# Patient Record
Sex: MALE | Race: WHITE | Employment: OTHER | URBAN - NONMETROPOLITAN AREA
[De-identification: names, ages, dates, MRNs, and addresses within clinical notes are randomized per-mention and may not be internally consistent; named-entity substitution may affect disease eponyms.]

---

## 2017-01-09 LAB
ALBUMIN: 3
BASOPHILS ABSOLUTE: ABNORMAL /ΜL
BASOPHILS RELATIVE PERCENT: ABNORMAL %
BUN BLDV-MCNC: 21 MG/DL
CALCIUM SERPL-MCNC: 8.8 MG/DL
CHLORIDE BLD-SCNC: 95 MMOL/L
CO2: 32.6 MMOL/L
CREAT SERPL-MCNC: 1.3 MG/DL
EOSINOPHILS ABSOLUTE: ABNORMAL /ΜL
EOSINOPHILS RELATIVE PERCENT: ABNORMAL %
GFR CALCULATED: 56
GLUCOSE BLD-MCNC: 203 MG/DL
HCT VFR BLD CALC: 33.6 % (ref 41–53)
HEMOGLOBIN: 11.4 G/DL (ref 13.5–17.5)
LYMPHOCYTES ABSOLUTE: ABNORMAL /ΜL
LYMPHOCYTES RELATIVE PERCENT: ABNORMAL %
MCH RBC QN AUTO: ABNORMAL PG
MCHC RBC AUTO-ENTMCNC: ABNORMAL G/DL
MCV RBC AUTO: ABNORMAL FL
MONOCYTES ABSOLUTE: ABNORMAL /ΜL
MONOCYTES RELATIVE PERCENT: ABNORMAL %
NEUTROPHILS ABSOLUTE: ABNORMAL /ΜL
NEUTROPHILS RELATIVE PERCENT: ABNORMAL %
PHOSPHORUS: 2.8 MG/DL
PLATELET # BLD: 412 K/ΜL
PMV BLD AUTO: ABNORMAL FL
POTASSIUM SERPL-SCNC: 3.9 MMOL/L
RBC # BLD: 3.58 10^6/ΜL
SODIUM BLD-SCNC: 138 MMOL/L
WBC # BLD: 11 10^3/ML

## 2017-01-11 ENCOUNTER — OFFICE VISIT (OUTPATIENT)
Dept: NEPHROLOGY | Age: 82
End: 2017-01-11

## 2017-01-11 VITALS — SYSTOLIC BLOOD PRESSURE: 132 MMHG | DIASTOLIC BLOOD PRESSURE: 60 MMHG | HEART RATE: 80 BPM | OXYGEN SATURATION: 94 %

## 2017-01-11 DIAGNOSIS — N18.30 CKD (CHRONIC KIDNEY DISEASE) STAGE 3, GFR 30-59 ML/MIN (HCC): Primary | Chronic | ICD-10-CM

## 2017-01-11 PROCEDURE — 99213 OFFICE O/P EST LOW 20 MIN: CPT | Performed by: INTERNAL MEDICINE

## 2017-01-11 RX ORDER — ALBUTEROL SULFATE 2.5 MG/3ML
2.5 SOLUTION RESPIRATORY (INHALATION) EVERY 6 HOURS PRN
Status: ON HOLD | COMMUNITY
End: 2017-05-23 | Stop reason: CLARIF

## 2017-01-11 RX ORDER — GABAPENTIN 300 MG/1
300 CAPSULE ORAL 3 TIMES DAILY
Status: ON HOLD | COMMUNITY
End: 2020-07-10 | Stop reason: SDUPTHER

## 2017-01-11 RX ORDER — PANTOPRAZOLE SODIUM 40 MG/1
40 TABLET, DELAYED RELEASE ORAL NIGHTLY
Status: ON HOLD | COMMUNITY
End: 2020-07-10 | Stop reason: HOSPADM

## 2017-01-11 RX ORDER — INSULIN HUMAN 100 [IU]/ML
INJECTION, SOLUTION PARENTERAL
COMMUNITY
Start: 2016-12-09 | End: 2017-05-22

## 2017-01-11 ASSESSMENT — ENCOUNTER SYMPTOMS
RESPIRATORY NEGATIVE: 1
SHORTNESS OF BREATH: 0
ABDOMINAL PAIN: 0
VOMITING: 0
FACIAL SWELLING: 0
BLOOD IN STOOL: 0
DIARRHEA: 0
COUGH: 0
WHEEZING: 0
NAUSEA: 0
CONSTIPATION: 0
CHEST TIGHTNESS: 0
BACK PAIN: 0
ABDOMINAL DISTENTION: 0
GASTROINTESTINAL NEGATIVE: 1

## 2017-02-07 ENCOUNTER — PROCEDURE VISIT (OUTPATIENT)
Dept: CARDIOLOGY | Age: 82
End: 2017-02-07

## 2017-02-07 DIAGNOSIS — Z95.0 PACEMAKER: Primary | ICD-10-CM

## 2017-02-07 PROCEDURE — 93280 PM DEVICE PROGR EVAL DUAL: CPT | Performed by: INTERNAL MEDICINE

## 2017-03-22 ENCOUNTER — TELEPHONE (OUTPATIENT)
Dept: CARDIOLOGY | Age: 82
End: 2017-03-22

## 2017-04-19 ENCOUNTER — PROCEDURE VISIT (OUTPATIENT)
Dept: CARDIOLOGY | Age: 82
End: 2017-04-19

## 2017-04-19 DIAGNOSIS — Z95.0 PACEMAKER: Primary | ICD-10-CM

## 2017-04-19 PROCEDURE — 93294 REM INTERROG EVL PM/LDLS PM: CPT | Performed by: INTERNAL MEDICINE

## 2017-04-19 PROCEDURE — 93296 REM INTERROG EVL PM/IDS: CPT | Performed by: INTERNAL MEDICINE

## 2017-05-15 ENCOUNTER — TELEPHONE (OUTPATIENT)
Dept: UROLOGY | Age: 82
End: 2017-05-15

## 2017-05-25 PROBLEM — J90 PLEURAL EFFUSION: Status: ACTIVE | Noted: 2017-05-25

## 2017-06-05 ENCOUNTER — PROCEDURE VISIT (OUTPATIENT)
Dept: UROLOGY | Age: 82
End: 2017-06-05

## 2017-06-05 VITALS
SYSTOLIC BLOOD PRESSURE: 100 MMHG | BODY MASS INDEX: 30.8 KG/M2 | WEIGHT: 220 LBS | DIASTOLIC BLOOD PRESSURE: 58 MMHG | HEIGHT: 71 IN

## 2017-06-05 DIAGNOSIS — R33.9 URINARY RETENTION: Primary | ICD-10-CM

## 2017-06-05 PROCEDURE — 52000 CYSTOURETHROSCOPY: CPT | Performed by: UROLOGY

## 2017-06-05 PROCEDURE — 99999 PR OFFICE/OUTPT VISIT,PROCEDURE ONLY: CPT | Performed by: UROLOGY

## 2017-06-05 PROCEDURE — 1036F TOBACCO NON-USER: CPT | Performed by: UROLOGY

## 2017-06-07 LAB
BUN BLDV-MCNC: 26 MG/DL
CALCIUM SERPL-MCNC: 8.4 MG/DL
CHLORIDE BLD-SCNC: 94 MMOL/L
CO2: 31.9 MMOL/L
CREAT SERPL-MCNC: 1.6 MG/DL
GFR CALCULATED: 44
GLUCOSE BLD-MCNC: 230 MG/DL
POTASSIUM SERPL-SCNC: 4.9 MMOL/L
SODIUM BLD-SCNC: 136 MMOL/L

## 2017-06-09 ENCOUNTER — OFFICE VISIT (OUTPATIENT)
Dept: NEPHROLOGY | Age: 82
End: 2017-06-09

## 2017-06-09 VITALS — SYSTOLIC BLOOD PRESSURE: 118 MMHG | DIASTOLIC BLOOD PRESSURE: 58 MMHG

## 2017-06-09 DIAGNOSIS — N18.30 CKD (CHRONIC KIDNEY DISEASE) STAGE 3, GFR 30-59 ML/MIN (HCC): Primary | Chronic | ICD-10-CM

## 2017-06-09 PROCEDURE — 1111F DSCHRG MED/CURRENT MED MERGE: CPT | Performed by: INTERNAL MEDICINE

## 2017-06-09 PROCEDURE — 99213 OFFICE O/P EST LOW 20 MIN: CPT | Performed by: INTERNAL MEDICINE

## 2017-06-09 PROCEDURE — 4040F PNEUMOC VAC/ADMIN/RCVD: CPT | Performed by: INTERNAL MEDICINE

## 2017-06-09 PROCEDURE — G8427 DOCREV CUR MEDS BY ELIG CLIN: HCPCS | Performed by: INTERNAL MEDICINE

## 2017-06-09 PROCEDURE — 1123F ACP DISCUSS/DSCN MKR DOCD: CPT | Performed by: INTERNAL MEDICINE

## 2017-06-09 PROCEDURE — 1036F TOBACCO NON-USER: CPT | Performed by: INTERNAL MEDICINE

## 2017-06-09 PROCEDURE — G8417 CALC BMI ABV UP PARAM F/U: HCPCS | Performed by: INTERNAL MEDICINE

## 2017-06-09 PROCEDURE — G8598 ASA/ANTIPLAT THER USED: HCPCS | Performed by: INTERNAL MEDICINE

## 2017-06-09 RX ORDER — FLUCONAZOLE 100 MG/1
100 TABLET ORAL DAILY
COMMUNITY
End: 2017-07-26 | Stop reason: ALTCHOICE

## 2017-06-09 RX ORDER — TRAMADOL HYDROCHLORIDE 50 MG/1
50 TABLET ORAL 2 TIMES DAILY
COMMUNITY
End: 2020-02-10 | Stop reason: SDUPTHER

## 2017-06-09 ASSESSMENT — ENCOUNTER SYMPTOMS
CHEST TIGHTNESS: 0
SHORTNESS OF BREATH: 0
WHEEZING: 0
FACIAL SWELLING: 0
NAUSEA: 0
GASTROINTESTINAL NEGATIVE: 1
VOMITING: 0
CONSTIPATION: 0
ABDOMINAL DISTENTION: 0
ABDOMINAL PAIN: 0
DIARRHEA: 0
BACK PAIN: 0
BLOOD IN STOOL: 0
RESPIRATORY NEGATIVE: 1
COUGH: 0

## 2017-06-27 ENCOUNTER — OFFICE VISIT (OUTPATIENT)
Dept: UROLOGY | Age: 82
End: 2017-06-27

## 2017-06-27 VITALS
SYSTOLIC BLOOD PRESSURE: 118 MMHG | WEIGHT: 222 LBS | DIASTOLIC BLOOD PRESSURE: 64 MMHG | HEIGHT: 72 IN | BODY MASS INDEX: 30.07 KG/M2

## 2017-06-27 DIAGNOSIS — R33.9 URINARY RETENTION: Primary | ICD-10-CM

## 2017-06-27 PROCEDURE — 1036F TOBACCO NON-USER: CPT | Performed by: NURSE PRACTITIONER

## 2017-06-27 PROCEDURE — 4040F PNEUMOC VAC/ADMIN/RCVD: CPT | Performed by: NURSE PRACTITIONER

## 2017-06-27 PROCEDURE — 99213 OFFICE O/P EST LOW 20 MIN: CPT | Performed by: NURSE PRACTITIONER

## 2017-06-27 PROCEDURE — G8417 CALC BMI ABV UP PARAM F/U: HCPCS | Performed by: NURSE PRACTITIONER

## 2017-06-27 PROCEDURE — 1123F ACP DISCUSS/DSCN MKR DOCD: CPT | Performed by: NURSE PRACTITIONER

## 2017-06-27 PROCEDURE — G8598 ASA/ANTIPLAT THER USED: HCPCS | Performed by: NURSE PRACTITIONER

## 2017-06-27 PROCEDURE — G8427 DOCREV CUR MEDS BY ELIG CLIN: HCPCS | Performed by: NURSE PRACTITIONER

## 2017-06-27 RX ORDER — DOCUSATE SODIUM 100 MG/1
100 CAPSULE, LIQUID FILLED ORAL 2 TIMES DAILY
COMMUNITY
End: 2017-12-08 | Stop reason: ALTCHOICE

## 2017-06-28 ENCOUNTER — OFFICE VISIT (OUTPATIENT)
Dept: OTOLARYNGOLOGY | Age: 82
End: 2017-06-28

## 2017-06-28 VITALS
TEMPERATURE: 98.1 F | SYSTOLIC BLOOD PRESSURE: 116 MMHG | RESPIRATION RATE: 16 BRPM | HEART RATE: 78 BPM | DIASTOLIC BLOOD PRESSURE: 60 MMHG

## 2017-06-28 DIAGNOSIS — H65.02 ACUTE SEROUS OTITIS MEDIA OF LEFT EAR, RECURRENCE NOT SPECIFIED: Primary | ICD-10-CM

## 2017-06-28 PROCEDURE — G8417 CALC BMI ABV UP PARAM F/U: HCPCS | Performed by: NURSE PRACTITIONER

## 2017-06-28 PROCEDURE — 99213 OFFICE O/P EST LOW 20 MIN: CPT | Performed by: NURSE PRACTITIONER

## 2017-06-28 PROCEDURE — G8427 DOCREV CUR MEDS BY ELIG CLIN: HCPCS | Performed by: NURSE PRACTITIONER

## 2017-06-28 PROCEDURE — 1123F ACP DISCUSS/DSCN MKR DOCD: CPT | Performed by: NURSE PRACTITIONER

## 2017-06-28 PROCEDURE — 1036F TOBACCO NON-USER: CPT | Performed by: NURSE PRACTITIONER

## 2017-06-28 PROCEDURE — 4040F PNEUMOC VAC/ADMIN/RCVD: CPT | Performed by: NURSE PRACTITIONER

## 2017-06-28 PROCEDURE — G8598 ASA/ANTIPLAT THER USED: HCPCS | Performed by: NURSE PRACTITIONER

## 2017-06-28 ASSESSMENT — ENCOUNTER SYMPTOMS
SINUS PRESSURE: 0
CONSTIPATION: 0
RHINORRHEA: 0
WHEEZING: 0
FACIAL SWELLING: 0
ANAL BLEEDING: 0
SHORTNESS OF BREATH: 0
PHOTOPHOBIA: 0
CHEST TIGHTNESS: 0
STRIDOR: 0
APNEA: 0
EYE REDNESS: 0
NAUSEA: 0
BACK PAIN: 0
TROUBLE SWALLOWING: 0
SORE THROAT: 0
EYE PAIN: 0
ABDOMINAL PAIN: 0
COLOR CHANGE: 0
ABDOMINAL DISTENTION: 0
EYE DISCHARGE: 0
VOMITING: 0
COUGH: 0
CHOKING: 0
EYE ITCHING: 0
RECTAL PAIN: 0
VOICE CHANGE: 0
DIARRHEA: 0
BLOOD IN STOOL: 0

## 2017-07-11 ENCOUNTER — OFFICE VISIT (OUTPATIENT)
Dept: CARDIOLOGY | Age: 82
End: 2017-07-11

## 2017-07-11 VITALS
HEART RATE: 72 BPM | SYSTOLIC BLOOD PRESSURE: 108 MMHG | WEIGHT: 215 LBS | BODY MASS INDEX: 29.16 KG/M2 | DIASTOLIC BLOOD PRESSURE: 60 MMHG

## 2017-07-11 DIAGNOSIS — I10 ESSENTIAL HYPERTENSION: Primary | ICD-10-CM

## 2017-07-11 DIAGNOSIS — I25.10 CORONARY ARTERY DISEASE INVOLVING NATIVE CORONARY ARTERY OF NATIVE HEART WITHOUT ANGINA PECTORIS: ICD-10-CM

## 2017-07-11 DIAGNOSIS — E78.01 FAMILIAL HYPERCHOLESTEROLEMIA: ICD-10-CM

## 2017-07-11 PROCEDURE — 99213 OFFICE O/P EST LOW 20 MIN: CPT | Performed by: NUCLEAR MEDICINE

## 2017-07-11 PROCEDURE — 4040F PNEUMOC VAC/ADMIN/RCVD: CPT | Performed by: NUCLEAR MEDICINE

## 2017-07-11 PROCEDURE — 1036F TOBACCO NON-USER: CPT | Performed by: NUCLEAR MEDICINE

## 2017-07-11 PROCEDURE — G8427 DOCREV CUR MEDS BY ELIG CLIN: HCPCS | Performed by: NUCLEAR MEDICINE

## 2017-07-11 PROCEDURE — G8417 CALC BMI ABV UP PARAM F/U: HCPCS | Performed by: NUCLEAR MEDICINE

## 2017-07-11 PROCEDURE — G8598 ASA/ANTIPLAT THER USED: HCPCS | Performed by: NUCLEAR MEDICINE

## 2017-07-11 PROCEDURE — 1123F ACP DISCUSS/DSCN MKR DOCD: CPT | Performed by: NUCLEAR MEDICINE

## 2017-07-11 RX ORDER — HYDROCODONE BITARTRATE AND ACETAMINOPHEN 5; 325 MG/1; MG/1
1 TABLET ORAL EVERY 4 HOURS PRN
COMMUNITY
End: 2020-05-12

## 2017-07-12 ENCOUNTER — TELEPHONE (OUTPATIENT)
Dept: UROLOGY | Age: 82
End: 2017-07-12

## 2017-07-20 ENCOUNTER — PROCEDURE VISIT (OUTPATIENT)
Dept: CARDIOLOGY CLINIC | Age: 82
End: 2017-07-20
Payer: MEDICARE

## 2017-07-20 DIAGNOSIS — Z95.0 PACEMAKER: Primary | ICD-10-CM

## 2017-07-20 PROCEDURE — 93294 REM INTERROG EVL PM/LDLS PM: CPT | Performed by: INTERNAL MEDICINE

## 2017-07-20 PROCEDURE — 93296 REM INTERROG EVL PM/IDS: CPT | Performed by: INTERNAL MEDICINE

## 2017-07-21 ENCOUNTER — OFFICE VISIT (OUTPATIENT)
Dept: ENT CLINIC | Age: 82
End: 2017-07-21
Payer: MEDICARE

## 2017-07-21 VITALS
TEMPERATURE: 98 F | RESPIRATION RATE: 24 BRPM | HEART RATE: 84 BPM | SYSTOLIC BLOOD PRESSURE: 120 MMHG | DIASTOLIC BLOOD PRESSURE: 70 MMHG | HEIGHT: 72 IN | WEIGHT: 215 LBS | BODY MASS INDEX: 29.12 KG/M2

## 2017-07-21 DIAGNOSIS — H91.91 HEARING LOSS, RIGHT: ICD-10-CM

## 2017-07-21 DIAGNOSIS — H65.21 RIGHT CHRONIC SEROUS OTITIS MEDIA: Primary | ICD-10-CM

## 2017-07-21 PROCEDURE — G8427 DOCREV CUR MEDS BY ELIG CLIN: HCPCS | Performed by: OTOLARYNGOLOGY

## 2017-07-21 PROCEDURE — 4040F PNEUMOC VAC/ADMIN/RCVD: CPT | Performed by: OTOLARYNGOLOGY

## 2017-07-21 PROCEDURE — G8598 ASA/ANTIPLAT THER USED: HCPCS | Performed by: OTOLARYNGOLOGY

## 2017-07-21 PROCEDURE — 1036F TOBACCO NON-USER: CPT | Performed by: OTOLARYNGOLOGY

## 2017-07-21 PROCEDURE — 1123F ACP DISCUSS/DSCN MKR DOCD: CPT | Performed by: OTOLARYNGOLOGY

## 2017-07-21 PROCEDURE — 69433 CREATE EARDRUM OPENING: CPT | Performed by: OTOLARYNGOLOGY

## 2017-07-21 PROCEDURE — 99212 OFFICE O/P EST SF 10 MIN: CPT | Performed by: OTOLARYNGOLOGY

## 2017-07-21 PROCEDURE — G8417 CALC BMI ABV UP PARAM F/U: HCPCS | Performed by: OTOLARYNGOLOGY

## 2017-07-21 RX ORDER — CYCLOBENZAPRINE HCL 10 MG
10 TABLET ORAL DAILY
Status: ON HOLD | COMMUNITY
Start: 2017-07-13 | End: 2022-10-11

## 2017-07-26 ENCOUNTER — OFFICE VISIT (OUTPATIENT)
Dept: UROLOGY | Age: 82
End: 2017-07-26
Payer: MEDICARE

## 2017-07-26 VITALS
SYSTOLIC BLOOD PRESSURE: 118 MMHG | WEIGHT: 215 LBS | HEIGHT: 72 IN | DIASTOLIC BLOOD PRESSURE: 66 MMHG | BODY MASS INDEX: 29.12 KG/M2

## 2017-07-26 DIAGNOSIS — N40.1 BENIGN NON-NODULAR PROSTATIC HYPERPLASIA WITH LOWER URINARY TRACT SYMPTOMS: Primary | ICD-10-CM

## 2017-07-26 LAB — POST VOID RESIDUAL (PVR): 590 ML

## 2017-07-26 PROCEDURE — G8427 DOCREV CUR MEDS BY ELIG CLIN: HCPCS | Performed by: NURSE PRACTITIONER

## 2017-07-26 PROCEDURE — G8598 ASA/ANTIPLAT THER USED: HCPCS | Performed by: NURSE PRACTITIONER

## 2017-07-26 PROCEDURE — G8417 CALC BMI ABV UP PARAM F/U: HCPCS | Performed by: NURSE PRACTITIONER

## 2017-07-26 PROCEDURE — 51702 INSERT TEMP BLADDER CATH: CPT | Performed by: NURSE PRACTITIONER

## 2017-07-26 PROCEDURE — 1036F TOBACCO NON-USER: CPT | Performed by: NURSE PRACTITIONER

## 2017-07-26 PROCEDURE — 1123F ACP DISCUSS/DSCN MKR DOCD: CPT | Performed by: NURSE PRACTITIONER

## 2017-07-26 PROCEDURE — 51798 US URINE CAPACITY MEASURE: CPT | Performed by: NURSE PRACTITIONER

## 2017-07-26 PROCEDURE — 4040F PNEUMOC VAC/ADMIN/RCVD: CPT | Performed by: NURSE PRACTITIONER

## 2017-07-26 PROCEDURE — 99213 OFFICE O/P EST LOW 20 MIN: CPT | Performed by: NURSE PRACTITIONER

## 2017-08-14 ENCOUNTER — OFFICE VISIT (OUTPATIENT)
Dept: ENT CLINIC | Age: 82
End: 2017-08-14
Payer: MEDICARE

## 2017-08-14 VITALS
TEMPERATURE: 97.7 F | RESPIRATION RATE: 18 BRPM | SYSTOLIC BLOOD PRESSURE: 120 MMHG | DIASTOLIC BLOOD PRESSURE: 70 MMHG | HEART RATE: 72 BPM

## 2017-08-14 DIAGNOSIS — Z96.22 S/P MYRINGOTOMY WITH INSERTION OF TUBE: Primary | ICD-10-CM

## 2017-08-14 PROCEDURE — 99213 OFFICE O/P EST LOW 20 MIN: CPT | Performed by: NURSE PRACTITIONER

## 2017-08-14 PROCEDURE — G8417 CALC BMI ABV UP PARAM F/U: HCPCS | Performed by: NURSE PRACTITIONER

## 2017-08-14 PROCEDURE — 1123F ACP DISCUSS/DSCN MKR DOCD: CPT | Performed by: NURSE PRACTITIONER

## 2017-08-14 PROCEDURE — G8427 DOCREV CUR MEDS BY ELIG CLIN: HCPCS | Performed by: NURSE PRACTITIONER

## 2017-08-14 PROCEDURE — G8598 ASA/ANTIPLAT THER USED: HCPCS | Performed by: NURSE PRACTITIONER

## 2017-08-14 PROCEDURE — 1036F TOBACCO NON-USER: CPT | Performed by: NURSE PRACTITIONER

## 2017-08-14 PROCEDURE — 4040F PNEUMOC VAC/ADMIN/RCVD: CPT | Performed by: NURSE PRACTITIONER

## 2017-08-14 ASSESSMENT — ENCOUNTER SYMPTOMS
SHORTNESS OF BREATH: 0
SINUS PRESSURE: 0
COLOR CHANGE: 0
WHEEZING: 0
VOICE CHANGE: 0
DIARRHEA: 0
BLOOD IN STOOL: 0
EYE DISCHARGE: 0
VOMITING: 0
CONSTIPATION: 0
PHOTOPHOBIA: 0
ABDOMINAL DISTENTION: 0
SORE THROAT: 0
ABDOMINAL PAIN: 0
EYE PAIN: 0
FACIAL SWELLING: 0
APNEA: 0
RHINORRHEA: 0
STRIDOR: 0
CHEST TIGHTNESS: 0
EYE REDNESS: 0
TROUBLE SWALLOWING: 0
EYE ITCHING: 0
BACK PAIN: 0
NAUSEA: 0
RECTAL PAIN: 0
ANAL BLEEDING: 0
CHOKING: 0
COUGH: 0

## 2017-10-18 ENCOUNTER — PROCEDURE VISIT (OUTPATIENT)
Dept: CARDIOLOGY CLINIC | Age: 82
End: 2017-10-18

## 2017-10-18 DIAGNOSIS — Z95.0 PACEMAKER: Primary | ICD-10-CM

## 2017-12-05 LAB
BUN BLDV-MCNC: 40 MG/DL
CALCIUM SERPL-MCNC: 8.4 MG/DL
CHLORIDE BLD-SCNC: 102 MMOL/L
CO2: 29.9 MMOL/L
CREAT SERPL-MCNC: 1.9 MG/DL
CREATININE, URINE: 75.4
GFR CALCULATED: 36
GLUCOSE BLD-MCNC: 122 MG/DL
MICROALBUMIN/CREAT 24H UR: 8.1 MG/G{CREAT}
MICROALBUMIN/CREAT UR-RTO: 107
POTASSIUM SERPL-SCNC: 4.3 MMOL/L
SODIUM BLD-SCNC: 140 MMOL/L

## 2017-12-08 ENCOUNTER — OFFICE VISIT (OUTPATIENT)
Dept: NEPHROLOGY | Age: 82
End: 2017-12-08
Payer: MEDICARE

## 2017-12-08 VITALS — DIASTOLIC BLOOD PRESSURE: 75 MMHG | HEART RATE: 79 BPM | OXYGEN SATURATION: 98 % | SYSTOLIC BLOOD PRESSURE: 116 MMHG

## 2017-12-08 DIAGNOSIS — N18.30 CKD (CHRONIC KIDNEY DISEASE) STAGE 3, GFR 30-59 ML/MIN (HCC): Primary | Chronic | ICD-10-CM

## 2017-12-08 PROCEDURE — 4040F PNEUMOC VAC/ADMIN/RCVD: CPT | Performed by: INTERNAL MEDICINE

## 2017-12-08 PROCEDURE — G8417 CALC BMI ABV UP PARAM F/U: HCPCS | Performed by: INTERNAL MEDICINE

## 2017-12-08 PROCEDURE — G8598 ASA/ANTIPLAT THER USED: HCPCS | Performed by: INTERNAL MEDICINE

## 2017-12-08 PROCEDURE — 1036F TOBACCO NON-USER: CPT | Performed by: INTERNAL MEDICINE

## 2017-12-08 PROCEDURE — 1123F ACP DISCUSS/DSCN MKR DOCD: CPT | Performed by: INTERNAL MEDICINE

## 2017-12-08 PROCEDURE — G8427 DOCREV CUR MEDS BY ELIG CLIN: HCPCS | Performed by: INTERNAL MEDICINE

## 2017-12-08 PROCEDURE — G8484 FLU IMMUNIZE NO ADMIN: HCPCS | Performed by: INTERNAL MEDICINE

## 2017-12-08 PROCEDURE — 99213 OFFICE O/P EST LOW 20 MIN: CPT | Performed by: INTERNAL MEDICINE

## 2017-12-08 RX ORDER — MELOXICAM 15 MG/1
15 TABLET ORAL DAILY
COMMUNITY
End: 2019-06-07 | Stop reason: SINTOL

## 2017-12-08 ASSESSMENT — ENCOUNTER SYMPTOMS
CHEST TIGHTNESS: 0
BACK PAIN: 0
VOMITING: 0
COUGH: 0
ABDOMINAL PAIN: 0
BLOOD IN STOOL: 0
NAUSEA: 0
RESPIRATORY NEGATIVE: 1
ABDOMINAL DISTENTION: 0
CONSTIPATION: 0
DIARRHEA: 0
WHEEZING: 0
GASTROINTESTINAL NEGATIVE: 1
FACIAL SWELLING: 0
SHORTNESS OF BREATH: 0

## 2017-12-08 NOTE — PROGRESS NOTES
Neck: No JVD present. Cardiovascular: Normal rate, regular rhythm, normal heart sounds and intact distal pulses. Exam reveals no gallop and no friction rub. No murmur heard. Pulmonary/Chest: Effort normal and breath sounds normal. No respiratory distress. He has no wheezes. He has no rales. He exhibits no tenderness. Abdominal: Soft. Bowel sounds are normal. He exhibits no distension. There is no tenderness. There is no guarding. Musculoskeletal: Normal range of motion. He exhibits no edema or tenderness. Lymphadenopathy:     He has no cervical adenopathy. Neurological: He is alert and oriented to person, place, and time. Skin: Skin is warm and dry. No rash noted. He is not diaphoretic. No pallor. Psychiatric: He has a normal mood and affect. His behavior is normal. Judgment and thought content normal.   Nursing note and vitals reviewed.     CBC:   Lab Results   Component Value Date    WBC 10.6 05/25/2017    HGB 8.2 (L) 05/25/2017    HCT 25.4 (L) 05/25/2017    MCV 94.5 (H) 05/25/2017     (H) 05/25/2017     BMP:    Lab Results   Component Value Date     12/05/2017     06/07/2017     05/26/2017    K 4.3 12/05/2017    K 4.9 06/07/2017    K 3.9 05/26/2017     12/05/2017    CL 94 06/07/2017    CL 99 05/26/2017    CO2 29.9 12/05/2017    CO2 31.9 06/07/2017    CO2 26 05/26/2017    BUN 40 12/05/2017    BUN 26 06/07/2017    BUN 30 (H) 05/26/2017    CREATININE 1.9 12/05/2017    CREATININE 1.6 06/07/2017    CREATININE 2.0 (H) 05/26/2017    GLUCOSE 122 12/05/2017    GLUCOSE 230 06/07/2017    GLUCOSE 245 (H) 05/26/2017      Hepatic:   Lab Results   Component Value Date    AST 17 05/22/2017    AST 18 06/10/2016    AST 20 05/12/2015    ALT 9 (L) 05/22/2017    ALT 20 06/10/2016    ALT 20 05/12/2015    BILITOT 0.3 05/22/2017    BILITOT 0.4 06/10/2016    BILITOT 0.4 05/12/2015    ALKPHOS 84 05/22/2017    ALKPHOS 57 06/10/2016    ALKPHOS 52 05/12/2015     BNP: No results found for:

## 2018-01-24 ENCOUNTER — OFFICE VISIT (OUTPATIENT)
Dept: UROLOGY | Age: 83
End: 2018-01-24
Payer: MEDICARE

## 2018-01-24 VITALS
DIASTOLIC BLOOD PRESSURE: 58 MMHG | WEIGHT: 235 LBS | HEIGHT: 72 IN | SYSTOLIC BLOOD PRESSURE: 108 MMHG | BODY MASS INDEX: 31.83 KG/M2

## 2018-01-24 DIAGNOSIS — N31.9 NEUROGENIC BLADDER: ICD-10-CM

## 2018-01-24 DIAGNOSIS — Z87.898 HISTORY OF GROSS HEMATURIA: ICD-10-CM

## 2018-01-24 DIAGNOSIS — R33.9 URINARY RETENTION: Primary | ICD-10-CM

## 2018-01-24 PROCEDURE — 1123F ACP DISCUSS/DSCN MKR DOCD: CPT | Performed by: NURSE PRACTITIONER

## 2018-01-24 PROCEDURE — G8427 DOCREV CUR MEDS BY ELIG CLIN: HCPCS | Performed by: NURSE PRACTITIONER

## 2018-01-24 PROCEDURE — G8484 FLU IMMUNIZE NO ADMIN: HCPCS | Performed by: NURSE PRACTITIONER

## 2018-01-24 PROCEDURE — 99213 OFFICE O/P EST LOW 20 MIN: CPT | Performed by: NURSE PRACTITIONER

## 2018-01-24 PROCEDURE — G8417 CALC BMI ABV UP PARAM F/U: HCPCS | Performed by: NURSE PRACTITIONER

## 2018-01-24 PROCEDURE — 1036F TOBACCO NON-USER: CPT | Performed by: NURSE PRACTITIONER

## 2018-01-24 PROCEDURE — 4040F PNEUMOC VAC/ADMIN/RCVD: CPT | Performed by: NURSE PRACTITIONER

## 2018-01-24 PROCEDURE — G8599 NO ASA/ANTIPLAT THER USE RNG: HCPCS | Performed by: NURSE PRACTITIONER

## 2018-01-24 ASSESSMENT — ENCOUNTER SYMPTOMS
NAUSEA: 0
ABDOMINAL PAIN: 0
VOMITING: 0

## 2018-02-06 ENCOUNTER — NURSE ONLY (OUTPATIENT)
Dept: CARDIOLOGY CLINIC | Age: 83
End: 2018-02-06
Payer: MEDICARE

## 2018-02-06 DIAGNOSIS — Z95.0 PACEMAKER: Primary | ICD-10-CM

## 2018-02-06 PROCEDURE — 93280 PM DEVICE PROGR EVAL DUAL: CPT | Performed by: INTERNAL MEDICINE

## 2018-02-14 ENCOUNTER — OFFICE VISIT (OUTPATIENT)
Dept: ENT CLINIC | Age: 83
End: 2018-02-14
Payer: MEDICARE

## 2018-02-14 VITALS
HEART RATE: 78 BPM | SYSTOLIC BLOOD PRESSURE: 120 MMHG | DIASTOLIC BLOOD PRESSURE: 70 MMHG | RESPIRATION RATE: 18 BRPM | TEMPERATURE: 97.8 F

## 2018-02-14 DIAGNOSIS — Z96.22 S/P TYMPANOSTOMY TUBE PLACEMENT: Primary | ICD-10-CM

## 2018-02-14 PROCEDURE — 99213 OFFICE O/P EST LOW 20 MIN: CPT | Performed by: NURSE PRACTITIONER

## 2018-02-14 PROCEDURE — G8599 NO ASA/ANTIPLAT THER USE RNG: HCPCS | Performed by: NURSE PRACTITIONER

## 2018-02-14 PROCEDURE — 4040F PNEUMOC VAC/ADMIN/RCVD: CPT | Performed by: NURSE PRACTITIONER

## 2018-02-14 PROCEDURE — G8427 DOCREV CUR MEDS BY ELIG CLIN: HCPCS | Performed by: NURSE PRACTITIONER

## 2018-02-14 PROCEDURE — 1036F TOBACCO NON-USER: CPT | Performed by: NURSE PRACTITIONER

## 2018-02-14 PROCEDURE — G8417 CALC BMI ABV UP PARAM F/U: HCPCS | Performed by: NURSE PRACTITIONER

## 2018-02-14 PROCEDURE — G8484 FLU IMMUNIZE NO ADMIN: HCPCS | Performed by: NURSE PRACTITIONER

## 2018-02-14 PROCEDURE — 1123F ACP DISCUSS/DSCN MKR DOCD: CPT | Performed by: NURSE PRACTITIONER

## 2018-02-14 ASSESSMENT — ENCOUNTER SYMPTOMS
APNEA: 0
WHEEZING: 0
COLOR CHANGE: 0
RECTAL PAIN: 0
ANAL BLEEDING: 0
BLOOD IN STOOL: 0
SORE THROAT: 0
COUGH: 0
STRIDOR: 0
SHORTNESS OF BREATH: 0
VOMITING: 0
EYE REDNESS: 0
FACIAL SWELLING: 0
VOICE CHANGE: 0
SINUS PRESSURE: 0
NAUSEA: 0
EYE PAIN: 0
DIARRHEA: 0
CHEST TIGHTNESS: 0
CONSTIPATION: 0
RHINORRHEA: 0
ABDOMINAL DISTENTION: 0
TROUBLE SWALLOWING: 0
PHOTOPHOBIA: 0
BACK PAIN: 0
EYE DISCHARGE: 0
CHOKING: 0
EYE ITCHING: 0
ABDOMINAL PAIN: 0

## 2018-03-07 ENCOUNTER — TELEPHONE (OUTPATIENT)
Dept: CARDIOLOGY CLINIC | Age: 83
End: 2018-03-07

## 2018-05-08 ENCOUNTER — PROCEDURE VISIT (OUTPATIENT)
Dept: CARDIOLOGY CLINIC | Age: 83
End: 2018-05-08
Payer: MEDICARE

## 2018-05-08 DIAGNOSIS — Z95.0 PACEMAKER: Primary | ICD-10-CM

## 2018-05-08 PROCEDURE — 93296 REM INTERROG EVL PM/IDS: CPT | Performed by: INTERNAL MEDICINE

## 2018-05-08 PROCEDURE — 93294 REM INTERROG EVL PM/LDLS PM: CPT | Performed by: INTERNAL MEDICINE

## 2018-05-09 ENCOUNTER — PROCEDURE VISIT (OUTPATIENT)
Dept: CARDIOLOGY CLINIC | Age: 83
End: 2018-05-09

## 2018-05-09 DIAGNOSIS — Z95.0 PACEMAKER: Primary | ICD-10-CM

## 2018-06-05 LAB
BUN BLDV-MCNC: 32 MG/DL
CALCIUM SERPL-MCNC: 8.1 MG/DL
CHLORIDE BLD-SCNC: 102 MMOL/L
CO2: 27 MMOL/L
CREAT SERPL-MCNC: 1.8 MG/DL
CREATININE, URINE: 68.9
GFR CALCULATED: 38
GLUCOSE BLD-MCNC: 154 MG/DL
MICROALBUMIN/CREAT 24H UR: 4 MG/G{CREAT}
MICROALBUMIN/CREAT UR-RTO: 58
POTASSIUM SERPL-SCNC: 4.4 MMOL/L
SODIUM BLD-SCNC: 139 MMOL/L

## 2018-06-06 ENCOUNTER — OFFICE VISIT (OUTPATIENT)
Dept: NEPHROLOGY | Age: 83
End: 2018-06-06
Payer: MEDICARE

## 2018-06-06 VITALS — SYSTOLIC BLOOD PRESSURE: 122 MMHG | DIASTOLIC BLOOD PRESSURE: 69 MMHG | OXYGEN SATURATION: 97 % | HEART RATE: 75 BPM

## 2018-06-06 DIAGNOSIS — N18.30 CHRONIC RENAL FAILURE, STAGE 3 (MODERATE) (HCC): Primary | ICD-10-CM

## 2018-06-06 PROCEDURE — G8427 DOCREV CUR MEDS BY ELIG CLIN: HCPCS | Performed by: INTERNAL MEDICINE

## 2018-06-06 PROCEDURE — 4040F PNEUMOC VAC/ADMIN/RCVD: CPT | Performed by: INTERNAL MEDICINE

## 2018-06-06 PROCEDURE — G8599 NO ASA/ANTIPLAT THER USE RNG: HCPCS | Performed by: INTERNAL MEDICINE

## 2018-06-06 PROCEDURE — 1123F ACP DISCUSS/DSCN MKR DOCD: CPT | Performed by: INTERNAL MEDICINE

## 2018-06-06 PROCEDURE — 99213 OFFICE O/P EST LOW 20 MIN: CPT | Performed by: INTERNAL MEDICINE

## 2018-06-06 PROCEDURE — 1036F TOBACCO NON-USER: CPT | Performed by: INTERNAL MEDICINE

## 2018-06-06 PROCEDURE — G8417 CALC BMI ABV UP PARAM F/U: HCPCS | Performed by: INTERNAL MEDICINE

## 2018-06-06 ASSESSMENT — ENCOUNTER SYMPTOMS
RESPIRATORY NEGATIVE: 1
GASTROINTESTINAL NEGATIVE: 1

## 2018-06-12 ENCOUNTER — OFFICE VISIT (OUTPATIENT)
Dept: CARDIOLOGY CLINIC | Age: 83
End: 2018-06-12
Payer: MEDICARE

## 2018-06-12 VITALS — DIASTOLIC BLOOD PRESSURE: 56 MMHG | HEART RATE: 80 BPM | SYSTOLIC BLOOD PRESSURE: 110 MMHG

## 2018-06-12 DIAGNOSIS — I10 ESSENTIAL HYPERTENSION: Primary | ICD-10-CM

## 2018-06-12 DIAGNOSIS — E78.01 FAMILIAL HYPERCHOLESTEROLEMIA: ICD-10-CM

## 2018-06-12 DIAGNOSIS — I25.10 CORONARY ARTERY DISEASE INVOLVING NATIVE CORONARY ARTERY OF NATIVE HEART WITHOUT ANGINA PECTORIS: ICD-10-CM

## 2018-06-12 PROCEDURE — 4040F PNEUMOC VAC/ADMIN/RCVD: CPT | Performed by: NUCLEAR MEDICINE

## 2018-06-12 PROCEDURE — 1036F TOBACCO NON-USER: CPT | Performed by: NUCLEAR MEDICINE

## 2018-06-12 PROCEDURE — G8427 DOCREV CUR MEDS BY ELIG CLIN: HCPCS | Performed by: NUCLEAR MEDICINE

## 2018-06-12 PROCEDURE — 1123F ACP DISCUSS/DSCN MKR DOCD: CPT | Performed by: NUCLEAR MEDICINE

## 2018-06-12 PROCEDURE — G8417 CALC BMI ABV UP PARAM F/U: HCPCS | Performed by: NUCLEAR MEDICINE

## 2018-06-12 PROCEDURE — G8599 NO ASA/ANTIPLAT THER USE RNG: HCPCS | Performed by: NUCLEAR MEDICINE

## 2018-06-12 PROCEDURE — 99213 OFFICE O/P EST LOW 20 MIN: CPT | Performed by: NUCLEAR MEDICINE

## 2018-08-07 ENCOUNTER — PROCEDURE VISIT (OUTPATIENT)
Dept: CARDIOLOGY CLINIC | Age: 83
End: 2018-08-07

## 2018-08-07 DIAGNOSIS — Z95.0 PACEMAKER: Primary | ICD-10-CM

## 2018-08-15 ENCOUNTER — OFFICE VISIT (OUTPATIENT)
Dept: ENT CLINIC | Age: 83
End: 2018-08-15
Payer: MEDICARE

## 2018-08-15 VITALS
BODY MASS INDEX: 33.23 KG/M2 | RESPIRATION RATE: 16 BRPM | SYSTOLIC BLOOD PRESSURE: 116 MMHG | TEMPERATURE: 97.6 F | HEART RATE: 68 BPM | DIASTOLIC BLOOD PRESSURE: 68 MMHG | WEIGHT: 245 LBS

## 2018-08-15 DIAGNOSIS — Z96.22 S/P TYMPANOSTOMY TUBE PLACEMENT: Primary | ICD-10-CM

## 2018-08-15 PROCEDURE — 1101F PT FALLS ASSESS-DOCD LE1/YR: CPT | Performed by: NURSE PRACTITIONER

## 2018-08-15 PROCEDURE — 99213 OFFICE O/P EST LOW 20 MIN: CPT | Performed by: NURSE PRACTITIONER

## 2018-08-15 PROCEDURE — G8427 DOCREV CUR MEDS BY ELIG CLIN: HCPCS | Performed by: NURSE PRACTITIONER

## 2018-08-15 PROCEDURE — 1123F ACP DISCUSS/DSCN MKR DOCD: CPT | Performed by: NURSE PRACTITIONER

## 2018-08-15 PROCEDURE — 4040F PNEUMOC VAC/ADMIN/RCVD: CPT | Performed by: NURSE PRACTITIONER

## 2018-08-15 PROCEDURE — G8599 NO ASA/ANTIPLAT THER USE RNG: HCPCS | Performed by: NURSE PRACTITIONER

## 2018-08-15 PROCEDURE — G8417 CALC BMI ABV UP PARAM F/U: HCPCS | Performed by: NURSE PRACTITIONER

## 2018-08-15 PROCEDURE — 1036F TOBACCO NON-USER: CPT | Performed by: NURSE PRACTITIONER

## 2018-08-15 ASSESSMENT — ENCOUNTER SYMPTOMS
COLOR CHANGE: 0
BLOOD IN STOOL: 0
VOICE CHANGE: 0
RHINORRHEA: 0
ABDOMINAL DISTENTION: 0
VOMITING: 0
DIARRHEA: 0
CHOKING: 0
CHEST TIGHTNESS: 0
PHOTOPHOBIA: 0
WHEEZING: 0
SORE THROAT: 0
COUGH: 0
EYE DISCHARGE: 0
NAUSEA: 0
SINUS PRESSURE: 0
ANAL BLEEDING: 0
STRIDOR: 0
RECTAL PAIN: 0
EYE PAIN: 0
BACK PAIN: 0
ABDOMINAL PAIN: 0
TROUBLE SWALLOWING: 0
EYE REDNESS: 0
SHORTNESS OF BREATH: 0
EYE ITCHING: 0
APNEA: 0
FACIAL SWELLING: 0
CONSTIPATION: 0

## 2018-11-05 NOTE — PROGRESS NOTES
[facility]  [facility]  [address]  Dept: [phone]  Dept Fax: [phone]  Loc: [phone]    Monie Brown is a 80 y.o. male who was referred by No ref. provider found for:  Chief Complaint   Patient presents with    6 Month Follow-Up     tube check   . HPI:       Patient here for follow up after left PET placed 7/21/17 in office with Dr Belia Jones. His left hearing improved greatly. No ear pain or drainage. Subjective:        Review of Systems   Constitutional: Negative for activity change, appetite change, chills, diaphoresis, fatigue, fever and unexpected weight change. HENT: Negative for congestion, dental problem, drooling, ear discharge, ear pain, facial swelling, hearing loss, mouth sores, nosebleeds, postnasal drip, rhinorrhea, sinus pressure, sneezing, sore throat, tinnitus, trouble swallowing and voice change. Eyes: Negative for photophobia, pain, discharge, redness, itching and visual disturbance. Respiratory: Negative for apnea, cough, choking, chest tightness, shortness of breath, wheezing and stridor. Cardiovascular: Negative for chest pain, palpitations and leg swelling. Gastrointestinal: Negative for abdominal distention, abdominal pain, anal bleeding, blood in stool, constipation, diarrhea, nausea, rectal pain and vomiting. Endocrine: Negative for cold intolerance, heat intolerance, polydipsia, polyphagia and polyuria. Genitourinary: Negative for decreased urine volume, difficulty urinating, discharge, dysuria, enuresis, flank pain, frequency, genital sores, hematuria, penile pain, penile swelling, scrotal swelling, testicular pain and urgency. Musculoskeletal: Negative for arthralgias, back pain, gait problem, joint swelling, myalgias, neck pain and neck stiffness. Skin: Negative for color change, pallor, rash and wound.    Allergic/Immunologic: Negative for environmental allergies, food allergies 10

## 2018-11-08 ENCOUNTER — PROCEDURE VISIT (OUTPATIENT)
Dept: CARDIOLOGY CLINIC | Age: 83
End: 2018-11-08
Payer: MEDICARE

## 2018-11-08 DIAGNOSIS — Z95.0 PACEMAKER: Primary | ICD-10-CM

## 2018-11-08 PROCEDURE — 93294 REM INTERROG EVL PM/LDLS PM: CPT | Performed by: INTERNAL MEDICINE

## 2018-11-08 PROCEDURE — 93296 REM INTERROG EVL PM/IDS: CPT | Performed by: INTERNAL MEDICINE

## 2019-03-06 ENCOUNTER — OFFICE VISIT (OUTPATIENT)
Dept: ENT CLINIC | Age: 84
End: 2019-03-06
Payer: MEDICARE

## 2019-03-06 VITALS
WEIGHT: 250 LBS | TEMPERATURE: 97.7 F | DIASTOLIC BLOOD PRESSURE: 72 MMHG | HEART RATE: 64 BPM | SYSTOLIC BLOOD PRESSURE: 136 MMHG | RESPIRATION RATE: 12 BRPM | BODY MASS INDEX: 33.91 KG/M2

## 2019-03-06 DIAGNOSIS — Z96.22 S/P TYMPANOSTOMY TUBE PLACEMENT: Primary | ICD-10-CM

## 2019-03-06 DIAGNOSIS — H61.22 LEFT EAR IMPACTED CERUMEN: ICD-10-CM

## 2019-03-06 PROCEDURE — G8484 FLU IMMUNIZE NO ADMIN: HCPCS | Performed by: NURSE PRACTITIONER

## 2019-03-06 PROCEDURE — G8599 NO ASA/ANTIPLAT THER USE RNG: HCPCS | Performed by: NURSE PRACTITIONER

## 2019-03-06 PROCEDURE — 4040F PNEUMOC VAC/ADMIN/RCVD: CPT | Performed by: NURSE PRACTITIONER

## 2019-03-06 PROCEDURE — G8417 CALC BMI ABV UP PARAM F/U: HCPCS | Performed by: NURSE PRACTITIONER

## 2019-03-06 PROCEDURE — 1036F TOBACCO NON-USER: CPT | Performed by: NURSE PRACTITIONER

## 2019-03-06 PROCEDURE — 99213 OFFICE O/P EST LOW 20 MIN: CPT | Performed by: NURSE PRACTITIONER

## 2019-03-06 PROCEDURE — 1123F ACP DISCUSS/DSCN MKR DOCD: CPT | Performed by: NURSE PRACTITIONER

## 2019-03-06 PROCEDURE — G8427 DOCREV CUR MEDS BY ELIG CLIN: HCPCS | Performed by: NURSE PRACTITIONER

## 2019-03-06 ASSESSMENT — ENCOUNTER SYMPTOMS
COUGH: 0
COLOR CHANGE: 0
VOICE CHANGE: 0
SHORTNESS OF BREATH: 0
CHOKING: 0
DIARRHEA: 0
FACIAL SWELLING: 0
ABDOMINAL PAIN: 0
SINUS PRESSURE: 0
CHEST TIGHTNESS: 0
SORE THROAT: 0
STRIDOR: 0
WHEEZING: 0
TROUBLE SWALLOWING: 0
APNEA: 0
RHINORRHEA: 0
VOMITING: 0
NAUSEA: 0

## 2019-03-06 NOTE — PROGRESS NOTES
[address] [facility] [address], NOSE AND THROAT  [facility]  [facility] [address] [address]  Dept: [phone]  Dept Fax: [phone]  Loc: [phone]    Katina Oviedo is a 80 y.o. male who was referred by No ref. provider found for:  Chief Complaint   Patient presents with    Follow-up     Patient here for 6 month follow up. Angeli Mckeon HPI:       Patient here with his wife for follow up after left PET placed 7/21/17 in office with Dr Regla Burgos. Janki Dill ear pain or drainage. No changes in hearing. No longer getting HBO therapy. Subjective:        Review of Systems   Constitutional: Negative for activity change, appetite change, chills, diaphoresis, fatigue, fever and unexpected weight change. HENT: Negative for congestion, dental problem, ear discharge, ear pain, facial swelling, hearing loss, mouth sores, nosebleeds, postnasal drip, rhinorrhea, sinus pressure, sneezing, sore throat, tinnitus, trouble swallowing and voice change. Eyes: Negative for visual disturbance. Respiratory: Negative for apnea, cough, choking, chest tightness, shortness of breath, wheezing and stridor. Cardiovascular: Negative for chest pain, palpitations and leg swelling. Gastrointestinal: Negative for abdominal pain, diarrhea, nausea and vomiting. Endocrine: Negative for cold intolerance, heat intolerance, polydipsia and polyuria. Genitourinary: Negative for difficulty urinating, discharge, dysuria, enuresis, hematuria, penile pain, penile swelling, scrotal swelling, testicular pain and urgency. Musculoskeletal: Negative for arthralgias, gait problem, neck pain and neck stiffness. Skin: Negative for color change, rash and wound. Allergic/Immunologic: Negative for environmental allergies, food allergies and immunocompromised state. Neurological: Positive for seizures. Negative for dizziness, syncope, facial asymmetry, speech difficulty, light-headedness and headaches.

## 2019-04-02 ENCOUNTER — NURSE ONLY (OUTPATIENT)
Dept: CARDIOLOGY CLINIC | Age: 84
End: 2019-04-02
Payer: MEDICARE

## 2019-04-02 DIAGNOSIS — Z95.0 PACEMAKER: Primary | ICD-10-CM

## 2019-04-02 PROCEDURE — 93288 INTERROG EVL PM/LDLS PM IP: CPT | Performed by: INTERNAL MEDICINE

## 2019-05-31 LAB
BUN BLDV-MCNC: 38 MG/DL
CALCIUM SERPL-MCNC: 8.2 MG/DL
CHLORIDE BLD-SCNC: 101 MMOL/L
CO2: 28 MMOL/L
CREAT SERPL-MCNC: 1.7 MG/DL
CREATININE URINE: 70.6 MG/DL
GFR CALCULATED: 41
GLUCOSE BLD-MCNC: 151 MG/DL
MICROALBUMIN/CREAT 24H UR: NORMAL MG/G{CREAT}
POTASSIUM SERPL-SCNC: 4.3 MMOL/L
SODIUM BLD-SCNC: 139 MMOL/L

## 2019-06-06 LAB
BUN BLDV-MCNC: 38 MG/DL
CALCIUM SERPL-MCNC: 8.5 MG/DL
CHLORIDE BLD-SCNC: 100 MMOL/L
CO2: 29 MMOL/L
CREAT SERPL-MCNC: 1.7 MG/DL
GFR CALCULATED: 41
GLUCOSE BLD-MCNC: 119 MG/DL
POTASSIUM SERPL-SCNC: 4.5 MMOL/L
SODIUM BLD-SCNC: 136 MMOL/L

## 2019-06-07 ENCOUNTER — OFFICE VISIT (OUTPATIENT)
Dept: NEPHROLOGY | Age: 84
End: 2019-06-07
Payer: MEDICARE

## 2019-06-07 VITALS — SYSTOLIC BLOOD PRESSURE: 118 MMHG | OXYGEN SATURATION: 98 % | DIASTOLIC BLOOD PRESSURE: 70 MMHG | HEART RATE: 65 BPM

## 2019-06-07 DIAGNOSIS — N18.30 CHRONIC RENAL FAILURE, STAGE 3 (MODERATE) (HCC): Primary | ICD-10-CM

## 2019-06-07 DIAGNOSIS — I10 ESSENTIAL HYPERTENSION: ICD-10-CM

## 2019-06-07 DIAGNOSIS — R33.9 URINARY RETENTION: ICD-10-CM

## 2019-06-07 PROCEDURE — 99213 OFFICE O/P EST LOW 20 MIN: CPT | Performed by: INTERNAL MEDICINE

## 2019-06-07 PROCEDURE — 1123F ACP DISCUSS/DSCN MKR DOCD: CPT | Performed by: INTERNAL MEDICINE

## 2019-06-07 PROCEDURE — 1036F TOBACCO NON-USER: CPT | Performed by: INTERNAL MEDICINE

## 2019-06-07 PROCEDURE — G8417 CALC BMI ABV UP PARAM F/U: HCPCS | Performed by: INTERNAL MEDICINE

## 2019-06-07 PROCEDURE — 4040F PNEUMOC VAC/ADMIN/RCVD: CPT | Performed by: INTERNAL MEDICINE

## 2019-06-07 PROCEDURE — G8599 NO ASA/ANTIPLAT THER USE RNG: HCPCS | Performed by: INTERNAL MEDICINE

## 2019-06-07 PROCEDURE — G8427 DOCREV CUR MEDS BY ELIG CLIN: HCPCS | Performed by: INTERNAL MEDICINE

## 2019-06-07 NOTE — PROGRESS NOTES
[facility]    [facility], [address]   [facility], [address] [facility]  [phone]  Progress Note  6/7/2019 11:55 AM      Pt Name:    Etta Solares  YOB: 1934  Primary Care Physician:  Derry Castleman, MD     Chief Complaint:   Chief Complaint   Patient presents with    Follow-up     CKD III        History of Present Illness: This is a follow-up visit for CKD 3. The patient was last seen in clinic 1 year ago by Dr. Werner Ewing. Since the last visit the patient denies any hospitalizations or illnesses. He is residing at [facility]. Hx of DM since 1985 +retinopathy + neuropathy, CAD s/p stent (follows with Christiano Jurado), +pacemaker, BPH, urinary retention with chronic engel catheter, HTN, hyperlipidemia, OA.  + left BKA . He has LE edema, he is on Bumex 1 mg BID. Thinks swelling is worsening. Denies any shortness of breath. He takes Mobic daily. Pertinent items are noted in HPI. Past History:  Past Medical History:   Diagnosis Date    BPH (benign prostatic hyperplasia)     Chronic kidney disease     CKD (chronic kidney disease) stage 3, GFR 30-59 ml/min ([facility]) 4/16/2015    Edema     Hiatal hernia     Hyperlipidemia     Hypertension     Osteoarthritis     S/P PTCA: 5/12/2015: Stenting of proximal left circumflex artery with Xience 2.75X15 mm, post-dilated to 3.70 mm. 5/12/2015 5/12/2015: Stenting of proximal left circumflex artery with Xience 2.75X15 mm, post-dilated to 3.70 mm.  Dr. Ivy Oliveros Type II or unspecified type diabetes mellitus without mention of complication, not stated as uncontrolled      Past Surgical History:   Procedure Laterality Date    C[facility]AC [facility]HE[facility]IZ[facility]  5[facility]8/15    [facility]    CATARACT REMOVAL      BILAT    COLONOSCOPY      CORONARY ANGIOPLASTY  2015    EKG 12-LEAD  5/12/2015         FOOT SURGERY      multiple    HYDROCELE EXCISION      LEG AMPUTATION BELOW KNEE  05/12/2017    PACEMAKER INSERTION      PACEMAKER PLACEMENT      TONSILLECTOMY AND ADENOIDECTOMY          VITALS:  /70 (Site: Left Lower Arm, Position: Sitting, Cuff Size: Large Adult)   Pulse 65   SpO2 98%   Wt Readings from Last 3 Encounters:   03/06/19 250 lb (113.4 kg)   08/15/18 245 lb (111.1 kg)   01/24/18 235 lb (106.6 kg)     There is no height or weight on file to calculate BMI. General Appearance: alert and cooperative with exam, appears comfortable,   HEENT: EOMI, moist oral mucus membranes  Lungs: rales posterior bases  Heart: S1, S2 heard, no rub  GI: soft, non-tender, no guarding, no flank pain  Extremities: 2+ LE edema B/L  + left BKA  Skin: warm, dry  Neurologic: no tremor, no asterixis, no focal neurologic deficits     Medications:  Current Outpatient Medications   Medication Sig Dispense Refill    guaiFENesin (ROBITUSSIN) 100 MG/5ML liquid Take 300 mg by mouth      ASPERCREME W/LIDOCAINE EX Apply topically      insulin aspart (NOVOLOG) 100 UNIT/ML injection vial Inject into the skin 3 times daily (before meals)      meloxicam (MOBIC) 15 MG tablet Take 15 mg by mouth daily      cyclobenzaprine (FLEXERIL) 10 MG tablet 10 mg as needed      HYDROcodone-acetaminophen (NORCO) 5-325 MG per tablet Take 1 tablet by mouth every 4 hours .       Incontinence Supplies (BEDSIDE DRAINAGE BAG) MISC Dispense one, 2000 cc overnight urinary bag 1 each 0    traMADol (ULTRAM) 50 MG tablet Take 50 mg by mouth every 6 hours as needed for Pain      insulin NPH (HUMULIN N;NOVOLIN N) 100 UNIT/ML injection vial Inject 10 Units into the skin 2 times daily (Patient taking differently: Inject 10 Units into the skin 2 times daily Inject 22 units qhs and 28 units daily) 1 vial 3    bumetanide (BUMEX) 1 MG tablet Take 1 tablet by mouth 2 times daily 30 tablet 3    finasteride (PROSCAR) 5 MG tablet Take 1 tablet by mouth daily 30 tablet 3    tamsulosin (FLOMAX) 0.4 MG capsule Take 1 capsule by mouth nightly 30 capsule 3    potassium chloride (KLOR-CON M) 20 MEQ extended release tablet Take 1 tablet by mouth daily (with breakfast) 60 tablet 3    loperamide (IMODIUM) 2 MG capsule Take 2 mg by mouth 4 times daily as needed for Diarrhea (for diarrhea related to weakness)      atorvastatin (LIPITOR) 20 MG tablet Take 20 mg by mouth nightly      gabapentin (NEURONTIN) 300 MG capsule Take 300 mg by mouth 2 times daily      pantoprazole (PROTONIX) 40 MG tablet Take 40 mg by mouth nightly       metoprolol succinate ER (TOPROL-XL) 50 MG XL tablet Take 1 tablet by mouth daily 90 tablet 3    clopidogrel (PLAVIX) 75 MG tablet Take 1 tablet by mouth daily 90 tablet 3    nitroGLYCERIN (NITROSTAT) 0.4 MG SL tablet Place 1 tablet under the tongue every 5 minutes as needed for Chest pain 25 tablet 0    aspirin 81 MG chewable tablet Take 1 tablet by mouth daily 30 tablet 3    acetaminophen (TYLENOL) 325 MG tablet Take 650 mg by mouth every 6 hours       Multiple Vitamins-Minerals (THERAPEUTIC MULTIVITAMIN-MINERALS) tablet Take 1 tablet by mouth daily      isosorbide mononitrate (IMDUR) 30 MG CR tablet Take 30 mg by mouth daily. No current facility-administered medications for this visit.          Laboratory & Diagnostics:  CBC:   Lab Results   Component Value Date    WBC 10.6 05/25/2017    HGB 8.2 (L) 05/25/2017    HCT 25.4 (L) 05/25/2017    MCV 94.5 (H) 05/25/2017     (H) 05/25/2017     BMP:    Lab Results   Component Value Date     06/06/2019     05/31/2019     06/05/2018    K 4.5 06/06/2019    K 4.3 05/31/2019    K 4.4 06/05/2018     06/06/2019     05/31/2019     06/05/2018    CO2 29.0 06/06/2019    CO2 28 05/31/2019    CO2 27.0 06/05/2018    BUN 38 06/06/2019    BUN 38 05/31/2019    BUN 32 06/05/2018    CREATININE 1.7 06/06/2019    CREATININE 1.7 05/31/2019    CREATININE 1.8 06/05/2018    GLUCOSE 119 06/06/2019    GLUCOSE 151 05/31/2019    GLUCOSE 154 06/05/2018      Hepatic:   Lab Results   Component Value Date    AST 17 05/22/2017    AST 18 06/10/2016    AST 20 05/12/2015    ALT 9 (L) 05/22/2017    ALT 20 06/10/2016    ALT 20 05/12/2015    BILITOT 0.3 05/22/2017    BILITOT 0.4 06/10/2016    BILITOT 0.4 05/12/2015    ALKPHOS 84 05/22/2017    ALKPHOS 57 06/10/2016    ALKPHOS 52 05/12/2015     BNP: No results found for: BNP  Lipids:   Lab Results   Component Value Date    CHOL 126 06/10/2016    HDL 48 06/10/2016     INR:   Lab Results   Component Value Date    INR 1.27 (H) 05/22/2017    INR 1.13 05/13/2015    INR 0.95 05/12/2015     URINE:   Lab Results   Component Value Date    NAUR 33 04/16/2015     Lab Results   Component Value Date    NITRU NEGATIVE 05/25/2017    COLORU YELLOW 05/25/2017    PHUR 6.0 05/25/2017    LABCAST NONE SEEN 05/25/2017    LABCAST NONE SEEN 05/25/2017    WBCUA 5-10 05/25/2017    RBCUA 3-5 05/25/2017    YEAST NONE SEEN 05/25/2017    BACTERIA NONE 05/25/2017    SPECGRAV <=1.005 05/25/2017    LEUKOCYTESUR SMALL 05/25/2017    UROBILINOGEN 0.2 05/25/2017    BILIRUBINUR NEGATIVE 05/25/2017    BLOODU MODERATE 05/25/2017    GLUCOSEU NEGATIVE 05/22/2017    KETUA NEGATIVE 05/25/2017      Microalbumen/Creatinine ratio:  No components found for: RUCREAT        Impression/Plan:   1. 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 Goals of care include slowing rate of progression by controlling blood pressure, blood glucoses and albuminuria and by avoiding nephrotoxins such as NSAIDs and IV contrast.  He takes Mobic daily and advised against this due to his kidneys and his heart  2. Volume overload - increase Bumex to TID for next 3 days. Will have NH check more frequent weights and advised extra bumex if gaining > 3 pounds in 1 day or 5 pounds in 1 week. K is stable 4.5 on KCl 20 meq daily  3. HTN - controlled  4. DM with microalbuminuria - not on ACE-I or ARB, will monitor  5. BPH/LUTS - chronic engel catheter  6. CAD/stent + pacemaker   7.  Anemia from 2017, recheck Bloodwork and medications were reviewed and plan of care discussed with the patient. Return to clinic in 1 year  or sooner if the need arises.  BMP q 6 months      Jose Arnold DO  [facility]

## 2019-06-10 ENCOUNTER — PROCEDURE VISIT (OUTPATIENT)
Dept: CARDIOLOGY CLINIC | Age: 84
End: 2019-06-10
Payer: MEDICARE

## 2019-06-10 DIAGNOSIS — Z95.0 PACEMAKER: Primary | ICD-10-CM

## 2019-06-10 PROCEDURE — 93294 REM INTERROG EVL PM/LDLS PM: CPT | Performed by: INTERNAL MEDICINE

## 2019-06-10 PROCEDURE — 93296 REM INTERROG EVL PM/IDS: CPT | Performed by: INTERNAL MEDICINE

## 2019-06-10 NOTE — PROGRESS NOTES
Medtronic dual pacemaker  Battery 24 ths  A paced 18.3%  V paced 98.6%  p wave >2.5mV  r wave >=80%  atria impedance 403 ohms  Ventricle impedance  583ohm

## 2019-06-18 ENCOUNTER — OFFICE VISIT (OUTPATIENT)
Dept: CARDIOLOGY CLINIC | Age: 84
End: 2019-06-18
Payer: MEDICARE

## 2019-06-18 VITALS
BODY MASS INDEX: 37.24 KG/M2 | HEART RATE: 76 BPM | HEIGHT: 71 IN | SYSTOLIC BLOOD PRESSURE: 130 MMHG | WEIGHT: 266 LBS | DIASTOLIC BLOOD PRESSURE: 58 MMHG

## 2019-06-18 DIAGNOSIS — I25.10 CORONARY ARTERY DISEASE INVOLVING NATIVE CORONARY ARTERY OF NATIVE HEART WITHOUT ANGINA PECTORIS: Primary | ICD-10-CM

## 2019-06-18 DIAGNOSIS — I10 ESSENTIAL HYPERTENSION: ICD-10-CM

## 2019-06-18 DIAGNOSIS — E78.01 FAMILIAL HYPERCHOLESTEROLEMIA: ICD-10-CM

## 2019-06-18 PROCEDURE — 4040F PNEUMOC VAC/ADMIN/RCVD: CPT | Performed by: NUCLEAR MEDICINE

## 2019-06-18 PROCEDURE — 99213 OFFICE O/P EST LOW 20 MIN: CPT | Performed by: NUCLEAR MEDICINE

## 2019-06-18 PROCEDURE — G8417 CALC BMI ABV UP PARAM F/U: HCPCS | Performed by: NUCLEAR MEDICINE

## 2019-06-18 PROCEDURE — 1036F TOBACCO NON-USER: CPT | Performed by: NUCLEAR MEDICINE

## 2019-06-18 PROCEDURE — 1123F ACP DISCUSS/DSCN MKR DOCD: CPT | Performed by: NUCLEAR MEDICINE

## 2019-06-18 PROCEDURE — G8428 CUR MEDS NOT DOCUMENT: HCPCS | Performed by: NUCLEAR MEDICINE

## 2019-06-18 PROCEDURE — G8599 NO ASA/ANTIPLAT THER USE RNG: HCPCS | Performed by: NUCLEAR MEDICINE

## 2019-06-18 NOTE — PROGRESS NOTES
[address] [address]  Dept: [phone]  Dept Fax: [phone]  Loc: [phone]    Visit Date: 6/18/2019    Rodrigo Sánchez is a 80 y.o. male who presents todayfor:  Chief Complaint   Patient presents with    Check-Up    Diabetes    Coronary Artery Disease    Hypertension   Bs is not the best   Not the best diet  No chest pain  Limited by amputation   At a NH  BP is stable  Known LAD stent   No changes in breathing        HPI:  HPI  Past Medical History:   Diagnosis Date    BPH (benign prostatic hyperplasia)     Chronic kidney disease     CKD (chronic kidney disease) stage 3, GFR 30-59 ml/min ([facility] [address] 4/16/2015    Edema     Hiatal hernia     Hyperlipidemia     Hypertension     Osteoarthritis     S/P PTCA: 5/12/2015: Stenting of proximal left circumflex artery with Xience 2.75X15 mm, post-dilated to 3.70 mm. 5/12/2015 5/12/2015: Stenting of proximal left circumflex artery with Xience 2.75X15 mm, post-dilated to 3.70 mm.  Dr. Beatty Co Type II or unspecified type diabetes mellitus without mention of complication, not stated as uncontrolled       Past Surgical History:   Procedure Laterality Date    [facility]    CATARACT REMOVAL      BILAT    COLONOSCOPY      CORONARY ANGIOPLASTY  2015    EKG 12-LEAD  5/12/2015         FOOT SURGERY      multiple    HYDROCELE EXCISION      LEG AMPUTATION BELOW KNEE  05/12/2017    PACEMAKER INSERTION      PACEMAKER PLACEMENT      TONSILLECTOMY AND ADENOIDECTOMY       Family History   Problem Relation Age of Onset    Cancer Mother     Diabetes Father     Cancer Brother     Diabetes Paternal Grandmother     Diabetes Paternal Grandfather      Social History     Tobacco Use    Smoking status: Never Smoker    Smokeless tobacco: Never Used   Substance Use Topics    Alcohol use: Yes     Comment: Occasionally      Current Outpatient Medications   Medication Sig Dispense Refill    guaiFENesin (ROBITUSSIN) 100 MG/5ML liquid Take 300 mg by mouth      ASPERCREME W/LIDOCAINE EX Apply topically      insulin aspart (NOVOLOG) 100 UNIT/ML injection vial Inject into the skin 3 times daily (before meals)      cyclobenzaprine (FLEXERIL) 10 MG tablet 10 mg as needed      HYDROcodone-acetaminophen (NORCO) 5-325 MG per tablet Take 1 tablet by mouth every 4 hours .       Incontinence Supplies (BEDSIDE DRAINAGE BAG) MISC Dispense one, 2000 cc overnight urinary bag 1 each 0    traMADol (ULTRAM) 50 MG tablet Take 50 mg by mouth every 6 hours as needed for Pain      insulin NPH (HUMULIN N;NOVOLIN N) 100 UNIT/ML injection vial Inject 10 Units into the skin 2 times daily (Patient taking differently: Inject 10 Units into the skin 2 times daily Inject 22 units qhs and 28 units daily) 1 vial 3    bumetanide (BUMEX) 1 MG tablet Take 1 tablet by mouth 2 times daily 30 tablet 3    finasteride (PROSCAR) 5 MG tablet Take 1 tablet by mouth daily 30 tablet 3    tamsulosin (FLOMAX) 0.4 MG capsule Take 1 capsule by mouth nightly 30 capsule 3    potassium chloride (KLOR-CON M) 20 MEQ extended release tablet Take 1 tablet by mouth daily (with breakfast) 60 tablet 3    loperamide (IMODIUM) 2 MG capsule Take 2 mg by mouth 4 times daily as needed for Diarrhea (for diarrhea related to weakness)      atorvastatin (LIPITOR) 20 MG tablet Take 20 mg by mouth nightly      gabapentin (NEURONTIN) 300 MG capsule Take 300 mg by mouth 2 times daily      pantoprazole (PROTONIX) 40 MG tablet Take 40 mg by mouth nightly       metoprolol succinate ER (TOPROL-XL) 50 MG XL tablet Take 1 tablet by mouth daily 90 tablet 3    clopidogrel (PLAVIX) 75 MG tablet Take 1 tablet by mouth daily 90 tablet 3    nitroGLYCERIN (NITROSTAT) 0.4 MG SL tablet Place 1 tablet under the tongue every 5 minutes as needed for Chest pain 25 tablet 0    aspirin 81 MG chewable tablet Take 1 tablet by mouth daily 30 tablet 3    acetaminophen (TYLENOL) 325 MG tablet Take 650 mg by mouth every 6 hours       Multiple Vitamins-Minerals (THERAPEUTIC MULTIVITAMIN-MINERALS) tablet Take 1 tablet by mouth daily      isosorbide mononitrate (IMDUR) 30 MG CR tablet Take 30 mg by mouth daily. No current facility-administered medications for this visit. No Known Allergies  Health Maintenance   Topic Date Due    DTaP/Tdap/Td vaccine (1 - Tdap) 11/19/1953    Shingles Vaccine (1 of 2) 11/19/1984    Pneumococcal 65+ years Vaccine (1 of 2 - PCV13) 11/19/1999    Flu vaccine (Season Ended) 09/01/2019    Potassium monitoring  06/06/2020    Creatinine monitoring  06/06/2020       Subjective:  Review of Systems  General:   No fever, no chills,some fatigue or weight loss  Pulmonary:   some dyspnea, no wheezing  Cardiac:    Denies recent chest pain,   GI:     No nausea or vomiting, no abdominal pain  Neuro:     No dizziness or light headedness,   Musculoskeletal:  No recent active issues  Extremities:   No edema,amputated       Objective:  Physical Exam  BP (!) 130/58   Pulse 76   Ht 5' 11\" (1.803 m)   Wt 266 lb (120.7 kg)   BMI 37.10 kg/m²   General:   Well developed, well nourished  Lungs:    Clear to auscultation  Heart:    Normal S1 S2, Slight murmur. no rubs, no gallops  Abdomen:   Soft, non tender, no organomegalies, positive bowel sounds  Extremities:   No edema, no cyanosis, good peripheral pulses  Neurological:   Awake, alert, oriented. No obvious focal deficits  Musculoskelatal:  No obvious deformities    Assessment:      Diagnosis Orders   1. Coronary artery disease involving native coronary artery of native heart without angina pectoris     2. Essential hypertension     3. Familial hypercholesterolemia     cardiac fair for now     Plan:  No follow-ups on file. As above  Continue risk factor modification and medical management  Thank you for allowing me to participate in the care of your patient.  Please don't hesitate to contact me regarding any further issues related to the patient care    Orders Placed:  No orders of the defined types were placed in this encounter. Medications Prescribed:  No orders of the defined types were placed in this encounter. Discussed use, benefit, and side effects of prescribed medications. All patient questions answered. Pt voicedunderstanding. Instructed to continue current medications, diet and exercise. Continue risk factor modification and medical management. Patient agreed with treatment plan. Follow up as directed.     Electronically signedby Neymar Peacock MD on 6/18/2019 at 1:53 PM

## 2019-09-11 ENCOUNTER — OFFICE VISIT (OUTPATIENT)
Dept: ENT CLINIC | Age: 84
End: 2019-09-11
Payer: MEDICARE

## 2019-09-11 VITALS
DIASTOLIC BLOOD PRESSURE: 70 MMHG | HEART RATE: 64 BPM | TEMPERATURE: 97.8 F | SYSTOLIC BLOOD PRESSURE: 118 MMHG | RESPIRATION RATE: 16 BRPM

## 2019-09-11 DIAGNOSIS — T85.698A EXTRUSION OF LEFT TYMPANIC VENTILATION TUBE, INITIAL ENCOUNTER: ICD-10-CM

## 2019-09-11 DIAGNOSIS — Z96.22 S/P TYMPANOSTOMY TUBE PLACEMENT: Primary | ICD-10-CM

## 2019-09-11 PROCEDURE — G8599 NO ASA/ANTIPLAT THER USE RNG: HCPCS | Performed by: NURSE PRACTITIONER

## 2019-09-11 PROCEDURE — G8417 CALC BMI ABV UP PARAM F/U: HCPCS | Performed by: NURSE PRACTITIONER

## 2019-09-11 PROCEDURE — 1036F TOBACCO NON-USER: CPT | Performed by: NURSE PRACTITIONER

## 2019-09-11 PROCEDURE — 1123F ACP DISCUSS/DSCN MKR DOCD: CPT | Performed by: NURSE PRACTITIONER

## 2019-09-11 PROCEDURE — 99213 OFFICE O/P EST LOW 20 MIN: CPT | Performed by: NURSE PRACTITIONER

## 2019-09-11 PROCEDURE — G8427 DOCREV CUR MEDS BY ELIG CLIN: HCPCS | Performed by: NURSE PRACTITIONER

## 2019-09-11 PROCEDURE — 4040F PNEUMOC VAC/ADMIN/RCVD: CPT | Performed by: NURSE PRACTITIONER

## 2019-09-11 RX ORDER — BENZONATATE 200 MG/1
200 CAPSULE ORAL EVERY 8 HOURS PRN
COMMUNITY

## 2019-09-11 RX ORDER — BUMETANIDE 2 MG/1
1 TABLET ORAL 3 TIMES DAILY
COMMUNITY
Start: 2019-09-01 | End: 2020-02-28

## 2019-09-11 RX ORDER — PEN NEEDLE, DIABETIC, SAFETY 30 GX3/16"
NEEDLE, DISPOSABLE MISCELLANEOUS
COMMUNITY
Start: 2019-08-29

## 2019-09-11 RX ORDER — INSULIN GLARGINE 100 [IU]/ML
75 INJECTION, SOLUTION SUBCUTANEOUS DAILY
Status: ON HOLD | COMMUNITY
Start: 2019-09-09 | End: 2020-07-10 | Stop reason: HOSPADM

## 2019-09-11 RX ORDER — DOCUSATE SODIUM 100 MG/1
100 CAPSULE, LIQUID FILLED ORAL 2 TIMES DAILY PRN
COMMUNITY

## 2019-09-11 ASSESSMENT — ENCOUNTER SYMPTOMS
SINUS PRESSURE: 0
NAUSEA: 0
FACIAL SWELLING: 0
WHEEZING: 0
SHORTNESS OF BREATH: 0
SORE THROAT: 0
RHINORRHEA: 0
CHEST TIGHTNESS: 0
VOMITING: 0
STRIDOR: 0
VOICE CHANGE: 0
APNEA: 0
TROUBLE SWALLOWING: 0
DIARRHEA: 0
COLOR CHANGE: 0
COUGH: 0
CHOKING: 0
ABDOMINAL PAIN: 0

## 2019-09-11 NOTE — PROGRESS NOTES
change. HENT: Negative for congestion, dental problem, ear discharge, ear pain, facial swelling, hearing loss, mouth sores, nosebleeds, postnasal drip, rhinorrhea, sinus pressure, sneezing, sore throat, tinnitus, trouble swallowing and voice change. Eyes: Negative for visual disturbance. Respiratory: Negative for apnea, cough, choking, chest tightness, shortness of breath, wheezing and stridor. Cardiovascular: Negative for chest pain, palpitations and leg swelling. Gastrointestinal: Negative for abdominal pain, diarrhea, nausea and vomiting. Endocrine: Negative for cold intolerance, heat intolerance, polydipsia and polyuria. Genitourinary: Negative for difficulty urinating, discharge, dysuria, enuresis, hematuria, penile pain, penile swelling, scrotal swelling, testicular pain and urgency. Musculoskeletal: Negative for arthralgias, gait problem, neck pain and neck stiffness. Skin: Negative for color change, rash and wound. Allergic/Immunologic: Negative for environmental allergies, food allergies and immunocompromised state. Neurological: Negative for dizziness, seizures, syncope, facial asymmetry, speech difficulty, light-headedness and headaches. Hematological: Negative for adenopathy. Does not bruise/bleed easily. Psychiatric/Behavioral: Negative for confusion, sleep disturbance and suicidal ideas. The patient is not nervous/anxious. Objective:       Physical Exam     This is a 80 y.o. male. Patient is alert and oriented to person, place and time. Mood is happy. No respiratory distress. No nasal voice, no hoarseness. Not obviously hearing impaired. Vitals:    09/11/19 1045   BP: 118/70   Pulse: 64   Resp: 16   Temp: 97.8 °F (36.6 °C)       External ears are normal: no scars, lesions or masses.    R External auditory canal clear and free of any pathology  L External auditory canal cerumen removed with curette, tube extruded in canal lying in hard dried secretions along the

## 2019-09-16 ENCOUNTER — PROCEDURE VISIT (OUTPATIENT)
Dept: CARDIOLOGY CLINIC | Age: 84
End: 2019-09-16
Payer: MEDICARE

## 2019-09-16 DIAGNOSIS — Z95.0 PACEMAKER: Primary | ICD-10-CM

## 2019-09-16 PROCEDURE — 93296 REM INTERROG EVL PM/IDS: CPT | Performed by: INTERNAL MEDICINE

## 2019-09-16 PROCEDURE — 93294 REM INTERROG EVL PM/LDLS PM: CPT | Performed by: INTERNAL MEDICINE

## 2019-11-05 LAB
ALBUMIN SERPL-MCNC: 3 G/DL
ALP BLD-CCNC: 83 U/L
ALT SERPL-CCNC: 21 U/L
ANION GAP SERPL CALCULATED.3IONS-SCNC: NORMAL MMOL/L
AST SERPL-CCNC: 21 U/L
BASOPHILS ABSOLUTE: ABNORMAL
BASOPHILS RELATIVE PERCENT: ABNORMAL
BILIRUB SERPL-MCNC: 0.3 MG/DL (ref 0.1–1.4)
BUN BLDV-MCNC: 33 MG/DL
CALCIUM SERPL-MCNC: 8.3 MG/DL
CHLORIDE BLD-SCNC: 99 MMOL/L
CO2: 29 MMOL/L
CREAT SERPL-MCNC: 1.7 MG/DL
EOSINOPHILS ABSOLUTE: ABNORMAL
EOSINOPHILS RELATIVE PERCENT: ABNORMAL
GFR CALCULATED: 41
GLUCOSE BLD-MCNC: 223 MG/DL
HCT VFR BLD CALC: 36.5 % (ref 41–53)
HEMOGLOBIN: 11.7 G/DL (ref 13.5–17.5)
LYMPHOCYTES ABSOLUTE: ABNORMAL
LYMPHOCYTES RELATIVE PERCENT: ABNORMAL
MCH RBC QN AUTO: ABNORMAL PG
MCHC RBC AUTO-ENTMCNC: ABNORMAL G/DL
MCV RBC AUTO: ABNORMAL FL
MONOCYTES ABSOLUTE: ABNORMAL
MONOCYTES RELATIVE PERCENT: ABNORMAL
NEUTROPHILS ABSOLUTE: ABNORMAL
NEUTROPHILS RELATIVE PERCENT: ABNORMAL
PLATELET # BLD: 240 K/ΜL
PMV BLD AUTO: ABNORMAL FL
POTASSIUM SERPL-SCNC: 4.4 MMOL/L
RBC # BLD: 3.8 10^6/ΜL
SODIUM BLD-SCNC: 138 MMOL/L
TOTAL PROTEIN: 5.8
WBC # BLD: 10.6 10^3/ML

## 2019-11-07 LAB — B-TYPE NATRIURETIC PEPTIDE: 2278 PG/ML

## 2019-11-11 ENCOUNTER — TELEPHONE (OUTPATIENT)
Dept: NEPHROLOGY | Age: 84
End: 2019-11-11

## 2019-12-05 ENCOUNTER — OFFICE VISIT (OUTPATIENT)
Dept: CARDIOLOGY CLINIC | Age: 84
End: 2019-12-05
Payer: MEDICARE

## 2019-12-05 VITALS — HEART RATE: 80 BPM | SYSTOLIC BLOOD PRESSURE: 136 MMHG | DIASTOLIC BLOOD PRESSURE: 78 MMHG

## 2019-12-05 DIAGNOSIS — E78.01 FAMILIAL HYPERCHOLESTEROLEMIA: ICD-10-CM

## 2019-12-05 DIAGNOSIS — I25.10 CORONARY ARTERY DISEASE INVOLVING NATIVE CORONARY ARTERY OF NATIVE HEART WITHOUT ANGINA PECTORIS: Primary | ICD-10-CM

## 2019-12-05 DIAGNOSIS — I10 ESSENTIAL HYPERTENSION: ICD-10-CM

## 2019-12-05 PROCEDURE — 1123F ACP DISCUSS/DSCN MKR DOCD: CPT | Performed by: NUCLEAR MEDICINE

## 2019-12-05 PROCEDURE — 93000 ELECTROCARDIOGRAM COMPLETE: CPT | Performed by: NUCLEAR MEDICINE

## 2019-12-05 PROCEDURE — G8417 CALC BMI ABV UP PARAM F/U: HCPCS | Performed by: NUCLEAR MEDICINE

## 2019-12-05 PROCEDURE — G8484 FLU IMMUNIZE NO ADMIN: HCPCS | Performed by: NUCLEAR MEDICINE

## 2019-12-05 PROCEDURE — 4040F PNEUMOC VAC/ADMIN/RCVD: CPT | Performed by: NUCLEAR MEDICINE

## 2019-12-05 PROCEDURE — 1036F TOBACCO NON-USER: CPT | Performed by: NUCLEAR MEDICINE

## 2019-12-05 PROCEDURE — G8428 CUR MEDS NOT DOCUMENT: HCPCS | Performed by: NUCLEAR MEDICINE

## 2019-12-05 PROCEDURE — 99214 OFFICE O/P EST MOD 30 MIN: CPT | Performed by: NUCLEAR MEDICINE

## 2019-12-05 PROCEDURE — G8599 NO ASA/ANTIPLAT THER USE RNG: HCPCS | Performed by: NUCLEAR MEDICINE

## 2019-12-05 RX ORDER — METOLAZONE 2.5 MG/1
2.5 TABLET ORAL DAILY
Qty: 30 TABLET | Refills: 3 | Status: SHIPPED | OUTPATIENT
Start: 2019-12-05 | End: 2020-01-28 | Stop reason: ALTCHOICE

## 2019-12-11 DIAGNOSIS — E78.01 FAMILIAL HYPERCHOLESTEROLEMIA: ICD-10-CM

## 2019-12-11 DIAGNOSIS — I10 ESSENTIAL HYPERTENSION: ICD-10-CM

## 2019-12-11 DIAGNOSIS — I25.10 CORONARY ARTERY DISEASE INVOLVING NATIVE CORONARY ARTERY OF NATIVE HEART WITHOUT ANGINA PECTORIS: ICD-10-CM

## 2019-12-23 ENCOUNTER — PROCEDURE VISIT (OUTPATIENT)
Dept: CARDIOLOGY CLINIC | Age: 84
End: 2019-12-23
Payer: MEDICARE

## 2019-12-23 DIAGNOSIS — Z95.0 PACEMAKER: Primary | ICD-10-CM

## 2019-12-23 PROCEDURE — 93296 REM INTERROG EVL PM/IDS: CPT | Performed by: INTERNAL MEDICINE

## 2019-12-23 PROCEDURE — 93294 REM INTERROG EVL PM/LDLS PM: CPT | Performed by: INTERNAL MEDICINE

## 2020-01-07 ENCOUNTER — OFFICE VISIT (OUTPATIENT)
Dept: CARDIOLOGY CLINIC | Age: 85
End: 2020-01-07
Payer: MEDICARE

## 2020-01-07 VITALS
HEART RATE: 72 BPM | DIASTOLIC BLOOD PRESSURE: 58 MMHG | BODY MASS INDEX: 35.56 KG/M2 | WEIGHT: 254 LBS | HEIGHT: 71 IN | SYSTOLIC BLOOD PRESSURE: 102 MMHG

## 2020-01-07 PROCEDURE — G8417 CALC BMI ABV UP PARAM F/U: HCPCS | Performed by: PHYSICIAN ASSISTANT

## 2020-01-07 PROCEDURE — 1123F ACP DISCUSS/DSCN MKR DOCD: CPT | Performed by: PHYSICIAN ASSISTANT

## 2020-01-07 PROCEDURE — 1036F TOBACCO NON-USER: CPT | Performed by: PHYSICIAN ASSISTANT

## 2020-01-07 PROCEDURE — 99213 OFFICE O/P EST LOW 20 MIN: CPT | Performed by: PHYSICIAN ASSISTANT

## 2020-01-07 PROCEDURE — 4040F PNEUMOC VAC/ADMIN/RCVD: CPT | Performed by: PHYSICIAN ASSISTANT

## 2020-01-07 PROCEDURE — G8427 DOCREV CUR MEDS BY ELIG CLIN: HCPCS | Performed by: PHYSICIAN ASSISTANT

## 2020-01-07 PROCEDURE — G8484 FLU IMMUNIZE NO ADMIN: HCPCS | Performed by: PHYSICIAN ASSISTANT

## 2020-01-07 NOTE — PROGRESS NOTES
times daily       docusate sodium (COLACE) 100 MG capsule Take 100 mg by mouth daily as needed for Constipation      ASPERCREME W/LIDOCAINE EX Apply topically every 4 hours as needed (Apply to back PRN for Pain)       insulin aspart (NOVOLOG) 100 UNIT/ML injection vial Inject into the skin 3 times daily (before meals) Per Sliding Scale = No Insulin, 140-199= 2 Units, 200-249= 4 Units, 250-290= 6 Units, 300-349= 8 Units, 350-399= 10 Units, 400+= 12 Units      HYDROcodone-acetaminophen (NORCO) 5-325 MG per tablet Take 1 tablet by mouth every 4 hours as needed.  Incontinence Supplies (BEDSIDE DRAINAGE BAG) MISC Dispense one, 2000 cc overnight urinary bag 1 each 0    traMADol (ULTRAM) 50 MG tablet Take 50 mg by mouth 2 times daily.        insulin NPH (HUMULIN N;NOVOLIN N) 100 UNIT/ML injection vial Inject 10 Units into the skin 2 times daily (Patient taking differently: Inject 22 Units into the skin nightly Inject 22 units qhs and 28 units daily) 1 vial 3    bumetanide (BUMEX) 1 MG tablet Take 1 tablet by mouth 2 times daily (Patient taking differently: Take 1 mg by mouth daily as needed (weight gain) ) 30 tablet 3    finasteride (PROSCAR) 5 MG tablet Take 1 tablet by mouth daily 30 tablet 3    tamsulosin (FLOMAX) 0.4 MG capsule Take 1 capsule by mouth nightly 30 capsule 3    potassium chloride (KLOR-CON M) 20 MEQ extended release tablet Take 1 tablet by mouth daily (with breakfast) 60 tablet 3    loperamide (IMODIUM) 2 MG capsule Take 2 mg by mouth 4 times daily as needed for Diarrhea (for diarrhea related to weakness)      atorvastatin (LIPITOR) 20 MG tablet Take 20 mg by mouth nightly      gabapentin (NEURONTIN) 300 MG capsule Take 300 mg by mouth 2 times daily      pantoprazole (PROTONIX) 40 MG tablet Take 40 mg by mouth nightly       metoprolol succinate ER (TOPROL-XL) 50 MG XL tablet Take 1 tablet by mouth daily 90 tablet 3    clopidogrel (PLAVIX) 75 MG tablet Take 1 tablet by mouth ERNESTINA

## 2020-01-10 ENCOUNTER — OUTSIDE SERVICES (OUTPATIENT)
Dept: FAMILY MEDICINE CLINIC | Age: 85
End: 2020-01-10
Payer: MEDICARE

## 2020-01-10 VITALS
SYSTOLIC BLOOD PRESSURE: 108 MMHG | TEMPERATURE: 98.9 F | HEART RATE: 68 BPM | DIASTOLIC BLOOD PRESSURE: 64 MMHG | WEIGHT: 261.2 LBS | BODY MASS INDEX: 36.43 KG/M2 | RESPIRATION RATE: 18 BRPM | OXYGEN SATURATION: 97 %

## 2020-01-10 PROCEDURE — 99305 1ST NF CARE MODERATE MDM 35: CPT | Performed by: NURSE PRACTITIONER

## 2020-01-10 NOTE — PROGRESS NOTES
 CORONARY ANGIOPLASTY  2015    EKG 12-LEAD  5/12/2015         FOOT SURGERY      multiple    HYDROCELE EXCISION      LEG AMPUTATION BELOW KNEE  05/12/2017    PACEMAKER INSERTION      PACEMAKER PLACEMENT      TONSILLECTOMY AND ADENOIDECTOMY         Family History   Problem Relation Age of Onset    Cancer Mother     Diabetes Father     Cancer Brother     Diabetes Paternal Grandmother     Diabetes Paternal Grandfather        Social History     Tobacco Use    Smoking status: Never Smoker    Smokeless tobacco: Never Used   Substance Use Topics    Alcohol use: Yes     Comment: Occasionally      Current Outpatient Medications   Medication Sig Dispense Refill    metOLazone (ZAROXOLYN) 2.5 MG tablet Take 1 tablet by mouth daily 30 tablet 3    BASAGLAR KWIKPEN 100 UNIT/ML injection pen Inject 70 Units into the skin daily       bumetanide (BUMEX) 2 MG tablet Take 1 tablet by mouth 3 times daily       docusate sodium (COLACE) 100 MG capsule Take 100 mg by mouth daily as needed for Constipation      benzonatate (TESSALON) 200 MG capsule Take 200 mg by mouth every 8 hours as needed for Cough      BD AUTOSHIELD DUO 30G X 5 MM MISC       guaiFENesin (ROBITUSSIN) 100 MG/5ML liquid Take 10 mLs by mouth every 6 hours as needed       ASPERCREME W/LIDOCAINE EX Apply topically every 4 hours as needed (Apply to back PRN for Pain)       insulin aspart (NOVOLOG) 100 UNIT/ML injection vial Inject into the skin 3 times daily (before meals) Per Sliding Scale = No Insulin, 140-199= 2 Units, 200-249= 4 Units, 250-290= 6 Units, 300-349= 8 Units, 350-399= 10 Units, 400+= 12 Units      cyclobenzaprine (FLEXERIL) 10 MG tablet 10 mg daily Taking once daily for leg spasms and the Every 8 hours PRN TID for Pain. Hold if drowsy, confused, or sleepy.  HYDROcodone-acetaminophen (NORCO) 5-325 MG per tablet Take 1 tablet by mouth every 4 hours as needed.        Incontinence Supplies (BEDSIDE DRAINAGE BAG) MISC Dispense (1 - Tdap) 11/19/1945    Shingles Vaccine (1 of 2) 11/19/1984    Pneumococcal 65+ years Vaccine (1 of 1 - PPSV23) 11/19/1999    Lipid screen  06/10/2017    Flu vaccine (1) 09/01/2019    Potassium monitoring  11/05/2020    Creatinine monitoring  11/05/2020       Subjective:      Review of Systems   Constitutional: Negative for chills, fatigue, fever and unexpected weight change. HENT: Negative for congestion, rhinorrhea, sore throat and trouble swallowing. Eyes: Negative for redness and visual disturbance. Respiratory: Negative for cough, shortness of breath and wheezing. Cardiovascular: Negative for chest pain and leg swelling. Gastrointestinal: Positive for nausea. Negative for blood in stool, constipation, diarrhea and vomiting. Endocrine: Negative for polydipsia and polyuria. Genitourinary: Negative for dysuria, frequency and hematuria. Catheter   Musculoskeletal: Positive for arthralgias. Negative for gait problem and myalgias. Skin: Positive for pallor. Negative for rash and wound. Allergic/Immunologic: Negative for environmental allergies and immunocompromised state. Neurological: Positive for weakness and numbness (phatom pain from time to time). Negative for dizziness, light-headedness and headaches. Hematological: Does not bruise/bleed easily. Psychiatric/Behavioral: Negative for dysphoric mood and sleep disturbance. The patient is not nervous/anxious. Objective:     Physical Exam  Vitals signs and nursing note reviewed. Constitutional:       General: He is not in acute distress. Appearance: He is well-developed. HENT:      Head: Normocephalic and atraumatic. Right Ear: External ear normal.      Left Ear: External ear normal.      Nose: Nose normal.   Eyes:      General: No scleral icterus. Right eye: No discharge. Left eye: No discharge. Conjunctiva/sclera: Conjunctivae normal.   Neck:      Musculoskeletal: Neck supple. Thyroid: No thyromegaly. Vascular: No JVD. Cardiovascular:      Rate and Rhythm: Normal rate and regular rhythm. Heart sounds: Normal heart sounds. Pulmonary:      Effort: Pulmonary effort is normal. No respiratory distress. Breath sounds: Normal breath sounds. No stridor. No wheezing or rales. Abdominal:      General: Bowel sounds are normal. There is no distension. Palpations: Abdomen is soft. Tenderness: There is no tenderness. Genitourinary:     Comments: Chronic catheter  Musculoskeletal: Normal range of motion. General: No deformity. Right shoulder: He exhibits no tenderness, no bony tenderness and no pain. Left shoulder: He exhibits no tenderness, no bony tenderness and no pain. Cervical back: He exhibits no tenderness, no bony tenderness and no pain. Thoracic back: He exhibits no tenderness, no bony tenderness, no pain and no spasm. Lumbar back: He exhibits no tenderness, no bony tenderness and no pain. 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 Lymphadenopathy:      Cervical: No cervical adenopathy. Upper Body:      Right upper body: No supraclavicular adenopathy. Left upper body: No supraclavicular adenopathy. Skin:     General: Skin is warm and dry. Findings: No erythema or rash. Neurological:      Mental Status: He is alert and oriented to person, place, and time. Motor: No atrophy, abnormal muscle tone or seizure activity. Psychiatric:         Attention and Perception: Attention normal.         Mood and Affect: Mood normal.         Speech: Speech normal.         Behavior: Behavior normal.         Cognition and Memory: Cognition and memory normal.       /64   Pulse 68   Temp 98.9 °F (37.2 °C)   Resp 18   Wt 261 lb 3.2 oz (118.5 kg)   SpO2 97%   BMI 36.43 kg/m²     Assessment:       Diagnosis Orders   1.  Type 2 diabetes mellitus with other circulatory complication, with long-term current use of insulin ([facility] [place] [address]     2. CKD (chronic kidney disease) stage 3, GFR 30-59 ml/min ([facility])     3. Diabetic peripheral neuropathy ([facility] [place] [address]     4. Benign prostatic hyperplasia with urinary obstruction     5. Hyperlipidemia, unspecified hyperlipidemia type     6. Essential hypertension     7. Gastroesophageal reflux disease without esophagitis         Plan:     1. DM- Chronic with hyperglycemia. Will obtain an A1C. Will add Trulicity once weekly and continue current regimen. Will review blood sugars in one week and may increase Trulicity or add novolog at meal coverage. 2. CKD- Follows with nephrology. Continue current diuretics and monitor renal function. 3. Diabetic neuropathy- Will increase Gabapentin to 3 times daily and monitor. Possibly increased s/s related to blood sugar. 4. BPH- Stable overall. Has chronic catheter related to retention from [address]. Continue to monitor and follow up with urology as indicated. Continue Flomax and Proscar. 5. HLD- Chronic and stable. Continue ASA, Plavix, and atorvastatin along with a heart healthy diet. 6. HTN- Chronic and stable. Continue Metoprolol, Bumex and Potassium. 7. GERD- increased s/s. Will continue to monitor as this may be viral. Continue Pantoprazole. 8. CAD- Chronic and denies chest pain. Follows with cardiology. Continue Imdur, Plavix and ASA. Patient seen and examined. History partially obtained by chart review and nursing notes. I have reviewed patient's past medical, surgical, social, and family history and have made updates where appropriate.   See facility EMR for updated medication list.       Electronically signed by DENY Lyons CNP on 1/10/2020 at 5:30 PM

## 2020-01-11 ASSESSMENT — ENCOUNTER SYMPTOMS
WHEEZING: 0
CONSTIPATION: 0
VOMITING: 0
SHORTNESS OF BREATH: 0
EYE REDNESS: 0
RHINORRHEA: 0
COUGH: 0
SORE THROAT: 0
DIARRHEA: 0
NAUSEA: 1
TROUBLE SWALLOWING: 0
BLOOD IN STOOL: 0

## 2020-01-24 ENCOUNTER — OUTSIDE SERVICES (OUTPATIENT)
Dept: FAMILY MEDICINE CLINIC | Age: 85
End: 2020-01-24
Payer: MEDICARE

## 2020-01-24 VITALS
HEART RATE: 66 BPM | DIASTOLIC BLOOD PRESSURE: 66 MMHG | OXYGEN SATURATION: 98 % | SYSTOLIC BLOOD PRESSURE: 122 MMHG | WEIGHT: 266.9 LBS | TEMPERATURE: 98.6 F | RESPIRATION RATE: 18 BRPM | BODY MASS INDEX: 37.22 KG/M2

## 2020-01-24 LAB
BUN BLDV-MCNC: 42 MG/DL
CALCIUM SERPL-MCNC: 8.4 MG/DL
CHLORIDE BLD-SCNC: 95 MMOL/L
CO2: 28 MMOL/L
CREAT SERPL-MCNC: 2 MG/DL
GFR CALCULATED: 34
GLUCOSE BLD-MCNC: 88 MG/DL
POTASSIUM SERPL-SCNC: 3.6 MMOL/L
SODIUM BLD-SCNC: 135 MMOL/L

## 2020-01-24 PROCEDURE — 99309 SBSQ NF CARE MODERATE MDM 30: CPT | Performed by: NURSE PRACTITIONER

## 2020-01-24 ASSESSMENT — ENCOUNTER SYMPTOMS
COUGH: 0
EYE REDNESS: 0
RHINORRHEA: 0
SORE THROAT: 0
VOMITING: 0
SHORTNESS OF BREATH: 0
NAUSEA: 0
TROUBLE SWALLOWING: 0
CONSTIPATION: 0
BLOOD IN STOOL: 0
DIARRHEA: 0
WHEEZING: 0

## 2020-01-24 NOTE — PROGRESS NOTES
Rolan Leslie is a 80 y.o. male who presents today for his medical conditions/complaints as noted below. Chief Complaint   Patient presents with    Diabetes           HPI:     Dl Richards is seen today as he was readmitted to the facility. He was admitted to the hospital on 1/20/2020 with hypokalemia and CKD. He was noted to have had increased nausea and diarrhea and was having adjustments made to his potassium but this was not making a difference. He was admitted and his potassium was replaced interveneosly. His Bumex was decreased and Trulicity was discontinued. His blood sugars were better controlled in the hospital,but he was eating a piece of pie upon entering his room today. 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 He has transferred to our care for continued follow up for chronic health conditions. Diabetes   Pertinent negatives for hypoglycemia include no dizziness, headaches, nervousness/anxiousness or pallor. Associated symptoms include weakness. Pertinent negatives for diabetes include no chest pain, no fatigue, no polydipsia and no polyuria. Past Medical History:   Diagnosis Date    BPH (benign prostatic hyperplasia)     Chronic kidney disease     CKD (chronic kidney disease) stage 3, GFR 30-59 ml/min ([facility] 4/16/2015    Edema     Hiatal hernia     Hyperlipidemia     Hypertension     Osteoarthritis     S/P PTCA: 5/12/2015: Stenting of proximal left circumflex artery with Xience 2.75X15 mm, post-dilated to 3.70 mm. 5/12/2015 5/12/2015: Stenting of proximal left circumflex artery with Xience 2.75X15 mm, post-dilated to 3.70 mm.  Dr. 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    PACEMAKER INSERTION      PACEMAKER PLACEMENT      TONSILLECTOMY AND ADENOIDECTOMY         Family History   Problem Relation Age of Onset    Cancer Mother     Diabetes Father     Cancer Brother     Diabetes Paternal Grandmother     Diabetes Paternal Grandfather        Social History     Tobacco Use    Smoking status: Never Smoker    Smokeless tobacco: Never Used   Substance Use Topics    Alcohol use: Yes     Comment: Occasionally      Current Outpatient Medications   Medication Sig Dispense Refill    metOLazone (ZAROXOLYN) 2.5 MG tablet Take 1 tablet by mouth daily 30 tablet 3    BASAGLAR KWIKPEN 100 UNIT/ML injection pen Inject 70 Units into the skin daily       bumetanide (BUMEX) 2 MG tablet Take 1 tablet by mouth 3 times daily       docusate sodium (COLACE) 100 MG capsule Take 100 mg by mouth daily as needed for Constipation      benzonatate (TESSALON) 200 MG capsule Take 200 mg by mouth every 8 hours as needed for Cough      BD AUTOSHIELD DUO 30G X 5 MM MISC       guaiFENesin (ROBITUSSIN) 100 MG/5ML liquid Take 10 mLs by mouth every 6 hours as needed       ASPERCREME W/LIDOCAINE EX Apply topically every 4 hours as needed (Apply to back PRN for Pain)       insulin aspart (NOVOLOG) 100 UNIT/ML injection vial Inject into the skin 3 times daily (before meals) Per Sliding Scale = No Insulin, 140-199= 2 Units, 200-249= 4 Units, 250-290= 6 Units, 300-349= 8 Units, 350-399= 10 Units, 400+= 12 Units      cyclobenzaprine (FLEXERIL) 10 MG tablet 10 mg daily Taking once daily for leg spasms and the Every 8 hours PRN TID for Pain. Hold if drowsy, confused, or sleepy.  HYDROcodone-acetaminophen (NORCO) 5-325 MG per tablet Take 1 tablet by mouth every 4 hours as needed.        Incontinence Supplies (BEDSIDE DRAINAGE BAG) MISC Dispense one, 2000 cc overnight urinary bag 1 each 0    traMADol (ULTRAM) 50 MG tablet Take 50 mg by mouth 2 times daily.  insulin NPH (HUMULIN N;NOVOLIN N) 100 UNIT/ML injection vial Inject 10 Units into the skin 2 times daily (Patient taking differently: Inject 22 Units into the skin nightly Inject 22 units qhs and 28 units daily) 1 vial 3    bumetanide (BUMEX) 1 MG tablet Take 1 tablet by mouth 2 times daily (Patient taking differently: Take 1 mg by mouth daily as needed (weight gain) ) 30 tablet 3    finasteride (PROSCAR) 5 MG tablet Take 1 tablet by mouth daily 30 tablet 3    tamsulosin (FLOMAX) 0.4 MG capsule Take 1 capsule by mouth nightly 30 capsule 3    potassium chloride (KLOR-CON M) 20 MEQ extended release tablet Take 1 tablet by mouth daily (with breakfast) 60 tablet 3    loperamide (IMODIUM) 2 MG capsule Take 2 mg by mouth 4 times daily as needed for Diarrhea (for diarrhea related to weakness)      atorvastatin (LIPITOR) 20 MG tablet Take 20 mg by mouth nightly      gabapentin (NEURONTIN) 300 MG capsule Take 300 mg by mouth 2 times daily      pantoprazole (PROTONIX) 40 MG tablet Take 40 mg by mouth nightly       metoprolol succinate ER (TOPROL-XL) 50 MG XL tablet Take 1 tablet by mouth daily 90 tablet 3    clopidogrel (PLAVIX) 75 MG tablet Take 1 tablet by mouth daily 90 tablet 3    nitroGLYCERIN (NITROSTAT) 0.4 MG SL tablet Place 1 tablet under the tongue every 5 minutes as needed for Chest pain 25 tablet 0    aspirin 81 MG chewable tablet Take 1 tablet by mouth daily 30 tablet 3    acetaminophen (TYLENOL) 325 MG tablet Take 650 mg by mouth every 6 hours       Multiple Vitamins-Minerals (THERAPEUTIC MULTIVITAMIN-MINERALS) tablet Take 1 tablet by mouth daily      isosorbide mononitrate (IMDUR) 30 MG CR tablet Take 30 mg by mouth daily. No current facility-administered medications for this visit.       No Known Allergies    Health Maintenance   Topic Date Due    DTaP/Tdap/Td vaccine (1 - Tdap) 11/19/1945    Shingles Vaccine (1 of 2) 11/19/1984    Pneumococcal 65+ years Vaccine (1 of 1 - PPSV23) 11/19/1999    Lipid screen  06/10/2017    Flu vaccine (1) 09/01/2019    Potassium monitoring  11/05/2020    Creatinine monitoring  11/05/2020       Subjective:      Review of Systems   Constitutional: Negative for appetite change, chills, fatigue, fever and unexpected weight change. HENT: Negative for congestion, rhinorrhea, sore throat and trouble swallowing. Eyes: Negative for redness and visual disturbance. Respiratory: Negative for cough, shortness of breath and wheezing. Cardiovascular: Positive for leg swelling. Negative for chest pain. Gastrointestinal: Negative for blood in stool, constipation, diarrhea, nausea and vomiting. Endocrine: Negative for polydipsia and polyuria. Genitourinary: Negative for dysuria, frequency and hematuria. Catheter   Musculoskeletal: Positive for arthralgias. Negative for gait problem and myalgias. Skin: Negative for pallor, rash and wound. Allergic/Immunologic: Positive for immunocompromised state. Negative for environmental allergies. Neurological: Positive for weakness and numbness (phatom pain from time to time). Negative for dizziness, light-headedness and headaches. Hematological: Does not bruise/bleed easily. Psychiatric/Behavioral: Negative for dysphoric mood and sleep disturbance. The patient is not nervous/anxious. Objective:     Physical Exam  Vitals signs and nursing note reviewed. Constitutional:       General: He is not in acute distress. Appearance: He is well-developed. HENT:      Head: Normocephalic and atraumatic. Right Ear: External ear normal.      Left Ear: External ear normal.      Nose: Nose normal.   Eyes:      General: No scleral icterus. Right eye: No discharge. Left eye: No discharge. Conjunctiva/sclera: Conjunctivae normal.   Neck:      Musculoskeletal: Neck supple. Thyroid: No thyromegaly.       Vascular: No 2. CKD (chronic kidney disease) stage 3, GFR 30-59 ml/min ([facility])     3. Diabetic peripheral neuropathy ([facility] [address]     4. Benign prostatic hyperplasia with urinary obstruction     5. Hyperlipidemia, unspecified hyperlipidemia type     6. Essential hypertension     7. Gastroesophageal reflux disease without esophagitis         Plan:     1. DM- Chronic and stable now. Will continue current regimen and Trulicity has been stopped. Nausea was present prior to Trulicity starting so not sure this was the cause of decrease. 2. CKD- Follows with nephrology. Continue current diuretics and monitor renal function. Will check BMP in one week. Labs reviewed from today and it is stable. 3. Diabetic neuropathy- stable and continue Gabapentin to 3 times daily and monitor. Improved with recent increase   4. BPH- Stable overall. Has chronic catheter related to retention from [address]. Continue to monitor and follow up with urology as indicated. Continue Flomax and Proscar. 5. HLD- Chronic and stable. Continue ASA, Plavix, and atorvastatin along with a heart healthy diet. 6. HTN- Chronic and stable. Continue Metoprolol, Bumex and Potassium. Recent decrease in Bumex and adjustment in potassium. Will monitor labs. 7. GERD- stable. Continue Pantoprazole. 8. CAD- Chronic and denies chest pain. Follows with cardiology. Continue Imdur, Plavix and ASA. Cardio appt on 1/28  Patient seen and examined. History partially obtained by chart review and nursing notes. I have reviewed patient's past medical, surgical, social, and family history and have made updates where appropriate.   See facility EMR for updated medication list.       Electronically signed by DENY Delgado CNP on 1/24/2020 at 5:34 PM

## 2020-01-28 ENCOUNTER — OUTSIDE SERVICES (OUTPATIENT)
Dept: FAMILY MEDICINE CLINIC | Age: 85
End: 2020-01-28
Payer: MEDICARE

## 2020-01-28 ENCOUNTER — OFFICE VISIT (OUTPATIENT)
Dept: NEPHROLOGY | Age: 85
End: 2020-01-28
Payer: MEDICARE

## 2020-01-28 VITALS
RESPIRATION RATE: 16 BRPM | HEIGHT: 71 IN | BODY MASS INDEX: 36.99 KG/M2 | TEMPERATURE: 97 F | OXYGEN SATURATION: 97 % | HEART RATE: 74 BPM | DIASTOLIC BLOOD PRESSURE: 72 MMHG | SYSTOLIC BLOOD PRESSURE: 151 MMHG | WEIGHT: 264.2 LBS

## 2020-01-28 VITALS
BODY MASS INDEX: 37.13 KG/M2 | WEIGHT: 266.2 LBS | HEART RATE: 75 BPM | DIASTOLIC BLOOD PRESSURE: 62 MMHG | SYSTOLIC BLOOD PRESSURE: 118 MMHG | OXYGEN SATURATION: 96 %

## 2020-01-28 PROBLEM — E87.6 HYPOKALEMIA: Status: ACTIVE | Noted: 2020-01-28

## 2020-01-28 PROCEDURE — 1036F TOBACCO NON-USER: CPT | Performed by: INTERNAL MEDICINE

## 2020-01-28 PROCEDURE — 99306 1ST NF CARE HIGH MDM 50: CPT | Performed by: PHYSICAL MEDICINE & REHABILITATION

## 2020-01-28 PROCEDURE — 99213 OFFICE O/P EST LOW 20 MIN: CPT | Performed by: INTERNAL MEDICINE

## 2020-01-28 PROCEDURE — 4040F PNEUMOC VAC/ADMIN/RCVD: CPT | Performed by: INTERNAL MEDICINE

## 2020-01-28 PROCEDURE — G8417 CALC BMI ABV UP PARAM F/U: HCPCS | Performed by: INTERNAL MEDICINE

## 2020-01-28 PROCEDURE — 1123F ACP DISCUSS/DSCN MKR DOCD: CPT | Performed by: INTERNAL MEDICINE

## 2020-01-28 PROCEDURE — G8428 CUR MEDS NOT DOCUMENT: HCPCS | Performed by: INTERNAL MEDICINE

## 2020-01-28 PROCEDURE — G8484 FLU IMMUNIZE NO ADMIN: HCPCS | Performed by: INTERNAL MEDICINE

## 2020-01-28 RX ORDER — BUMETANIDE 1 MG/1
1 TABLET ORAL 2 TIMES DAILY
Qty: 30 TABLET | Refills: 3
Start: 2020-01-28 | End: 2021-11-05 | Stop reason: SINTOL

## 2020-01-28 RX ORDER — POTASSIUM CHLORIDE 20 MEQ/1
20 TABLET, EXTENDED RELEASE ORAL 2 TIMES DAILY
Qty: 60 TABLET | Refills: 3
Start: 2020-01-28 | End: 2020-10-02 | Stop reason: ALTCHOICE

## 2020-01-28 ASSESSMENT — ENCOUNTER SYMPTOMS
SINUS PAIN: 0
EYE DISCHARGE: 0
EYE ITCHING: 0
RHINORRHEA: 0
ABDOMINAL PAIN: 0
SINUS PRESSURE: 0
EYE PAIN: 0
CONSTIPATION: 0
SORE THROAT: 0
NAUSEA: 0
STRIDOR: 0
CHOKING: 0
DIARRHEA: 0
WHEEZING: 0
SHORTNESS OF BREATH: 0
PHOTOPHOBIA: 0
COUGH: 0
COLOR CHANGE: 0
EYE REDNESS: 0
FACIAL SWELLING: 0
ABDOMINAL DISTENTION: 0
VOMITING: 0

## 2020-01-28 NOTE — PROGRESS NOTES
[facility]    [facility], [address]   [address] [facility]  [phone]  Progress Note  1/28/2020 10:00 AM      Pt Name:    Richard Byers  YOB: 1934  Primary Care Physician: Roula Luther MD     Chief Complaint:   Chief Complaint   Patient presents with    Follow-up     CKD III        History of Present Illness: This is a hospital follow up visit from [facility]. He was hospitalized earlier in January with diarrhea and had EFREN, hyponatremia, and severe hypokalemia. Renal labs and electrolyte improved with IV fluids. He presented with creat of 2.5, Na 125, K of 2.8. On day of discharge 1/24 sodium was 135, K 3.6, serum creat 2.0. He is residing at [facility]. His bumex was decreased on discharge to 1 mg daily (previously on 2 mg TID). KCl 10 meq BID. His weight is up. Has swelling which is stable. Denies SOB. Hx of DM since 1985 +retinopathy + neuropathy, CAD s/p stent (follows with Maikel Marking), CHF ( EF 45%), +pacemaker, BPH, urinary retention with chronic engel catheter, HTN, hyperlipidemia, OA.  + left BKA . Pertinent items are noted in HPI. Past History:  Past Medical History:   Diagnosis Date    BPH (benign prostatic hyperplasia)     Chronic kidney disease     CKD (chronic kidney disease) stage 3, GFR 30-59 ml/min ([facility]) 4/16/2015    Edema     Hiatal hernia     Hyperlipidemia     Hypertension     Osteoarthritis     S/P PTCA: 5/12/2015: Stenting of proximal left circumflex artery with Xience 2.75X15 mm, post-dilated to 3.70 mm. 5/12/2015 5/12/2015: Stenting of proximal left circumflex artery with Xience 2.75X15 mm, post-dilated to 3.70 mm.  Dr. Allegra Barrett Type II or unspecified type diabetes mellitus without mention of complication, not stated as uncontrolled      Past Surgical History:   Procedure Laterality Date    CARDIAC CATHETERIZATION  5/8/15    [address] [place] [address]    CATARACT REMOVAL      BILAT    COLONOSCOPY      CORONARY ANGIOPLASTY  2015    EKG 12-LEAD  5/12/2015         FOOT SURGERY      multiple    HYDROCELE EXCISION      LEG AMPUTATION BELOW KNEE  05/12/2017    PACEMAKER INSERTION      PACEMAKER PLACEMENT      TONSILLECTOMY AND ADENOIDECTOMY          VITALS:  /62 (Site: Right Upper Arm, Position: Sitting, Cuff Size: Large Adult)   Pulse 75   Wt 266 lb 3.2 oz (120.7 kg) Comment: patient stated  SpO2 96%   BMI 37.13 kg/m²   Wt Readings from Last 3 Encounters:   01/28/20 266 lb 3.2 oz (120.7 kg)   01/10/20 261 lb 3.2 oz (118.5 kg)   01/07/20 254 lb (115.2 kg)     Body mass index is 37.13 kg/m².      General Appearance: alert and cooperative with exam, appears comfortable,   HEENT: EOMI, moist oral mucus membranes  Lungs: decreased breath sounds, no audible rales  Heart: S1, S2 heard, no rub  GI: soft, non-tender, no guarding, no flank pain  Extremities: 2+ LE edema B/L  + left BKA  Skin: warm, dry  Neurologic: no tremor, no asterixis, no focal neurologic deficits     Medications:  Current Outpatient Medications   Medication Sig Dispense Refill    metOLazone (ZAROXOLYN) 2.5 MG tablet Take 1 tablet by mouth daily 30 tablet 3    BASAGLAR KWIKPEN 100 UNIT/ML injection pen Inject 70 Units into the skin daily       bumetanide (BUMEX) 2 MG tablet Take 1 tablet by mouth 3 times daily       docusate sodium (COLACE) 100 MG capsule Take 100 mg by mouth daily as needed for Constipation      benzonatate (TESSALON) 200 MG capsule Take 200 mg by mouth every 8 hours as needed for Cough      BD AUTOSHIELD DUO 30G X 5 MM MISC       guaiFENesin (ROBITUSSIN) 100 MG/5ML liquid Take 10 mLs by mouth every 6 hours as needed       ASPERCREME W/LIDOCAINE EX Apply topically every 4 hours as needed (Apply to back PRN for Pain)       insulin aspart (NOVOLOG) 100 UNIT/ML injection vial Inject into the skin 3 times daily (before meals) Per Sliding Scale = No Insulin, 140-199= 2 Units, 200-249= 4 Units, 250-290= 6 Units, 300-349= 8 Units, 350-399= 10 Units, 400+= 12 Units      cyclobenzaprine (FLEXERIL) 10 MG tablet 10 mg daily Taking once daily for leg spasms and the Every 8 hours PRN TID for Pain. Hold if drowsy, confused, or sleepy.  HYDROcodone-acetaminophen (NORCO) 5-325 MG per tablet Take 1 tablet by mouth every 4 hours as needed.  Incontinence Supplies (BEDSIDE DRAINAGE BAG) MISC Dispense one, 2000 cc overnight urinary bag 1 each 0    traMADol (ULTRAM) 50 MG tablet Take 50 mg by mouth 2 times daily.        insulin NPH (HUMULIN N;NOVOLIN N) 100 UNIT/ML injection vial Inject 10 Units into the skin 2 times daily (Patient taking differently: Inject 22 Units into the skin nightly Inject 22 units qhs and 28 units daily) 1 vial 3    bumetanide (BUMEX) 1 MG tablet Take 1 tablet by mouth 2 times daily (Patient taking differently: Take 1 mg by mouth daily as needed (weight gain) ) 30 tablet 3    finasteride (PROSCAR) 5 MG tablet Take 1 tablet by mouth daily 30 tablet 3    tamsulosin (FLOMAX) 0.4 MG capsule Take 1 capsule by mouth nightly 30 capsule 3    potassium chloride (KLOR-CON M) 20 MEQ extended release tablet Take 1 tablet by mouth daily (with breakfast) 60 tablet 3    loperamide (IMODIUM) 2 MG capsule Take 2 mg by mouth 4 times daily as needed for Diarrhea (for diarrhea related to weakness)      atorvastatin (LIPITOR) 20 MG tablet Take 20 mg by mouth nightly      gabapentin (NEURONTIN) 300 MG capsule Take 300 mg by mouth 2 times daily      pantoprazole (PROTONIX) 40 MG tablet Take 40 mg by mouth nightly       metoprolol succinate ER (TOPROL-XL) 50 MG XL tablet Take 1 tablet by mouth daily 90 tablet 3    clopidogrel (PLAVIX) 75 MG tablet Take 1 tablet by mouth daily 90 tablet 3    nitroGLYCERIN (NITROSTAT) 0.4 MG SL tablet Place 1 tablet under the tongue every 5 minutes as needed for Chest pain 25 tablet 0    aspirin 81 MG chewable tablet Take 1 tablet by mouth daily 30 tablet 3    acetaminophen (TYLENOL) 325 MG YEAST NONE SEEN 05/25/2017    BACTERIA NONE 05/25/2017    SPECGRAV <=1.005 05/25/2017    LEUKOCYTESUR SMALL 05/25/2017    UROBILINOGEN 0.2 05/25/2017    BILIRUBINUR NEGATIVE 05/25/2017    BLOODU MODERATE 05/25/2017    GLUCOSEU NEGATIVE 05/22/2017    KETUA NEGATIVE 05/25/2017      Microalbumen/Creatinine ratio:  No components found for: RUCREAT        Impression/Plan:   1. S/p EFREN from volume depletion from diarrhea, creatinine 2.0 on discharge. Hyponatremia and hypokalemia resolved as well. 2. 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 3. Volume overload -weights increasing, will increase his Bumex to BID  4. Hypokalemia: increase KCl to 20 meq BID since increasing Bumex  5. HTN - controlled  6. DM with microalbuminuria - not on ACE-I or ARB, will monitor  7. BPH/LUTS - chronic engel catheter  8. CAD/stent + pacemaker         Repeat BMP Friday or Monday. Bloodwork and medications were reviewed and plan of care discussed with the patient. Return to clinic in 6 weeks or sooner if the need arises.        Karen Amin DO  [facility]

## 2020-01-29 NOTE — PROGRESS NOTES
Artie Bañuelos is a 80 y.o. male who presents today for his medical conditions/complaints as noted below. Chief Complaint   Patient presents with    Other     Re-admission visit           HPI:     Priscilla Munoz is an 81 y/o M admitted to [facility] [place] 1/2/17 and was re-admitted 1/23/20 from [facility] where he had been admitted 1/20/20  with hypokalemia and CKD. He was noted to have had increased nausea and diarrhea and was having adjustments made to his potassium but this was not making a difference. He was admitted and his potassium was replaced interveneosly. His Bumex was decreased and Trulicity was discontinued. His blood sugars were better controlled in the hospital,but he was eating a piece of pie upon entering his room today. 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 He has transferred to our care for continued follow up for chronic health conditions and for therapies to improve his mobility and ADL's. When he was seen today, he denied headache, SOB, chest pain, nausea, and vomiting. He has been sleeping at night and his bowels have been moving.         Past Medical History:   Diagnosis Date    BPH (benign prostatic hyperplasia)     Chronic kidney disease     CKD (chronic kidney disease) stage 3, GFR 30-59 ml/min ([address] 4/16/2015    Edema     Hiatal hernia     Hyperlipidemia     Hypertension     Osteoarthritis     S/P PTCA: 5/12/2015: Stenting of proximal left circumflex artery with Xience 2.75X15 mm, post-dilated to 3.70 mm. 5/12/2015 5/12/2015: Stenting of proximal left circumflex artery with Xience 2.75X15 mm, post-dilated to 3.70 mm.  Dr. Marie Lopez Type II or unspecified type diabetes mellitus without mention of complication, not stated as uncontrolled       Past Surgical History:   Procedure Laterality Date    [facility]    CATARACT REMOVAL      BILAT    COLONOSCOPY      CORONARY ANGIOPLASTY  2015    EKG 12-LEAD  5/12/2015         FOOT SURGERY      multiple    HYDROCELE EXCISION      LEG AMPUTATION BELOW KNEE  05/12/2017    PACEMAKER INSERTION      PACEMAKER PLACEMENT      TONSILLECTOMY AND ADENOIDECTOMY         Family History   Problem Relation Age of Onset    Cancer Mother     Diabetes Father     Cancer Brother     Diabetes Paternal Grandmother     Diabetes Paternal Grandfather        Social History     Tobacco Use    Smoking status: Never Smoker    Smokeless tobacco: Never Used   Substance Use Topics    Alcohol use: Yes     Comment: Occasionally      Current Outpatient Medications   Medication Sig Dispense Refill    bumetanide (BUMEX) 1 MG tablet Take 1 tablet by mouth 2 times daily 30 tablet 3    potassium chloride (KLOR-CON M) 20 MEQ extended release tablet Take 1 tablet by mouth 2 times daily 60 tablet 3    BASAGLAR KWIKPEN 100 UNIT/ML injection pen Inject 75 Units into the skin daily       bumetanide (BUMEX) 2 MG tablet Take 1 tablet by mouth 3 times daily       docusate sodium (COLACE) 100 MG capsule Take 100 mg by mouth daily as needed for Constipation      benzonatate (TESSALON) 200 MG capsule Take 200 mg by mouth every 8 hours as needed for Cough      BD AUTOSHIELD DUO 30G X 5 MM MISC       guaiFENesin (ROBITUSSIN) 100 MG/5ML liquid Take 10 mLs by mouth every 6 hours as needed       ASPERCREME W/LIDOCAINE EX Apply topically every 4 hours as needed (Apply to back PRN for Pain)       insulin aspart (NOVOLOG) 100 UNIT/ML injection vial Inject into the skin 3 times daily (before meals) Per Sliding Scale = No Insulin, 140-199= 2 Units, 200-249= 4 Units, 250-290= 6 Units, 300-349= 8 Units, 350-399= 10 Units, 400+= 12 Units      cyclobenzaprine (FLEXERIL) 10 MG tablet 10 mg daily Taking once daily for leg spasms and the Every 8 hours PRN TID for Pain. Hold if drowsy, confused, or sleepy.  HYDROcodone-acetaminophen (NORCO) 5-325 MG per tablet Take 1 tablet by mouth every 4 hours as needed.        Incontinence Supplies (BEDSIDE DRAINAGE BAG) MISC Dispense one, 2000 cc overnight urinary bag 1 each 0    traMADol (ULTRAM) 50 MG tablet Take 50 mg by mouth 2 times daily.  insulin NPH (HUMULIN N;NOVOLIN N) 100 UNIT/ML injection vial Inject 10 Units into the skin 2 times daily (Patient taking differently: Inject 22 Units into the skin nightly Inject 22 units qhs and 28 units daily) 1 vial 3    finasteride (PROSCAR) 5 MG tablet Take 1 tablet by mouth daily 30 tablet 3    tamsulosin (FLOMAX) 0.4 MG capsule Take 1 capsule by mouth nightly 30 capsule 3    loperamide (IMODIUM) 2 MG capsule Take 2 mg by mouth 4 times daily as needed for Diarrhea (for diarrhea related to weakness)      atorvastatin (LIPITOR) 20 MG tablet Take 20 mg by mouth nightly      gabapentin (NEURONTIN) 300 MG capsule Take 300 mg by mouth 3 times daily.  pantoprazole (PROTONIX) 40 MG tablet Take 40 mg by mouth nightly       metoprolol succinate ER (TOPROL-XL) 50 MG XL tablet Take 1 tablet by mouth daily 90 tablet 3    clopidogrel (PLAVIX) 75 MG tablet Take 1 tablet by mouth daily 90 tablet 3    nitroGLYCERIN (NITROSTAT) 0.4 MG SL tablet Place 1 tablet under the tongue every 5 minutes as needed for Chest pain 25 tablet 0    aspirin 81 MG chewable tablet Take 1 tablet by mouth daily 30 tablet 3    acetaminophen (TYLENOL) 325 MG tablet Take 650 mg by mouth every 6 hours       Multiple Vitamins-Minerals (THERAPEUTIC MULTIVITAMIN-MINERALS) tablet Take 1 tablet by mouth daily      isosorbide mononitrate (IMDUR) 30 MG CR tablet Take 30 mg by mouth daily. No current facility-administered medications for this visit.       No Known Allergies    Health Maintenance   Topic Date Due    DTaP/Tdap/Td vaccine (1 - Tdap) 11/19/1945    Shingles Vaccine (1 of 2) 11/19/1984    Pneumococcal 65+ years Vaccine (1 of 1 - PPSV23) 11/19/1999    Lipid screen  06/10/2017    Flu vaccine (1) 09/01/2019    Potassium monitoring  11/05/2020    Creatinine monitoring  11/05/2020       Subjective:      Review of Systems   Constitutional: Positive for activity change. Negative for chills, diaphoresis, fatigue and fever. Tolerating therapies   HENT: Negative for congestion, dental problem, drooling, ear discharge, ear pain, facial swelling, nosebleeds, postnasal drip, rhinorrhea, sinus pressure, sinus pain, sore throat and tinnitus. Eyes: Negative for photophobia, pain, discharge, redness, itching and visual disturbance. Respiratory: Negative for cough, choking, shortness of breath, wheezing and stridor. Cardiovascular: Negative for chest pain and leg swelling. Gastrointestinal: Negative for abdominal distention, abdominal pain, constipation, diarrhea, nausea and vomiting. Endocrine: Negative for polydipsia, polyphagia and polyuria. Genitourinary: Negative for decreased urine volume, dysuria, enuresis, flank pain, frequency, hematuria and urgency. Musculoskeletal: Negative for arthralgias, gait problem and myalgias. Skin: Negative for color change, pallor, rash and wound. Allergic/Immunologic: Negative for environmental allergies and immunocompromised state. Neurological: Positive for weakness and numbness. Negative for dizziness, tremors, seizures, speech difficulty, light-headedness and headaches. Hematological: Does not bruise/bleed easily. Psychiatric/Behavioral: Negative for agitation, behavioral problems, confusion, decreased concentration, dysphoric mood, hallucinations, self-injury, sleep disturbance and suicidal ideas. The patient is not nervous/anxious. Objective:     Physical Exam  Vitals signs and nursing note reviewed. Constitutional:       General: He is not in acute distress. Appearance: He is well-developed. He is not ill-appearing, toxic-appearing or diaphoretic. HENT:      Head: Normocephalic and atraumatic.       Right Ear: External ear normal.      Left Ear: External ear normal.      Nose: Nose normal. No congestion or rhinorrhea. Mouth/Throat:      Pharynx: Oropharynx is clear. Eyes:      General: No scleral icterus. Right eye: No discharge. Left eye: No discharge. Extraocular Movements: Extraocular movements intact. Conjunctiva/sclera: Conjunctivae normal.      Pupils: Pupils are equal, round, and reactive to light. Neck:      Musculoskeletal: Normal range of motion and neck supple. No neck rigidity. Thyroid: No thyromegaly. Vascular: No JVD. Cardiovascular:      Rate and Rhythm: Normal rate and regular rhythm. Heart sounds: Normal heart sounds. No friction rub. No gallop. Pulmonary:      Effort: Pulmonary effort is normal. No respiratory distress. Breath sounds: Normal breath sounds. No stridor. No wheezing, rhonchi or rales. Abdominal:      General: Bowel sounds are normal. There is no distension. Palpations: Abdomen is soft. Tenderness: There is no abdominal tenderness. There is no guarding or rebound. Musculoskeletal: Normal range of motion. General: No swelling, deformity or signs of injury. Right shoulder: He exhibits no tenderness, no bony tenderness and no pain. Left shoulder: He exhibits no tenderness, no bony tenderness and no pain. Cervical back: He exhibits no tenderness, no bony tenderness and no pain. Thoracic back: He exhibits no tenderness, no bony tenderness, no pain and no spasm. Lumbar back: He exhibits no tenderness, no bony tenderness and no pain. Lymphadenopathy:      Cervical: No cervical adenopathy. Upper Body:      Right upper body: No supraclavicular adenopathy. Left upper body: No supraclavicular adenopathy. Skin:     General: Skin is warm and dry. Coloration: Skin is not jaundiced. Findings: No erythema or rash. Neurological:      General: No focal deficit present. Mental Status: He is alert and oriented to person, place, and time.       Cranial Nerves: No cranial nerve deficit. Sensory: Sensory deficit present. Motor: Weakness present. No atrophy, abnormal muscle tone or seizure activity. Gait: Gait abnormal.   Psychiatric:         Mood and Affect: Mood normal.         Speech: Speech normal.         Behavior: Behavior normal.         Thought Content: Thought content normal.         Judgment: Judgment normal.         VITALS  BP (!) 151/72   Pulse 74   Temp 97 °F (36.1 °C)   Resp 16   Ht 5' 11\" (1.803 m)   Wt 264 lb 3.2 oz (119.8 kg)   SpO2 97%   BMI 36.85 kg/m²     Assessment/Plan:       1. DM- Chronic and stable now. Will continue current regimen and Trulicity has been stopped. (Nausea was present prior to Trulicity starting so not sure this was the cause of decrease.)  2. CKD- Follows with nephrology. Continue current diuretics and monitor renal function. 3. Diabetic neuropathy- stable and continue Gabapentin 3 times daily and monitor. Improved with recent increase   4. BPH- Stable overall. Has chronic catheter related to retention from [address]. Continue to monitor and follow up with urology as indicated. Continue Flomax and Proscar. 5. HLD- Chronic and stable. Continue ASA, Plavix, and atorvastatin along with a heart healthy diet. 6. HTN- Chronic and stable. Continue Metoprolol, Bumex and K+. Recent decrease in Bumex and adjustment in potassium. Will monitor labs. 7. GERD- stable. Continue Pantoprazole. 8. CAD- Chronic and denies chest pain. Follows with cardiology. Continue Imdur, Plavix and ASA. Cardio appt today. Spent 45 minutes with patient and nurse, discussing case, symptoms, medications, and management options and completing documentation. Patient seen and examined. History partially obtained by chart review and nursing notes. I have reviewed patient's past medical, surgical, social, and family history and have made updates where appropriate.   See facility EMR for updated medication list.     Electronically signed by

## 2020-01-30 ENCOUNTER — OFFICE VISIT (OUTPATIENT)
Dept: CARDIOLOGY CLINIC | Age: 85
End: 2020-01-30
Payer: MEDICARE

## 2020-01-30 VITALS
HEART RATE: 76 BPM | HEIGHT: 71 IN | DIASTOLIC BLOOD PRESSURE: 60 MMHG | WEIGHT: 260 LBS | SYSTOLIC BLOOD PRESSURE: 114 MMHG | BODY MASS INDEX: 36.4 KG/M2

## 2020-01-30 PROBLEM — I50.43 ACUTE ON CHRONIC COMBINED SYSTOLIC AND DIASTOLIC CHF (CONGESTIVE HEART FAILURE) (HCC): Status: ACTIVE | Noted: 2020-01-30

## 2020-01-30 PROCEDURE — 1123F ACP DISCUSS/DSCN MKR DOCD: CPT | Performed by: NURSE PRACTITIONER

## 2020-01-30 PROCEDURE — G8427 DOCREV CUR MEDS BY ELIG CLIN: HCPCS | Performed by: NURSE PRACTITIONER

## 2020-01-30 PROCEDURE — G8417 CALC BMI ABV UP PARAM F/U: HCPCS | Performed by: NURSE PRACTITIONER

## 2020-01-30 PROCEDURE — 4040F PNEUMOC VAC/ADMIN/RCVD: CPT | Performed by: NURSE PRACTITIONER

## 2020-01-30 PROCEDURE — 99213 OFFICE O/P EST LOW 20 MIN: CPT | Performed by: NURSE PRACTITIONER

## 2020-01-30 PROCEDURE — G8484 FLU IMMUNIZE NO ADMIN: HCPCS | Performed by: NURSE PRACTITIONER

## 2020-01-30 PROCEDURE — 1036F TOBACCO NON-USER: CPT | Performed by: NURSE PRACTITIONER

## 2020-01-30 NOTE — PROGRESS NOTES
[facility] PROFESSIONAL SERVICES  [facility]   [address] [place] [address] [address]   Dept: [phone]   Dept Fax: [phone]   Loc: [phone]      Chief Complaint   Patient presents with    Follow-Up from Hospital     F/U from recent hospitalization at [facility] for diarrhea, hypokalemia, pvc's and EFREN on CKD in an 81 y/o male patient of Dr. Reese Hough with history of CAD/PCI, HTN, HLP, CHF, PPM, CKD, LE edema, and DM type 2 Per patient and his spouse, he was given IV fluids and potassium replacement with improvement and discharged back to ECF. No records available at this time. He reports doing much better and is following closely with nephrology and had diuretics adjusted this week. He does report right lower extremity edema and uses lymphedema pump. Weight had been up some and therefore diuretics increased by nephrology. Denies chest pain, palpitations, sob, lightheadedness, dizziness or syncope. Denies orthopnea or PND. States ECF is supposed to be following 2gm/2L fluid restriction, diabetic diet. Cardiologist:  Dr. Elodia Alas:   No fever, no chills, No fatigue or weight loss  Pulmonary:    No dyspnea, no wheezing  Cardiac:    Denies recent chest pain   GI:     No nausea or vomiting, no abdominal pain  Neuro:    No dizziness or light headedness  Musculoskeletal:  No recent active issues  Extremities:   + RLE edema, good peripheral pulses      Past Medical History:   Diagnosis Date    BPH (benign prostatic hyperplasia)     Chronic kidney disease     CKD (chronic kidney disease) stage 3, GFR 30-59 ml/min ([address] 4/16/2015    Edema     Hiatal hernia     Hyperlipidemia     Hypertension     Osteoarthritis     S/P PTCA: 5/12/2015: Stenting of proximal left circumflex artery with Xience 2.75X15 mm, post-dilated to 3.70 mm. 5/12/2015 5/12/2015: Stenting of proximal left circumflex artery with Xience 2.75X15 mm, post-dilated to 3.70 mm.  Dr. Ethan Patel    Type II or unspecified type diabetes mellitus without mention of complication, not stated as uncontrolled        No Known Allergies    Current Outpatient Medications   Medication Sig Dispense Refill    bumetanide (BUMEX) 1 MG tablet Take 1 tablet by mouth 2 times daily 30 tablet 3    potassium chloride (KLOR-CON M) 20 MEQ extended release tablet Take 1 tablet by mouth 2 times daily 60 tablet 3    BASAGLAR KWIKPEN 100 UNIT/ML injection pen Inject 75 Units into the skin daily       bumetanide (BUMEX) 2 MG tablet Take 1 tablet by mouth 3 times daily       docusate sodium (COLACE) 100 MG capsule Take 100 mg by mouth daily as needed for Constipation      benzonatate (TESSALON) 200 MG capsule Take 200 mg by mouth every 8 hours as needed for Cough      BD AUTOSHIELD DUO 30G X 5 MM MISC       guaiFENesin (ROBITUSSIN) 100 MG/5ML liquid Take 10 mLs by mouth every 6 hours as needed       ASPERCREME W/LIDOCAINE EX Apply topically every 4 hours as needed (Apply to back PRN for Pain)       insulin aspart (NOVOLOG) 100 UNIT/ML injection vial Inject into the skin 3 times daily (before meals) Per Sliding Scale = No Insulin, 140-199= 2 Units, 200-249= 4 Units, 250-290= 6 Units, 300-349= 8 Units, 350-399= 10 Units, 400+= 12 Units      cyclobenzaprine (FLEXERIL) 10 MG tablet 10 mg daily Taking once daily for leg spasms and the Every 8 hours PRN TID for Pain. Hold if drowsy, confused, or sleepy.  HYDROcodone-acetaminophen (NORCO) 5-325 MG per tablet Take 1 tablet by mouth every 4 hours as needed.  Incontinence Supplies (BEDSIDE DRAINAGE BAG) MISC Dispense one, 2000 cc overnight urinary bag 1 each 0    traMADol (ULTRAM) 50 MG tablet Take 50 mg by mouth 2 times daily.        insulin NPH (HUMULIN N;NOVOLIN N) 100 UNIT/ML injection vial Inject 10 Units into the skin 2 times daily (Patient taking differently: Inject 22 Units into the skin nightly Inject 22 units qhs and 28 units daily) 1 vial 3    finasteride (PROSCAR) 5 MG tablet Take 1 tablet by mouth daily 30 tablet 3    tamsulosin (FLOMAX) 0.4 MG capsule Take 1 capsule by mouth nightly 30 capsule 3    loperamide (IMODIUM) 2 MG capsule Take 2 mg by mouth 4 times daily as needed for Diarrhea (for diarrhea related to weakness)      atorvastatin (LIPITOR) 20 MG tablet Take 20 mg by mouth nightly      gabapentin (NEURONTIN) 300 MG capsule Take 300 mg by mouth 3 times daily.  pantoprazole (PROTONIX) 40 MG tablet Take 40 mg by mouth nightly       metoprolol succinate ER (TOPROL-XL) 50 MG XL tablet Take 1 tablet by mouth daily 90 tablet 3    clopidogrel (PLAVIX) 75 MG tablet Take 1 tablet by mouth daily 90 tablet 3    nitroGLYCERIN (NITROSTAT) 0.4 MG SL tablet Place 1 tablet under the tongue every 5 minutes as needed for Chest pain 25 tablet 0    aspirin 81 MG chewable tablet Take 1 tablet by mouth daily 30 tablet 3    acetaminophen (TYLENOL) 325 MG tablet Take 650 mg by mouth every 6 hours       Multiple Vitamins-Minerals (THERAPEUTIC MULTIVITAMIN-MINERALS) tablet Take 1 tablet by mouth daily      isosorbide mononitrate (IMDUR) 30 MG CR tablet Take 30 mg by mouth daily. No current facility-administered medications for this visit.         Social History     Socioeconomic History    Marital status:      Spouse name: None    Number of children: None    Years of education: None    Highest education level: None   Occupational History    None   Social Needs    Financial resource strain: None    Food insecurity:     Worry: None     Inability: None    Transportation needs:     Medical: None     Non-medical: None   Tobacco Use    Smoking status: Never Smoker    Smokeless tobacco: Never Used   Substance and Sexual Activity    Alcohol use: Yes     Comment: Occasionally    Drug use: No    Sexual activity: None   Lifestyle    Physical activity:     Days per week: None     Minutes per session: None    Stress: None   Relationships    Social connections: diarrhea. Potassium 3.6. Following closely with nephrology for diuretic adjustment. Educated on signs/symptoms of heart failure and to report to healthcare provider if:  Weight gain of 2-3 pounds in 1 day or 5 pounds in 1 week  Increased shortness of breath  Shortness of breath while laying down  Cough  Chest pain  Swelling in feet, ankles or legs  Tenderness or bloating in the abdomen  Fatigue   Decreased appetite or nausea   Confusion       Discussed use, benefit, and side effects of prescribed medications. All patient questions answered. Pt voiced understanding. Instructed to continue current medications, diet and exercise. Continue risk factor modification and medical management. Patient agreed with treatment plan. Follow up as directed.     Continue Dr Skylar Keys current treatment plan  Follow up with Dr Fidelina Worthy as scheduled or sooner if needed

## 2020-02-10 RX ORDER — TRAMADOL HYDROCHLORIDE 50 MG/1
50 TABLET ORAL 2 TIMES DAILY
Qty: 60 TABLET | Refills: 5 | Status: SHIPPED | OUTPATIENT
Start: 2020-02-10 | End: 2020-03-11

## 2020-02-17 LAB
BUN BLDV-MCNC: 33 MG/DL
CALCIUM SERPL-MCNC: 8.1 MG/DL
CHLORIDE BLD-SCNC: 100 MMOL/L
CO2: 26 MMOL/L
CREAT SERPL-MCNC: 1.6 MG/DL
GFR CALCULATED: 44
GLUCOSE BLD-MCNC: 136 MG/DL
POTASSIUM SERPL-SCNC: 4.5 MMOL/L
SODIUM BLD-SCNC: 138 MMOL/L

## 2020-02-20 ENCOUNTER — OUTSIDE SERVICES (OUTPATIENT)
Dept: FAMILY MEDICINE CLINIC | Age: 85
End: 2020-02-20
Payer: MEDICARE

## 2020-02-20 VITALS
HEART RATE: 74 BPM | RESPIRATION RATE: 16 BRPM | WEIGHT: 277.5 LBS | SYSTOLIC BLOOD PRESSURE: 151 MMHG | TEMPERATURE: 97 F | BODY MASS INDEX: 38.7 KG/M2 | OXYGEN SATURATION: 97 % | DIASTOLIC BLOOD PRESSURE: 72 MMHG

## 2020-02-20 PROCEDURE — 99309 SBSQ NF CARE MODERATE MDM 30: CPT | Performed by: NURSE PRACTITIONER

## 2020-02-20 ASSESSMENT — ENCOUNTER SYMPTOMS
COUGH: 0
CONSTIPATION: 0
NAUSEA: 0
SHORTNESS OF BREATH: 0
SORE THROAT: 0
EYE REDNESS: 0
RHINORRHEA: 0
WHEEZING: 0
DIARRHEA: 0
VOMITING: 0

## 2020-02-20 NOTE — PROGRESS NOTES
Le Robertson is a 80 y.o. male who presents today for his medical conditions/complaints as noted below. Chief Complaint   Patient presents with    1 Month Follow-Up    Diabetes           HPI:     Angela Haro is seen today for follow up on chronic health conditions. His labs are reviewed and states that he knows his blood sugars are elevated. We have added meal time insulin and we will monitor if there is improvement with this. He is also complaining of ears feeling clogged. Will add debrox. No other complaints today. Past Medical History:   Diagnosis Date    BPH (benign prostatic hyperplasia)     Chronic kidney disease     CKD (chronic kidney disease) stage 3, GFR 30-59 ml/min ([facility] 4/16/2015    Edema     Hiatal hernia     Hyperlipidemia     Hypertension     Osteoarthritis     S/P PTCA: 5/12/2015: Stenting of proximal left circumflex artery with Xience 2.75X15 mm, post-dilated to 3.70 mm. 5/12/2015 5/12/2015: Stenting of proximal left circumflex artery with Xience 2.75X15 mm, post-dilated to 3.70 mm.  Dr. Gopi Corado Type II or unspecified type diabetes mellitus without mention of complication, not stated as uncontrolled       Past Surgical History:   Procedure Laterality Date    CARDIAC CATHETERIZATION  5/8/15    [facility]    CATARACT REMOVAL      BILAT    COLONOSCOPY      CORONARY ANGIOPLASTY  2015    EKG 12-LEAD  5/12/2015         FOOT SURGERY      multiple    HYDROCELE EXCISION      LEG AMPUTATION BELOW KNEE  05/12/2017    PACEMAKER INSERTION      PACEMAKER PLACEMENT      TONSILLECTOMY AND ADENOIDECTOMY         Family History   Problem Relation Age of Onset    Cancer Mother     Diabetes Father     Cancer Brother     Diabetes Paternal Grandmother     Diabetes Paternal Grandfather        Social History     Tobacco Use    Smoking status: Never Smoker    Smokeless tobacco: Never Used   Substance Use Topics    Alcohol use: Yes     Comment: Occasionally      No Known Allergies    Health Maintenance   Topic Date Due    DTaP/Tdap/Td vaccine (1 - Tdap) 11/19/1945    Hepatitis B vaccine (1 of 3 - Risk 3-dose series) 11[birth year]/1953    Shingles Vaccine (1 of 2) 11/19/1984    Pneumococcal 65+ years Vaccine (1 of 1 - PPSV23) 11/19/1999    Lipid screen  06/10/2017    Flu vaccine (1) 09/01/2019    Potassium monitoring  01/24/2021    Creatinine monitoring  01/24/2021    Hepatitis A vaccine  Aged Out    Hib vaccine  Aged Out    Meningococcal (ACWY) vaccine  Aged Out       Subjective:      Review of Systems   Constitutional: Negative for chills, fatigue and fever. HENT: Negative for congestion, rhinorrhea and sore throat. Eyes: Negative for redness and visual disturbance. Respiratory: Negative for cough, shortness of breath and wheezing. Cardiovascular: Positive for leg swelling. Negative for chest pain. Gastrointestinal: Negative for constipation, diarrhea, nausea and vomiting. Endocrine: Negative for polydipsia and polyuria. Genitourinary: Negative for dysuria, frequency and hematuria. Musculoskeletal: Negative for arthralgias, gait problem and myalgias. Skin: Negative for rash and wound. Allergic/Immunologic: Negative for environmental allergies and immunocompromised state. Neurological: Negative for dizziness, light-headedness and headaches. Hematological: Does not bruise/bleed easily. Psychiatric/Behavioral: Negative for dysphoric mood and sleep disturbance. The patient is not nervous/anxious. Objective:     Physical Exam  Vitals signs and nursing note reviewed. Constitutional:       General: He is not in acute distress. Appearance: He is well-developed. He is obese. HENT:      Head: Normocephalic and atraumatic. Right Ear: External ear normal. Decreased hearing noted. Left Ear: External ear normal. Decreased hearing noted. Nose: Nose normal.   Eyes:      General: No scleral icterus. Right eye: No discharge.          Left eye: No discharge. Conjunctiva/sclera: Conjunctivae normal.   Neck:      Musculoskeletal: Neck supple. Thyroid: No thyromegaly. Vascular: No JVD. Cardiovascular:      Rate and Rhythm: Normal rate and regular rhythm. Heart sounds: Normal heart sounds. Pulmonary:      Effort: Pulmonary effort is normal. No respiratory distress. Breath sounds: Normal breath sounds. No stridor. No wheezing or rales. Abdominal:      General: Bowel sounds are normal. There is no distension. Palpations: Abdomen is soft. Tenderness: There is no abdominal tenderness. Musculoskeletal: Normal range of motion. General: No deformity. Right shoulder: He exhibits no tenderness, no bony tenderness and no pain. Left shoulder: He exhibits no tenderness, no bony tenderness and no pain. Cervical back: He exhibits no tenderness, no bony tenderness and no pain. Thoracic back: He exhibits no tenderness, no bony tenderness, no pain and no spasm. Lumbar back: He exhibits no tenderness, no bony tenderness and no pain. Left lower leg: Edema present. 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 Lymphadenopathy:      Cervical: No cervical adenopathy. Upper Body:      Right upper body: No supraclavicular adenopathy. Left upper body: No supraclavicular adenopathy. Skin:     General: Skin is warm and dry. Findings: No erythema or rash. Neurological:      Mental Status: He is alert and oriented to person, place, and time. Motor: No atrophy, abnormal muscle tone or seizure activity. Psychiatric:         Attention and Perception: Attention normal.         Mood and Affect: Mood normal.         Speech: Speech normal.         Behavior: Behavior normal.       BP (!) 151/72   Pulse 74   Temp 97 °F (36.1 °C)   Resp 16   Wt 277 lb 8 oz (125.9 kg)   SpO2 97%   BMI 38.70 kg/m²     Assessment/Plan      1. Chronic osteoarthritis - stable.

## 2020-02-28 ENCOUNTER — OFFICE VISIT (OUTPATIENT)
Dept: NEPHROLOGY | Age: 85
End: 2020-02-28
Payer: MEDICARE

## 2020-02-28 VITALS
SYSTOLIC BLOOD PRESSURE: 118 MMHG | HEART RATE: 75 BPM | OXYGEN SATURATION: 96 % | WEIGHT: 272 LBS | DIASTOLIC BLOOD PRESSURE: 56 MMHG | BODY MASS INDEX: 37.94 KG/M2

## 2020-02-28 PROCEDURE — 1036F TOBACCO NON-USER: CPT | Performed by: INTERNAL MEDICINE

## 2020-02-28 PROCEDURE — G8484 FLU IMMUNIZE NO ADMIN: HCPCS | Performed by: INTERNAL MEDICINE

## 2020-02-28 PROCEDURE — G8427 DOCREV CUR MEDS BY ELIG CLIN: HCPCS | Performed by: INTERNAL MEDICINE

## 2020-02-28 PROCEDURE — 4040F PNEUMOC VAC/ADMIN/RCVD: CPT | Performed by: INTERNAL MEDICINE

## 2020-02-28 PROCEDURE — 1123F ACP DISCUSS/DSCN MKR DOCD: CPT | Performed by: INTERNAL MEDICINE

## 2020-02-28 PROCEDURE — G8417 CALC BMI ABV UP PARAM F/U: HCPCS | Performed by: INTERNAL MEDICINE

## 2020-02-28 PROCEDURE — 99213 OFFICE O/P EST LOW 20 MIN: CPT | Performed by: INTERNAL MEDICINE

## 2020-02-28 RX ORDER — BUMETANIDE 1 MG/1
2 TABLET ORAL 2 TIMES DAILY
COMMUNITY
Start: 2020-02-28 | End: 2020-05-12

## 2020-02-28 NOTE — PROGRESS NOTES
Renal Progress Note    Assessment and Plan:      Diagnosis Orders   1. CKD (chronic kidney disease), stage III ([address]     2. Essential hypertension       PLAN:  Lab results discussed with the patient and spouse. They understood. They had no questions. Serum creatinine is improved to 1.6 mg/dL from 2.0 mg/dL. Medications reviewed. Will change bumetanide from 1 mg twice a day to 2 mg twice a day for 7 days. Thereafter we will go back to 1 mg twice a day. BMP in 1 week. Return visit in 4 weeks otherwise with labs. Care plan discussed with the patient and spouse. We will ask the [facility] facility to call us in 3 to 5 days with patient's weight. Patient Active Problem List   Diagnosis    Medtronic dual pacer    Hyperlipidemia    Type 2 diabetes mellitus with circulatory disorder ([facility])    Chronic renal failure, stage 3 (moderate) ([facility])    Chronic osteoarthritis    Benign prostatic hyperplasia with lower urinary tract symptoms    Other lymphedema    CAD (coronary artery disease)    Essential hypertension    GERD (gastroesophageal reflux disease)    EFREN (acute kidney injury) ([address]    Diabetic peripheral neuropathy ([facility])    Chronic back pain    Hypoxia    Leukocytosis    Pain, dental    CKD (chronic kidney disease) stage 3, GFR 30-59 ml/min ([facility])    CKD (chronic kidney disease), stage III ([address]    Dental abscess    Abnormal stress test    Diarrhea    S/P PTCA: 5/12/2015: Stenting of proximal left circumflex artery with Xience 2.75X15 mm, post-dilated to 3.70 mm.     Hypotension    Renal insufficiency    Hematoma    Acute on chronic renal insufficiency    CAD S/P percutaneous coronary angioplasty    HTN (hypertension)    Hyperlipidemia with target LDL less than 70    Sepsis (HCC)    Urinary retention    Scrotal edema    Cellulitis of scrotum    Altered mental status    Anasarca associated with disorder of kidney    Hyponatremia    ARF (acute renal failure) with mouth daily.  guaiFENesin (ROBITUSSIN) 100 MG/5ML liquid Take 10 mLs by mouth every 6 hours as needed       cyclobenzaprine (FLEXERIL) 10 MG tablet 10 mg daily Taking once daily for leg spasms and the Every 8 hours PRN TID for Pain. Hold if drowsy, confused, or sleepy.  HYDROcodone-acetaminophen (NORCO) 5-325 MG per tablet Take 1 tablet by mouth every 4 hours as needed. No current facility-administered medications for this visit.         Lab Results:    CBC:   Lab Results   Component Value Date    WBC 10.6 11/05/2019    HGB 11.7 (A) 11/05/2019    HCT 36.5 (A) 11/05/2019    MCV 94.5 (H) 05/25/2017     11/05/2019     BMP:    Lab Results   Component Value Date     02/17/2020     01/24/2020     11/05/2019    K 4.5 02/17/2020    K 3.6 01/24/2020    K 4.4 11/05/2019     02/17/2020    CL 95 01/24/2020    CL 99 11/05/2019    CO2 26.0 02/17/2020    CO2 28.0 01/24/2020    CO2 29.0 11/05/2019    BUN 33 02/17/2020    BUN 42 01/24/2020    BUN 33 11/05/2019    CREATININE 1.6 02/17/2020    CREATININE 2.0 01/24/2020    CREATININE 1.7 11/05/2019    GLUCOSE 136 02/17/2020    GLUCOSE 88 01/24/2020    GLUCOSE 223 11/05/2019      Hepatic:   Lab Results   Component Value Date    AST 21 11/05/2019    AST 17 05/22/2017    AST 18 06/10/2016    ALT 21 11/05/2019    ALT 9 (L) 05/22/2017    ALT 20 06/10/2016    BILITOT 0.3 11/05/2019    BILITOT 0.3 05/22/2017    BILITOT 0.4 06/10/2016    ALKPHOS 83 11/05/2019    ALKPHOS 84 05/22/2017    ALKPHOS 57 06/10/2016     BNP:   Lab Results   Component Value Date    BNP 2,278 11/07/2019     Lipids:   Lab Results   Component Value Date    CHOL 126 06/10/2016    HDL 48 06/10/2016     INR:   Lab Results   Component Value Date    INR 1.27 (H) 05/22/2017    INR 1.13 05/13/2015    INR 0.95 05/12/2015     URINE:   Lab Results   Component Value Date    NAUR 33 04/16/2015     Lab Results   Component Value Date    NITRU NEGATIVE 05/25/2017    COLORU YELLOW

## 2020-03-20 ENCOUNTER — OUTSIDE SERVICES (OUTPATIENT)
Dept: FAMILY MEDICINE CLINIC | Age: 85
End: 2020-03-20
Payer: MEDICARE

## 2020-03-20 ENCOUNTER — OUTSIDE SERVICES (OUTPATIENT)
Dept: FAMILY MEDICINE CLINIC | Age: 85
End: 2020-03-20

## 2020-03-20 VITALS
TEMPERATURE: 97 F | SYSTOLIC BLOOD PRESSURE: 140 MMHG | DIASTOLIC BLOOD PRESSURE: 60 MMHG | RESPIRATION RATE: 18 BRPM | WEIGHT: 282.2 LBS | OXYGEN SATURATION: 95 % | HEART RATE: 68 BPM | BODY MASS INDEX: 39.36 KG/M2

## 2020-03-20 PROCEDURE — 99309 SBSQ NF CARE MODERATE MDM 30: CPT | Performed by: FAMILY MEDICINE

## 2020-03-20 ASSESSMENT — ENCOUNTER SYMPTOMS
COUGH: 0
WHEEZING: 0
RHINORRHEA: 0
NAUSEA: 0
EYE REDNESS: 0
SORE THROAT: 0
DIARRHEA: 0
SHORTNESS OF BREATH: 0
VOMITING: 0
CONSTIPATION: 0

## 2020-03-20 NOTE — PROGRESS NOTES
Left eye: No discharge. Conjunctiva/sclera: Conjunctivae normal.   Neck:      Musculoskeletal: Neck supple. Thyroid: No thyromegaly. Vascular: No JVD. Cardiovascular:      Rate and Rhythm: Normal rate and regular rhythm. Heart sounds: Normal heart sounds. Pulmonary:      Effort: Pulmonary effort is normal. No respiratory distress. Breath sounds: Normal breath sounds. No stridor. No wheezing or rales. Abdominal:      General: Bowel sounds are normal. There is no distension. Palpations: Abdomen is soft. Tenderness: There is no abdominal tenderness. Musculoskeletal: Normal range of motion. General: No deformity. Right shoulder: He exhibits no tenderness, no bony tenderness and no pain. Left shoulder: He exhibits no tenderness, no bony tenderness and no pain. Cervical back: He exhibits no tenderness, no bony tenderness and no pain. Thoracic back: He exhibits no tenderness, no bony tenderness, no pain and no spasm. Lumbar back: He exhibits no tenderness, no bony tenderness and no pain. Right lower leg: Edema present. Feet:      Left foot:      Amputation: Left leg is amputated above knee. Lymphadenopathy:      Cervical: No cervical adenopathy. Upper Body:      Right upper body: No supraclavicular adenopathy. Left upper body: No supraclavicular adenopathy. Skin:     General: Skin is warm and dry. Findings: No erythema or rash. Neurological:      Mental Status: He is alert and oriented to person, place, and time. Motor: No atrophy, abnormal muscle tone or seizure activity. Psychiatric:         Attention and Perception: Attention normal.         Mood and Affect: Mood normal.         Speech: Speech normal.         Behavior: Behavior normal.       BP (!) 140/60   Pulse 68   Temp 97 °F (36.1 °C)   Resp 18   Wt 282 lb 3.2 oz (128 kg)   SpO2 95%   BMI 39.36 kg/m²     Assessment/Plan      1.  Chronic osteoarthritis - stable. Continue to monitor pain and continue current pain regimen. 2. Type 2 diabetes mellitus with other circulatory complication, with long-term current use of insulin ([facility]) - elevated blood sugars. Continue current regimen. Blood sugars improved overall. 3. CKD (chronic kidney disease) stage 3, GFR 30-59 ml/min ([facility]) - continue to monitor labs. Overall stable. BMP stable. Follows with nephrology. 4. Diabetic peripheral neuropathy ([facility]) - Continue Gabapentin as directed. 5. Benign prostatic hyperplasia with urinary obstruction - continue catheter. Continue current meds. 6. Hyperlipidemia, unspecified hyperlipidemia type - continue to follow labs and current meds. 7. Essential hypertension - blood pressures overall stable. Continue current meds and monitor blood pressures. 8. Gastroesophageal reflux disease without esophagitis - denies symptoms. Patient seen and examined. History partially obtained by chart review and nursing notes. I have reviewed patient's past medical, surgical, social, and family history and have made updates where appropriate.   See facility EMR for updated medication list.       Electronically signed by Hank Marques MD on 3/20/2020 at 3:39 PM

## 2020-03-30 ENCOUNTER — PROCEDURE VISIT (OUTPATIENT)
Dept: CARDIOLOGY CLINIC | Age: 85
End: 2020-03-30
Payer: MEDICARE

## 2020-03-30 PROCEDURE — 93296 REM INTERROG EVL PM/IDS: CPT | Performed by: INTERNAL MEDICINE

## 2020-03-30 PROCEDURE — 93294 REM INTERROG EVL PM/LDLS PM: CPT | Performed by: INTERNAL MEDICINE

## 2020-04-16 ENCOUNTER — OUTSIDE SERVICES (OUTPATIENT)
Dept: FAMILY MEDICINE CLINIC | Age: 85
End: 2020-04-16
Payer: MEDICARE

## 2020-04-16 VITALS
DIASTOLIC BLOOD PRESSURE: 60 MMHG | RESPIRATION RATE: 20 BRPM | HEART RATE: 78 BPM | TEMPERATURE: 99.6 F | WEIGHT: 274.8 LBS | OXYGEN SATURATION: 95 % | BODY MASS INDEX: 38.33 KG/M2 | SYSTOLIC BLOOD PRESSURE: 140 MMHG

## 2020-04-16 PROCEDURE — 99490 CHRNC CARE MGMT STAFF 1ST 20: CPT | Performed by: NURSE PRACTITIONER

## 2020-04-16 PROCEDURE — 99310 SBSQ NF CARE HIGH MDM 45: CPT | Performed by: NURSE PRACTITIONER

## 2020-04-16 ASSESSMENT — ENCOUNTER SYMPTOMS
EYE DISCHARGE: 1
SINUS PRESSURE: 0
SHORTNESS OF BREATH: 0
SORE THROAT: 0
COUGH: 1
ABDOMINAL PAIN: 0
NAUSEA: 0
TROUBLE SWALLOWING: 0
CONSTIPATION: 0
VOMITING: 0
SINUS PAIN: 0
RHINORRHEA: 0
WHEEZING: 0
EYE ITCHING: 0
DIARRHEA: 0
CHEST TIGHTNESS: 0
EYE REDNESS: 1
ABDOMINAL DISTENTION: 0

## 2020-05-12 ENCOUNTER — OFFICE VISIT (OUTPATIENT)
Dept: CARDIOLOGY CLINIC | Age: 85
End: 2020-05-12
Payer: MEDICARE

## 2020-05-12 VITALS
SYSTOLIC BLOOD PRESSURE: 112 MMHG | BODY MASS INDEX: 38.5 KG/M2 | HEIGHT: 71 IN | WEIGHT: 275 LBS | HEART RATE: 72 BPM | DIASTOLIC BLOOD PRESSURE: 67 MMHG

## 2020-05-12 PROCEDURE — G8417 CALC BMI ABV UP PARAM F/U: HCPCS | Performed by: NUCLEAR MEDICINE

## 2020-05-12 PROCEDURE — 4040F PNEUMOC VAC/ADMIN/RCVD: CPT | Performed by: NUCLEAR MEDICINE

## 2020-05-12 PROCEDURE — G8427 DOCREV CUR MEDS BY ELIG CLIN: HCPCS | Performed by: NUCLEAR MEDICINE

## 2020-05-12 PROCEDURE — 99213 OFFICE O/P EST LOW 20 MIN: CPT | Performed by: NUCLEAR MEDICINE

## 2020-05-12 PROCEDURE — 1123F ACP DISCUSS/DSCN MKR DOCD: CPT | Performed by: NUCLEAR MEDICINE

## 2020-05-12 PROCEDURE — 1036F TOBACCO NON-USER: CPT | Performed by: NUCLEAR MEDICINE

## 2020-05-12 RX ORDER — TRAMADOL HYDROCHLORIDE 50 MG/1
50 TABLET ORAL 2 TIMES DAILY PRN
Status: ON HOLD | COMMUNITY
End: 2020-06-29 | Stop reason: HOSPADM

## 2020-05-12 NOTE — PROGRESS NOTES
[id number] [place] [address]  Dept: [phone]  Dept Fax: [phone]  Loc: [phone]    Visit Date: 5/12/2020    Kenny Ambriz is a 80 y.o. male who presents todayfor:  Chief Complaint   Patient presents with    1 Year Follow Up    Coronary Artery Disease    Hypertension    Hyperlipidemia     Known cardiorenal   No changes clinically   No chest pain   Some baseline leg edema  No changes  BP is stable  No dizziness  No syncope      HPI:  HPI  Past Medical History:   Diagnosis Date    BPH (benign prostatic hyperplasia)     Chronic kidney disease     CKD (chronic kidney disease) stage 3, GFR 30-59 ml/min [facility] 4/16/2015    Edema     Hiatal hernia     Hyperlipidemia     Hypertension     Osteoarthritis     S/P PTCA: 5/12/2015: Stenting of proximal left circumflex artery with Xience 2.75X15 mm, post-dilated to 3.70 mm. 5/12/2015 5/12/2015: Stenting of proximal left circumflex artery with Xience 2.75X15 mm, post-dilated to 3.70 mm.  Dr. Curry Search Type II or unspecified type diabetes mellitus without mention of complication, not stated as uncontrolled       Past Surgical History:   Procedure Laterality Date    [facility]    CATARACT REMOVAL      BILAT    COLONOSCOPY      CORONARY ANGIOPLASTY  2015    EKG 12-LEAD  5/12/2015         FOOT SURGERY      multiple    HYDROCELE EXCISION      LEG AMPUTATION BELOW KNEE  05/12/2017    PACEMAKER INSERTION      PACEMAKER PLACEMENT      TONSILLECTOMY AND ADENOIDECTOMY       Family History   Problem Relation Age of Onset    Cancer Mother     Diabetes Father     Cancer Brother     Diabetes Paternal Grandmother     Diabetes Paternal Grandfather      Social History     Tobacco Use    Smoking status: Never Smoker    Smokeless tobacco: Never Used   Substance Use Topics    Alcohol use: Yes     Comment: Occasionally      Current Outpatient 20 mg by mouth nightly      gabapentin (NEURONTIN) 300 MG capsule Take 300 mg by mouth 3 times daily.  pantoprazole (PROTONIX) 40 MG tablet Take 40 mg by mouth nightly       metoprolol succinate ER (TOPROL-XL) 50 MG XL tablet Take 1 tablet by mouth daily 90 tablet 3    clopidogrel (PLAVIX) 75 MG tablet Take 1 tablet by mouth daily 90 tablet 3    nitroGLYCERIN (NITROSTAT) 0.4 MG SL tablet Place 1 tablet under the tongue every 5 minutes as needed for Chest pain 25 tablet 0    aspirin 81 MG chewable tablet Take 1 tablet by mouth daily 30 tablet 3    acetaminophen (TYLENOL) 325 MG tablet Take 650 mg by mouth every 6 hours       Multiple Vitamins-Minerals (THERAPEUTIC MULTIVITAMIN-MINERALS) tablet Take 1 tablet by mouth daily      isosorbide mononitrate (IMDUR) 30 MG CR tablet Take 30 mg by mouth daily. No current facility-administered medications for this visit.       No Known Allergies  Health Maintenance   Topic Date Due    DTaP/Tdap/Td vaccine (1 - Tdap) 11/19/1953    Shingles Vaccine (1 of 2) 11/19/1984    Pneumococcal 65+ years Vaccine (1 of 1 - PPSV23) 11/19/1999    Lipid screen  06/10/2017    Flu vaccine (Season Ended) 09/01/2020    Potassium monitoring  02/17/2021    Creatinine monitoring  02/17/2021    Hepatitis A vaccine  Aged Out    Hib vaccine  Aged Out    Meningococcal (ACWY) vaccine  Aged Out       Subjective:  Review of Systems  General:   No fever, no chills, No fatigue or weight loss  Pulmonary:   some dyspnea, no wheezing  Cardiac:    Denies recent chest pain,   GI:     No nausea or vomiting, no abdominal pain  Neuro:    No dizziness or light headedness,   Musculoskeletal:  No recent active issues  Extremities:   No edema, no obvious claudication       Objective:  Physical Exam  /67   Pulse 72   Ht 5' 11\" (1.803 m)   Wt 275 lb (124.7 kg)   BMI 38.35 kg/m²   General:   Well developed, well nourished  Lungs:   Clear to auscultation  Heart:    Normal S1 S2, Slight

## 2020-05-20 ENCOUNTER — OUTSIDE SERVICES (OUTPATIENT)
Dept: FAMILY MEDICINE CLINIC | Age: 85
End: 2020-05-20
Payer: MEDICARE

## 2020-05-20 VITALS
SYSTOLIC BLOOD PRESSURE: 141 MMHG | OXYGEN SATURATION: 95 % | DIASTOLIC BLOOD PRESSURE: 65 MMHG | HEART RATE: 65 BPM | WEIGHT: 281.6 LBS | TEMPERATURE: 97.4 F | RESPIRATION RATE: 18 BRPM | BODY MASS INDEX: 39.28 KG/M2

## 2020-05-20 PROCEDURE — 99490 CHRNC CARE MGMT STAFF 1ST 20: CPT | Performed by: NURSE PRACTITIONER

## 2020-05-20 PROCEDURE — 99309 SBSQ NF CARE MODERATE MDM 30: CPT | Performed by: NURSE PRACTITIONER

## 2020-05-20 ASSESSMENT — ENCOUNTER SYMPTOMS
SHORTNESS OF BREATH: 0
RHINORRHEA: 0
WHEEZING: 0
COUGH: 0
CONSTIPATION: 0
DIARRHEA: 0
NAUSEA: 0
ABDOMINAL PAIN: 0
EYE REDNESS: 0
VOMITING: 0
SORE THROAT: 0

## 2020-05-20 NOTE — PROGRESS NOTES
Sebastian Long is a 80 y.o. male who presents today for his medical conditions/complaints as noted below. Chief Complaint   Patient presents with    1 Month Follow-Up     Follow up on chronic health conditions           HPI:     Todd Oliveira is seen in his room today. He states he is doing well. States his blood sugars have been stable overall. No recent concerns. States he has occasional phantom pain. Has been having some popping in his left ear but it has improved in the last week or two. No recent falls. Labs reviewed from the past month and overall stable. Blood sugars reviewed. Will check a Hgb A1C next week. Past Medical History:   Diagnosis Date    BPH (benign prostatic hyperplasia)     Chronic kidney disease     CKD (chronic kidney disease) stage 3, GFR 30-59 ml/min ([facility]) 4/16/2015    Edema     Hiatal hernia     Hyperlipidemia     Hypertension     Osteoarthritis     S/P PTCA: 5/12/2015: Stenting of proximal left circumflex artery with Xience 2.75X15 mm, post-dilated to 3.70 mm. 5/12/2015 5/12/2015: Stenting of proximal left circumflex artery with Xience 2.75X15 mm, post-dilated to 3.70 mm.  Dr. Natacha Good Type II or unspecified type diabetes mellitus without mention of complication, not stated as uncontrolled       Past Surgical History:   Procedure Laterality Date    [facility]    CATARACT REMOVAL      BILAT    COLONOSCOPY      CORONARY ANGIOPLASTY  2015    EKG 12-LEAD  5/12/2015         FOOT SURGERY      multiple    HYDROCELE EXCISION      LEG AMPUTATION BELOW KNEE  05/12/2017    PACEMAKER INSERTION      PACEMAKER PLACEMENT      TONSILLECTOMY AND ADENOIDECTOMY         Family History   Problem Relation Age of Onset    Cancer Mother     Diabetes Father     Cancer Brother     Diabetes Paternal Grandmother     Diabetes Paternal Grandfather        Social History     Tobacco Use    Smoking status: Never Smoker    Smokeless tobacco: Never Used Substance Use Topics    Alcohol use: Yes     Comment: Occasionally      No Known Allergies    Health Maintenance   Topic Date Due    DTaP/Tdap/Td vaccine (1 - Tdap) 11/19/1953    Shingles Vaccine (1 of 2) 11/19/1984    Pneumococcal 65+ years Vaccine (1 of 1 - PPSV23) 11/19/1999    Lipid screen  06/10/2017    Flu vaccine (Season Ended) 09/01/2020    Potassium monitoring  02/17/2021    Creatinine monitoring  02/17/2021    Hepatitis A vaccine  Aged Out    Hib vaccine  Aged Out    Meningococcal (ACWY) vaccine  Aged Out       Subjective:      Review of Systems   Constitutional: Negative for chills, fatigue and fever. HENT: Positive for hearing loss. Negative for congestion, rhinorrhea and sore throat. Eyes: Negative for redness and visual disturbance. Respiratory: Negative for cough, shortness of breath and wheezing. Cardiovascular: Positive for leg swelling (right leg). Negative for chest pain. Gastrointestinal: Negative for abdominal pain, constipation, diarrhea, nausea and vomiting. Endocrine: Negative for polydipsia and polyuria. Genitourinary: Negative for dysuria, frequency and hematuria. Musculoskeletal: Positive for gait problem. Negative for arthralgias and myalgias. Skin: Negative for rash and wound. Allergic/Immunologic: Negative for environmental allergies and immunocompromised state. Neurological: Negative for dizziness, light-headedness and headaches. Hematological: Does not bruise/bleed easily. Psychiatric/Behavioral: Negative for dysphoric mood and sleep disturbance. The patient is not nervous/anxious. Objective:     Physical Exam  Vitals signs and nursing note reviewed. Constitutional:       General: He is not in acute distress. Appearance: He is well-developed. He is obese. HENT:      Head: Normocephalic and atraumatic. Right Ear: External ear normal.      Left Ear: External ear normal. Decreased hearing noted.       Nose: Nose normal.   Eyes:

## 2020-06-02 ENCOUNTER — NURSE ONLY (OUTPATIENT)
Dept: CARDIOLOGY CLINIC | Age: 85
End: 2020-06-02
Payer: MEDICARE

## 2020-06-02 PROCEDURE — 93280 PM DEVICE PROGR EVAL DUAL: CPT | Performed by: INTERNAL MEDICINE

## 2020-06-17 ENCOUNTER — OUTSIDE SERVICES (OUTPATIENT)
Dept: FAMILY MEDICINE CLINIC | Age: 85
End: 2020-06-17
Payer: MEDICARE

## 2020-06-17 VITALS
SYSTOLIC BLOOD PRESSURE: 147 MMHG | TEMPERATURE: 97.3 F | OXYGEN SATURATION: 95 % | DIASTOLIC BLOOD PRESSURE: 64 MMHG | HEART RATE: 74 BPM | WEIGHT: 284.3 LBS | BODY MASS INDEX: 39.65 KG/M2 | RESPIRATION RATE: 18 BRPM

## 2020-06-17 PROCEDURE — 99490 CHRNC CARE MGMT STAFF 1ST 20: CPT | Performed by: NURSE PRACTITIONER

## 2020-06-17 PROCEDURE — 99309 SBSQ NF CARE MODERATE MDM 30: CPT | Performed by: NURSE PRACTITIONER

## 2020-06-17 ASSESSMENT — ENCOUNTER SYMPTOMS
COUGH: 0
EYE REDNESS: 0
VOMITING: 0
SHORTNESS OF BREATH: 0
SORE THROAT: 0
CONSTIPATION: 0
ABDOMINAL DISTENTION: 0
NAUSEA: 0
DIARRHEA: 0
RHINORRHEA: 0
WHEEZING: 0

## 2020-06-17 NOTE — PROGRESS NOTES
Smita Nicholson is a 80 y.o. male who presents today for his medical conditions/complaints as noted below. Chief Complaint   Patient presents with    1 Month Follow-Up     Folllow up on chronic health conditions           HPI:     Carmen Thompson is seen today for follow upon chronic health conditions including DM, CKD, HTN, BPH, HLD, and neuropathy. He had labs on 6/15 and we are awaiting C&S from  that was completed. He denies s/s. The rest of the labs were sent to nephrology. His parathormone was elevated and his Vitamin D level was decreased. These have been sent to nephrology. His calcium is 8.2. This is consistent with CKD. Likely needs his vitamin D replenished. His blood sugars are overall improved with A1C down to 7.6 from 8.3. He did have a sleep study completed and we are awaiting results. He denies chest pain or shortness of breath. He has chronic neuropathic pain. Past Medical History:   Diagnosis Date    BPH (benign prostatic hyperplasia)     Chronic kidney disease     CKD (chronic kidney disease) stage 3, GFR 30-59 ml/min ([facility]) 4/16/2015    Edema     Hiatal hernia     Hyperlipidemia     Hypertension     Osteoarthritis     S/P PTCA: 5/12/2015: Stenting of proximal left circumflex artery with Xience 2.75X15 mm, post-dilated to 3.70 mm. 5/12/2015 5/12/2015: Stenting of proximal left circumflex artery with Xience 2.75X15 mm, post-dilated to 3.70 mm.  Dr. Tahir Gruber Type II or unspecified type diabetes mellitus without mention of complication, not stated as uncontrolled       Past Surgical History:   Procedure Laterality Date    [facility]/15    [facility]    CATARACT REMOVAL      BILAT    COLONOSCOPY      CORONARY ANGIOPLASTY  2015    EKG 12-LEAD  5/12/2015         FOOT SURGERY      multiple    HYDROCELE EXCISION      LEG AMPUTATION BELOW KNEE  05/12/2017    PACEMAKER INSERTION      PACEMAKER PLACEMENT      TONSILLECTOMY AND ADENOIDECTOMY         Family History   Problem

## 2020-06-27 ENCOUNTER — HOSPITAL ENCOUNTER (INPATIENT)
Age: 85
LOS: 14 days | Discharge: NURSING, REHAB OR LONG-TERM CARE | DRG: 981 | End: 2020-07-11
Attending: FAMILY MEDICINE | Admitting: FAMILY MEDICINE
Payer: MEDICARE

## 2020-06-27 LAB
ALBUMIN SERPL-MCNC: 2.8 G/DL (ref 3.5–5.1)
ALP BLD-CCNC: 76 U/L (ref 38–126)
ALT SERPL-CCNC: 13 U/L (ref 11–66)
ANION GAP SERPL CALCULATED.3IONS-SCNC: 10 MEQ/L (ref 8–16)
AST SERPL-CCNC: 17 U/L (ref 5–40)
BASOPHILS # BLD: 0.6 %
BASOPHILS ABSOLUTE: 0.1 THOU/MM3 (ref 0–0.1)
BILIRUB SERPL-MCNC: 0.2 MG/DL (ref 0.3–1.2)
BUN BLDV-MCNC: 33 MG/DL (ref 7–22)
CALCIUM SERPL-MCNC: 7.9 MG/DL (ref 8.5–10.5)
CHLORIDE BLD-SCNC: 103 MEQ/L (ref 98–111)
CO2: 27 MEQ/L (ref 23–33)
CREAT SERPL-MCNC: 1.9 MG/DL (ref 0.4–1.2)
EOSINOPHIL # BLD: 2.9 %
EOSINOPHILS ABSOLUTE: 0.3 THOU/MM3 (ref 0–0.4)
ERYTHROCYTE [DISTWIDTH] IN BLOOD BY AUTOMATED COUNT: 14.6 % (ref 11.5–14.5)
ERYTHROCYTE [DISTWIDTH] IN BLOOD BY AUTOMATED COUNT: 52.3 FL (ref 35–45)
GFR SERPL CREATININE-BSD FRML MDRD: 34 ML/MIN/1.73M2
GLUCOSE BLD-MCNC: 58 MG/DL (ref 70–108)
GLUCOSE BLD-MCNC: 60 MG/DL (ref 70–108)
HCT VFR BLD CALC: 35.7 % (ref 42–52)
HEMOGLOBIN: 10.8 GM/DL (ref 14–18)
IMMATURE GRANS (ABS): 0.09 THOU/MM3 (ref 0–0.07)
IMMATURE GRANULOCYTES: 0.8 %
LYMPHOCYTES # BLD: 26.2 %
LYMPHOCYTES ABSOLUTE: 3.1 THOU/MM3 (ref 1–4.8)
MCH RBC QN AUTO: 29.9 PG (ref 26–33)
MCHC RBC AUTO-ENTMCNC: 30.3 GM/DL (ref 32.2–35.5)
MCV RBC AUTO: 98.9 FL (ref 80–94)
MONOCYTES # BLD: 8 %
MONOCYTES ABSOLUTE: 1 THOU/MM3 (ref 0.4–1.3)
NUCLEATED RED BLOOD CELLS: 0 /100 WBC
PLATELET # BLD: 265 THOU/MM3 (ref 130–400)
PMV BLD AUTO: 9.7 FL (ref 9.4–12.4)
POTASSIUM REFLEX MAGNESIUM: 4.3 MEQ/L (ref 3.5–5.2)
RBC # BLD: 3.61 MILL/MM3 (ref 4.7–6.1)
SEG NEUTROPHILS: 61.5 %
SEGMENTED NEUTROPHILS ABSOLUTE COUNT: 7.3 THOU/MM3 (ref 1.8–7.7)
SODIUM BLD-SCNC: 140 MEQ/L (ref 135–145)
TOTAL PROTEIN: 6.5 G/DL (ref 6.1–8)
WBC # BLD: 11.9 THOU/MM3 (ref 4.8–10.8)

## 2020-06-27 PROCEDURE — 82948 REAGENT STRIP/BLOOD GLUCOSE: CPT

## 2020-06-27 PROCEDURE — 1200000003 HC TELEMETRY R&B

## 2020-06-27 PROCEDURE — 85025 COMPLETE CBC W/AUTO DIFF WBC: CPT

## 2020-06-27 PROCEDURE — 80053 COMPREHEN METABOLIC PANEL: CPT

## 2020-06-27 PROCEDURE — 99222 1ST HOSP IP/OBS MODERATE 55: CPT | Performed by: PHYSICIAN ASSISTANT

## 2020-06-27 PROCEDURE — 36415 COLL VENOUS BLD VENIPUNCTURE: CPT

## 2020-06-28 ENCOUNTER — APPOINTMENT (OUTPATIENT)
Dept: GENERAL RADIOLOGY | Age: 85
DRG: 981 | End: 2020-06-28
Attending: FAMILY MEDICINE
Payer: MEDICARE

## 2020-06-28 ENCOUNTER — APPOINTMENT (OUTPATIENT)
Dept: CT IMAGING | Age: 85
DRG: 981 | End: 2020-06-28
Attending: FAMILY MEDICINE
Payer: MEDICARE

## 2020-06-28 PROBLEM — R41.82 AMS (ALTERED MENTAL STATUS): Status: ACTIVE | Noted: 2020-06-28

## 2020-06-28 LAB
AMMONIA: 29 UMOL/L (ref 11–60)
BACTERIA: ABNORMAL
BASE EXCESS MIXED: 1.4 MMOL/L (ref -2–3)
BILIRUBIN URINE: NEGATIVE
BLOOD, URINE: ABNORMAL
CALCIUM IONIZED: 0.89 MMOL/L (ref 1.12–1.32)
CASTS: ABNORMAL /LPF
CASTS: ABNORMAL /LPF
CHARACTER, URINE: CLEAR
COLLECTED BY:: ABNORMAL
COLOR: YELLOW
CREATININE URINE: 42 MG/DL
CRYSTALS: ABNORMAL
EKG ATRIAL RATE: 70 BPM
EKG P AXIS: 63 DEGREES
EKG P-R INTERVAL: 144 MS
EKG Q-T INTERVAL: 478 MS
EKG QRS DURATION: 206 MS
EKG QTC CALCULATION (BAZETT): 516 MS
EKG R AXIS: -66 DEGREES
EKG T AXIS: 108 DEGREES
EKG VENTRICULAR RATE: 70 BPM
EPITHELIAL CELLS, UA: ABNORMAL /HPF
GLUCOSE BLD-MCNC: 102 MG/DL (ref 70–108)
GLUCOSE BLD-MCNC: 103 MG/DL (ref 70–108)
GLUCOSE BLD-MCNC: 60 MG/DL (ref 70–108)
GLUCOSE BLD-MCNC: 63 MG/DL (ref 70–108)
GLUCOSE BLD-MCNC: 74 MG/DL (ref 70–108)
GLUCOSE BLD-MCNC: 81 MG/DL (ref 70–108)
GLUCOSE, URINE: NEGATIVE MG/DL
HCO3, MIXED: 28 MMOL/L (ref 23–28)
KETONES, URINE: NEGATIVE
LEUKOCYTE ESTERASE, URINE: ABNORMAL
MISCELLANEOUS LAB TEST RESULT: ABNORMAL
NITRITE, URINE: NEGATIVE
O2 SAT, MIXED: 68 %
PCO2, MIXED VENOUS: 53 MMHG (ref 41–51)
PH UA: 5.5 (ref 5–9)
PH, MIXED: 7.33 (ref 7.31–7.41)
PO2 MIXED: 39 MMHG (ref 25–40)
PRO-BNP: 3267 PG/ML (ref 0–1800)
PROTEIN UA: 100 MG/DL
RBC URINE: ABNORMAL /HPF
RENAL EPITHELIAL, UA: ABNORMAL
SPECIFIC GRAVITY UA: 1.01 (ref 1–1.03)
UREA NITROGEN, UR: 384 MG/DL
UROBILINOGEN, URINE: 0.2 EU/DL (ref 0–1)
WBC UA: ABNORMAL /HPF
YEAST: ABNORMAL

## 2020-06-28 PROCEDURE — 1200000003 HC TELEMETRY R&B

## 2020-06-28 PROCEDURE — 36415 COLL VENOUS BLD VENIPUNCTURE: CPT

## 2020-06-28 PROCEDURE — 82140 ASSAY OF AMMONIA: CPT

## 2020-06-28 PROCEDURE — 2580000003 HC RX 258: Performed by: PHYSICIAN ASSISTANT

## 2020-06-28 PROCEDURE — 36600 WITHDRAWAL OF ARTERIAL BLOOD: CPT

## 2020-06-28 PROCEDURE — 82570 ASSAY OF URINE CREATININE: CPT

## 2020-06-28 PROCEDURE — 6360000002 HC RX W HCPCS: Performed by: INTERNAL MEDICINE

## 2020-06-28 PROCEDURE — 3209999900 CT INTERPRETATION OF OUTSIDE IMAGES

## 2020-06-28 PROCEDURE — 93010 ELECTROCARDIOGRAM REPORT: CPT | Performed by: INTERNAL MEDICINE

## 2020-06-28 PROCEDURE — 99221 1ST HOSP IP/OBS SF/LOW 40: CPT | Performed by: UROLOGY

## 2020-06-28 PROCEDURE — 81001 URINALYSIS AUTO W/SCOPE: CPT

## 2020-06-28 PROCEDURE — 83880 ASSAY OF NATRIURETIC PEPTIDE: CPT

## 2020-06-28 PROCEDURE — 82948 REAGENT STRIP/BLOOD GLUCOSE: CPT

## 2020-06-28 PROCEDURE — 94760 N-INVAS EAR/PLS OXIMETRY 1: CPT

## 2020-06-28 PROCEDURE — 93005 ELECTROCARDIOGRAM TRACING: CPT | Performed by: PHYSICIAN ASSISTANT

## 2020-06-28 PROCEDURE — 82330 ASSAY OF CALCIUM: CPT

## 2020-06-28 PROCEDURE — 71045 X-RAY EXAM CHEST 1 VIEW: CPT

## 2020-06-28 PROCEDURE — 87077 CULTURE AEROBIC IDENTIFY: CPT

## 2020-06-28 PROCEDURE — 6370000000 HC RX 637 (ALT 250 FOR IP): Performed by: INTERNAL MEDICINE

## 2020-06-28 PROCEDURE — 99222 1ST HOSP IP/OBS MODERATE 55: CPT | Performed by: INTERNAL MEDICINE

## 2020-06-28 PROCEDURE — 87086 URINE CULTURE/COLONY COUNT: CPT

## 2020-06-28 PROCEDURE — 94761 N-INVAS EAR/PLS OXIMETRY MLT: CPT

## 2020-06-28 PROCEDURE — 87186 SC STD MICRODIL/AGAR DIL: CPT

## 2020-06-28 PROCEDURE — 99233 SBSQ HOSP IP/OBS HIGH 50: CPT | Performed by: INTERNAL MEDICINE

## 2020-06-28 PROCEDURE — 6370000000 HC RX 637 (ALT 250 FOR IP): Performed by: PHYSICIAN ASSISTANT

## 2020-06-28 PROCEDURE — 84540 ASSAY OF URINE/UREA-N: CPT

## 2020-06-28 PROCEDURE — 6360000002 HC RX W HCPCS: Performed by: PHYSICIAN ASSISTANT

## 2020-06-28 RX ORDER — ACETAMINOPHEN 650 MG/1
650 SUPPOSITORY RECTAL EVERY 6 HOURS PRN
Status: DISCONTINUED | OUTPATIENT
Start: 2020-06-28 | End: 2020-07-11 | Stop reason: HOSPADM

## 2020-06-28 RX ORDER — FUROSEMIDE 10 MG/ML
40 INJECTION INTRAMUSCULAR; INTRAVENOUS 2 TIMES DAILY
Status: DISCONTINUED | OUTPATIENT
Start: 2020-06-28 | End: 2020-07-02 | Stop reason: ALTCHOICE

## 2020-06-28 RX ORDER — HEPARIN SODIUM 5000 [USP'U]/ML
5000 INJECTION, SOLUTION INTRAVENOUS; SUBCUTANEOUS EVERY 8 HOURS SCHEDULED
Status: DISCONTINUED | OUTPATIENT
Start: 2020-06-28 | End: 2020-06-29

## 2020-06-28 RX ORDER — ATORVASTATIN CALCIUM 20 MG/1
20 TABLET, FILM COATED ORAL NIGHTLY
Status: DISCONTINUED | OUTPATIENT
Start: 2020-06-28 | End: 2020-07-11 | Stop reason: HOSPADM

## 2020-06-28 RX ORDER — TAMSULOSIN HYDROCHLORIDE 0.4 MG/1
0.4 CAPSULE ORAL NIGHTLY
Status: DISCONTINUED | OUTPATIENT
Start: 2020-06-28 | End: 2020-07-11 | Stop reason: HOSPADM

## 2020-06-28 RX ORDER — GABAPENTIN 300 MG/1
300 CAPSULE ORAL 3 TIMES DAILY
Status: DISCONTINUED | OUTPATIENT
Start: 2020-06-28 | End: 2020-07-01

## 2020-06-28 RX ORDER — PROMETHAZINE HYDROCHLORIDE 25 MG/1
12.5 TABLET ORAL EVERY 6 HOURS PRN
Status: DISCONTINUED | OUTPATIENT
Start: 2020-06-28 | End: 2020-07-11 | Stop reason: HOSPADM

## 2020-06-28 RX ORDER — METOPROLOL SUCCINATE 50 MG/1
50 TABLET, EXTENDED RELEASE ORAL DAILY
Status: DISCONTINUED | OUTPATIENT
Start: 2020-06-28 | End: 2020-07-11 | Stop reason: HOSPADM

## 2020-06-28 RX ORDER — LINEZOLID 600 MG/1
600 TABLET, FILM COATED ORAL EVERY 12 HOURS SCHEDULED
Status: DISCONTINUED | OUTPATIENT
Start: 2020-06-28 | End: 2020-07-05

## 2020-06-28 RX ORDER — BUMETANIDE 1 MG/1
1 TABLET ORAL 2 TIMES DAILY
Status: DISCONTINUED | OUTPATIENT
Start: 2020-06-28 | End: 2020-06-28

## 2020-06-28 RX ORDER — INSULIN GLARGINE 100 [IU]/ML
30 INJECTION, SOLUTION SUBCUTANEOUS DAILY
Status: DISCONTINUED | OUTPATIENT
Start: 2020-06-28 | End: 2020-06-30

## 2020-06-28 RX ORDER — CLOPIDOGREL BISULFATE 75 MG/1
75 TABLET ORAL DAILY
Status: DISCONTINUED | OUTPATIENT
Start: 2020-06-28 | End: 2020-07-11 | Stop reason: HOSPADM

## 2020-06-28 RX ORDER — SODIUM CHLORIDE 9 MG/ML
INJECTION, SOLUTION INTRAVENOUS CONTINUOUS
Status: DISCONTINUED | OUTPATIENT
Start: 2020-06-28 | End: 2020-06-30

## 2020-06-28 RX ORDER — ISOSORBIDE MONONITRATE 30 MG/1
30 TABLET, EXTENDED RELEASE ORAL DAILY
Status: DISCONTINUED | OUTPATIENT
Start: 2020-06-28 | End: 2020-07-11 | Stop reason: HOSPADM

## 2020-06-28 RX ORDER — INSULIN GLARGINE 100 [IU]/ML
75 INJECTION, SOLUTION SUBCUTANEOUS DAILY
Status: DISCONTINUED | OUTPATIENT
Start: 2020-06-28 | End: 2020-06-28

## 2020-06-28 RX ORDER — ACETAMINOPHEN 325 MG/1
650 TABLET ORAL EVERY 6 HOURS PRN
Status: DISCONTINUED | OUTPATIENT
Start: 2020-06-28 | End: 2020-07-11 | Stop reason: HOSPADM

## 2020-06-28 RX ORDER — TRAMADOL HYDROCHLORIDE 50 MG/1
50 TABLET ORAL EVERY 6 HOURS PRN
Status: DISCONTINUED | OUTPATIENT
Start: 2020-06-28 | End: 2020-07-11 | Stop reason: HOSPADM

## 2020-06-28 RX ORDER — ONDANSETRON 2 MG/ML
4 INJECTION INTRAMUSCULAR; INTRAVENOUS EVERY 6 HOURS PRN
Status: DISCONTINUED | OUTPATIENT
Start: 2020-06-28 | End: 2020-07-11 | Stop reason: HOSPADM

## 2020-06-28 RX ORDER — DEXTROSE MONOHYDRATE 25 G/50ML
12.5 INJECTION, SOLUTION INTRAVENOUS PRN
Status: DISCONTINUED | OUTPATIENT
Start: 2020-06-28 | End: 2020-07-11 | Stop reason: HOSPADM

## 2020-06-28 RX ORDER — PANTOPRAZOLE SODIUM 40 MG/1
40 TABLET, DELAYED RELEASE ORAL NIGHTLY
Status: DISCONTINUED | OUTPATIENT
Start: 2020-06-28 | End: 2020-07-01

## 2020-06-28 RX ORDER — DEXTROSE MONOHYDRATE 50 MG/ML
100 INJECTION, SOLUTION INTRAVENOUS PRN
Status: DISCONTINUED | OUTPATIENT
Start: 2020-06-28 | End: 2020-07-11 | Stop reason: HOSPADM

## 2020-06-28 RX ORDER — FINASTERIDE 5 MG/1
5 TABLET, FILM COATED ORAL DAILY
Status: DISCONTINUED | OUTPATIENT
Start: 2020-06-28 | End: 2020-07-11 | Stop reason: HOSPADM

## 2020-06-28 RX ORDER — ASPIRIN 81 MG/1
81 TABLET, CHEWABLE ORAL DAILY
Status: DISCONTINUED | OUTPATIENT
Start: 2020-06-28 | End: 2020-07-11 | Stop reason: HOSPADM

## 2020-06-28 RX ORDER — SODIUM CHLORIDE 0.9 % (FLUSH) 0.9 %
10 SYRINGE (ML) INJECTION EVERY 12 HOURS SCHEDULED
Status: DISCONTINUED | OUTPATIENT
Start: 2020-06-28 | End: 2020-07-11 | Stop reason: HOSPADM

## 2020-06-28 RX ORDER — DOXYCYCLINE HYCLATE 100 MG
100 TABLET ORAL EVERY 12 HOURS SCHEDULED
Status: DISCONTINUED | OUTPATIENT
Start: 2020-06-28 | End: 2020-06-28

## 2020-06-28 RX ORDER — SODIUM CHLORIDE 0.9 % (FLUSH) 0.9 %
10 SYRINGE (ML) INJECTION PRN
Status: DISCONTINUED | OUTPATIENT
Start: 2020-06-28 | End: 2020-07-11 | Stop reason: HOSPADM

## 2020-06-28 RX ORDER — CYCLOBENZAPRINE HCL 10 MG
10 TABLET ORAL DAILY
Status: DISCONTINUED | OUTPATIENT
Start: 2020-06-28 | End: 2020-07-11 | Stop reason: HOSPADM

## 2020-06-28 RX ORDER — FUROSEMIDE 10 MG/ML
80 INJECTION INTRAMUSCULAR; INTRAVENOUS ONCE
Status: COMPLETED | OUTPATIENT
Start: 2020-06-28 | End: 2020-06-28

## 2020-06-28 RX ORDER — NICOTINE POLACRILEX 4 MG
15 LOZENGE BUCCAL PRN
Status: DISCONTINUED | OUTPATIENT
Start: 2020-06-28 | End: 2020-07-11 | Stop reason: HOSPADM

## 2020-06-28 RX ORDER — DOCUSATE SODIUM 100 MG/1
100 CAPSULE, LIQUID FILLED ORAL DAILY PRN
Status: DISCONTINUED | OUTPATIENT
Start: 2020-06-28 | End: 2020-07-09

## 2020-06-28 RX ORDER — BUMETANIDE 0.25 MG/ML
1 INJECTION, SOLUTION INTRAMUSCULAR; INTRAVENOUS 2 TIMES DAILY
Status: DISCONTINUED | OUTPATIENT
Start: 2020-06-28 | End: 2020-06-28

## 2020-06-28 RX ADMIN — INSULIN GLARGINE 30 UNITS: 100 INJECTION, SOLUTION SUBCUTANEOUS at 13:05

## 2020-06-28 RX ADMIN — FINASTERIDE 5 MG: 5 TABLET, FILM COATED ORAL at 09:12

## 2020-06-28 RX ADMIN — ISOSORBIDE MONONITRATE 30 MG: 30 TABLET ORAL at 13:03

## 2020-06-28 RX ADMIN — HEPARIN SODIUM 5000 UNITS: 5000 INJECTION INTRAVENOUS; SUBCUTANEOUS at 05:36

## 2020-06-28 RX ADMIN — DEXTROSE MONOHYDRATE 12.5 G: 25 INJECTION, SOLUTION INTRAVENOUS at 23:41

## 2020-06-28 RX ADMIN — CEFTRIAXONE SODIUM 1 G: 1 INJECTION, POWDER, FOR SOLUTION INTRAMUSCULAR; INTRAVENOUS at 02:52

## 2020-06-28 RX ADMIN — GABAPENTIN 300 MG: 300 CAPSULE ORAL at 09:12

## 2020-06-28 RX ADMIN — CLOPIDOGREL BISULFATE 75 MG: 75 TABLET ORAL at 09:12

## 2020-06-28 RX ADMIN — CALCIUM GLUCONATE 2 G: 98 INJECTION, SOLUTION INTRAVENOUS at 05:34

## 2020-06-28 RX ADMIN — SODIUM CHLORIDE, PRESERVATIVE FREE 10 ML: 5 INJECTION INTRAVENOUS at 23:44

## 2020-06-28 RX ADMIN — HEPARIN SODIUM 5000 UNITS: 5000 INJECTION INTRAVENOUS; SUBCUTANEOUS at 21:45

## 2020-06-28 RX ADMIN — ASPIRIN 81 MG 81 MG: 81 TABLET ORAL at 09:12

## 2020-06-28 RX ADMIN — HEPARIN SODIUM 5000 UNITS: 5000 INJECTION INTRAVENOUS; SUBCUTANEOUS at 13:05

## 2020-06-28 RX ADMIN — FUROSEMIDE 40 MG: 10 INJECTION, SOLUTION INTRAMUSCULAR; INTRAVENOUS at 13:03

## 2020-06-28 RX ADMIN — LINEZOLID 600 MG: 600 TABLET, FILM COATED ORAL at 09:12

## 2020-06-28 RX ADMIN — FUROSEMIDE 40 MG: 10 INJECTION, SOLUTION INTRAMUSCULAR; INTRAVENOUS at 17:39

## 2020-06-28 RX ADMIN — SODIUM CHLORIDE: 9 INJECTION, SOLUTION INTRAVENOUS at 01:08

## 2020-06-28 RX ADMIN — CYCLOBENZAPRINE 10 MG: 10 TABLET, FILM COATED ORAL at 13:07

## 2020-06-28 RX ADMIN — METOPROLOL SUCCINATE 50 MG: 50 TABLET, FILM COATED, EXTENDED RELEASE ORAL at 09:12

## 2020-06-28 RX ADMIN — GABAPENTIN 300 MG: 300 CAPSULE ORAL at 13:03

## 2020-06-28 RX ADMIN — FUROSEMIDE 80 MG: 10 INJECTION, SOLUTION INTRAMUSCULAR; INTRAVENOUS at 03:59

## 2020-06-28 ASSESSMENT — PAIN SCALES - GENERAL
PAINLEVEL_OUTOF10: 0

## 2020-06-28 NOTE — PROGRESS NOTES
Pt admitted to  [id number] from [address] [facility]. Patient from [address] [place] [address] Avenue: UTI  INT: Right hand, capped   Vital signs obtained. Assessment and data collection initiated. Two nurse skin assessment performed by MYRON Overton RN and NETTIE Haley RN. Oriented to room. Policies and procedures for 6K explained. All questions answered with no further questions at this time. Fall prevention and safety brochure discussed with patient. Bed alarm on. Call light in reach. Wife and daughter at bedside. Explained the COVID19 precautions and visiting hours with family.

## 2020-06-28 NOTE — PROGRESS NOTES
Hospitalist Progress Note    Patient:  Daniela Randolph      Unit/Bed:6K-09/009-A    YOB: 1934    MRN: [id number]       Acct: [de-identified]     PCP: Pete De Oliveira MD    Date of Admission: 6/27/2020    Active Hospital Problems    Diagnosis Date Noted    AMS (altered mental status) [R41.82] 06/28/2020     Assessment and Plan:          1. Acute Encephalopathy / Visual Hallucinations; Likely 2/2 UTI/acute urinary retention. a. CT head showed: no acute findings. Ammonia wnl. Mixed Blood Gas unremarkable. b. Immediate 1300mL UOP with change of engel  c. UTI management as below, if no resolution of AMS with treatement of UTI, consider MRI. 2. Acute Complicated UTI / BPH with indwelling engel, with acute retenion  a. Frequent UTIs 3-4 since dec 2019  b. UA ([facility]): 2+ bacteria, WBC 21-30, moderate LE, neg Nitrites  c. Resume Finasteride, Tamsulosin. Pend UA/Cx. Ceftriaxone 1g q24hr and PO linezolid (for hx VRE UTI). Change engel. 3. 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 3-4+ pitting edema BLE (new per daughter), Denies SOB. b. CXR shows prominent interstitium with likely mild pulmonary edema. BNP 3k  c. Start IV Bumex 1mg BID, BB. Daily weights. Unclear why no ACEi (consider adding with resolution of EFREN). Pend repeat echo. 4. EFREN on CKD stage 3b: BUN:Cr 17. Likely multifactorial with cardiorenal + obstructive   a. Strict I&Os, monitor with BMP. Diuresis per above.   b. Follows up with Dr. Camacho Laughter. Will consult Nephrology. 5. Left scrotal Pain / Bilateral Hydrocele with pseudo septations  a. US scrotum shows prominent amount of hydrocele formation on the right, moderate hydrocele on the left. Fluid on the right appears clear, fluid on the left appears to be markedly complex with multiple pseudo-septations through it.  b. Consult Urology for ? Need for drainage of hydroceles. 6. 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  IDDM type 2, controlled: Continue home lantus at reduced dose, medium dose SSI. Goal BG of 140-180 while hospitalized. Hypoglycemia order set. Accucheck ACHS. 8. GERD: Resume PPI   9. Chronic back pain: Hold flexeril, PRN tramadol while altered. Resume as needed. 10. 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 on 20 c/o altered mental status x 2 days with associated hallucinations, testicular swelling (seen in the ED on 20), dec UOP per nursing home. He has a hx of frequent UTIs and was started on bactrim for UTI on 20. Per wife/daughter, at baseline he is oriented with no confusion. 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 Family also notes that the LE edema was no present 2 days ago when he was taken to the ED for scrotal pain. On interview, patient is unable to tell me why he is at the hospital and intermittently responds to external stimuli, but is oriented to person, , year, president, month, place. He endorses scrotal pain with. Denies fever, abdominal pain, SOB, CP, lightheadedness.     In the ED, vitals show: HR 68, /75, 92% on RA and afebrile. Labs show: wbc 12.4, hgb 10.8, MCV 94.2, plt 298, cr 1.9, BUN 34, BUN: Cr 18, Ca 8.2, LFTs unremarkable, lactic 1.1, UA shows 2+ bacteria, WBC 21-30, moderate LE, neg Nitrites, moderate hematuria, trace proteinuria. CXR shows no acute findings. Stable mild bibasilar atelectasis. US scrotom shows prominent amount of hydrocele formation on the right, moderate hydrocele on the left. Fluid on the right appears clear, fluid on the left appears to be markedly complex with multiple pseudo-septations through it.        Subjective: no acute events overnight. Pt hallucinating however alert to self, place and date. Continues to be seeing \"2 kids\" around. No fever, no chest pain or sob. No diarrhea, nausea or vomiting. Medications:  Reviewed    Infusion Medications    sodium chloride Stopped (06/28/20 0326)    dextrose       Scheduled Medications    aspirin  81 mg Oral Daily    atorvastatin  20 mg Oral Nightly    clopidogrel  75 mg Oral Daily    cyclobenzaprine  10 mg Oral Daily    finasteride  5 mg Oral Daily    gabapentin  300 mg Oral TID    isosorbide mononitrate  30 mg Oral Daily    metoprolol succinate  50 mg Oral Daily    tamsulosin  0.4 mg Oral Nightly    pantoprazole  40 mg Oral Nightly    cefTRIAXone (ROCEPHIN) IV  1 g Intravenous Q24H    sodium chloride flush  10 mL Intravenous 2 times per day    heparin (porcine)  5,000 Units Subcutaneous 3 times per day    insulin lispro  0-12 Units Subcutaneous TID WC    insulin lispro  0-6 Units Subcutaneous Nightly    calcium replacement protocol   Other RX Placeholder    linezolid  600 mg Oral 2 times per day    insulin glargine  30 Units Subcutaneous Daily    bumetanide  1 mg Intravenous BID     PRN Meds: docusate sodium, traMADol, sodium chloride flush, acetaminophen **OR** acetaminophen, promethazine **OR** ondansetron, glucose, dextrose, glucagon (rDNA), dextrose      Intake/Output Summary (Last 24 hours) at 6/28/2020 5362  Last data filed at 6/28/2020 4086  Gross per 24 hour   Intake 428.69 ml   Output 900 ml   Net -471.31 ml       Diet:  DIET RENAL; Carb Control: 3 carb choices (45 gms)/meal    Exam:  BP (!) 166/70   Pulse 83   Temp 98.5 °F (36.9 °C) (Oral)   Resp 18   Wt 290 lb 3.2 oz (131.6 kg)   SpO2 93%   BMI 40.47 kg/m²     General:  White male well groomed, well-nourished, well-developed who appears stated age, in no acute distress lying in bed. Head: Normocephalic and atraumatic. EENT: No exophthalmos noted.  No scleral or conjunctiva icterus, injection or pallor noted. No maxillary or frontal sinus tenderness to percussion. Neck: Supple. Trachea midline. No thyromegaly. Thorax/Lungs: Thorax is symmetrical with good expansion. Breath sounds CTA and equal b/l without rales, wheezing, or rhonchi. No retractions or use of abdominal muscles. Cardiac: S1, S2, RRR without murmur, rub, or gallop. No JVD  Abdomen: Abdomen large but soft, nontender to palpation, without guarding or rigidity. Normoactive BS  Peripheral Vasculature: Extremities warm, dry with 3+ pitting edema to RLE, and 1+ pitting edema to LLE stump, no varicosities or stasis changes, DP pulses 2+ b/l. Brisk capillary refill. Skin:  Skin warm and moist. No lesions. Stasis changes to RLE shin with mild erythema and desquamation without warmth. Psych:  Alert and oriented x3. Affect very confused, responding to intermittently responding to stimuli. Lymph:  No supraclavicular or cervical adenopathy. Neurologic: No focal deficits. No Seizures. Labs:   Recent Labs     06/27/20  2301   WBC 11.9*   HGB 10.8*   HCT 35.7*        Recent Labs     06/27/20  2301      K 4.3      CO2 27   BUN 33*   CREATININE 1.9*   CALCIUM 7.9*     Recent Labs     06/27/20  2301   AST 17   ALT 13   BILITOT 0.2*   ALKPHOS 76     No results for input(s): INR in the last 72 hours. No results for input(s): Margie Moellers in the last 72 hours. Urinalysis:      Lab Results   Component Value Date    NITRU NEGATIVE 06/28/2020    WBCUA  06/28/2020    BACTERIA NONE SEEN 06/28/2020    RBCUA 25-50 06/28/2020    BLOODU MODERATE 06/28/2020    SPECGRAV 1.011 06/28/2020    [place] 994 NEGATIVE 05/22/2017       Radiology:   All imaging reviewed     Diet: DIET RENAL; Carb Control: 3 carb choices (45 gms)/meal      Code Status: Full Code            Electronically signed by Devon Carmen MD on 6/28/2020 at 9:52 AM

## 2020-06-28 NOTE — PROGRESS NOTES
New urethral catheter placed. 1300mL out upon placing. Patient tolerated well. Ace wraps applied to right lower leg.

## 2020-06-28 NOTE — CONSULTS
[facility]          [facility]        [address]        [place] [facility]        [phone]           Inpatient Initial consult note         6/28/2020 12:31 PM    Patient Name:   Alexander Escobar  YOB: 1934  Primary Care Physician: Sumeet Bunn MD     History Obtained From:  patient, electronic medical record     Consultation requested by : Chepe Haney MD    requested for  : Evaluation of  worsening renal function     History of presentingillness   Alexander Escobar is a 80 y.o.   male with Past Medical History as stated below presented with a chief complaint of No chief complaint on file. on 6/27/2020 . Patient is slightly drowsy cannot participate in a full conversation. So accurate history and review of systems cannot be obtained  It appears patient presented from the nursing home with complaints of change in mental status, since the last 2 days, associated with hallucinations and testicular swelling and decreased urine output as per the nursing home. Symptoms moderate severity, no specific modifying factors. Patient apparently has a history of frequent urinary tract infections and was started on Bactrim for UTI on 6/26/2020 as per the family patient also noted that he has some bilateral lower extremity edema as well    In the ER patient was found to have creatinine of 1.9 and urine suggestive of urinary tract infection and also a hydrocele on the left.      Past History      Past Medical History:   Diagnosis Date    BPH (benign prostatic hyperplasia)     Chronic kidney disease     CKD (chronic kidney disease) stage 3, GFR 30-59 ml/min ([facility] [address] 4/16/2015    Edema     Hiatal hernia     Hyperlipidemia     Hypertension     Osteoarthritis     S/P PTCA: 5/12/2015: Stenting of proximal left circumflex artery with Xience 2.75X15 mm, post-dilated to 3.70 mm. 5/12/2015 5/12/2015: Stenting of proximal left circumflex artery with Xience 2.75X15 mm, post-dilated to 3.70 mm.  Dr. Garcia Divers Type II or unspecified type diabetes mellitus without mention of complication, not stated as uncontrolled      Past Surgical History:   Procedure Laterality Date    C[facility]  [facility]8/15    [facility]    CATARACT REMOVAL      BILAT    COLONOSCOPY      CORONARY ANGIOPLASTY  2015    EKG 12-LEAD  5/12/2015         FOOT SURGERY      multiple    HYDROCELE EXCISION      LEG AMPUTATION BELOW KNEE  05/12/2017    PACEMAKER INSERTION      PACEMAKER PLACEMENT      TONSILLECTOMY AND ADENOIDECTOMY       Social History     Socioeconomic History    Marital status:      Spouse name: Not on file    Number of children: Not on file    Years of education: Not on file    Highest education level: Not on file   Occupational History    Not on file   Social Needs    Financial resource strain: Not on file    Food insecurity     Worry: Not on file     Inability: Not on file    Transportation needs     Medical: Not on file     Non-medical: Not on file   Tobacco Use    Smoking status: Never Smoker    Smokeless tobacco: Never Used   Substance and Sexual Activity    Alcohol use: Yes     Comment: Occasionally    Drug use: No    Sexual activity: Not on file   Lifestyle    Physical activity     Days per week: Not on file     Minutes per session: Not on file    Stress: Not on file   Relationships    Social connections     Talks on phone: Not on file     Gets together: Not on file     Attends Scientologist service: Not on file     Active member of club or organization: Not on file     Attends meetings of clubs or organizations: Not on file     Relationship status: Not on file    Intimate partner violence     Fear of current or ex partner: Not on file     Emotionally abused: Not on file     Physically abused: Not on file     Forced sexual activity: Not on file   Other Topics Concern    Not on file   Social History Narrative    Not on file     Family History   Problem Relation Age of Onset    Cancer Mother     Diabetes Father     Cancer Brother     Diabetes Paternal Grandmother     Diabetes Paternal Grandfather      Medications & Allergies      Prior to Admission medications    Medication Sig Start Date End Date Taking? Authorizing Provider   traMADol (ULTRAM) 50 MG tablet Take 50 mg by mouth 2 times daily as needed for Pain. Yes Historical Provider, MD   bumetanide (BUMEX) 1 MG tablet Take 1 tablet by mouth 2 times daily 1/28/20  Yes Aleksandra Alonso, DO   potassium chloride (KLOR-CON M) 20 MEQ extended release tablet Take 1 tablet by mouth 2 times daily 1/28/20  Yes Melissa Nguyen, DO   insulin aspart (NOVOLOG) 100 UNIT/ML injection vial Inject into the skin 3 times daily (before meals) Per Sliding Scale = No Insulin, 140-199= 2 Units, 200-249= 4 Units, 250-290= 6 Units, 300-349= 8 Units, 350-399= 10 Units, 400+= 12 Units   Yes Historical Provider, MD   finasteride (PROSCAR) 5 MG tablet Take 1 tablet by mouth daily 5/26/17  Yes Chhaya Huston MD   gabapentin (NEURONTIN) 300 MG capsule Take 300 mg by mouth 3 times daily.     Yes Historical Provider, MD   metoprolol succinate ER (TOPROL-XL) 50 MG XL tablet Take 1 tablet by mouth daily 8/8/16  Yes Zoey Lagos MD   clopidogrel (PLAVIX) 75 MG tablet Take 1 tablet by mouth daily 11/18/15  Yes Zoey Lagos MD   aspirin 81 MG chewable tablet Take 1 tablet by mouth daily 5/16/15  Yes Yinka Cadet, APRN - CNP   Multiple Vitamins-Minerals (THERAPEUTIC MULTIVITAMIN-MINERALS) tablet Take 1 tablet by mouth daily   Yes Historical Provider, MD Bobby HUNT 100 UNIT/ML injection pen Inject 75 Units into the skin daily  9/9/19   Historical Provider, MD   docusate sodium (COLACE) 100 MG capsule Take 100 mg by mouth daily as needed for Constipation    Historical Provider, MD   benzonatate (TESSALON) 200 MG capsule Take 200 mg by mouth every 8 hours as needed for Cough    Historical Provider, MD BANUELOS AUTOSHIELD DUO 30G X 5 MM MISC  8/29/19   Historical Provider, MD   guaiFENesin (ROBITUSSIN) 100 MG/5ML liquid Take 10 mLs by mouth every 6 hours as needed     Historical Provider, MD   ASPERCREME W/LIDOCAINE EX Apply topically every 4 hours as needed (Apply to back PRN for Pain)     Historical Provider, MD   cyclobenzaprine (FLEXERIL) 10 MG tablet 10 mg daily Taking once daily for leg spasms and the Every 8 hours PRN TID for Pain. Hold if drowsy, confused, or sleepy. 7/13/17   Historical Provider, MD   Incontinence Supplies (BEDSIDE DRAINAGE BAG) [address] one, 2000 cc overnight urinary bag 6/27/17   DENY Villarreal CNP   insulin NPH (HUMULIN N;NOVOLIN N) 100 UNIT/ML injection vial Inject 10 Units into the skin 2 times daily  Patient taking differently: Inject 22 Units into the skin nightly Inject 22 units qhs and 28 units daily 5/26/17   Alexander Correia MD   tamsulosin [facility]) 0.4 MG capsule Take 1 capsule by mouth nightly 5/26/17   Alexander Correia MD   loperamide (IMODIUM) 2 MG capsule Take 2 mg by mouth 4 times daily as needed for Diarrhea (for diarrhea related to weakness)    Historical Provider, MD   atorvastatin (LIPITOR) 20 MG tablet Take 20 mg by mouth nightly    Historical Provider, MD   pantoprazole (PROTONIX) 40 MG tablet Take 40 mg by mouth nightly     Historical Provider, MD   nitroGLYCERIN (NITROSTAT) 0.4 MG SL tablet Place 1 tablet under the tongue every 5 minutes as needed for Chest pain 5/16/15   [facility] DENY Molina CNP   acetaminophen (TYLENOL) 325 MG tablet Take 650 mg by mouth every 6 hours     Historical Provider, MD   isosorbide mononitrate (IMDUR) 30 MG CR tablet Take 30 mg by mouth daily. Historical Provider, MD     Allergies: Patient has no known allergies.   IP meds : Scheduled Meds:   aspirin  81 mg Oral Daily    atorvastatin  20 mg Oral Nightly    clopidogrel  75 mg Oral Daily    cyclobenzaprine  10 mg Oral Daily    finasteride  5 mg Oral Daily    gabapentin  300 mg Oral TID    isosorbide mononitrate  30 mg Oral Daily    metoprolol succinate  50 mg Oral Daily    tamsulosin  0.4 mg Oral Nightly    pantoprazole  40 mg Oral Nightly    cefTRIAXone (ROCEPHIN) IV  1 g Intravenous Q24H    sodium chloride flush  10 mL Intravenous 2 times per day    heparin (porcine)  5,000 Units Subcutaneous 3 times per day    insulin lispro  0-12 Units Subcutaneous TID WC    insulin lispro  0-6 Units Subcutaneous Nightly    calcium replacement protocol   Other RX Placeholder    linezolid  600 mg Oral 2 times per day    insulin glargine  30 Units Subcutaneous Daily    furosemide  40 mg Intravenous BID     Continuous Infusions:   sodium chloride Stopped (06/28/20 0326)    dextrose       Review of Systems Physical Exam   Review of Systems   Unable to perform ROS: Mental status change    Physical Exam  Vitals signs reviewed. Constitutional:       General: He is not in acute distress. Appearance: He is obese. He is ill-appearing. He is not diaphoretic. Comments: Opens eyes to speech   HENT:      Head: Normocephalic and atraumatic. Right Ear: External ear normal.      Left Ear: External ear normal.      Nose: Nose normal.      Mouth/Throat:      Mouth: Mucous membranes are moist.   Eyes:      General: No scleral icterus. Right eye: No discharge. Left eye: No discharge. Conjunctiva/sclera: Conjunctivae normal.   Neck:      Musculoskeletal: Normal range of motion and neck supple. Thyroid: No thyromegaly. Vascular: No JVD. Cardiovascular:      Rate and Rhythm: Normal rate and regular rhythm. Heart sounds: Normal heart sounds. No murmur. Pulmonary:      Effort: Pulmonary effort is normal. No respiratory distress. Breath sounds: Normal breath sounds. No stridor. No wheezing or rales. Chest:      Chest wall: No tenderness. Abdominal:      General: Bowel sounds are normal. There is distension.       Palpations: Abdomen is soft. Tenderness: There is no abdominal tenderness. Musculoskeletal:         General: No tenderness. Right lower leg: Edema present. Left lower leg: Edema present. Skin:     General: Skin is warm and dry. Findings: No erythema or rash. Neurological:      Comments: Opens eyes to speech falls back to sleep   Psychiatric:         Mood and Affect: Mood normal.         Behavior: Behavior normal.           Vitals:    06/28/20 1124   BP: (!) 158/86   Pulse: 81   Resp: 16   Temp: 98.5 °F (36.9 °C)   SpO2: 97%     Labs, Radiology and Tests       Recent Labs     06/27/20  2301   WBC 11.9*   RBC 3.61*   HGB 10.8*   HCT 35.7*   MCV 98.9*   MCH 29.9   MCHC 30.3*        Recent Labs     06/27/20  2301      K 4.3      CO2 27   BUN 33*   CREATININE 1.9*   CALCIUM 7.9*   PROT 6.5   LABALBU 2.8*   BILITOT 0.2*   ALKPHOS 76   AST 17   ALT 13       Radiology : Chest x-ray-personally reviewed by me does not show significant congestion. Other : Old lab data have been reviewed which shows patient baseline creatinine is around 1.6-1.8    Assessment    Renal -mild acute kidney injury/chronic kidney disease-renal function still appears close to baseline  - Cannot rule out mild EFREN may be due to the use of Bactrim  - However currently making excellent urine output creatinine is stable slight fluid overload  - Can continue IV Lasix for now and monitor renal function  Electrolytes-appear to be within normal limits  Essential hypertension-running reasonable closely follow  Hx of diabetes mellitus  Bilateral hydrocele seen by urology, no need for active intervention at this time  Urinary tract infection mostly due to chronic Olmedo currently on antibiotics follow cultures and sensitivity  Change in mental status may be related to urinary tract infection  meds reviewed and D/W patient      **This report has been created using voice recognition software.  It maycontain minor  errors which are inherent

## 2020-06-28 NOTE — PROGRESS NOTES
Called [facility] and talked to nurse Jason Mendez about medications patient is currently taking. Med rec complete.

## 2020-06-28 NOTE — CONSULTS
[address] [facility] [address]  [address] [facility] [address]  Dept: [phone]  Loc: [phone]  Visit Date: 6/27/2020    Urology Consult Note    Reason for Consult:  Hydroceles  Requesting Physician:  [facility]    History Obtained From:  Chart. Reason patient could not give history:  altered mental status    HISTORY OF PRESENT ILLNESS:                The patient is a 80 y.o. male  who presents with AMS. He presented to [facility] ED earlier in the week with AMS. He was complaining of testicular swelling. He apparently has chronic urinary retention being managed with a Olmedo. He lives at [facility]. He had US at [facility] and showed septated hydroceles. He was to follow up with me. I have not seen the patient before. Mental status continued to decline he presented again to [facility] and was transferred to [facility]. He was noted to have blocked Olmedo and 1300 cc's returned after replacement. Past Medical History:        Diagnosis Date    BPH (benign prostatic hyperplasia)     Chronic kidney disease     CKD (chronic kidney disease) stage 3, GFR 30-59 ml/min ([facility]) 4/16/2015    Edema     Hiatal hernia     Hyperlipidemia     Hypertension     Osteoarthritis     S/P PTCA: 5/12/2015: Stenting of proximal left circumflex artery with Xience 2.75X15 mm, post-dilated to 3.70 mm. 5/12/2015 5/12/2015: Stenting of proximal left circumflex artery with Xience 2.75X15 mm, post-dilated to 3.70 mm.  Dr. 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 HEENT:  normal appearance  Cardiovascular: significant peripheral edema bilaterally  Respiratory:  normal respiratory effort  Gastrointestinal:  abdomen is not distended and is consistent with BMI  Musculoskeletal:  normal range of motion to all extremities  Skin: warm and dry  Neurologic: no focal weakness; no tremor  Psychiatric: confused  : Olmedo in urethra, estee urine, mildly edematous scrotum with moderate erythema. Non-tender. Scrotal rugae present. Moderate right hydrocele, mild left hydrocele.     DATA:  CBC:   Lab Results   Component Value Date    WBC 11.9 06/27/2020    RBC 3.61 06/27/2020    HGB 10.8 06/27/2020    HCT 35.7 06/27/2020    MCV 98.9 06/27/2020    MCH 29.9 06/27/2020    MCHC 30.3 06/27/2020    RDW 16.3 05/25/2017     06/27/2020    MPV 9.7 06/27/2020     BMP:    Lab Results   Component Value Date     06/27/2020    K 4.3 06/27/2020     06/27/2020    CO2 27 06/27/2020    BUN 33 06/27/2020    CREATININE 1.9 06/27/2020    CALCIUM 7.9 06/27/2020    LABGLOM 34 06/27/2020    GLUCOSE 58 06/27/2020     BUN/Creatinine:    Lab Results   Component Value Date    BUN 33 06/27/2020    CREATININE 1.9 06/27/2020     Magnesium:    Lab Results   Component Value Date    MG 1.9 04/16/2015     Phosphorus:    Lab Results   Component Value Date    PHOS 2.8 01/09/2017     PT/INR:    Lab Results   Component Value Date    INR 1.27 05/22/2017     U/A:    Lab Results   Component Value Date    COLORU YELLOW 06/28/2020    PHUR 5.5 06/28/2020    LABCAST NONE SEEN 06/28/2020    LABCAST NONE SEEN 06/28/2020    WBCUA  06/28/2020    RBCUA 25-50 06/28/2020    YEAST NONE SEEN 06/28/2020    BACTERIA NONE SEEN 06/28/2020    SPECGRAV 1.011 06/28/2020    LEUKOCYTESUR LARGE 06/28/2020    UROBILINOGEN 0.2 06/28/2020    BILIRUBINUR NEGATIVE 06/28/2020    BLOODU MODERATE 06/28/2020    GLUCOSEU NEGATIVE 05/22/2017       Imaging:   Renal US from [facility], report only    IMPRESSION:   Known right hydrocele (previous imaging 2017), no signs of abscess  Scrotal swelling secondary to underling and preexisting cardiovascular status    UTI likely secondary to blocked Olmedo  AMS, likely secondary to UTI  Urinary retention, chronic, unknown prior history      Plan:  scrotal elevation, no indication for urologic intervention at this time. Might do better with SP tube rather than urethral Olmedo.   I can see patient in my urology clinic for follow up      Thank you for including us in the care of Josephine Rojas MD  06/28/20 11:09 AM  Urology

## 2020-06-29 ENCOUNTER — APPOINTMENT (OUTPATIENT)
Dept: CT IMAGING | Age: 85
DRG: 981 | End: 2020-06-29
Attending: FAMILY MEDICINE
Payer: MEDICARE

## 2020-06-29 ENCOUNTER — APPOINTMENT (OUTPATIENT)
Dept: GENERAL RADIOLOGY | Age: 85
DRG: 981 | End: 2020-06-29
Attending: FAMILY MEDICINE
Payer: MEDICARE

## 2020-06-29 ENCOUNTER — ANESTHESIA EVENT (OUTPATIENT)
Dept: OPERATING ROOM | Age: 85
DRG: 981 | End: 2020-06-29
Payer: MEDICARE

## 2020-06-29 ENCOUNTER — ANESTHESIA (OUTPATIENT)
Dept: OPERATING ROOM | Age: 85
DRG: 981 | End: 2020-06-29
Payer: MEDICARE

## 2020-06-29 VITALS
SYSTOLIC BLOOD PRESSURE: 113 MMHG | OXYGEN SATURATION: 100 % | RESPIRATION RATE: 2 BRPM | DIASTOLIC BLOOD PRESSURE: 66 MMHG

## 2020-06-29 PROBLEM — S72.001A CLOSED RIGHT HIP FRACTURE (HCC): Status: ACTIVE | Noted: 2020-06-29

## 2020-06-29 LAB
ANION GAP SERPL CALCULATED.3IONS-SCNC: 9 MEQ/L (ref 8–16)
BASOPHILS # BLD: 0.7 %
BASOPHILS ABSOLUTE: 0.1 THOU/MM3 (ref 0–0.1)
BUN BLDV-MCNC: 32 MG/DL (ref 7–22)
CALCIUM SERPL-MCNC: 8.1 MG/DL (ref 8.5–10.5)
CHLORIDE BLD-SCNC: 102 MEQ/L (ref 98–111)
CO2: 28 MEQ/L (ref 23–33)
CREAT SERPL-MCNC: 1.9 MG/DL (ref 0.4–1.2)
EOSINOPHIL # BLD: 0.6 %
EOSINOPHILS ABSOLUTE: 0.1 THOU/MM3 (ref 0–0.4)
ERYTHROCYTE [DISTWIDTH] IN BLOOD BY AUTOMATED COUNT: 14.6 % (ref 11.5–14.5)
ERYTHROCYTE [DISTWIDTH] IN BLOOD BY AUTOMATED COUNT: 51.3 FL (ref 35–45)
GFR SERPL CREATININE-BSD FRML MDRD: 34 ML/MIN/1.73M2
GLUCOSE BLD-MCNC: 107 MG/DL (ref 70–108)
GLUCOSE BLD-MCNC: 111 MG/DL (ref 70–108)
GLUCOSE BLD-MCNC: 169 MG/DL (ref 70–108)
GLUCOSE BLD-MCNC: 78 MG/DL (ref 70–108)
GLUCOSE BLD-MCNC: 80 MG/DL (ref 70–108)
GLUCOSE BLD-MCNC: 87 MG/DL (ref 70–108)
GLUCOSE BLD-MCNC: 89 MG/DL (ref 70–108)
GLUCOSE BLD-MCNC: 89 MG/DL (ref 70–108)
HCT VFR BLD CALC: 34.9 % (ref 42–52)
HEMOGLOBIN: 10.7 GM/DL (ref 14–18)
IMMATURE GRANS (ABS): 0.1 THOU/MM3 (ref 0–0.07)
IMMATURE GRANULOCYTES: 0.7 %
LV EF: 48 %
LVEF MODALITY: NORMAL
LYMPHOCYTES # BLD: 16.5 %
LYMPHOCYTES ABSOLUTE: 2.5 THOU/MM3 (ref 1–4.8)
MAGNESIUM: 1.6 MG/DL (ref 1.6–2.4)
MCH RBC QN AUTO: 30.1 PG (ref 26–33)
MCHC RBC AUTO-ENTMCNC: 30.7 GM/DL (ref 32.2–35.5)
MCV RBC AUTO: 98 FL (ref 80–94)
MONOCYTES # BLD: 6.4 %
MONOCYTES ABSOLUTE: 1 THOU/MM3 (ref 0.4–1.3)
NUCLEATED RED BLOOD CELLS: 0 /100 WBC
PLATELET # BLD: 261 THOU/MM3 (ref 130–400)
PMV BLD AUTO: 9.8 FL (ref 9.4–12.4)
POTASSIUM SERPL-SCNC: 3.7 MEQ/L (ref 3.5–5.2)
RBC # BLD: 3.56 MILL/MM3 (ref 4.7–6.1)
SARS-COV-2, NAAT: NOT DETECTED
SEG NEUTROPHILS: 75.1 %
SEGMENTED NEUTROPHILS ABSOLUTE COUNT: 11.2 THOU/MM3 (ref 1.8–7.7)
SODIUM BLD-SCNC: 139 MEQ/L (ref 135–145)
WBC # BLD: 14.9 THOU/MM3 (ref 4.8–10.8)

## 2020-06-29 PROCEDURE — 3700000001 HC ADD 15 MINUTES (ANESTHESIA): Performed by: ORTHOPAEDIC SURGERY

## 2020-06-29 PROCEDURE — 2500000003 HC RX 250 WO HCPCS: Performed by: ORTHOPAEDIC SURGERY

## 2020-06-29 PROCEDURE — 36415 COLL VENOUS BLD VENIPUNCTURE: CPT

## 2020-06-29 PROCEDURE — 99232 SBSQ HOSP IP/OBS MODERATE 35: CPT | Performed by: NURSE PRACTITIONER

## 2020-06-29 PROCEDURE — 85025 COMPLETE CBC W/AUTO DIFF WBC: CPT

## 2020-06-29 PROCEDURE — 99233 SBSQ HOSP IP/OBS HIGH 50: CPT | Performed by: INTERNAL MEDICINE

## 2020-06-29 PROCEDURE — 6360000002 HC RX W HCPCS: Performed by: PHYSICIAN ASSISTANT

## 2020-06-29 PROCEDURE — 93306 TTE W/DOPPLER COMPLETE: CPT

## 2020-06-29 PROCEDURE — 82948 REAGENT STRIP/BLOOD GLUCOSE: CPT

## 2020-06-29 PROCEDURE — 2709999900 HC NON-CHARGEABLE SUPPLY: Performed by: ORTHOPAEDIC SURGERY

## 2020-06-29 PROCEDURE — 7100000000 HC PACU RECOVERY - FIRST 15 MIN: Performed by: ORTHOPAEDIC SURGERY

## 2020-06-29 PROCEDURE — 83735 ASSAY OF MAGNESIUM: CPT

## 2020-06-29 PROCEDURE — 7100000001 HC PACU RECOVERY - ADDTL 15 MIN: Performed by: ORTHOPAEDIC SURGERY

## 2020-06-29 PROCEDURE — 6370000000 HC RX 637 (ALT 250 FOR IP): Performed by: PHYSICIAN ASSISTANT

## 2020-06-29 PROCEDURE — 6360000002 HC RX W HCPCS

## 2020-06-29 PROCEDURE — 1200000003 HC TELEMETRY R&B

## 2020-06-29 PROCEDURE — 99223 1ST HOSP IP/OBS HIGH 75: CPT | Performed by: INTERNAL MEDICINE

## 2020-06-29 PROCEDURE — 80048 BASIC METABOLIC PNL TOTAL CA: CPT

## 2020-06-29 PROCEDURE — 3600000004 HC SURGERY LEVEL 4 BASE: Performed by: ORTHOPAEDIC SURGERY

## 2020-06-29 PROCEDURE — 73552 X-RAY EXAM OF FEMUR 2/>: CPT

## 2020-06-29 PROCEDURE — 2580000003 HC RX 258: Performed by: ORTHOPAEDIC SURGERY

## 2020-06-29 PROCEDURE — C1758 CATHETER, URETERAL: HCPCS | Performed by: ORTHOPAEDIC SURGERY

## 2020-06-29 PROCEDURE — 6360000002 HC RX W HCPCS: Performed by: ORTHOPAEDIC SURGERY

## 2020-06-29 PROCEDURE — 72100 X-RAY EXAM L-S SPINE 2/3 VWS: CPT

## 2020-06-29 PROCEDURE — 2580000003 HC RX 258: Performed by: PHYSICIAN ASSISTANT

## 2020-06-29 PROCEDURE — 2580000003 HC RX 258

## 2020-06-29 PROCEDURE — 72170 X-RAY EXAM OF PELVIS: CPT

## 2020-06-29 PROCEDURE — C1713 ANCHOR/SCREW BN/BN,TIS/BN: HCPCS | Performed by: ORTHOPAEDIC SURGERY

## 2020-06-29 PROCEDURE — 3700000000 HC ANESTHESIA ATTENDED CARE: Performed by: ORTHOPAEDIC SURGERY

## 2020-06-29 PROCEDURE — 73030 X-RAY EXAM OF SHOULDER: CPT

## 2020-06-29 PROCEDURE — 2500000003 HC RX 250 WO HCPCS

## 2020-06-29 PROCEDURE — 2720000010 HC SURG SUPPLY STERILE: Performed by: ORTHOPAEDIC SURGERY

## 2020-06-29 PROCEDURE — U0002 COVID-19 LAB TEST NON-CDC: HCPCS

## 2020-06-29 PROCEDURE — 99232 SBSQ HOSP IP/OBS MODERATE 35: CPT | Performed by: INTERNAL MEDICINE

## 2020-06-29 PROCEDURE — 72072 X-RAY EXAM THORAC SPINE 3VWS: CPT

## 2020-06-29 PROCEDURE — 6360000002 HC RX W HCPCS: Performed by: INTERNAL MEDICINE

## 2020-06-29 PROCEDURE — 73600 X-RAY EXAM OF ANKLE: CPT

## 2020-06-29 PROCEDURE — 72125 CT NECK SPINE W/O DYE: CPT

## 2020-06-29 PROCEDURE — 3600000014 HC SURGERY LEVEL 4 ADDTL 15MIN: Performed by: ORTHOPAEDIC SURGERY

## 2020-06-29 PROCEDURE — 0QS606Z REPOSITION RIGHT UPPER FEMUR WITH INTRAMEDULLARY INTERNAL FIXATION DEVICE, OPEN APPROACH: ICD-10-PCS | Performed by: ORTHOPAEDIC SURGERY

## 2020-06-29 PROCEDURE — 3209999900 FLUORO FOR SURGICAL PROCEDURES

## 2020-06-29 PROCEDURE — 70450 CT HEAD/BRAIN W/O DYE: CPT

## 2020-06-29 DEVICE — TRIGEN INTERTAN 1.5 11.5MM X 42CM                                    125DEGREE RIGHT
Type: IMPLANTABLE DEVICE | Site: HIP | Status: FUNCTIONAL
Brand: TRIGEN

## 2020-06-29 DEVICE — TRIGEN LOW PROFILE SCREW 5.0MM X 40MM
Type: IMPLANTABLE DEVICE | Site: HIP | Status: FUNCTIONAL
Brand: TRIGEN

## 2020-06-29 RX ORDER — BUPIVACAINE HYDROCHLORIDE AND EPINEPHRINE 5; 5 MG/ML; UG/ML
INJECTION, SOLUTION EPIDURAL; INTRACAUDAL; PERINEURAL PRN
Status: DISCONTINUED | OUTPATIENT
Start: 2020-06-29 | End: 2020-06-29 | Stop reason: ALTCHOICE

## 2020-06-29 RX ORDER — FENTANYL CITRATE 50 UG/ML
INJECTION, SOLUTION INTRAMUSCULAR; INTRAVENOUS PRN
Status: DISCONTINUED | OUTPATIENT
Start: 2020-06-29 | End: 2020-06-29 | Stop reason: SDUPTHER

## 2020-06-29 RX ORDER — HYDROCODONE BITARTRATE AND ACETAMINOPHEN 10; 325 MG/1; MG/1
1 TABLET ORAL EVERY 6 HOURS PRN
Status: DISCONTINUED | OUTPATIENT
Start: 2020-06-29 | End: 2020-07-11 | Stop reason: HOSPADM

## 2020-06-29 RX ORDER — ASPIRIN 81 MG/1
81 TABLET, CHEWABLE ORAL DAILY
Status: CANCELLED | OUTPATIENT
Start: 2020-06-30

## 2020-06-29 RX ORDER — PROPOFOL 10 MG/ML
INJECTION, EMULSION INTRAVENOUS PRN
Status: DISCONTINUED | OUTPATIENT
Start: 2020-06-29 | End: 2020-06-29 | Stop reason: SDUPTHER

## 2020-06-29 RX ORDER — SODIUM CHLORIDE 9 MG/ML
INJECTION, SOLUTION INTRAVENOUS CONTINUOUS PRN
Status: COMPLETED | OUTPATIENT
Start: 2020-06-29 | End: 2020-06-29

## 2020-06-29 RX ORDER — MAGNESIUM SULFATE IN WATER 40 MG/ML
2 INJECTION, SOLUTION INTRAVENOUS ONCE
Status: COMPLETED | OUTPATIENT
Start: 2020-06-29 | End: 2020-06-29

## 2020-06-29 RX ORDER — EPHEDRINE SULFATE/0.9% NACL/PF 50 MG/5 ML
SYRINGE (ML) INTRAVENOUS PRN
Status: DISCONTINUED | OUTPATIENT
Start: 2020-06-29 | End: 2020-06-29 | Stop reason: SDUPTHER

## 2020-06-29 RX ORDER — ROCURONIUM BROMIDE 10 MG/ML
INJECTION, SOLUTION INTRAVENOUS PRN
Status: DISCONTINUED | OUTPATIENT
Start: 2020-06-29 | End: 2020-06-29 | Stop reason: SDUPTHER

## 2020-06-29 RX ORDER — HYDROCODONE BITARTRATE AND ACETAMINOPHEN 5; 325 MG/1; MG/1
1 TABLET ORAL EVERY 6 HOURS PRN
Status: DISCONTINUED | OUTPATIENT
Start: 2020-06-29 | End: 2020-07-11 | Stop reason: HOSPADM

## 2020-06-29 RX ORDER — DEXAMETHASONE SODIUM PHOSPHATE 4 MG/ML
INJECTION, SOLUTION INTRA-ARTICULAR; INTRALESIONAL; INTRAMUSCULAR; INTRAVENOUS; SOFT TISSUE PRN
Status: DISCONTINUED | OUTPATIENT
Start: 2020-06-29 | End: 2020-06-29 | Stop reason: SDUPTHER

## 2020-06-29 RX ORDER — PHENYLEPHRINE HYDROCHLORIDE 10 MG/ML
INJECTION INTRAVENOUS PRN
Status: DISCONTINUED | OUTPATIENT
Start: 2020-06-29 | End: 2020-06-29 | Stop reason: SDUPTHER

## 2020-06-29 RX ORDER — ONDANSETRON 2 MG/ML
INJECTION INTRAMUSCULAR; INTRAVENOUS PRN
Status: DISCONTINUED | OUTPATIENT
Start: 2020-06-29 | End: 2020-06-29 | Stop reason: SDUPTHER

## 2020-06-29 RX ORDER — MORPHINE SULFATE 10 MG/ML
INJECTION, SOLUTION INTRAMUSCULAR; INTRAVENOUS PRN
Status: DISCONTINUED | OUTPATIENT
Start: 2020-06-29 | End: 2020-06-29 | Stop reason: ALTCHOICE

## 2020-06-29 RX ORDER — INSULIN GLARGINE 100 [IU]/ML
30 INJECTION, SOLUTION SUBCUTANEOUS DAILY
Status: CANCELLED | OUTPATIENT
Start: 2020-06-30

## 2020-06-29 RX ORDER — HYDROCODONE BITARTRATE AND ACETAMINOPHEN 5; 325 MG/1; MG/1
1-2 TABLET ORAL
Qty: 42 TABLET | Refills: 0 | Status: SHIPPED | OUTPATIENT
Start: 2020-06-29 | End: 2020-07-06

## 2020-06-29 RX ORDER — CEFAZOLIN SODIUM 1 G/3ML
INJECTION, POWDER, FOR SOLUTION INTRAMUSCULAR; INTRAVENOUS PRN
Status: DISCONTINUED | OUTPATIENT
Start: 2020-06-29 | End: 2020-06-29 | Stop reason: SDUPTHER

## 2020-06-29 RX ORDER — SODIUM CHLORIDE 9 MG/ML
INJECTION, SOLUTION INTRAVENOUS CONTINUOUS PRN
Status: DISCONTINUED | OUTPATIENT
Start: 2020-06-29 | End: 2020-06-29 | Stop reason: SDUPTHER

## 2020-06-29 RX ORDER — SODIUM CHLORIDE 9 MG/ML
INJECTION, SOLUTION INTRAVENOUS CONTINUOUS
Status: CANCELLED | OUTPATIENT
Start: 2020-06-29

## 2020-06-29 RX ORDER — SUCCINYLCHOLINE/SOD CL,ISO/PF 200MG/10ML
SYRINGE (ML) INTRAVENOUS PRN
Status: DISCONTINUED | OUTPATIENT
Start: 2020-06-29 | End: 2020-06-29 | Stop reason: SDUPTHER

## 2020-06-29 RX ORDER — KETOROLAC TROMETHAMINE 30 MG/ML
INJECTION, SOLUTION INTRAMUSCULAR; INTRAVENOUS PRN
Status: DISCONTINUED | OUTPATIENT
Start: 2020-06-29 | End: 2020-06-29 | Stop reason: ALTCHOICE

## 2020-06-29 RX ORDER — LIDOCAINE HYDROCHLORIDE 20 MG/ML
INJECTION, SOLUTION INTRAVENOUS PRN
Status: DISCONTINUED | OUTPATIENT
Start: 2020-06-29 | End: 2020-06-29 | Stop reason: SDUPTHER

## 2020-06-29 RX ORDER — CLOPIDOGREL BISULFATE 75 MG/1
75 TABLET ORAL DAILY
Status: CANCELLED | OUTPATIENT
Start: 2020-06-30

## 2020-06-29 RX ADMIN — TAMSULOSIN HYDROCHLORIDE 0.4 MG: 0.4 CAPSULE ORAL at 21:45

## 2020-06-29 RX ADMIN — LIDOCAINE HYDROCHLORIDE 100 MG: 20 INJECTION, SOLUTION INTRAVENOUS at 13:19

## 2020-06-29 RX ADMIN — LINEZOLID 600 MG: 600 TABLET, FILM COATED ORAL at 21:45

## 2020-06-29 RX ADMIN — SODIUM CHLORIDE: 9 INJECTION, SOLUTION INTRAVENOUS at 13:15

## 2020-06-29 RX ADMIN — PROPOFOL 80 MG: 10 INJECTION, EMULSION INTRAVENOUS at 13:19

## 2020-06-29 RX ADMIN — Medication 10 MG: at 13:55

## 2020-06-29 RX ADMIN — CEFTRIAXONE SODIUM 1 G: 1 INJECTION, POWDER, FOR SOLUTION INTRAMUSCULAR; INTRAVENOUS at 04:08

## 2020-06-29 RX ADMIN — HYDROMORPHONE HYDROCHLORIDE 0.25 MG: 1 INJECTION, SOLUTION INTRAMUSCULAR; INTRAVENOUS; SUBCUTANEOUS at 09:36

## 2020-06-29 RX ADMIN — PHENYLEPHRINE HYDROCHLORIDE 200 MCG: 10 INJECTION INTRAVENOUS at 13:25

## 2020-06-29 RX ADMIN — Medication 10 ML: at 23:57

## 2020-06-29 RX ADMIN — FENTANYL CITRATE 75 MCG: 50 INJECTION, SOLUTION INTRAMUSCULAR; INTRAVENOUS at 13:19

## 2020-06-29 RX ADMIN — GABAPENTIN 300 MG: 300 CAPSULE ORAL at 21:45

## 2020-06-29 RX ADMIN — FENTANYL CITRATE 25 MCG: 50 INJECTION, SOLUTION INTRAMUSCULAR; INTRAVENOUS at 14:03

## 2020-06-29 RX ADMIN — DEXTROSE MONOHYDRATE 12.5 G: 25 INJECTION, SOLUTION INTRAVENOUS at 07:52

## 2020-06-29 RX ADMIN — CEFAZOLIN 2000 MG: 1 INJECTION, POWDER, FOR SOLUTION INTRAMUSCULAR; INTRAVENOUS; PARENTERAL at 13:36

## 2020-06-29 RX ADMIN — ONDANSETRON 4 MG: 2 INJECTION INTRAMUSCULAR; INTRAVENOUS at 23:56

## 2020-06-29 RX ADMIN — SODIUM CHLORIDE, PRESERVATIVE FREE 10 ML: 5 INJECTION INTRAVENOUS at 11:42

## 2020-06-29 RX ADMIN — Medication 15 MG: at 13:30

## 2020-06-29 RX ADMIN — ONDANSETRON HYDROCHLORIDE 4 MG: 4 INJECTION, SOLUTION INTRAMUSCULAR; INTRAVENOUS at 14:03

## 2020-06-29 RX ADMIN — DEXAMETHASONE SODIUM PHOSPHATE 8 MG: 4 INJECTION, SOLUTION INTRAMUSCULAR; INTRAVENOUS at 13:44

## 2020-06-29 RX ADMIN — ROCURONIUM BROMIDE 20 MG: 10 INJECTION INTRAVENOUS at 13:26

## 2020-06-29 RX ADMIN — MAGNESIUM SULFATE HEPTAHYDRATE 2 G: 40 INJECTION, SOLUTION INTRAVENOUS at 11:13

## 2020-06-29 RX ADMIN — Medication 140 MG: at 13:19

## 2020-06-29 RX ADMIN — HYDROMORPHONE HYDROCHLORIDE 0.5 MG: 1 INJECTION, SOLUTION INTRAMUSCULAR; INTRAVENOUS; SUBCUTANEOUS at 04:09

## 2020-06-29 RX ADMIN — PANTOPRAZOLE SODIUM 40 MG: 40 TABLET, DELAYED RELEASE ORAL at 21:45

## 2020-06-29 RX ADMIN — SUGAMMADEX 200 MG: 100 INJECTION, SOLUTION INTRAVENOUS at 14:01

## 2020-06-29 RX ADMIN — PHENYLEPHRINE HYDROCHLORIDE 100 MCG: 10 INJECTION INTRAVENOUS at 13:53

## 2020-06-29 RX ADMIN — Medication 15 MG: at 13:25

## 2020-06-29 RX ADMIN — ATORVASTATIN CALCIUM 20 MG: 20 TABLET, FILM COATED ORAL at 21:45

## 2020-06-29 RX ADMIN — PHENYLEPHRINE HYDROCHLORIDE 100 MCG: 10 INJECTION INTRAVENOUS at 13:59

## 2020-06-29 ASSESSMENT — PAIN SCALES - WONG BAKER
WONGBAKER_NUMERICALRESPONSE: 8

## 2020-06-29 ASSESSMENT — PULMONARY FUNCTION TESTS
PIF_VALUE: 33
PIF_VALUE: 3
PIF_VALUE: 22
PIF_VALUE: 23
PIF_VALUE: 24
PIF_VALUE: 2
PIF_VALUE: 2
PIF_VALUE: 17
PIF_VALUE: 1
PIF_VALUE: 22
PIF_VALUE: 25
PIF_VALUE: 24
PIF_VALUE: 20
PIF_VALUE: 1
PIF_VALUE: 2
PIF_VALUE: 24
PIF_VALUE: 24
PIF_VALUE: 1
PIF_VALUE: 24
PIF_VALUE: 22
PIF_VALUE: 24
PIF_VALUE: 23
PIF_VALUE: 1
PIF_VALUE: 18
PIF_VALUE: 22
PIF_VALUE: 23
PIF_VALUE: 26
PIF_VALUE: 23
PIF_VALUE: 24
PIF_VALUE: 2
PIF_VALUE: 20
PIF_VALUE: 24
PIF_VALUE: 24
PIF_VALUE: 22
PIF_VALUE: 2
PIF_VALUE: 22
PIF_VALUE: 24
PIF_VALUE: 20
PIF_VALUE: 24
PIF_VALUE: 23
PIF_VALUE: 24
PIF_VALUE: 5
PIF_VALUE: 22
PIF_VALUE: 24
PIF_VALUE: 22
PIF_VALUE: 4
PIF_VALUE: 21
PIF_VALUE: 22
PIF_VALUE: 26
PIF_VALUE: 0
PIF_VALUE: 22
PIF_VALUE: 4
PIF_VALUE: 24

## 2020-06-29 ASSESSMENT — PAIN SCALES - GENERAL
PAINLEVEL_OUTOF10: 8
PAINLEVEL_OUTOF10: 4
PAINLEVEL_OUTOF10: 4
PAINLEVEL_OUTOF10: 6

## 2020-06-29 ASSESSMENT — PAIN DESCRIPTION - LOCATION: LOCATION: HIP

## 2020-06-29 ASSESSMENT — PAIN DESCRIPTION - PAIN TYPE: TYPE: SURGICAL PAIN

## 2020-06-29 ASSESSMENT — PAIN DESCRIPTION - ORIENTATION: ORIENTATION: RIGHT

## 2020-06-29 NOTE — PROGRESS NOTES
05:58- This RN attempted to call patient's wife again. No answer. [employer], Hospitalist [id number] updated.

## 2020-06-29 NOTE — PROGRESS NOTES
01:48- Called and left message for wife Renan Kumar regarding patient's fall. Message stated that patient's vital signs are stable and he was taken down for several scans. Call back number left.

## 2020-06-29 NOTE — DISCHARGE INSTR - COC
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, N13.8    Other lymphedema I89.0    CAD (coronary artery disease) I25.10    Essential hypertension I10    Gastroesophageal reflux disease without esophagitis K21.9    EFREN (acute kidney injury) ([facility] N17.9    Diabetic peripheral neuropathy ([facility]) E11.42    Chronic back pain M54.9, G89.29    Hypoxia R09.02    Leukocytosis D72.829    Pain, dental K08.89    CKD (chronic kidney disease) stage 3, GFR 30-59 ml/min ([facility]) N18.3    CKD (chronic kidney disease), stage III ([facility]) N18.3    Dental abscess K04.7    Abnormal stress test R94.39    Diarrhea R19.7    S/P PTCA: 5/12/2015: Stenting of proximal left circumflex artery with Xience 2.75X15 mm, post-dilated to 3.70 mm. Z98.61    Hypotension I95.9    Renal insufficiency N28.9    Hematoma T14. 8XXA    Acute on chronic renal insufficiency N28.9, N18.9    CAD S/P percutaneous coronary angioplasty I25.10, Z98.61    HTN (hypertension) I10    Hyperlipidemia with target LDL less than 70 E78.5    Sepsis ([facility]) A41.9    Urinary retention R33.9    Scrotal edema N50.89    Cellulitis of scrotum N49.2    Altered mental status R41.82    Anasarca associated with disorder of kidney N04.9    Hyponatremia E87.1    ARF (acute renal failure) with tubular necrosis ([facility]) N17.0    Pleural effusion J90    Urinary tract infection associated with indwelling urethral catheter ([facility]) T83.511A, N39.0    Decreased hearing H91.90    Other acute kidney failure ([facility]) N17.8    Hypokalemia E87.6    Acute on chronic combined systolic and diastolic CHF (congestive heart failure) ([facility]) I50.43    AMS (altered mental status) R41.82       Isolation/Infection:   Isolation          Contact        Patient Infection Status     Infection Onset Added Last Indicated Last Indicated By Review Planned Expiration Resolved Resolved By    COVID-19 Rule Out 06/29/20 06/29/20 06/29/20 COVID-19 (Ordered)        VRE  05/26/17 05/26/17 Ardyth Severs, RN 5/23/17          Nurse Assessment:  Last Vital Signs: BP (!) 144/82   Pulse 82   Temp 97.9 °F (36.6 °C) (Oral)   Resp 16   Wt 290 lb 3.2 oz (131.6 kg)   SpO2 91%   BMI 40.47 kg/m²     Last documented pain score (0-10 scale): Pain Level: 6  Last Weight:   Wt Readings from Last 1 Encounters:   06/28/20 290 lb 3.2 oz (131.6 kg)     Mental Status:  oriented and alert    IV Access:  - None    Nursing Mobility/ADLs:  Walking   Dependent  Transfer  Dependent  Bathing  Dependent  Dressing  Dependent  Toileting  Dependent  Feeding  Assisted  Med Admin  Assisted  Med Delivery   whole and prefers mixed with applesauce    Wound Care Documentation and Therapy:  Pressure Ulcer 05/23/17 Coccyx (Active)   Number of days: 1133       Pressure Ulcer 05/23/17 Buttocks Left;Right (Active)   Number of days: 0056        Elimination:  Continence:   · Bowel: No  · Bladder: Yes  Urinary Catheter: Insertion Date: 06/28/2020   Colostomy/Ileostomy/Ileal Conduit: No       Date of Last BM: 07/11/20    Intake/Output Summary (Last 24 hours) at 6/29/2020 1121  Last data filed at 6/29/2020 0316  Gross per 24 hour   Intake 150 ml   Output 4950 ml   Net -4800 ml     I/O last 3 completed shifts: In: 250 [P.O.:250]  Out: 4950 [Urine:4950]    Safety Concerns: At Risk for Falls    Impairments/Disabilities:      Amputation - left above knee    Nutrition Therapy:  Current Nutrition Therapy:   - Oral Diet:  Carb Control 4 carbs/meal (1800kcals/day) and Low Sodium (2gm)    Routes of Feeding: Oral  Liquids: Thin Liquids  Daily Fluid Restriction: yes - amount 1800ml  Last Modified Barium Swallow with Video (Video Swallowing Test): not done    Treatments at the Time of Hospital Discharge:   Respiratory Treatments: Incentive Spirometer  Oxygen Therapy:  is on oxygen at 1 L/min per nasal cannula.   Ventilator:    - No ventilator support    Rehab Therapies: Physical Therapy and Occupational Therapy  Weight Bearing Status/Restrictions: No weight bearing restirctions  Other Medical Equipment (for information only, NOT a DME order):  wheelchair and hospital bed  Other Treatments: Ace wrap to right lower leg during day. Off at night. Patient's personal belongings (please select all that are sent with patient):  Glasses    RN SIGNATURE:  Electronically signed by Lyle Davenport RN on 7/11/20 at 1:45 PM EDT    CASE MANAGEMENT/SOCIAL WORK SECTION    Inpatient Status Date: 6/27/2020    Readmission Risk Assessment Score:  Readmission Risk              Risk of Unplanned Readmission:        22           Discharging to Facility/ Agency   · Name: [facility]  · Address: [place] [address], [address] [facility]  · Phone:[phone]  · Fax:[phone]    Dialysis Facility (if applicable)   · Name:  · Address:  · Dialysis Schedule:  · Phone:  · Fax:    / signature: Electronically signed by SHELLEY Esparza on 6/29/20 at 11:24 AM EDT    PHYSICIAN SECTION    Prognosis: {Prognosis:[id number]}    Condition at Discharge: Stable    Rehab Potential (if transferring to Rehab): Fair    Recommended Labs or Other Treatments After Discharge: BMP/Mg in one week    Physician Certification: I certify the above information and transfer of Smita Carrier  is necessary for the continuing treatment of the diagnosis listed and that he requires [facility] for greater 30 days.      Update Admission H&P: No change in H&P    PHYSICIAN SIGNATURE:  Electronically signed by Edgar Esquivel on 7/11/2020 at 2:37 PM

## 2020-06-29 NOTE — CARE COORDINATION
6/29/20, 11:19 AM EDT  DISCHARGE PLANNING EVALUATION:    Janel Encinas       Admitted from:transfer from [address] [place] [address] 6/27/2020/ 2232 Hospital day: 2   Location: -09/009-A Reason for admit: AMS (altered mental status) [R41.82] Status: IP   Admit order signed?: no  PMH:  has a past medical history of BPH (benign prostatic hyperplasia), Chronic kidney disease, CKD (chronic kidney disease) stage 3, GFR 30-59 ml/min ([address] Edema, Hiatal hernia, Hyperlipidemia, Hypertension, Osteoarthritis, S/P PTCA: 5/12/2015: Stenting of proximal left circumflex artery with Xience 2.75X15 mm, post-dilated to 3.70 mm., and Type II or unspecified type diabetes mellitus without mention of complication, not stated as uncontrolled. Pertinent abnormal Imaging: right proximal femur fracture  Medications:  Scheduled Meds:   magnesium sulfate  2 g Intravenous Once    [Held by provider] aspirin  81 mg Oral Daily    atorvastatin  20 mg Oral Nightly    [Held by provider] clopidogrel  75 mg Oral Daily    cyclobenzaprine  10 mg Oral Daily    finasteride  5 mg Oral Daily    gabapentin  300 mg Oral TID    isosorbide mononitrate  30 mg Oral Daily    metoprolol succinate  50 mg Oral Daily    tamsulosin  0.4 mg Oral Nightly    pantoprazole  40 mg Oral Nightly    cefTRIAXone (ROCEPHIN) IV  1 g Intravenous Q24H    sodium chloride flush  10 mL Intravenous 2 times per day    insulin lispro  0-12 Units Subcutaneous TID WC    insulin lispro  0-6 Units Subcutaneous Nightly    calcium replacement protocol   Other RX Placeholder    linezolid  600 mg Oral 2 times per day    [Held by provider] insulin glargine  30 Units Subcutaneous Daily    [Held by provider] furosemide  40 mg Intravenous BID     Continuous Infusions:   [Held by provider] sodium chloride Stopped (06/28/20 0326)    dextrose        Pertinent Info/Orders/Treatment Plan:  Creat 1.9, WBC 14.9. IVF, IV ATB, pain control. Cardio, nephro, and ortho following.  Planning OR today if medically cleared, for ORIF of right femur. Diet: Diet NPO Effective Now Exceptions are: Sips with Meds   Smoking status:  reports that he has never smoked. He has never used smokeless tobacco.   PCP: Allie Street MD  Readmission 30 days or less: no  Readmission Risk Score: 22%    Discharge Planning Evaluation  Current Residence:  Assisted living  Living Arrangements:  Alone   Support Systems:  Children, Spouse/Significant Other  Current Services PTA:     Potential Assistance Needed:     Potential Assistance Purchasing Medications:  No  Does patient want to participate in local refill/ meds to beds program?  No  Type of Home Care Services:  [email or web address], Housekeeping, Nursing Services  Patient expects to be discharged to:     Expected Discharge date: Follow Up Appointment: Best Day/ Time: Wednesday AM    Patient Goals/Plan/Treatment Preferences: From [facility]. Transportation/Food Security/Housekeeping Addressed:  No issues identified.     Evaluation: yes

## 2020-06-29 NOTE — CONSULTS
Orthopedic Consult    Requesting Physician: Dr. Favian Victoria:  Right thigh pain    HISTORY OF PRESENT ILLNESS:      The patient is a 80 y.o. male with multiple medical comorbidities who ortho was consulted for the above noted complaint. Patient admitted on 6/27/20 for AMS he was noted to have a fall last night sustaining a displaced right proximal femur fracture. He is currently NPO. He is a poor historian secondary to AMS. Family is present during evaluation. He has a left BKA and family states he is non-ambulatory due to that. X-rays reviewed and demonstrate the fracture. Past Medical History:    Past Medical History:   Diagnosis Date    BPH (benign prostatic hyperplasia)     Chronic kidney disease     CKD (chronic kidney disease) stage 3, GFR 30-59 ml/min ([facility]) 4/16/2015    Edema     Hiatal hernia     Hyperlipidemia     Hypertension     Osteoarthritis     S/P PTCA: 5/12/2015: Stenting of proximal left circumflex artery with Xience 2.75X15 mm, post-dilated to 3.70 mm. 5/12/2015 5/12/2015: Stenting of proximal left circumflex artery with Xience 2.75X15 mm, post-dilated to 3.70 mm.  Dr. Jackie Garduno Type II or unspecified type diabetes mellitus without mention of complication, not stated as uncontrolled        Past Surgical History:    Past Surgical History:   Procedure Laterality Date    C[facility]IAC [facility]HETER[facility]ATION  5/8/15    [facility]    CATARACT REMOVAL      BILAT    COLONOSCOPY      CORONARY ANGIOPLASTY  2015    EKG 12-LEAD  5/12/2015         FOOT SURGERY      multiple    HYDROCELE EXCISION      LEG AMPUTATION BELOW KNEE  05/12/2017    PACEMAKER INSERTION      PACEMAKER PLACEMENT      TONSILLECTOMY AND ADENOIDECTOMY         Medications Prior to Admission:   Current Facility-Administered Medications   Medication Dose Route Frequency Provider Last Rate Last Dose    HYDROmorphone (DILAUDID) injection 0.25 mg  0.25 mg Intravenous Q4H PRN Nicol Spain PA-C        Or   Ciro Lynch sodium chloride flush 0.9 % injection 10 mL  10 mL Intravenous PRN [facility]        acetaminophen (TYLENOL) tablet 650 mg  650 mg Oral Q6H PRN [place], [place]        Or    acetaminophen (TYLENOL) suppository 650 mg  650 mg Rectal Q6H PRN [place], [place]        promethazine (PHENERGAN) tablet 12.5 mg  12.5 mg Oral Q6H PRN [place], [place]        Or    ondansetron (ZOFRAN) injection 4 mg  4 mg Intravenous Q6H PRN [place], [place]        heparin (porcine) injection 5,000 Units  5,000 Units Subcutaneous 3 times per day [place], [place]   Stopped at 06/29/20 0600    glucose (GLUTOSE) 40 % oral gel 15 g  15 g Oral PRN [place], [place]        dextrose 50 % IV solution  12.5 g Intravenous PRN [place], [place]   12.5 g at 06/29/20 0752    glucagon (rDNA) injection 1 mg  1 mg Intramuscular PRN [place], [place]        dextrose 5 % solution  100 mL/hr Intravenous PRN [place], [place]        insulin lispro (HUMALOG) injection vial 0-12 Units  0-12 Units Subcutaneous TID WC [place], [place]        insulin lispro (HUMALOG) injection vial 0-6 Units  0-6 Units Subcutaneous Nightly [facility] [place]        calcium replacement protocol   Other RX Placeholder [place], [place]        linezolid (ZYVOX) tablet 600 mg  600 mg Oral 2 times per day [place], [place]   600 mg at 06/28/20 0912    [Held by provider] insulin glargine (LANTUS) injection vial 30 Units  30 Units Subcutaneous Daily Manpreet Shrestha MD   30 Units at 06/28/20 1305    furosemide (LASIX) injection 40 mg  40 mg Intravenous BID Manpreet Shrestha MD   40 mg at 06/28/20 1014       Allergies:  Patient has no known allergies.     Social History:   Social History     Tobacco Use   Smoking Status Never Smoker   Smokeless Tobacco Never Used     Social History     Substance and Sexual Activity   Alcohol Use Yes    Comment: Occasionally     Social History     Substance and Sexual Activity   Drug Use No       Family History:  Family results for input(s): LIPASE, AMYLASE in the last 72 hours. Recent Labs     06/27/20  2301   AST 17   ALT 13   BILITOT 0.2*   ALKPHOS 76       Radiology:   Xr Thoracic Spine (3 Views)    Result Date: 6/29/2020  PROCEDURE: XR THORACIC SPINE (3 VIEWS) CLINICAL INFORMATION: fall with back pain unwitnessed. COMPARISON: No prior study. TECHNIQUE: AP, lateral and swimmer's view FINDINGS: There is no fracture or subluxation. There is no disc space narrowing. There are multilevel bridging thoracic vertebral endplate osteophytes consistent with DISH. There is mild cardiomegaly. There is a left subclavian dual-lead pacemaker, leads in the regions of the right atrium and right ventricle. No acute fracture or subluxation. Multilevel bridging osteophytes consistent with DISH. Mild cardiomegaly. **This report has been created using voice recognition software. It may contain minor errors which are inherent in voice recognition technology. ** Final report electronically signed by Dr. Juan M Ayala on 6/29/2020 2:32 AM    Xr Lumbar Spine (2-3 Views)    Result Date: 6/29/2020  PROCEDURE: XR LUMBAR SPINE (2-3 VIEWS) CLINICAL INFORMATION: back pain with fall, COMPARISON: No prior study. TECHNIQUE: AP and lateral views of the lumbar spine. FINDINGS: There are 5 non-rib bearing lumbar type vertebra. There is no fracture. There is 5 mm retrolisthesis of L1 on L2 and L2 on L3 and 4 to 5 mm anterolisthesis of L4 on L5, likely degenerative in nature. . There is multilevel disc space narrowing consistent with degenerative disc disease, most severe at L1-2. There is diffuse endplate spondylosis and mid and lower lumbar facet joint DJD. SI joints are unremarkable. There is bilateral hip joint DJD. No acute fracture. Multilevel subluxations as discussed above, likely degenerative in nature. Multilevel degenerative disc disease and DJD. Bilateral hip joint DJD. **This report has been created using voice recognition software.  It may contain minor errors which are inherent in voice recognition technology. ** Final report electronically signed by Dr. Mariam Jennings on 6/29/2020 2:39 AM    Xr Pelvis (1-2 Views)    Result Date: 6/29/2020  PROCEDURE: XR PELVIS (1-2 VIEWS) CLINICAL INFORMATION: fall from bed, hip pain, Left. COMPARISON: No prior study. TECHNIQUE: AP view of the pelvis. FINDINGS: Bones/joints: There is a fracture of the right femur with medial displacement of the distal fracture fragment and overriding of the fracture fragments. There is no hip fracture or dislocation. There is moderate bilateral hip joint DJD. The SI joints and symphysis pubis are unremarkable. Soft tissues: No significant soft tissue abnormality. Other: There is a Olmedo catheter in the region of the bladder. Displaced and overriding fracture of the right femoral shaft. See the accompanying right femur series report. Bilateral hip joint DJD. **This report has been created using voice recognition software. It may contain minor errors which are inherent in voice recognition technology. ** Final report electronically signed by Dr. Mariam Jennings on 6/29/2020 2:34 AM    Xr Femur Right (min 2 Views)    Result Date: 6/29/2020  PROCEDURE: XR FEMUR RIGHT (MIN 2 VIEWS) CLINICAL INFORMATION: fall with hip/knee pain. COMPARISON: No prior study. TECHNIQUE: AP and lateral views of the right femur  FINDINGS: Bones/joints: There is a fracture of the proximal right femoral shaft with posterior and medial displacement of the distal fracture fragment, overriding of the fracture fragments, and mild posterior angulation of the distal fracture fragment. There is mild right hip joint DJD and there is moderate medial compartment knee joint DJD. There is no dislocation. There are patellar enthesophytes. Soft tissues: There is severe muscular atrophy. There is subcutaneous edema of the thigh. There are regional arterial atherosclerotic calcifications.      Displaced, mildly angulated, and overriding fracture of the proximal right femoral shaft. Right knee and hip joint DJD. Severe muscular atrophy. **This report has been created using voice recognition software. It may contain minor errors which are inherent in voice recognition technology. ** Final report electronically signed by Dr. Sarah Weaver on 6/29/2020 2:36 AM    Xr Ankle Right (2 Views)    Result Date: 6/29/2020  PROCEDURE: XR ANKLE RIGHT (2 VIEWS) CLINICAL INFORMATION: pain after fall. COMPARISON: No prior study. TECHNIQUE: AP and lateral views of the right ankle. FINDINGS: Bones/joints: No fracture or dislocation. No ankle joint space narrowing or erosive changes. The ankle mortise is intact. There are degenerative changes of the midfoot. Bones are osteopenic. Soft tissues: There is soft tissue swelling about the ankle, most pronounced over the medial malleolus. There are calcaneal spurs at the insertion of the Achilles tendon and plantar fascia. There are regional arterial atherosclerotic calcifications. No acute fracture or dislocation. Soft tissue swelling most pronounced over the medial malleolus. Calcaneal spurs. Mid foot DJD. **This report has been created using voice recognition software. It may contain minor errors which are inherent in voice recognition technology. ** Final report electronically signed by Dr. Sarah Weaver on 6/29/2020 2:43 AM    Ct Head Wo Contrast    Result Date: 6/29/2020  PROCEDURE: CT HEAD WO CONTRAST CLINICAL INFORMATION: unwitnessed fall. COMPARISON: None TECHNIQUE: Noncontrast 5 mm axial images were obtained through the brain. Sagittal and coronal reconstructions were obtained and reviewed. All CT scans at this facility use dose modulation, iterative reconstruction, and/or weight-based dosing when appropriate to reduce radiation dose to as low as reasonably achievable. FINDINGS: Brain: There is no acute ischemic infarct, hemorrhage, midline shift, mass, or mass effect.  There are mild deep white matter hypodensities, nonspecific in nature but probably representing small vessel chronic ischemic changes. There is mild age appropriate cortical atrophy. Ventricles/basal cisterns: The ventricles and cisterns are of appropriate size and configuration for the patient's age. No evidence of obstructive hydrocephalus. Vascular: There are atherosclerotic calcifications in the bilateral vertebral and cavernous internal carotid arteries. Skull base/calvarium/osseous structures: Unremarkable Soft tissues: Unremarkable Intraorbital contents: Unremarkable Sinuses: There is mild bilateral ethmoid and right maxillary sinus disease. Mastoids: There is moderate to severe right mastoid disease and minimal left mastoid disease. No acute ischemic infarct, hemorrhage, or mass effect. Senescent changes as discussed above. Bilateral mastoid disease, more severe on the right. Mild sinus disease. **This report has been created using voice recognition software. It may contain minor errors which are inherent in voice recognition technology. ** Final report electronically signed by Dr. Tristan Tsang on 6/29/2020 1:18 AM    Ct Cervical Spine Wo Contrast    Result Date: 6/29/2020  PROCEDURE: CT CERVICAL SPINE WO CONTRAST CLINICAL INFORMATION: unwitnessed fall. . COMPARISON: No prior study. TECHNIQUE: 3 mm noncontrast axial images were obtained through the cervical spine with sagittal and coronal reconstructions. All CT scans at this facility use dose modulation, iterative reconstruction, and/or weight-based dosing when appropriate to reduce radiation dose to as low as reasonably achievable. FINDINGS: Vertebrae: There is no acute fracture. There is no subluxation. There is multilevel endplate spondylosis, uncovertebral joint DJD, and facet joint DJD. Disc spaces/neural foramina: There is moderate C6-7 and severe T1-T2 disc space narrowing consistent with degenerative disc disease. No significant disc bulge or herniation is seen.  There is no significant spinal stenosis. There are multilevel predominantly left-sided neural foraminal stenoses. Spinal canal: There is no obvious epidural hematoma. Soft tissues: Prevertebral soft tissues are normal. There are bilateral carotid artery calcified plaques. Imaged lungs: The imaged lung apices are clear. Sinuses: The imaged sinuses are clear. . Mastoids: There is bilateral mastoid disease, far more severe on the right. No fracture or subluxation. Multilevel degenerative disc disease and DJD. Predominant left-sided neural foraminal stenoses. Bilateral mastoid disease, more severe on the right. **This report has been created using voice recognition software. It may contain minor errors which are inherent in voice recognition technology. ** Final report electronically signed by Dr. Nicky Morrow on 6/29/2020 1:26 AM    Xr Shoulder Left (min 2 Views)    Result Date: 6/29/2020  PROCEDURE: XR SHOULDER LEFT (MIN 2 VIEWS) CLINICAL INFORMATION: fall. COMPARISON: No prior study. TECHNIQUE: 3 views of the left shoulder were obtained FINDINGS: Bones/joints: No fracture or dislocation. There is severe glenohumeral joint DJD. There is no AC joint DJD. Soft tissues: No significant soft tissue swelling. Devices: There is an incompletely imaged left subclavian dual-lead pacemaker. No acute fracture or dislocation. Severe glenohumeral joint DJD. **This report has been created using voice recognition software. It may contain minor errors which are inherent in voice recognition technology. ** Final report electronically signed by Dr. Nicky Morrow on 6/29/2020 2:41 AM    Xr Chest Portable    Result Date: 6/28/2020  PROCEDURE: XR CHEST PORTABLE CLINICAL INFORMATION: LE edema, rales, Hx of CHF. COMPARISON: Chest x-ray dated 5/25/2017 TECHNIQUE: AP Portable chest xray FINDINGS: Lines/tubes/devices: There is a left subclavian dual-lead pacemaker, leads in the regions of the right atrium and right ventricle.  Lungs/pleura: Interstitium is mildly prominent likely chronic and accentuated by the lower lung volumes. Cannot exclude minimal interstitial pulmonary edema. There is no focal pneumonia. No pleural effusion. No pneumothorax. Heart: There is mild cardiomegaly. Mediastinum/bertha: No obvious mass or adenopathy. Skeleton: There is left glenohumeral joint DJD. There is multilevel vertebral endplate spondylosis. Interstitial prominence likely chronic however cannot exclude minimal interstitial pulmonary edema. No focal pneumonia. **This report has been created using voice recognition software. It may contain minor errors which are inherent in voice recognition technology. ** Final report electronically signed by Dr. Leora Lee on 6/28/2020 1:23 AM    Ct Interpretation Of Outside Images    Result Date: 6/28/2020  PROCEDURE: CT INTERPRETATION OF OUTSIDE IMAGES CLINICAL INFORMATION: altered mental status, CT head w/o contrast. COMPARISON: Noncontrast brain CT dated 5/26/2017 TECHNIQUE: Noncontrast brain CT was performed at [facility] on 6/27/2020 at 9:13 PM and submitted for interpretation. FINDINGS: Brain: There is no acute ischemic infarct, hemorrhage, midline shift, mass, or mass effect. There are mild deep white matter hypodensities, nonspecific in nature but probably representing small vessel chronic ischemic changes. There is age appropriate cortical atrophy. Ventricles: The ventricles, cisterns and cortical sulci are enlarged concordant with the mild degree of age-appropriate atrophy. No obstructive hydrocephalus. Skull base/calvarium/osseous structures: Unremarkable Soft tissues: Unremarkable Intraorbital contents: Unremarkable Sinuses: There is very mild right maxillary and bilateral ethmoid sinus disease. Mastoids: There is severe right and minimal left mastoid disease. No acute ischemic infarct, hemorrhage, or mass effect. Senescent changes as discussed above. Severe right and minimal left mastoid disease.  Very mild sinus disease. **This report has been created using voice recognition software. It may contain minor errors which are inherent in voice recognition technology. ** Final report electronically signed by Dr. Pao Carnes on 6/28/2020 7:06 AM      ASSESSMENT:Active Problems:    AMS (altered mental status)  Resolved Problems:    * No resolved hospital problems. *     Closed displaced right proximal femur fracture  PLAN as discussed with Dr. Neil Guerrero:  Surgery planned today pending cardiac clearance.    Continue NPO  Hold ACs, Heparin DCd        Electronically signed by DENY Hector CNP on 6/29/2020 at 8:38 AM

## 2020-06-29 NOTE — PROGRESS NOTES
Hospitalist Progress Note    Patient:  Janel Encinas      Unit/Bed:6K-09/009-A    YOB: 1934    MRN: [id number]       Acct: [de-identified]     PCP: Dandre Chen MD    Date of Admission: 6/27/2020    Active Hospital Problems    Diagnosis Date Noted    AMS (altered mental status) [R41.82] 06/28/2020     Assessment and Plan:     1. Acute Metabolic Encephalopathy / Visual Hallucinations: Likely 2/2 UTI/acute urinary retention.   a. CT head showed: no acute findings. Ammonia wnl. Mixed Blood Gas unremarkable.   b. Immediate 1300mL UOP with change of engel  c. Urology following. 2. Acute Complicated UTI / BPH with indwelling engel, with acute retenion  a. Frequent UTIs 3-4 since dec 2019  b. UA ([facility]): 2+ bacteria, WBC 21-30, moderate LE, neg Nitrites  c. Resume Finasteride, Tamsulosin. Pend UA/Cx. Ceftriaxone 1g q24hr and PO linezolid (for hx VRE UTI). Change engel. Urology on board. 3. 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 3-4+ pitting edema BLE (new per daughter), Denies SOB.   b. CXR shows prominent interstitium with likely mild pulmonary edema. BNP 3k  c. Started IV Lasix 40 BID, BB. Daily weights. Unclear why no ACEi (consider adding with resolution of EFREN). 4. EFREN on CKD stage 3b: BUN:Cr 17. Likely multifactorial with cardiorenal + obstructive   a. Strict I&Os, monitor with BMP. Diuresis per above.   b. Follows up with Dr. Sulema Sandifer. Will consult Nephrology. 5. Left scrotal Pain / Bilateral Hydrocele with pseudo septations  a. US scrotum shows prominent amount of hydrocele formation on the right, moderate hydrocele on the left. Fluid on the right appears clear, fluid on the left appears to be markedly complex with multiple pseudo-septations through it.  b. Consult Urology. 6. 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 IDDM type 2, controlled: Continue home lantus at reduced dose, medium dose SSI. Goal BG of 140-180 while hospitalized. Hypoglycemia order set. Accucheck ACHS.   8. GERD: Resume PPI   9. Chronic back pain: Hold flexeril, PRN tramadol while altered. Resume as needed.   10. 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 Fall: had a unwitnessed fall on . Found to have a subsequent femur fracture. No bleeds. Cardiology cleared for surgery. Plan for surgery today. Will go to [facility] after. Dispo: had a fall overnight, found to have a femur fracture. Ortho consulted. Plan for surgery today. Hold ASA, Plavix, and Lasix. Will go to [facility] after surgery. Chief Complaint: [facility]     Hospital Course: 80year old white male nonsmoker with PMH of BPH (with chronic indwelling engel since ), CKD stage 3, HLD, hiatal hernia, CAD s/p MAI to LCx (), IDDM type 2 who presented to [facility] ED via EMS from [facility] on 20 c/o altered mental status x 2 days with associated hallucinations, testicular swelling (seen in the ED on 20), [address] [place], [address] home. He has a hx of frequent UTIs and was started on bactrim for UTI on 20. Per wife/daughter, at baseline he is oriented with no confusion. 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 Family also notes that the LE edema was no present 2 days ago when he was taken to the ED for scrotal pain. On interview, patient is unable to tell me why he is at the hospital and intermittently responds to external stimuli, but is oriented to person, , year, president, month, place. He endorses scrotal pain with. Denies fever, abdominal pain, SOB, CP, lightheadedness.     In the ED, vitals show: HR 68, /75, 92% on RA and afebrile. Labs show: wbc 12.4, hgb 10.8, MCV 94.2, plt 298, cr 1.9, BUN 34, BUN: Cr 18, Ca 8.2, LFTs unremarkable, lactic 1.1, UA shows 2+ bacteria, WBC 21-30, moderate LE, neg Nitrites, moderate hematuria, trace proteinuria. CXR shows no acute findings. Stable mild bibasilar atelectasis.  US scrotom shows prominent amount of hydrocele formation on the right, moderate hydrocele on the left. Fluid on the right appears clear, fluid on the left appears to be markedly complex with multiple pseudo-septations through it. .      Subjective: overnight pt had a unwitnessed fall. No fevers or chills. Found to have a femur fracture. No chest pain or sob. No nausea or vomiting. Plan for surgery today. Cleared by Cardiology. Family updated.        Medications:  Reviewed    Infusion Medications    [Held by provider] sodium chloride Stopped (06/28/20 0326)    dextrose       Scheduled Medications    magnesium sulfate  2 g Intravenous Once    [Held by provider] aspirin  81 mg Oral Daily    atorvastatin  20 mg Oral Nightly    [Held by provider] clopidogrel  75 mg Oral Daily    cyclobenzaprine  10 mg Oral Daily    finasteride  5 mg Oral Daily    gabapentin  300 mg Oral TID    isosorbide mononitrate  30 mg Oral Daily    metoprolol succinate  50 mg Oral Daily    tamsulosin  0.4 mg Oral Nightly    pantoprazole  40 mg Oral Nightly    cefTRIAXone (ROCEPHIN) IV  1 g Intravenous Q24H    sodium chloride flush  10 mL Intravenous 2 times per day    insulin lispro  0-12 Units Subcutaneous TID WC    insulin lispro  0-6 Units Subcutaneous Nightly    calcium replacement protocol   Other RX Placeholder    linezolid  600 mg Oral 2 times per day    [Held by provider] insulin glargine  30 Units Subcutaneous Daily    [Held by provider] furosemide  40 mg Intravenous BID     PRN Meds: HYDROmorphone **OR** HYDROmorphone, HYDROcodone-acetaminophen, HYDROcodone-acetaminophen, docusate sodium, traMADol, sodium chloride flush, acetaminophen **OR** acetaminophen, promethazine **OR** ondansetron, glucose, dextrose, glucagon (rDNA), dextrose      Intake/Output Summary (Last 24 hours) at 6/29/2020 1254  Last data filed at 6/29/2020 0316  Gross per 24 hour   Intake 150 ml   Output 4950 ml   Net -4800 ml       Diet:  Diet NPO Effective Now Exceptions are: Sips with Meds    Exam:  BP (!) 147/67   Pulse 78   Temp 98.1 °F (36.7 °C) (Oral)   Resp 18   Wt 290 lb 3.2 oz (131.6 kg)   SpO2 98%   BMI 40.47 kg/m²     General:  White male well groomed, well-nourished, well-developed who appears stated age, confused. Head: Normocephalic and atraumatic. EENT: No exophthalmos noted. No scleral or conjunctiva icterus, injection or pallor noted. No maxillary or frontal sinus tenderness to percussion. Neck: Supple. Trachea midline. No thyromegaly. Thorax/Lungs: Thorax is symmetrical with good expansion. Breath sounds CTA and equal b/l without rales, wheezing, or rhonchi.  No retractions or use of abdominal muscles. Cardiac: S1, S2, RRR without murmur, rub, or gallop. No JVD  Abdomen: Abdomen large but soft, nontender to palpation, without guarding or rigidity. Normoactive BS  Peripheral Vasculature: Extremities warm, dry with 3+ pitting edema to RLE, and 1+ pitting edema to LLE stump, no varicosities or stasis changes, DP pulses 2+ b/l. Brisk capillary refill. Skin:  Skin warm and moist. No lesions. Stasis changes to RLE shin with mild erythema and desquamation without warmth.   Psych:  Alert and oriented x3.  Affect very confused, responding to intermittently responding to stimuli.   Lymph:  No supraclavicular or cervical adenopathy. Neurologic: No focal deficits. No Seizures. Labs:   Recent Labs     06/29/20  0524   WBC 14.9*   HGB 10.7*   HCT 34.9*        Recent Labs     06/29/20  0524      K 3.7      CO2 28   BUN 32*   CREATININE 1.9*   CALCIUM 8.1*     Recent Labs     06/27/20  2301   AST 17   ALT 13   BILITOT 0.2*   ALKPHOS 76     No results for input(s): INR in the last 72 hours. No results for input(s): Ludmila Herb in the last 72 hours.     Urinalysis:      Lab Results   Component Value Date    NITRU NEGATIVE 06/28/2020    WBCUA  06/28/2020    BACTERIA NONE SEEN 06/28/2020    RBCUA 25-50 06/28/2020 BLOODU MODERATE 06/28/2020    SPECGRAV 1.011 06/28/2020    GLUCOSEU NEGATIVE 05/22/2017       Radiology:   All imaging reviewed     Diet: Diet NPO Effective Now Exceptions are: Sips with Meds      Code Status: Full Code            Electronically signed by Za Herman MD on 6/29/2020 at 12:54 PM

## 2020-06-29 NOTE — PROGRESS NOTES
Renal Progress Note  [facility]    Patient :  Jennifer Chen; 80 y.o. MRN# [id number]  Location:  [facility]93/168-R  Attending:  Emilio Lee MD  Admit Date:  6/27/2020   Hospital Day: 2      Subjective:     Nephrology is following the patient for EFREN/CKD. Patient seen and examined. Had fall last night and has femur fracture. Possible OR today. Wife and daughter at bedside. Patient confused. On room air, lying supine. Per wife his swelling is improved. Has good urine output. Outpatient Medications:     Medications Prior to Admission: traMADol (ULTRAM) 50 MG tablet, Take 50 mg by mouth 2 times daily as needed for Pain. bumetanide (BUMEX) 1 MG tablet, Take 1 tablet by mouth 2 times daily  potassium chloride (KLOR-CON M) 20 MEQ extended release tablet, Take 1 tablet by mouth 2 times daily  insulin aspart (NOVOLOG) 100 UNIT/ML injection vial, Inject into the skin 3 times daily (before meals) Per Sliding Scale = No Insulin, 140-199= 2 Units, 200-249= 4 Units, 250-290= 6 Units, 300-349= 8 Units, 350-399= 10 Units, 400+= 12 Units  finasteride (PROSCAR) 5 MG tablet, Take 1 tablet by mouth daily  gabapentin (NEURONTIN) 300 MG capsule, Take 300 mg by mouth 3 times daily.    metoprolol succinate ER (TOPROL-XL) 50 MG XL tablet, Take 1 tablet by mouth daily  clopidogrel (PLAVIX) 75 MG tablet, Take 1 tablet by mouth daily  aspirin 81 MG chewable tablet, Take 1 tablet by mouth daily  Multiple Vitamins-Minerals (THERAPEUTIC MULTIVITAMIN-MINERALS) tablet, Take 1 tablet by mouth daily  BASAGLAR KWIKPEN 100 UNIT/ML injection pen, Inject 75 Units into the skin daily   docusate sodium (COLACE) 100 MG capsule, Take 100 mg by mouth daily as needed for Constipation  benzonatate (TESSALON) 200 MG capsule, Take 200 mg by mouth every 8 hours as needed for Cough  BD AUTOSHIELD DUO 30G X 5 MM MISC,   guaiFENesin (ROBITUSSIN) 100 MG/5ML liquid, Take 10 mLs by mouth every 6 hours as needed   ASPERCREME W/LIDOCAINE EX, Apply topically every 4 hours as needed (Apply to back PRN for Pain)   cyclobenzaprine (FLEXERIL) 10 MG tablet, 10 mg daily Taking once daily for leg spasms and the Every 8 hours PRN TID for Pain. Hold if drowsy, confused, or sleepy. Incontinence Supplies (BEDSIDE DRAINAGE BAG) MISC, Dispense one, 2000 cc overnight urinary bag  insulin NPH (HUMULIN N;NOVOLIN N) 100 UNIT/ML injection vial, Inject 10 Units into the skin 2 times daily (Patient taking differently: Inject 22 Units into the skin nightly Inject 22 units qhs and 28 units daily)  tamsulosin (FLOMAX) 0.4 MG capsule, Take 1 capsule by mouth nightly  loperamide (IMODIUM) 2 MG capsule, Take 2 mg by mouth 4 times daily as needed for Diarrhea (for diarrhea related to weakness)  atorvastatin (LIPITOR) 20 MG tablet, Take 20 mg by mouth nightly  pantoprazole (PROTONIX) 40 MG tablet, Take 40 mg by mouth nightly   nitroGLYCERIN (NITROSTAT) 0.4 MG SL tablet, Place 1 tablet under the tongue every 5 minutes as needed for Chest pain  acetaminophen (TYLENOL) 325 MG tablet, Take 650 mg by mouth every 6 hours   isosorbide mononitrate (IMDUR) 30 MG CR tablet, Take 30 mg by mouth daily.       Current Medications:     Scheduled Meds:    magnesium sulfate  2 g Intravenous Once    [Held by provider] aspirin  81 mg Oral Daily    atorvastatin  20 mg Oral Nightly    [Held by provider] clopidogrel  75 mg Oral Daily    cyclobenzaprine  10 mg Oral Daily    finasteride  5 mg Oral Daily    gabapentin  300 mg Oral TID    isosorbide mononitrate  30 mg Oral Daily    metoprolol succinate  50 mg Oral Daily    tamsulosin  0.4 mg Oral Nightly    pantoprazole  40 mg Oral Nightly    cefTRIAXone (ROCEPHIN) IV  1 g Intravenous Q24H    sodium chloride flush  10 mL Intravenous 2 times per day    insulin lispro  0-12 Units Subcutaneous TID WC    insulin lispro  0-6 Units Subcutaneous Nightly    calcium replacement protocol   Other RX Placeholder    linezolid  600 mg Oral 2 times per day    [Held by provider] insulin glargine  30 Units Subcutaneous Daily    [Held by provider] furosemide  40 mg Intravenous BID     Continuous Infusions:    [Held by provider] sodium chloride Stopped (20 0326)    dextrose       PRN Meds:  HYDROmorphone **OR** HYDROmorphone, HYDROcodone-acetaminophen, HYDROcodone-acetaminophen, docusate sodium, traMADol, sodium chloride flush, acetaminophen **OR** acetaminophen, promethazine **OR** ondansetron, glucose, dextrose, glucagon (rDNA), dextrose    Input/Output:       I/O last 3 completed shifts: In: 250 [P.O.:250]  Out: 4950 [Urine:4950]. Patient Vitals for the past 96 hrs (Last 3 readings):   Weight   20 0412 290 lb 3.2 oz (131.6 kg)   20 2230 289 lb 9.6 oz (131.4 kg)       Vital Signs:   Temperature:  Temp: 97.9 °F (36.6 °C)  TMax:   Temp (24hrs), Av.9 °F (36.6 °C), Min:96.8 °F (36 °C), Max:98.5 °F (36.9 °C)    Respirations:  Resp: 16  Pulse:   Pulse: 82  BP:    BP: (!) 144/82  BP Range: Systolic (55BXF), HOL:735 , Min:121 , JMU:242       Diastolic (17VNT), VK, Min:61, Max:100      Physical Examination:     General:  Awake but confused, lying supine, no distress  HEENT: NC/AT/ MMM  Chest:               Diminished but clear  Cardiac:  S1 S2   Abdomen: Soft, non-tender,  Neuro:  No facial droop, No Asterixis  SKIN:  No rashes, good skin turgor. Extremities:  1+ edema right leg, + left BKA    Labs:       Recent Labs     20  2301 20  0524   WBC 11.9* 14.9*   RBC 3.61* 3.56*   HGB 10.8* 10.7*   HCT 35.7* 34.9*   MCV 98.9* 98.0*   MCH 29.9 30.1   MCHC 30.3* 30.7*    261   MPV 9.7 9.8      BMP:   Recent Labs     20  2301 20  0524    139   K 4.3 3.7    102   CO2 27 28   BUN 33* 32*   CREATININE 1.9* 1.9*   GLUCOSE 58* 80   CALCIUM 7.9* 8.1*      Phosphorus:   No results for input(s): PHOS in the last 72 hours.   Magnesium:    Recent Labs     20  0524   MG 1.6 Albumin:    Recent Labs     06/27/20  2301   LABALBU 2.8*     BNP:      Lab Results   Component Value Date    BNP 2,278 11/07/2019     LYN:    No results found for: LYN  SPEP:  Lab Results   Component Value Date    PROT 6.5 06/27/2020    PROT 6.1 06/10/2016     UPEP:   No results found for: LABPE  C3:   No results found for: C3  C4:   No results found for: C4  MPO ANCA:   No results found for: MPO  PR3 ANCA:   No results found for: PR3  Anti-GBM:   No results found for: GBMABIGG  Hep BsAg:       No results found for: HEPBSAG  Hep C AB:        No results found for: HEPCAB    Urinalysis/Chemistries:      Lab Results   Component Value Date    NITRU NEGATIVE 06/28/2020    COLORU YELLOW 06/28/2020    PHUR 5.5 06/28/2020    LABCAST NONE SEEN 06/28/2020    LABCAST NONE SEEN 06/28/2020    WBCUA  06/28/2020    RBCUA 25-50 06/28/2020    YEAST NONE SEEN 06/28/2020    BACTERIA NONE SEEN 06/28/2020    SPECGRAV 1.011 06/28/2020    LEUKOCYTESUR LARGE 06/28/2020    UROBILINOGEN 0.2 06/28/2020    BILIRUBINUR NEGATIVE 06/28/2020    BLOODU MODERATE 06/28/2020    GLUCOSEU NEGATIVE 05/22/2017    KETUA NEGATIVE 06/28/2020     Urine Sodium:   No results found for: EDOUARD  Urine Potassium:  No results found for: KUR  Urine Chloride:  No results found for: CLUR  Urine Osmolarity:   Lab Results   Component Value Date    OSMOU 238 04/16/2015     Urine Protein:   No components found for: TOTALPROTEIN, URINE   Urine Creatinine:     Lab Results   Component Value Date    LABCREA 70.6 05/31/2019     Urine Eosinophils:  No components found for: UEOS        Impression and Plan:  1. 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 2. Volume overload :good urine output, appears improved. Will hold lasix prior to OR today  3. HTN: stable  4. Right femur fx s/p fall  5. UTI: awaiting culture  6. Right hydrocele  7. Urinary retention with chronic engel  8. Altered mental status  9. DM  10.  Systolic CHF        Please don't hesitate to call with any questions.   Electronically signed by Franc Thurston DO on 6/29/2020 at 9:37 AM

## 2020-06-29 NOTE — ANESTHESIA PRE PROCEDURE
Provider, MD   benzonatate (TESSALON) 200 MG capsule Take 200 mg by mouth every 8 hours as needed for Cough    Historical Provider, MD   BD AUTOSHIELD DUO 30G X 5 MM MISC  8/29/19   Historical Provider, MD   guaiFENesin (ROBITUSSIN) 100 MG/5ML liquid Take 10 mLs by mouth every 6 hours as needed     Historical Provider, MD   ASPERCREME W/LIDOCAINE EX Apply topically every 4 hours as needed (Apply to back PRN for Pain)     Historical Provider, MD   cyclobenzaprine (FLEXERIL) 10 MG tablet 10 mg daily Taking once daily for leg spasms and the Every 8 hours PRN TID for Pain. Hold if drowsy, confused, or sleepy. 7/13/17   Historical Provider, MD   Incontinence Supplies (BEDSIDE DRAINAGE BAG) [address] one, 2000 cc overnight urinary bag 6/27/17   Lance Williamson APRN - CNP   insulin NPH (HUMULIN N;NOVOLIN N) 100 UNIT/ML injection vial Inject 10 Units into the skin 2 times daily  Patient taking differently: Inject 22 Units into the skin nightly Inject 22 units qhs and 28 units daily 5/26/17   Moises Odonnell MD   tamsulosin [facility]) 0.4 MG capsule Take 1 capsule by mouth nightly 5/26/17   Moises Odonnell MD   loperamide (IMODIUM) 2 MG capsule Take 2 mg by mouth 4 times daily as needed for Diarrhea (for diarrhea related to weakness)    Historical Provider, MD   atorvastatin (LIPITOR) 20 MG tablet Take 20 mg by mouth nightly    Historical Provider, MD   pantoprazole (PROTONIX) 40 MG tablet Take 40 mg by mouth nightly     Historical Provider, MD   nitroGLYCERIN (NITROSTAT) 0.4 MG SL tablet Place 1 tablet under the tongue every 5 minutes as needed for Chest pain 5/16/15   DENY Greene - CNP   acetaminophen (TYLENOL) 325 MG tablet Take 650 mg by mouth every 6 hours     Historical Provider, MD   isosorbide mononitrate (IMDUR) 30 MG CR tablet Take 30 mg by mouth daily.       Historical Provider, MD       Current medications:    Current Facility-Administered Medications   Medication Dose Route Frequency Provider Last Rate Last Dose    HYDROmorphone (DILAUDID) injection 0.25 mg  0.25 mg Intravenous Q4H PRN Haleigh Josue PA-C   0.25 mg at 06/29/20 8850    Or    HYDROmorphone (DILAUDID) injection 0.5 mg  0.5 mg Intravenous Q4H PRN Haleigh Josue PA-C   0.5 mg at 06/29/20 0409    HYDROcodone-acetaminophen (NORCO) 5-325 MG per tablet 1 tablet  1 tablet Oral Q6H PRN Nicol Aquino PA-C        HYDROcodone-acetaminophen (NORCO)  MG per tablet 1 tablet  1 tablet Oral Q6H PRN Nicol Aquino PA-C        magnesium sulfate 2 g in 50 mL IVPB premix  2 g Intravenous Once Edgard Guy, DO 25 mL/hr at 06/29/20 1113 2 g at 06/29/20 1113    [Held by provider] aspirin chewable tablet 81 mg  81 mg Oral Daily [facility]   81 mg at 06/28/20 0912    atorvastatin (LIPITOR) tablet 20 mg  20 mg Oral Nightly [facility]        [Held by provider] clopidogrel (PLAVIX) tablet 75 mg  75 mg Oral Daily [place]   75 mg at 06/28/20 0912    cyclobenzaprine (FLEXERIL) tablet 10 mg  10 mg [facility] Daily [facility]   10 mg at 06/28/20 1307    docusate sodium (COLACE) capsule 100 mg  100 mg Oral Daily PRN [place], [place]        finasteride (PROSCAR) tablet 5 mg  5 mg Oral Daily [facility]   5 mg at 06/28/20 0912    gabapentin (NEURONTIN) capsule 300 mg  300 mg Oral TID [place], [place]   300 mg at 06/28/20 1303    isosorbide mononitrate (IMDUR) extended release tablet 30 mg  30 mg Oral Daily [place], [place]   30 mg at 06/28/20 1303    metoprolol succinate (TOPROL XL) extended release tablet 50 mg  50 mg Oral Daily [place], [place]   50 mg at 06/28/20 0912    tamsulosin (FLOMAX) capsule 0.4 mg  0.4 mg Oral Nightly [place], [place]        pantoprazole (PROTONIX) tablet 40 mg  40 mg Oral Nightly [place], [place]        traMADol (ULTRAM) tablet 50 mg  50 mg Oral Q6H PRN [place], [place]        cefTRIAXone (ROCEPHIN) 1 g IVPB in 50 mL D5W minibag  1 g Induction: intravenous. MIPS: Postoperative opioids intended and Prophylactic antiemetics administered. Anesthetic plan and risks discussed with patient and spouse. Plan discussed with CRNA.                   [address] Guilherme Wilson,    6/29/2020

## 2020-06-29 NOTE — PLAN OF CARE
Problem: Falls - Risk of:  Goal: Will remain free from falls  Description: Will remain free from falls  Outcome: Ongoing  Note: Call light within reach. Side rails up x2. Bed alarm on. Non skid slippers available. Patient fell onto floor unwitnessed this shift. Sitter placed at bedside to assist with patient safety. Goal: Absence of physical injury  Description: Absence of physical injury  Outcome: Ongoing  Note: Stay with me and falling star program in place. Patient fell and fractured femur this shift. Sitter now at bedside. Problem: Skin Integrity:  Goal: Absence of new skin breakdown  Description: Absence of new skin breakdown  Outcome: Ongoing  Note: Patient being turned/repositioned every 2 hours. No new skin breakdown at this time. Problem: Discharge Planning:  Goal: Participates in care planning  Description: Participates in care planning  Outcome: Ongoing  Note: Patient being updated on plan of care. Awaiting of call from family to further update on plan of care. Goal: Discharged to appropriate level of care  Description: Discharged to appropriate level of care  Outcome: Ongoing  Note: Patient from [facility]. Discharge plan unknown at this time. Problem: Bowel Function - Altered:  Goal: Bowel elimination is within specified parameters  Description: Bowel elimination is within specified parameters  Outcome: Ongoing  Note: Patient's bowel sounds active, incontinent of stool this shift. Problem: Cardiac Output - Decreased:  Goal: Hemodynamic stability will improve  Description: Hemodynamic stability will improve  Outcome: Ongoing  Note:   Vitals:    06/29/20 0011   BP: (!) 137/100   Pulse: 87   Resp: 18   Temp:    SpO2: 95%     Will continue to monitor vital signs. Problem: Mental Status - Impaired:  Goal: Mental status will be restored to baseline  Description: Mental status will be restored to baseline  Outcome: Ongoing  Note: Patient frequently disoriented.  Occasionally able to appropriately answer orientation questions. Problem: Pain:  Goal: Control of acute pain  Description: Control of acute pain  Outcome: Ongoing  Note: Patient verbalizing pain in R leg from fall. PRN pain medication ordered to assist with pain control. Patient unable to verbalize pain on 0-10 scale. Goal: Control of chronic pain  Description: Control of chronic pain  Outcome: Ongoing  Note: Unable to assess chronic pain. Problem: Serum Glucose Level - Abnormal:  Goal: Ability to maintain appropriate glucose levels will improve to within specified parameters  Description: Ability to maintain appropriate glucose levels will improve to within specified parameters  Outcome: Ongoing  Note: Patient's blood glucose has frequently been low. Blood glucose being checked with meals, before bedtime and PRN to ensure blood glucose does not continue to drop. Care plan reviewed with patient.   Patient verbalize understanding of the plan of care and contribute to goal setting.  '

## 2020-06-29 NOTE — CONSULTS
encounter    CARDIAC TESTING    Ekg:   EKG Interpretation:  Atrial-sensed ventricular-paced rhythm      Echo:   ECHO:   Results for orders placed in visit on 12/11/19   ECHO Complete 2D W Doppler W Color   Apical anterior segment hypokinesis  EF 45%    Stress Test:2015  Inferolateral ischemia     Cath:2015    PROCEDURE IN DETAIL:  After explaining the procedure to the patient,  including the risks and benefits, he was brought to the Cath Lab and  positioned flat on the cath lab table. The right femoral artery area was  cleaned and prepped using sterile technique. 2% Xylocaine was used to  anesthetize the area below the inguinal ligament. An 18 gauge Seldinger  needle was used to cannulate the right femoral artery without difficulty,  followed by insertion of 5 Western Cheryl sheath. 5 Western Cheryl JL-4 was used to engage  the left main, followed by 5 Western Cheryl Dheeraj catheter to engage the right  coronary artery, 5 Czech pigtail was used to cross the aortic valve to  obtain left ventriculogram and hemodynamic assessment. The procedure was  completed successfully. The patient was awake, alert, and oriented x 3  before and after the procedure. HEMODYNAMIC RESULTS AND LEFT VENTRICULOGRAM:  Left ventricular end diastolic  pressure is 12 mmHg with no significant change before and after contrast  injection. No significant gradient across the aortic valve to signify aortic  stenosis. Left ventricular function within normal limits, EF ___________  percent. CORONARY ANGIOGRAM RESULTS:    1. Left main has diffuse disease and haziness and nonobstructive          otherwise, gives rise to LAD and left circ. 2.    LAD has diffuse 30-40 percent nonobstructive disease with patent          stents. 3.    Left circumflex artery has significant stenosis in the proximal          ostial segment about 90+ percent and calcification close to the left          main, relatively small to moderate sized vessel.     4.    Right 3.70 mm. Dr. 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 bumetanide (BUMEX) 1 MG tablet, Take 1 tablet by mouth 2 times daily  potassium chloride (KLOR-CON M) 20 MEQ extended release tablet, Take 1 tablet by mouth 2 times daily  insulin aspart (NOVOLOG) 100 UNIT/ML injection vial, Inject into the skin 3 times daily (before meals) Per Sliding Scale = No Insulin, 140-199= 2 Units, 200-249= 4 Units, 250-290= 6 Units, 300-349= 8 Units, 350-399= 10 Units, 400+= 12 Units  finasteride (PROSCAR) 5 MG tablet, Take 1 tablet by mouth daily  gabapentin (NEURONTIN) 300 MG capsule, Take 300 mg by mouth 3 times daily.    metoprolol succinate ER (TOPROL-XL) 50 MG XL tablet, Take 1 tablet by mouth daily  clopidogrel (PLAVIX) 75 MG tablet, Take 1 tablet by mouth daily  aspirin 81 MG chewable tablet, Take 1 tablet by mouth daily  Multiple Vitamins-Minerals (THERAPEUTIC MULTIVITAMIN-MINERALS) tablet, Take 1 tablet by mouth daily  BASAGLAR KWIKPEN 100 UNIT/ML injection pen, Inject 75 Units into the skin daily   docusate sodium (COLACE) 100 MG capsule, Take 100 mg by mouth daily as needed for Constipation  benzonatate (TESSALON) 200 MG capsule, Take 200 mg by mouth every 8 hours as needed for Cough  BD AUTOSHIELD DUO 30G X 5 MM MISC,   guaiFENesin (ROBITUSSIN) 100 MG/5ML liquid, Take 10 mLs by mouth every 6 hours as needed   ASPERCREME W/LIDOCAINE EX, Apply topically every 4 hours as needed (Apply to back PRN for Pain)   cyclobenzaprine (FLEXERIL) 10 MG tablet, 10 mg daily Taking once daily for leg spasms and the Every 8 hours PRN TID for Pain. Hold if drowsy, confused, or sleepy. Incontinence Supplies (BEDSIDE DRAINAGE BAG) MISC, Dispense one, 2000 cc overnight urinary bag  insulin NPH (HUMULIN N;NOVOLIN N) 100 UNIT/ML injection vial, Inject 10 Units into the skin 2 times daily (Patient taking differently: Inject 22 Units into the skin nightly Inject 22 units qhs and 28 units daily)  tamsulosin (FLOMAX) 0.4 MG capsule, Take 1 capsule by mouth nightly  loperamide (IMODIUM) 2 MG capsule, Take 2 mg by mouth 4 times daily as needed for Diarrhea (for diarrhea related to weakness)  atorvastatin (LIPITOR) 20 MG tablet, Take 20 mg by mouth nightly  pantoprazole (PROTONIX) 40 MG tablet, Take 40 mg by mouth nightly   nitroGLYCERIN (NITROSTAT) 0.4 MG SL tablet, Place 1 tablet under the tongue every 5 minutes as needed for Chest pain  acetaminophen (TYLENOL) 325 MG tablet, Take 650 mg by mouth every 6 hours   isosorbide mononitrate (IMDUR) 30 MG CR tablet, Take 30 mg by mouth daily. Allergies:    Patient has no known allergies. Social History:    reports that he has never smoked. He has never used smokeless tobacco. He reports current alcohol use. He reports that he does not use drugs. Family History:   family history includes Cancer in his brother and mother; Diabetes in his father, paternal grandfather, and paternal grandmother.       REVIEW OF SYSTEMS:  Constitutional: negative for anorexia, chills and fevers,weight change  Skin: negative for new skin rash per patient  HEENT: negative for head trauma or new visual changes  Respiratory: negative for cough, dyspnea on exertion, hemoptysis, shortness of breath and wheezing  Cardiovascular: negative for  orthopnea, palpitations and soft, non-tender, non-distended, normal bowel sounds, no masses or organomegaly  Extremities: no cyanosis, clubbing . Mild LE pitting Edema  Musculoskeletal: normal range of motion, no joint swelling, deformity or tenderness      RADIOLOGY   Xr Thoracic Spine (3 Views)    Result Date: 6/29/2020  PROCEDURE: XR THORACIC SPINE (3 VIEWS) CLINICAL INFORMATION: fall with back pain unwitnessed. COMPARISON: No prior study. TECHNIQUE: AP, lateral and swimmer's view FINDINGS: There is no fracture or subluxation. There is no disc space narrowing. There are multilevel bridging thoracic vertebral endplate osteophytes consistent with DISH. There is mild cardiomegaly. There is a left subclavian dual-lead pacemaker, leads in the regions of the right atrium and right ventricle. No acute fracture or subluxation. Multilevel bridging osteophytes consistent with DISH. Mild cardiomegaly. **This report has been created using voice recognition software. It may contain minor errors which are inherent in voice recognition technology. ** Final report electronically signed by Dr. Laureen Rosas on 6/29/2020 2:32 AM    Xr Lumbar Spine (2-3 Views)    Result Date: 6/29/2020  PROCEDURE: XR LUMBAR SPINE (2-3 VIEWS) CLINICAL INFORMATION: back pain with fall, COMPARISON: No prior study. TECHNIQUE: AP and lateral views of the lumbar spine. FINDINGS: There are 5 non-rib bearing lumbar type vertebra. There is no fracture. There is 5 mm retrolisthesis of L1 on L2 and L2 on L3 and 4 to 5 mm anterolisthesis of L4 on L5, likely degenerative in nature. . There is multilevel disc space narrowing consistent with degenerative disc disease, most severe at L1-2. There is diffuse endplate spondylosis and mid and lower lumbar facet joint DJD. SI joints are unremarkable. There is bilateral hip joint DJD. No acute fracture. Multilevel subluxations as discussed above, likely degenerative in nature. Multilevel degenerative disc disease and DJD.  Bilateral hip infarct, hemorrhage, midline shift, mass, or mass effect. There are mild deep white matter hypodensities, nonspecific in nature but probably representing small vessel chronic ischemic changes. There is mild age appropriate cortical atrophy. Ventricles/basal cisterns: The ventricles and cisterns are of appropriate size and configuration for the patient's age. No evidence of obstructive hydrocephalus. Vascular: There are atherosclerotic calcifications in the bilateral vertebral and cavernous internal carotid arteries. Skull base/calvarium/osseous structures: Unremarkable Soft tissues: Unremarkable Intraorbital contents: Unremarkable Sinuses: There is mild bilateral ethmoid and right maxillary sinus disease. Mastoids: There is moderate to severe right mastoid disease and minimal left mastoid disease. No acute ischemic infarct, hemorrhage, or mass effect. Senescent changes as discussed above. Bilateral mastoid disease, more severe on the right. Mild sinus disease. **This report has been created using voice recognition software. It may contain minor errors which are inherent in voice recognition technology. ** Final report electronically signed by Dr. Alden Hercules on 6/29/2020 1:18 AM    Ct Cervical Spine Wo Contrast    Result Date: 6/29/2020  PROCEDURE: CT CERVICAL SPINE WO CONTRAST CLINICAL INFORMATION: unwitnessed fall. . COMPARISON: No prior study. TECHNIQUE: 3 mm noncontrast axial images were obtained through the cervical spine with sagittal and coronal reconstructions. All CT scans at this facility use dose modulation, iterative reconstruction, and/or weight-based dosing when appropriate to reduce radiation dose to as low as reasonably achievable. FINDINGS: Vertebrae: There is no acute fracture. There is no subluxation. There is multilevel endplate spondylosis, uncovertebral joint DJD, and facet joint DJD. Disc spaces/neural foramina:  There is moderate C6-7 and severe T1-T2 disc space narrowing consistent with degenerative disc disease. No significant disc bulge or herniation is seen. There is no significant spinal stenosis. There are multilevel predominantly left-sided neural foraminal stenoses. Spinal canal: There is no obvious epidural hematoma. Soft tissues: Prevertebral soft tissues are normal. There are bilateral carotid artery calcified plaques. Imaged lungs: The imaged lung apices are clear. Sinuses: The imaged sinuses are clear. . Mastoids: There is bilateral mastoid disease, far more severe on the right. No fracture or subluxation. Multilevel degenerative disc disease and DJD. Predominant left-sided neural foraminal stenoses. Bilateral mastoid disease, more severe on the right. **This report has been created using voice recognition software. It may contain minor errors which are inherent in voice recognition technology. ** Final report electronically signed by Dr. Shay Ring on 6/29/2020 1:26 AM    Xr Shoulder Left (min 2 Views)    Result Date: 6/29/2020  PROCEDURE: XR SHOULDER LEFT (MIN 2 VIEWS) CLINICAL INFORMATION: fall. COMPARISON: No prior study. TECHNIQUE: 3 views of the left shoulder were obtained FINDINGS: Bones/joints: No fracture or dislocation. There is severe glenohumeral joint DJD. There is no AC joint DJD. Soft tissues: No significant soft tissue swelling. Devices: There is an incompletely imaged left subclavian dual-lead pacemaker. No acute fracture or dislocation. Severe glenohumeral joint DJD. **This report has been created using voice recognition software. It may contain minor errors which are inherent in voice recognition technology. ** Final report electronically signed by Dr. Shay Ring on 6/29/2020 2:41 AM    Xr Chest Portable    Result Date: 6/28/2020  PROCEDURE: XR CHEST PORTABLE CLINICAL INFORMATION: LE edema, rales, Hx of CHF. COMPARISON: Chest x-ray dated 5/25/2017 TECHNIQUE: AP Portable chest xray FINDINGS: Lines/tubes/devices:  There is a left subclavian dual-lead pacemaker, leads in the regions of the right atrium and right ventricle. Lungs/pleura:  Interstitium is mildly prominent likely chronic and accentuated by the lower lung volumes. Cannot exclude minimal interstitial pulmonary edema. There is no focal pneumonia. No pleural effusion. No pneumothorax. Heart: There is mild cardiomegaly. Mediastinum/bertha: No obvious mass or adenopathy. Skeleton: There is left glenohumeral joint DJD. There is multilevel vertebral endplate spondylosis. Interstitial prominence likely chronic however cannot exclude minimal interstitial pulmonary edema. No focal pneumonia. **This report has been created using voice recognition software. It may contain minor errors which are inherent in voice recognition technology. ** Final report electronically signed by Dr. Ani Hahn on 6/28/2020 1:23 AM    Ct Interpretation Of Outside Images    Result Date: 6/28/2020  PROCEDURE: CT INTERPRETATION OF OUTSIDE IMAGES CLINICAL INFORMATION: altered mental status, CT head w/o contrast. COMPARISON: Noncontrast brain CT dated 5/26/2017 TECHNIQUE: Noncontrast brain CT was performed at [facility] on 6/27/2020 at 9:13 PM and submitted for interpretation. FINDINGS: Brain: There is no acute ischemic infarct, hemorrhage, midline shift, mass, or mass effect. There are mild deep white matter hypodensities, nonspecific in nature but probably representing small vessel chronic ischemic changes. There is age appropriate cortical atrophy. Ventricles: The ventricles, cisterns and cortical sulci are enlarged concordant with the mild degree of age-appropriate atrophy. No obstructive hydrocephalus. Skull base/calvarium/osseous structures: Unremarkable Soft tissues: Unremarkable Intraorbital contents: Unremarkable Sinuses: There is very mild right maxillary and bilateral ethmoid sinus disease. Mastoids: There is severe right and minimal left mastoid disease. No acute ischemic infarct, hemorrhage, or mass effect. Senescent changes as discussed above. Severe right and minimal left mastoid disease. Very mild sinus disease. **This report has been created using voice recognition software. It may contain minor errors which are inherent in voice recognition technology. ** Final report electronically signed by Dr. Kun Gonzales on 6/28/2020 7:06 AM      LABS:  No results for input(s): CKTOTAL, CKMB, CKMBINDEX, TROPONINT in the last 72 hours.   CBC:   Lab Results   Component Value Date    WBC 14.9 06/29/2020    RBC 3.56 06/29/2020    HGB 10.7 06/29/2020    HCT 34.9 06/29/2020    MCV 98.0 06/29/2020    MCH 30.1 06/29/2020    MCHC 30.7 06/29/2020    RDW 16.3 05/25/2017     06/29/2020    MPV 9.8 06/29/2020     BMP:    Lab Results   Component Value Date     06/29/2020    K 3.7 06/29/2020    K 4.3 06/27/2020     06/29/2020    CO2 28 06/29/2020    BUN 32 06/29/2020    LABALBU 2.8 06/27/2020    LABALBU 3.0 01/09/2017    CREATININE 1.9 06/29/2020    CALCIUM 8.1 06/29/2020    LABGLOM 34 06/29/2020    GLUCOSE 80 06/29/2020     Hepatic Function Panel:    Lab Results   Component Value Date    ALKPHOS 76 06/27/2020    ALT 13 06/27/2020    AST 17 06/27/2020    PROT 6.5 06/27/2020    PROT 6.1 06/10/2016    BILITOT 0.2 06/27/2020    LABALBU 2.8 06/27/2020    LABALBU 3.0 01/09/2017     Magnesium:    Lab Results   Component Value Date    MG 1.6 06/29/2020     Warfarin PT/INR:  No components found for: PTPATWAR, PTINRWAR  HgBA1c:    Lab Results   Component Value Date    LABA1C 5.5 05/23/2017     FLP:    Lab Results   Component Value Date    TRIG 69 06/10/2016    HDL 48 06/10/2016    LDLCALC 64 06/10/2016     TSH:    Lab Results   Component Value Date    TSH 6.270 05/22/2017     BNP:   Lab Results   Component Value Date    BNP 2,278 11/07/2019         ASSESSMENT:  Jatin He is a 80year old male patient with past medical history that includes:   Past Medical History:   Diagnosis Date    BPH (benign prostatic hyperplasia)     Chronic kidney disease     CKD (chronic kidney disease) stage 3, GFR 30-59 ml/min ([facility]) 4/16/2015    Edema     Hiatal hernia     Hyperlipidemia     Hypertension     Osteoarthritis     S/P PTCA: 5/12/2015: Stenting of proximal left circumflex artery with Xience 2.75X15 mm, post-dilated to 3.70 mm. 5/12/2015 5/12/2015: Stenting of proximal left circumflex artery with Xience 2.75X15 mm, post-dilated to 3.70 mm. Dr. Chavez Anselmo Type II or unspecified type diabetes mellitus without mention of complication, not stated as uncontrolled    The patient was admitted to the hospital on 6/27/2020 with AMS. The patient was transferred from [facility] to [address] [place] [address] ER for evaluation of AMS, Hallucinations, lower extremity edema, testicular swelling and decreased urine output. He has known h/o recurrent UTIs and was recently started on Abx for UTI. Patient has been evaluated by Nephrology for EFREN and edema, patient was started on IV Lasix. Last night, the patient had an unwitnessed fall and back pain. Imaging studies revealed evidence for left femur fracture, orthopedics was consulted. Cardiology was consulted for preoperative evaluation in anticipation of surgery for femoral fracture. Patient denies chest pain, shortness of breath, dyspnea on exertion, orthopnea, paroxysmal nocturnal dyspnea, palpitations, dizziness, syncope. 1. Preoperative cardiac evaluation   2. H/o CHF, borderline EF 45%  3. CAD  4. S/P PCI LCX 2015  5. S/p prior pacemaker placement  6. CKD  7. Mild volume overload, improved with IV Lasix  8. HTN  9.  Dyslipidemia     RECOMMENDATIONS:   The patient denies chest pain or symptoms to suggest ischemia   He denies SOB or orthopnea, can lay flat with issues   He was seen by Nephrology   Leg edema apparently improved with diuresis    Denies syncope    EKG was reviewed, paced rhythm   No apparent active cardiac issues   Will obtain an echocardiogram   May proceed with hip surgery with low cardiac risk if echo findings are stable compered to prior study   Monitor I/O   Daily BMP   Cont to monitor on Telemetry   Cont DAPT, lipitor   Cont Toprol     Above findings and plan of care were discussed with patient and his family in details, questions were answered, agreeable to plan. Thank you for allowing me to participate in the care of this patient. Please let me know if I can be of any further assistance.     Justine Hess MD, [facility]   8:22 AM  6/29/2020

## 2020-06-29 NOTE — PROGRESS NOTES
Preoperatively the patient was on Plavix and aspirin. We will not add additional chemoprophylaxis to him for DVT prophylaxis. He is in for this is increased bleeding.

## 2020-06-29 NOTE — OP NOTE
Operative Note      Patient: Idalmis Bill  YOB: 1934  MRN: [id number]    Date of Procedure: 6/29/2020    Pre-Op Diagnosis: Right subtrochanteric femur fracture    Post-Op Diagnosis: Same       Procedure(s):  Open treatment right subtrochanteric femur fracture with a cephalo-medullary nail 43299    Surgeon(s):  Jose Alamo MD    Assistant:   Physician Assistant: Maury Chavarria PA-C    Anesthesia: General    Estimated Blood Loss (mL): 346     Complications: None    Specimens:   * No specimens in log *    Implants:  Implant Name Type Inv. Item Serial No.  Lot No. LRB No. Used Action   DAVID INTER 1.5MM BOW 11. [id number] 125D RT Leg/Ankle/Foot/Toe DAVID INTER 1.5MM BOW 11. [id number] 125D RT  [employer] [id number] Right 1 Implanted   SCREW TRIGEN LPROF 5.0X40MM Screw/Plate/Nail/David SCREW TRIGEN LPROF 5.0X40MM  [employer] [id number] Right 1 Implanted         Drains:   Urethral Catheter Non-latex (Active)   Site Assessment Pink 6/29/2020  9:20 AM   Urine Color Yellow 6/29/2020  9:20 AM   Urine Appearance Clear 6/29/2020  9:20 AM   Output (mL) 800 mL 6/29/2020  3:16 AM       [REMOVED] Urethral Catheter Double-lumen (Removed)       Findings: Closed displaced right subtrochanteric femur fracture    Detailed Description of Procedure: Indications  This is an 27-year-old nonambulator who had a fall out of bed. Pain in the right hip. Diagnosed with a subtrochanteric femur fracture. He has not ambulated in 3 years. Felt to benefit from operative invention to stabilize a fracture for pain control and mobilization. Family agreed. Narrative  Patient taken the operating room underwent a general anesthetic. Timeout was taken consent was confirmed. Did receive 2 g of Ancef. Was placed in the fracture table. The right lower extremity was placed in longitudinal traction left stump was draped out of the field. Overall alignment was felt acceptable. He is a nonambulator. When I can I do an open reduction.   A 3.2 mm guidepin was placed in the medial face the greater trochanter. The incision was increased in size and a 16mm channel reamers and passed down the level lesser trochanter. Finger reduction tool was used to reduce the subtrochanteric femur fracture. Ball-tipped guidewire was placed. Measured length to be 42 cm. Placed on eleven 1/2 x 420 mm InterTAN nail on appropriate depth. The out raking jig was utilized and a 3.2 Mc guidepin placed in the center femoral head. We used the template. The cannulated reamer was utilized. 100 mm lag screw and a 95 mm lag screw were placed. Traction was removed. Slight varus but overall for his demand this is appropriate. A 40 mm distal locking screw was placed under freehand technique and fluoroscopic guidance. We injected local anesthetic. Wounds were closed with absorbable suture followed by Prineo. Patient was awakened turned to cover room in fair condition    Postoperative plan  He will be weightbearing as tolerated but he does not ambulate. They can utilize this for transfers. Dry dressings as necessary. I will see him in the office in 8 weeks. At that time we will get x-rays 2 views of the right femur. If the family would like to not have him transported he could have x-rays at the facility and we could manage him via telemedicine.     Electronically signed by Harsh Simpson MD on 6/29/2020 at 1:59 PM

## 2020-06-29 NOTE — SIGNIFICANT EVENT
Called to bedside by RN for unwitnessed fall. Patient on ASA daily and prophylactic Lovenox. Upon arrival, patient was several feet away from the bed with head towards the foot of the bed. He was alert and oriented to person, year and president but not to location or situation. Patient moaning complaining of back pain (unable to localized), right hip pain, right knee pain, left shoulder pain. Patient could not turn head and stated he had pain upon palpation of midline cervical spine. He was placed in C-spine, returned to bed with the use of a back board with c-spine maintained. Left shoulder, pelvis, right hip, right knee XR ordered. CT head and C-spine ordered. Update:   Left femur fracture. RN left message with wife. Ortho consulted. Cardiology consulted for cardiac clearance.

## 2020-06-29 NOTE — CARE COORDINATION
6/29/20, 11:18 AM EDT  Discharge Planning Evaluation  Social work consult received, patient from [facility]. Patient/Family preference is to return to [place], confirmed with spouse and daughter. The patient's current payor source at the facility is Medicaid. Medicare skilled days available: yes  Insurance precert:  no  Spoke with [facility] at the facility.   Patient bed hold: yes  Anticipated transport plan: ambulance   Do they require COVID 19 test to return to [facility]  Is there a required time frame which which COVID test needs done: Covid completed

## 2020-06-29 NOTE — PROGRESS NOTES
Post-Fall Assessment  Date of Fall:   6/29/2020  Time of Fall:   0015   Yes No N/A Comment   Was Patient on [place]alling [place] Program? [x] [] []    Was the Fall Witnessed? [] [x] []    Were Clothes a Factor? [] [] [x]    Was Patient Wearing Corrective Footwear? [] [] [x]    Other Environmental Factors Involved? [] [] [x]      Description of Fall  Who found the patient: andrei Ruiz  Where was the patient at the time of the fall:  Patient was on floor with head at opposite end of bed. Brief description of fall: Patient had just received IV dextrose about 20 minutes prior to fall and was turned and repositioned at that time. Patient's bed alarm and call light were going off when tech entered the room. Patient had been unable to lift leg off bed during midnight assessment. Patient comments regarding fall:   Patient confused, only able to tell us that patient was on floor. Medications potentially contributing to fall risk (such as Sedatives, Hypnotics, Antihypertensives, Narcotics, Psychotropics, Anticonvulsants):   metoprolol    Patient Assessment of Injury    (Please document Vital Signs in Doc Flowsheet)     Yes No   Patient hit his/her head [] [] unwitnessed fall   Patient is taking an anticoagulant [x] []   CT of Head requested [x] []     Neurological Assessment Protocol: If the patient has hit his/her head during this fall,   a Neurological Assessment must be completed every 2 hours for 12 hours;   every 3 hours for 24 hours;   then every 4 hours for 24 hours. Document in Doc Flowsheets. Neurological Assessment Protocol initiated  yes    If the patient did not hit his/her head during this fall, monitor vital signs every 8 hours. Notify the physician within 24 hours and document. Physician Notification  Please document under Provider Notification group within the Assessment (Complex Assessment) template of Doc Flowsheets.      Physician notified yes    Pharmacy notified yes Time Notified:

## 2020-06-29 NOTE — PROGRESS NOTES
Urology Progress Note    Chief Complaint: UTI, R leg pain    Subjective:     Patient is resting in bed, draining estee colored urine from urinary engel catheter, +flatus, + loose BM, currently NPO, denies any nausea or vomiting. There are complaints of R leg pain and tender to palpation of R side of scrotum. Patient had a unwitnessed fall on  with a subsequent right femur rx and will be going to surgery today for repair performed by Dr Bridgett Snyder reports chronic catheter for last 5-6 years.    Exchanged         Vitals:  BP (!) 120/47   Pulse 82   Temp 97.6 °F (36.4 °C) (Oral)   Resp 18   Wt 290 lb 3.2 oz (131.6 kg)   SpO2 98%   BMI 40.47 kg/m²   Temp  Av.8 °F (36.6 °C)  Min: 96.8 °F (36 °C)  Max: 98.4 °F (36.9 °C)    Intake/Output Summary (Last 24 hours) at 2020 1558  Last data filed at 2020 1440  Gross per 24 hour   Intake 750 ml   Output 2820 ml   Net -2070 ml       Social History     Socioeconomic History    Marital status:      Spouse name: Not on file    Number of children: Not on file    Years of education: Not on file    Highest education level: Not on file   Occupational History    Not on file   Social Needs    Financial resource strain: Not on file    Food insecurity     Worry: Not on file     Inability: Not on file   [employer] needs     Medical: Not on file     Non-medical: Not on file   Tobacco Use    Smoking status: Never Smoker    Smokeless tobacco: Never Used   Substance and Sexual Activity    Alcohol use: Yes     Comment: Occasionally    Drug use: No    Sexual activity: Not on file   Lifestyle    Physical activity     Days per week: Not on file     Minutes per session: Not on file    Stress: Not on file   Relationships    Social connections     Talks on phone: Not on file     Gets together: Not on file     Attends Cheondo[employer] service: Not on file     Active member of club or organization: Not on file     Attends meetings of clubs or organizations: Not on file     Relationship status: Not on file    Intimate partner violence     Fear of current or ex partner: Not on file     Emotionally abused: Not on file     Physically abused: Not on file     Forced sexual activity: Not on file   Other Topics Concern    Not on file   Social History Narrative    Not on file     Family History   Problem Relation Age of Onset    Cancer Mother     Diabetes Father     Cancer Brother     Diabetes Paternal Grandmother     Diabetes Paternal Grandfather      No Known Allergies      Constitutional: Alert and oriented times x3, no acute distress, and cooperative to examination with appropriate mood and affect. HEENT:  Neck brace in place   Head:         Normocephalic and atraumatic. Mucous membranes are normal.   Eyes:         EOM are normal. No scleral icterus. Nose:    The external appearance of the nose is normal  Ears: The ears appear normal to external inspection. Tribe   Cardiovascular:       Normal rate, regular rhythm. Pulmonary/Chest:  Normal respiratory rate and rhthym. No use of accessory muscles. Lungs clear bilaterally. Abdominal:          Soft. No tenderness. Active bowel sounds. Genitalia:    Olmedo catheter draining estee colored urine. R testicle tender to palpation, R scrotal swelling. Musculoskeletal:    Right femur fracture  Extremities:    No cyanosis, clubbing, or edema present. BKA left. R leg shortented and externally rotated  Neurological:    Alert and oriented.      Labs:  WBC:    Lab Results   Component Value Date    WBC 14.9 06/29/2020     Hemoglobin/Hematocrit:    Lab Results   Component Value Date    HGB 10.7 06/29/2020    HCT 34.9 06/29/2020     BMP:    Lab Results   Component Value Date     06/29/2020    K 3.7 06/29/2020    K 4.3 06/27/2020     06/29/2020    CO2 28 06/29/2020    BUN 32 06/29/2020    LABALBU 2.8 06/27/2020    LABALBU 3.0 01/09/2017    CREATININE 1.9 06/29/2020    CALCIUM 8.1

## 2020-06-29 NOTE — PROGRESS NOTES
07:05- This RN received call back from patient's wife. This RN updated patient's wife that patient fell during the night despite several fall precautions being in place. This RN updated patient's wife that patient is stable but that an x-ray indicated a fracture of the R femur. Updated wife that orthopedic surgery has been consulted and cardiology has been consulted to give cardiac clearance for surgery to repair femur. This RN informed patient's wife that her and patient's daughter would be allowed to come to floor due to patient's need for surgery. This RN informed patient's wife that we would need informed consent signed from her prior to patient's going to surgery.

## 2020-06-30 LAB
ANION GAP SERPL CALCULATED.3IONS-SCNC: 17 MEQ/L (ref 8–16)
BASOPHILS # BLD: 0.1 %
BASOPHILS ABSOLUTE: 0 THOU/MM3 (ref 0–0.1)
BUN BLDV-MCNC: 47 MG/DL (ref 7–22)
CALCIUM SERPL-MCNC: 7.6 MG/DL (ref 8.5–10.5)
CHLORIDE BLD-SCNC: 98 MEQ/L (ref 98–111)
CO2: 24 MEQ/L (ref 23–33)
CREAT SERPL-MCNC: 2.5 MG/DL (ref 0.4–1.2)
EOSINOPHIL # BLD: 0 %
EOSINOPHILS ABSOLUTE: 0 THOU/MM3 (ref 0–0.4)
ERYTHROCYTE [DISTWIDTH] IN BLOOD BY AUTOMATED COUNT: 15.1 % (ref 11.5–14.5)
ERYTHROCYTE [DISTWIDTH] IN BLOOD BY AUTOMATED COUNT: 53.9 FL (ref 35–45)
GFR SERPL CREATININE-BSD FRML MDRD: 25 ML/MIN/1.73M2
GLUCOSE BLD-MCNC: 212 MG/DL (ref 70–108)
GLUCOSE BLD-MCNC: 221 MG/DL (ref 70–108)
GLUCOSE BLD-MCNC: 250 MG/DL (ref 70–108)
GLUCOSE BLD-MCNC: 263 MG/DL (ref 70–108)
GLUCOSE BLD-MCNC: 280 MG/DL (ref 70–108)
HCT VFR BLD CALC: 20.4 % (ref 42–52)
HCT VFR BLD CALC: 26.8 % (ref 42–52)
HEMOGLOBIN: 6.9 GM/DL (ref 14–18)
HEMOGLOBIN: 8.2 GM/DL (ref 14–18)
IMMATURE GRANS (ABS): 0.11 THOU/MM3 (ref 0–0.07)
IMMATURE GRANULOCYTES: 0.8 %
LYMPHOCYTES # BLD: 10.7 %
LYMPHOCYTES ABSOLUTE: 1.5 THOU/MM3 (ref 1–4.8)
MAGNESIUM: 2.2 MG/DL (ref 1.6–2.4)
MCH RBC QN AUTO: 30.4 PG (ref 26–33)
MCHC RBC AUTO-ENTMCNC: 30.6 GM/DL (ref 32.2–35.5)
MCV RBC AUTO: 99.3 FL (ref 80–94)
MONOCYTES # BLD: 5.1 %
MONOCYTES ABSOLUTE: 0.7 THOU/MM3 (ref 0.4–1.3)
NUCLEATED RED BLOOD CELLS: 0 /100 WBC
PLATELET # BLD: 222 THOU/MM3 (ref 130–400)
PMV BLD AUTO: 10.4 FL (ref 9.4–12.4)
POTASSIUM SERPL-SCNC: 5.1 MEQ/L (ref 3.5–5.2)
RBC # BLD: 2.7 MILL/MM3 (ref 4.7–6.1)
SEG NEUTROPHILS: 83.3 %
SEGMENTED NEUTROPHILS ABSOLUTE COUNT: 11.4 THOU/MM3 (ref 1.8–7.7)
SODIUM BLD-SCNC: 139 MEQ/L (ref 135–145)
WBC # BLD: 13.7 THOU/MM3 (ref 4.8–10.8)

## 2020-06-30 PROCEDURE — 36415 COLL VENOUS BLD VENIPUNCTURE: CPT

## 2020-06-30 PROCEDURE — 2580000003 HC RX 258: Performed by: INTERNAL MEDICINE

## 2020-06-30 PROCEDURE — 86900 BLOOD TYPING SEROLOGIC ABO: CPT

## 2020-06-30 PROCEDURE — 2580000003 HC RX 258: Performed by: PHYSICIAN ASSISTANT

## 2020-06-30 PROCEDURE — 1200000003 HC TELEMETRY R&B

## 2020-06-30 PROCEDURE — 86901 BLOOD TYPING SEROLOGIC RH(D): CPT

## 2020-06-30 PROCEDURE — 82948 REAGENT STRIP/BLOOD GLUCOSE: CPT

## 2020-06-30 PROCEDURE — P9016 RBC LEUKOCYTES REDUCED: HCPCS

## 2020-06-30 PROCEDURE — 99233 SBSQ HOSP IP/OBS HIGH 50: CPT | Performed by: INTERNAL MEDICINE

## 2020-06-30 PROCEDURE — 85018 HEMOGLOBIN: CPT

## 2020-06-30 PROCEDURE — 85014 HEMATOCRIT: CPT

## 2020-06-30 PROCEDURE — 6370000000 HC RX 637 (ALT 250 FOR IP): Performed by: PHYSICIAN ASSISTANT

## 2020-06-30 PROCEDURE — 99232 SBSQ HOSP IP/OBS MODERATE 35: CPT | Performed by: INTERNAL MEDICINE

## 2020-06-30 PROCEDURE — 6360000002 HC RX W HCPCS: Performed by: PHYSICIAN ASSISTANT

## 2020-06-30 PROCEDURE — 86850 RBC ANTIBODY SCREEN: CPT

## 2020-06-30 PROCEDURE — 80048 BASIC METABOLIC PNL TOTAL CA: CPT

## 2020-06-30 PROCEDURE — 85025 COMPLETE CBC W/AUTO DIFF WBC: CPT

## 2020-06-30 PROCEDURE — 86923 COMPATIBILITY TEST ELECTRIC: CPT

## 2020-06-30 PROCEDURE — 83735 ASSAY OF MAGNESIUM: CPT

## 2020-06-30 PROCEDURE — 6370000000 HC RX 637 (ALT 250 FOR IP): Performed by: INTERNAL MEDICINE

## 2020-06-30 RX ORDER — INSULIN GLARGINE 100 [IU]/ML
30 INJECTION, SOLUTION SUBCUTANEOUS DAILY
Status: DISCONTINUED | OUTPATIENT
Start: 2020-06-30 | End: 2020-07-09

## 2020-06-30 RX ORDER — SODIUM CHLORIDE 9 MG/ML
INJECTION, SOLUTION INTRAVENOUS CONTINUOUS
Status: DISCONTINUED | OUTPATIENT
Start: 2020-06-30 | End: 2020-07-01

## 2020-06-30 RX ORDER — 0.9 % SODIUM CHLORIDE 0.9 %
500 INTRAVENOUS SOLUTION INTRAVENOUS ONCE
Status: COMPLETED | OUTPATIENT
Start: 2020-06-30 | End: 2020-06-30

## 2020-06-30 RX ORDER — 0.9 % SODIUM CHLORIDE 0.9 %
20 INTRAVENOUS SOLUTION INTRAVENOUS ONCE
Status: DISCONTINUED | OUTPATIENT
Start: 2020-06-30 | End: 2020-07-11 | Stop reason: HOSPADM

## 2020-06-30 RX ADMIN — SODIUM CHLORIDE, PRESERVATIVE FREE 10 ML: 5 INJECTION INTRAVENOUS at 07:50

## 2020-06-30 RX ADMIN — GABAPENTIN 300 MG: 300 CAPSULE ORAL at 14:52

## 2020-06-30 RX ADMIN — LINEZOLID 600 MG: 600 TABLET, FILM COATED ORAL at 07:50

## 2020-06-30 RX ADMIN — CEFTRIAXONE SODIUM 1 G: 1 INJECTION, POWDER, FOR SOLUTION INTRAMUSCULAR; INTRAVENOUS at 03:33

## 2020-06-30 RX ADMIN — INSULIN GLARGINE 30 UNITS: 100 INJECTION, SOLUTION SUBCUTANEOUS at 11:39

## 2020-06-30 RX ADMIN — ISOSORBIDE MONONITRATE 30 MG: 30 TABLET ORAL at 07:50

## 2020-06-30 RX ADMIN — SODIUM CHLORIDE 500 ML: 9 INJECTION, SOLUTION INTRAVENOUS at 20:30

## 2020-06-30 RX ADMIN — TAMSULOSIN HYDROCHLORIDE 0.4 MG: 0.4 CAPSULE ORAL at 21:54

## 2020-06-30 RX ADMIN — ACETAMINOPHEN 650 MG: 325 TABLET ORAL at 14:52

## 2020-06-30 RX ADMIN — METOPROLOL SUCCINATE 50 MG: 50 TABLET, FILM COATED, EXTENDED RELEASE ORAL at 07:50

## 2020-06-30 RX ADMIN — FINASTERIDE 5 MG: 5 TABLET, FILM COATED ORAL at 07:50

## 2020-06-30 RX ADMIN — HYDROCODONE BITARTRATE AND ACETAMINOPHEN 1 TABLET: 5; 325 TABLET ORAL at 04:17

## 2020-06-30 RX ADMIN — PANTOPRAZOLE SODIUM 40 MG: 40 TABLET, DELAYED RELEASE ORAL at 22:05

## 2020-06-30 RX ADMIN — LINEZOLID 600 MG: 600 TABLET, FILM COATED ORAL at 21:54

## 2020-06-30 RX ADMIN — ATORVASTATIN CALCIUM 20 MG: 20 TABLET, FILM COATED ORAL at 21:54

## 2020-06-30 RX ADMIN — SODIUM CHLORIDE: 9 INJECTION, SOLUTION INTRAVENOUS at 09:44

## 2020-06-30 RX ADMIN — CYCLOBENZAPRINE 10 MG: 10 TABLET, FILM COATED ORAL at 07:50

## 2020-06-30 RX ADMIN — GABAPENTIN 300 MG: 300 CAPSULE ORAL at 21:54

## 2020-06-30 RX ADMIN — GABAPENTIN 300 MG: 300 CAPSULE ORAL at 07:50

## 2020-06-30 RX ADMIN — INSULIN LISPRO 2 UNITS: 100 INJECTION, SOLUTION INTRAVENOUS; SUBCUTANEOUS at 22:05

## 2020-06-30 ASSESSMENT — PAIN SCALES - GENERAL
PAINLEVEL_OUTOF10: 0
PAINLEVEL_OUTOF10: 0
PAINLEVEL_OUTOF10: 3
PAINLEVEL_OUTOF10: 0
PAINLEVEL_OUTOF10: 5

## 2020-06-30 ASSESSMENT — PAIN DESCRIPTION - ORIENTATION: ORIENTATION: RIGHT

## 2020-06-30 ASSESSMENT — PAIN DESCRIPTION - FREQUENCY: FREQUENCY: INTERMITTENT

## 2020-06-30 ASSESSMENT — PAIN - FUNCTIONAL ASSESSMENT: PAIN_FUNCTIONAL_ASSESSMENT: ACTIVITIES ARE NOT PREVENTED

## 2020-06-30 ASSESSMENT — PAIN DESCRIPTION - ONSET: ONSET: ON-GOING

## 2020-06-30 ASSESSMENT — PAIN DESCRIPTION - LOCATION: LOCATION: HIP

## 2020-06-30 ASSESSMENT — PAIN SCALES - WONG BAKER
WONGBAKER_NUMERICALRESPONSE: 0
WONGBAKER_NUMERICALRESPONSE: 4

## 2020-06-30 ASSESSMENT — PAIN DESCRIPTION - PAIN TYPE: TYPE: SURGICAL PAIN

## 2020-06-30 ASSESSMENT — PAIN DESCRIPTION - PROGRESSION: CLINICAL_PROGRESSION: NOT CHANGED

## 2020-06-30 ASSESSMENT — PAIN DESCRIPTION - DESCRIPTORS: DESCRIPTORS: ACHING

## 2020-06-30 NOTE — PROGRESS NOTES
1.9, BUN 34, BUN: Cr 18, Ca 8.2, LFTs unremarkable, lactic 1.1, UA shows 2+ bacteria, WBC 21-30, moderate LE, neg Nitrites, moderate hematuria, trace proteinuria. CXR shows no acute findings. Stable mild bibasilar atelectasis. US scrotom shows prominent amount of hydrocele formation on the right, moderate hydrocele on the left. Fluid on the right appears clear, fluid on the left appears to be markedly complex with multiple pseudo-septations through it. .      Subjective: no acute events overnight. Mental status improving, pt more alert and oriented. No fevers or chills. No chest pain or sob. Tolerating PO intake. No nausea or vomiting.        Medications:  Reviewed    Infusion Medications    sodium chloride 40 mL/hr at 06/30/20 0944    dextrose       Scheduled Medications    insulin glargine  30 Units Subcutaneous Daily    [Held by provider] aspirin  81 mg Oral Daily    atorvastatin  20 mg Oral Nightly    [Held by provider] clopidogrel  75 mg Oral Daily    cyclobenzaprine  10 mg Oral Daily    finasteride  5 mg Oral Daily    gabapentin  300 mg Oral TID    isosorbide mononitrate  30 mg Oral Daily    metoprolol succinate  50 mg Oral Daily    tamsulosin  0.4 mg Oral Nightly    pantoprazole  40 mg Oral Nightly    cefTRIAXone (ROCEPHIN) IV  1 g Intravenous Q24H    sodium chloride flush  10 mL Intravenous 2 times per day    insulin lispro  0-12 Units Subcutaneous TID WC    insulin lispro  0-6 Units Subcutaneous Nightly    calcium replacement protocol   Other RX Placeholder    linezolid  600 mg Oral 2 times per day    [Held by provider] furosemide  40 mg Intravenous BID     PRN Meds: HYDROmorphone **OR** HYDROmorphone, HYDROcodone-acetaminophen, HYDROcodone-acetaminophen, docusate sodium, traMADol, sodium chloride flush, acetaminophen **OR** acetaminophen, promethazine **OR** ondansetron, glucose, dextrose, glucagon (rDNA), dextrose      Intake/Output Summary (Last 24 hours) at 6/30/2020 1303  Last data 06/28/2020    RBCUA 25-50 06/28/2020    BLOODU MODERATE 06/28/2020    SPECGRAV 1.011 06/28/2020    GLUCOSEU NEGATIVE 05/22/2017       Radiology:   All imaging reviewed     Diet: DIET RENAL; Carb Control: 3 carb choices (45 gms)/meal      Code Status: Full Code            Electronically signed by Merritt Gallo MD on 6/30/2020 at 1:03 PM

## 2020-06-30 NOTE — PROGRESS NOTES
Follow up with Dr. Clarissa Sarabia at discharge  Continue Scrotal elevation  Keep engel in- has it chronically  Call me with any questions or concerns.   Electronically signed by DENY Padilla CNP on 6/30/2020 at 1:06 PM

## 2020-06-30 NOTE — PLAN OF CARE
Problem: Falls - Risk of:  Goal: Will remain free from falls  Description: Will remain free from falls  6/30/2020 1618 by Yoana Russ RN  Outcome: Ongoing  Note: Patient fell 6/29, broke femur, surgery 6/29 afternoon for repair. Call light within reach. Side rails up x3. Bed alarm on. Non skid slippers available. Problem: Skin Integrity:  Goal: Will show no infection signs and symptoms  Description: Will show no infection signs and symptoms  6/30/2020 1618 by Yoana Russ RN  Outcome: Ongoing  Note: No new skin issues. Patient turned and repositioned every 2 hours and as needed. Will continue to monitor. Problem: Discharge Planning:  Goal: Participates in care planning  Description: Participates in care planning  Outcome: Ongoing  Note: Patient plans to be discharged home to [facility] when medically stable. Problem: Cardiac Output - Decreased:  Goal: Hemodynamic stability will improve  Description: Hemodynamic stability will improve  6/30/2020 1618 by Yoana Russ RN  Outcome: Ongoing  Note:   Vitals:    06/30/20 1555   BP: (!) 101/44   Pulse: 79   Resp: 16   Temp: 98.2 °F (36.8 °C)   SpO2: 98%          Problem: Mental Status - Impaired:  Goal: Mental status will be restored to baseline  Description: Mental status will be restored to baseline  Outcome: Ongoing  Note: A&Ox4, Rocephin on to treat UTI, will continue to monitor. Problem: Serum Glucose Level - Abnormal:  Goal: Ability to maintain appropriate glucose levels will improve to within specified parameters  Description: Ability to maintain appropriate glucose levels will improve to within specified parameters  Outcome: Ongoing  Note: Results for Dewey Lundborg (MRN [id number]) as of 6/30/2020 16:18   Ref.  Range 6/29/2020 16:34 6/29/2020 20:02 6/30/2020 05:54 6/30/2020 07:38 6/30/2020 11:39   POC Glucose Latest Ref Range: 70 - 108 mg/dl 107 169 (H)  280 (H) 250 (H)     Lantus and sliding scale insulin per MAR, will continue to monitor. Problem: Pain:  Goal: Pain level will decrease  Description: Pain level will decrease  Outcome: Ongoing  Note: Patient complaining of pain in neck of 3 on 0-10 scale, tylenol given per [facility], will continue to monitor. No complaints of hip or right leg pain. Care plan reviewed with patient and wife. Patient and wife verbalize understanding of the plan of care and contribute to goal setting.

## 2020-06-30 NOTE — PLAN OF CARE
Problem: Falls - Risk of:  Goal: Will remain free from falls  Description: Will remain free from falls  Outcome: Ongoing  Note: Patient remains free from falls this shift. Fall precautions in place with bed/chair exit alarmed. Fall sign posted and fall armband in place. Nonskid footwear used with transferring. Sitter at bedside. Problem: Falls - Risk of:  Goal: Absence of physical injury  Description: Absence of physical injury  Outcome: Ongoing  Note: Call light in reach, bed lowest position, wheels locked, and correct ID band in place. Environment remains clear of fall hazards. Reviewed safety measures with patient/family. Problem: Skin Integrity:  Goal: Will show no infection signs and symptoms  Description: Will show no infection signs and symptoms  Outcome: Ongoing  Note: No signs or symptoms of infection. Problem: Skin Integrity:  Goal: Absence of new skin breakdown  Description: Absence of new skin breakdown  Outcome: Ongoing  Note: No signs of new skin breakdown with each assessment. Skin remains warm, dry, intact. Mucous membranes pink & moist. Patient turned Q2. Problem: Discharge Planning:  Goal: Discharged to appropriate level of care  Description: Discharged to appropriate level of care  Outcome: Ongoing  Note: Patients continuum of care needs met this shift. Patient to be discharged to [address] [place] [address] 100 barriers to discharge. Problem: Cardiac Output - Decreased:  Goal: Hemodynamic stability will improve  Description: Hemodynamic stability will improve  Outcome: Ongoing  Note: Patient Vpaced on telemetry monitor. No changes in cardiac rhythm noted with each tele strip reading. No evidence of DVT this shift. DVT prophylaxis in place- patient has SCDs in place. Patient denies chest pain or shortness of breath with assessments.         Problem: Pain:  Goal: Control of acute pain  Description: Control of acute pain  Outcome: Ongoing  Note: Patient able to use 0-10 pain scale. Denies pain at this time. Agreeable to take PRN pain medications.

## 2020-06-30 NOTE — PROGRESS NOTES
Orthopaedic Progress Note      SUBJECTIVE:    POD 1 ORIF right hip with CMN  Seen in bed, alert. Patient notes a history of PVD, neuropathy, and chronic foot drop.   hgb 8.2-acute blood loss anemia. Physical    Vitals:    06/30/20 0730   BP: 119/71   Pulse: 100   Resp: 16   Temp: 98.2 °F (36.8 °C)   SpO2: 96%         OBJECTIVE  RLE dressing Prineo intact. Compartments soft. Chronic foot drop noted. 2+ pitting edema. Toes cool. Cap refill brisk. Diminished sensation throughout the extremity. Diminished pulses.        Data  CBC:   Lab Results   Component Value Date    WBC 13.7 06/30/2020    RBC 2.70 06/30/2020    HGB 8.2 06/30/2020    HCT 26.8 06/30/2020    MCV 99.3 06/30/2020    MCH 30.4 06/30/2020    MCHC 30.6 06/30/2020    RDW 16.3 05/25/2017     06/30/2020    MPV 10.4 06/30/2020     BMP:    Lab Results   Component Value Date     06/30/2020    K 5.1 06/30/2020    K 4.3 06/27/2020    CL 98 06/30/2020    CO2 24 06/30/2020    BUN 47 06/30/2020    LABALBU 2.8 06/27/2020    LABALBU 3.0 01/09/2017    CREATININE 2.5 06/30/2020    CALCIUM 7.6 06/30/2020    LABGLOM 25 06/30/2020    GLUCOSE 263 06/30/2020     Uric Acid:  No components found for: URIC  PT/INR:    Lab Results   Component Value Date    INR 1.27 05/22/2017     PTT:    Lab Results   Component Value Date    APTT 39.5 05/22/2017   [APTT  Troponin:  No results found for: TROPONINI  Urine Culture:  No components found for: CURINE    Current Inpatient Medications    Current Facility-Administered Medications: 0.9 % sodium chloride infusion, , Intravenous, Continuous  HYDROmorphone (DILAUDID) injection 0.25 mg, 0.25 mg, Intravenous, Q4H PRN **OR** HYDROmorphone (DILAUDID) injection 0.5 mg, 0.5 mg, Intravenous, Q4H PRN  HYDROcodone-acetaminophen (NORCO) 5-325 MG per tablet 1 tablet, 1 tablet, Oral, Q6H PRN  HYDROcodone-acetaminophen (NORCO)  MG per tablet 1 tablet, 1 tablet, Oral, Q6H PRN  [Held by provider] aspirin chewable tablet 81 mg, 81 mg, Oral, Daily  atorvastatin (LIPITOR) tablet 20 mg, 20 mg, Oral, Nightly  [Held by provider] clopidogrel (PLAVIX) tablet 75 mg, 75 mg, Oral, Daily  cyclobenzaprine (FLEXERIL) tablet 10 mg, 10 mg, Oral, Daily  docusate sodium (COLACE) capsule 100 mg, 100 mg, Oral, Daily PRN  finasteride (PROSCAR) tablet 5 mg, 5 mg, Oral, Daily  gabapentin (NEURONTIN) capsule 300 mg, 300 mg, Oral, TID  isosorbide mononitrate (IMDUR) extended release tablet 30 mg, 30 mg, Oral, Daily  metoprolol succinate (TOPROL XL) extended release tablet 50 mg, 50 mg, Oral, Daily  tamsulosin (FLOMAX) capsule 0.4 mg, 0.4 mg, Oral, Nightly  pantoprazole (PROTONIX) tablet 40 mg, 40 mg, Oral, Nightly  traMADol (ULTRAM) tablet 50 mg, 50 mg, Oral, Q6H PRN  cefTRIAXone (ROCEPHIN) 1 g IVPB in 50 mL D5W minibag, 1 g, Intravenous, Q24H  sodium chloride flush 0.9 % injection 10 mL, 10 mL, Intravenous, 2 times per day  sodium chloride flush 0.9 % injection 10 mL, 10 mL, Intravenous, PRN  acetaminophen (TYLENOL) tablet 650 mg, 650 mg, Oral, Q6H PRN **OR** acetaminophen (TYLENOL) suppository 650 mg, 650 mg, Rectal, Q6H PRN  promethazine (PHENERGAN) tablet 12.5 mg, 12.5 mg, Oral, Q6H PRN **OR** ondansetron (ZOFRAN) injection 4 mg, 4 mg, Intravenous, Q6H PRN  glucose (GLUTOSE) 40 % oral gel 15 g, 15 g, Oral, PRN  dextrose 50 % IV solution, 12.5 g, Intravenous, PRN  glucagon (rDNA) injection 1 mg, 1 mg, Intramuscular, PRN  dextrose 5 % solution, 100 mL/hr, Intravenous, PRN  insulin lispro (HUMALOG) injection vial 0-12 Units, 0-12 Units, Subcutaneous, TID WC  insulin lispro (HUMALOG) injection vial 0-6 Units, 0-6 Units, Subcutaneous, Nightly  calcium replacement protocol, , Other, RX Placeholder  linezolid (ZYVOX) tablet 600 mg, 600 mg, Oral, 2 times per day  [Held by provider] insulin glargine (LANTUS) injection vial 30 Units, 30 Units, Subcutaneous, Daily  [Held by provider] furosemide (LASIX) injection 40 mg, 40 mg, Intravenous, BID    .     ASSESSMENT AND PLAN  WBAT

## 2020-06-30 NOTE — PROGRESS NOTES
Renal Progress Note  [facility]    Patient :  Wilfredo Denny; 80 y.o. MRN# [id number]  Location:  7D-72/[id number]  Attending:  Nel Sinha MD  Admit Date:  6/27/2020   Hospital Day: 3      Subjective:     Nephrology is following the patient for EFREN/CKD. This is a late entry. Patient was seen earlier this morning. Much more alert today. He is s/p femur fracture repair. He is on room air. Denies SOB. Outpatient Medications:     Medications Prior to Admission: bumetanide (BUMEX) 1 MG tablet, Take 1 tablet by mouth 2 times daily  potassium chloride (KLOR-CON M) 20 MEQ extended release tablet, Take 1 tablet by mouth 2 times daily  insulin aspart (NOVOLOG) 100 UNIT/ML injection vial, Inject into the skin 3 times daily (before meals) Per Sliding Scale = No Insulin, 140-199= 2 Units, 200-249= 4 Units, 250-290= 6 Units, 300-349= 8 Units, 350-399= 10 Units, 400+= 12 Units  finasteride (PROSCAR) 5 MG tablet, Take 1 tablet by mouth daily  gabapentin (NEURONTIN) 300 MG capsule, Take 300 mg by mouth 3 times daily. metoprolol succinate ER (TOPROL-XL) 50 MG XL tablet, Take 1 tablet by mouth daily  clopidogrel (PLAVIX) 75 MG tablet, Take 1 tablet by mouth daily  aspirin 81 MG chewable tablet, Take 1 tablet by mouth daily  Multiple Vitamins-Minerals (THERAPEUTIC MULTIVITAMIN-MINERALS) tablet, Take 1 tablet by mouth daily  [DISCONTINUED] traMADol (ULTRAM) 50 MG tablet, Take 50 mg by mouth 2 times daily as needed for Pain.   BASAGLAR KWIKPEN 100 UNIT/ML injection pen, Inject 75 Units into the skin daily   docusate sodium (COLACE) 100 MG capsule, Take 100 mg by mouth daily as needed for Constipation  benzonatate (TESSALON) 200 MG capsule, Take 200 mg by mouth every 8 hours as needed for Cough  BD AUTOSHIELD DUO 30G X 5 MM MISC,   guaiFENesin (ROBITUSSIN) 100 MG/5ML liquid, Take 10 mLs by mouth every 6 hours as needed   ASPERCREME W/LIDOCAINE EX, Apply topically every 4 hours as needed (Apply to back PRN for Pain)   cyclobenzaprine (FLEXERIL) 10 MG tablet, 10 mg daily Taking once daily for leg spasms and the Every 8 hours PRN TID for Pain. Hold if drowsy, confused, or sleepy. Incontinence Supplies (BEDSIDE DRAINAGE BAG) MISC, Dispense one, 2000 cc overnight urinary bag  insulin NPH (HUMULIN N;NOVOLIN N) 100 UNIT/ML injection vial, Inject 10 Units into the skin 2 times daily (Patient taking differently: Inject 22 Units into the skin nightly Inject 22 units qhs and 28 units daily)  tamsulosin (FLOMAX) 0.4 MG capsule, Take 1 capsule by mouth nightly  loperamide (IMODIUM) 2 MG capsule, Take 2 mg by mouth 4 times daily as needed for Diarrhea (for diarrhea related to weakness)  atorvastatin (LIPITOR) 20 MG tablet, Take 20 mg by mouth nightly  pantoprazole (PROTONIX) 40 MG tablet, Take 40 mg by mouth nightly   nitroGLYCERIN (NITROSTAT) 0.4 MG SL tablet, Place 1 tablet under the tongue every 5 minutes as needed for Chest pain  acetaminophen (TYLENOL) 325 MG tablet, Take 650 mg by mouth every 6 hours   isosorbide mononitrate (IMDUR) 30 MG CR tablet, Take 30 mg by mouth daily.       Current Medications:     Scheduled Meds:    insulin glargine  30 Units Subcutaneous Daily    [Held by provider] aspirin  81 mg Oral Daily    atorvastatin  20 mg Oral Nightly    [Held by provider] clopidogrel  75 mg Oral Daily    cyclobenzaprine  10 mg Oral Daily    finasteride  5 mg Oral Daily    gabapentin  300 mg Oral TID    isosorbide mononitrate  30 mg Oral Daily    metoprolol succinate  50 mg Oral Daily    tamsulosin  0.4 mg Oral Nightly    pantoprazole  40 mg Oral Nightly    cefTRIAXone (ROCEPHIN) IV  1 g Intravenous Q24H    sodium chloride flush  10 mL Intravenous 2 times per day    insulin lispro  0-12 Units Subcutaneous TID WC    insulin lispro  0-6 Units Subcutaneous Nightly    calcium replacement protocol   Other RX Placeholder    linezolid  600 mg Oral 2 times per day    [Held by provider] furosemide  40 mg Intravenous BID     Continuous Infusions:    sodium chloride 40 mL/hr at 20 0944    dextrose       PRN Meds:  HYDROmorphone **OR** HYDROmorphone, HYDROcodone-acetaminophen, HYDROcodone-acetaminophen, docusate sodium, traMADol, sodium chloride flush, acetaminophen **OR** acetaminophen, promethazine **OR** ondansetron, glucose, dextrose, glucagon (rDNA), dextrose    Input/Output:       I/O last 3 completed shifts: In: 1150 [P.O.:400; I.V.:750]  Out: 645 [Urine:625; Blood:20]. Patient Vitals for the past 96 hrs (Last 3 readings):   Weight   20 0329 291 lb (132 kg)   20 0412 290 lb 3.2 oz (131.6 kg)   20 2230 289 lb 9.6 oz (131.4 kg)       Vital Signs:   Temperature:  Temp: 97.4 °F (36.3 °C)  TMax:   Temp (24hrs), Av.9 °F (36.6 °C), Min:97.4 °F (36.3 °C), Max:98.2 °F (36.8 °C)    Respirations:  Resp: 16  Pulse:   Pulse: 87  BP:    BP: (!) 102/41  BP Range: Systolic (17IOB), PCL:862 , Min:61 , UAW:826       Diastolic (27UAQ), ACQ:77, Min:32, Max:71      Physical Examination:     General:  Awake and more alert today  HEENT: NC/AT/ MMM  Chest:               Diminished but clear  Cardiac:  S1 S2   Abdomen: Soft, non-tender,  Neuro:  No facial droop, No Asterixis  SKIN:  No rashes, good skin turgor. Extremities:  1+ edema right leg, + left BKA    Labs:       Recent Labs     20  2301 20  0524 20  0554   WBC 11.9* 14.9* 13.7*   RBC 3.61* 3.56* 2.70*   HGB 10.8* 10.7* 8.2*   HCT 35.7* 34.9* 26.8*   MCV 98.9* 98.0* 99.3*   MCH 29.9 30.1 30.4   MCHC 30.3* 30.7* 30.6*    261 222   MPV 9.7 9.8 10.4      BMP:   Recent Labs     20  2301 20  0524 20  0554    139 139   K 4.3 3.7 5.1    102 98   CO2 27 28 24   BUN 33* 32* 47*   CREATININE 1.9* 1.9* 2.5*   GLUCOSE 58* 80 263*   CALCIUM 7.9* 8.1* 7.6*      Phosphorus:   No results for input(s): PHOS in the last 72 hours.   Magnesium:    Recent Labs     20  0524 20  0554   MG 1.6 2.2 Albumin:    Recent Labs     06/27/20  2301   LABALBU 2.8*     BNP:      Lab Results   Component Value Date    BNP 2,278 11/07/2019     LYN:    No results found for: LYN  SPEP:  Lab Results   Component Value Date    PROT 6.5 06/27/2020    PROT 6.1 06/10/2016     UPEP:   No results found for: LABPE  C3:   No results found for: C3  C4:   No results found for: C4  MPO ANCA:   No results found for: MPO  PR3 ANCA:   No results found for: PR3  Anti-GBM:   No results found for: GBMABIGG  Hep BsAg:       No results found for: HEPBSAG  Hep C AB:        No results found for: HEPCAB    Urinalysis/Chemistries:      Lab Results   Component Value Date    NITRU NEGATIVE 06/28/2020    COLORU YELLOW 06/28/2020    PHUR 5.5 06/28/2020    LABCAST NONE SEEN 06/28/2020    LABCAST NONE SEEN 06/28/2020    WBCUA  06/28/2020    RBCUA 25-50 06/28/2020    YEAST NONE SEEN 06/28/2020    BACTERIA NONE SEEN 06/28/2020    SPECGRAV 1.011 06/28/2020    LEUKOCYTESUR LARGE 06/28/2020    UROBILINOGEN 0.2 06/28/2020    BILIRUBINUR NEGATIVE 06/28/2020    BLOODU MODERATE 06/28/2020    GLUCOSEU NEGATIVE 05/22/2017    KETUA NEGATIVE 06/28/2020     Urine Sodium:   No results found for: EDOUARD  Urine Potassium:  No results found for: KUR  Urine Chloride:  No results found for: CLUR  Urine Osmolarity:   Lab Results   Component Value Date    OSMOU 238 04/16/2015     Urine Protein:   No components found for: TOTALPROTEIN, URINE   Urine Creatinine:     Lab Results   Component Value Date    LABCREA 70.6 05/31/2019     Urine Eosinophils:  No components found for: UEOS        Impression and Plan:  1. EFREN on CKD IIIb :worse today likely renal hypoperfusion s/p surgery. Start on gentle IV fluids 0.9 @ 40 cc/hour, continue to hold lasix    2. Volume overload :lasix on hold due to worsened kidney fxn today. He has some edema but is on room air, no resp distress  3. HTN: stable  4. Right femur fx s/p fall with repair  5. UTI: awaiting culture  6.  Right hydrocele  7. Urinary retention with chronic engel  8. Altered mental status  9. DM  10. Systolic CHF        Please don't hesitate to call with any questions.   Electronically signed by Anthony Don DO on 6/30/2020 at 12:24 PM

## 2020-06-30 NOTE — CARE COORDINATION
6/30/20, 8:15 AM EDT    DISCHARGE ON GOING EVALUATION    [address] day: 3  Location: -09/009-A Reason for admit: AMS (altered mental status) [R41.82]   Procedure: 7/29 ORIF right femur with CM nailing. Treatment Plan of Care: Creat 2.5, Ca+ 7.6, WBC 13.7, Hgb 8.2. IV ATBs, pain control, DM management. Post-op care. PT/OT.    Barriers to Discharge: not medically ready   PCP: Dee Lugo MD  Readmission Risk Score: 22%  Patient Goals/Plan/Treatment Preferences: [place]e [place]

## 2020-07-01 LAB
ABO: NORMAL
ANION GAP SERPL CALCULATED.3IONS-SCNC: 11 MEQ/L (ref 8–16)
ANION GAP SERPL CALCULATED.3IONS-SCNC: 12 MEQ/L (ref 8–16)
ANTIBODY SCREEN: NORMAL
BASOPHILS # BLD: 0.3 %
BASOPHILS ABSOLUTE: 0.1 THOU/MM3 (ref 0–0.1)
BUN BLDV-MCNC: 58 MG/DL (ref 7–22)
BUN BLDV-MCNC: 60 MG/DL (ref 7–22)
CALCIUM IONIZED: 0.94 MMOL/L (ref 1.12–1.32)
CALCIUM SERPL-MCNC: 7.2 MG/DL (ref 8.5–10.5)
CALCIUM SERPL-MCNC: 7.2 MG/DL (ref 8.5–10.5)
CHLORIDE BLD-SCNC: 100 MEQ/L (ref 98–111)
CHLORIDE BLD-SCNC: 97 MEQ/L (ref 98–111)
CO2: 24 MEQ/L (ref 23–33)
CO2: 25 MEQ/L (ref 23–33)
CREAT SERPL-MCNC: 3.6 MG/DL (ref 0.4–1.2)
CREAT SERPL-MCNC: 3.7 MG/DL (ref 0.4–1.2)
EOSINOPHIL # BLD: 0.5 %
EOSINOPHILS ABSOLUTE: 0.1 THOU/MM3 (ref 0–0.4)
ERYTHROCYTE [DISTWIDTH] IN BLOOD BY AUTOMATED COUNT: 15.7 % (ref 11.5–14.5)
ERYTHROCYTE [DISTWIDTH] IN BLOOD BY AUTOMATED COUNT: 56.8 FL (ref 35–45)
GFR SERPL CREATININE-BSD FRML MDRD: 16 ML/MIN/1.73M2
GFR SERPL CREATININE-BSD FRML MDRD: 16 ML/MIN/1.73M2
GLUCOSE BLD-MCNC: 159 MG/DL (ref 70–108)
GLUCOSE BLD-MCNC: 159 MG/DL (ref 70–108)
GLUCOSE BLD-MCNC: 175 MG/DL (ref 70–108)
GLUCOSE BLD-MCNC: 188 MG/DL (ref 70–108)
GLUCOSE BLD-MCNC: 234 MG/DL (ref 70–108)
GLUCOSE BLD-MCNC: 248 MG/DL (ref 70–108)
HCT VFR BLD CALC: 27.7 % (ref 42–52)
HEMOGLOBIN: 8.2 GM/DL (ref 14–18)
IMMATURE GRANS (ABS): 0.17 THOU/MM3 (ref 0–0.07)
IMMATURE GRANULOCYTES: 1 %
IRON SATURATION: 33 % (ref 20–50)
IRON: 38 UG/DL (ref 65–195)
LYMPHOCYTES # BLD: 20 %
LYMPHOCYTES ABSOLUTE: 3.5 THOU/MM3 (ref 1–4.8)
MCH RBC QN AUTO: 29.5 PG (ref 26–33)
MCHC RBC AUTO-ENTMCNC: 29.6 GM/DL (ref 32.2–35.5)
MCV RBC AUTO: 99.6 FL (ref 80–94)
MONOCYTES # BLD: 6.8 %
MONOCYTES ABSOLUTE: 1.2 THOU/MM3 (ref 0.4–1.3)
NUCLEATED RED BLOOD CELLS: 0 /100 WBC
PLATELET # BLD: 184 THOU/MM3 (ref 130–400)
PMV BLD AUTO: 10.3 FL (ref 9.4–12.4)
POTASSIUM SERPL-SCNC: 4 MEQ/L (ref 3.5–5.2)
POTASSIUM SERPL-SCNC: 4.5 MEQ/L (ref 3.5–5.2)
RBC # BLD: 2.78 MILL/MM3 (ref 4.7–6.1)
RH FACTOR: NORMAL
SEG NEUTROPHILS: 71.4 %
SEGMENTED NEUTROPHILS ABSOLUTE COUNT: 12.6 THOU/MM3 (ref 1.8–7.7)
SODIUM BLD-SCNC: 133 MEQ/L (ref 135–145)
SODIUM BLD-SCNC: 136 MEQ/L (ref 135–145)
TOTAL IRON BINDING CAPACITY: 115 UG/DL (ref 171–450)
WBC # BLD: 17.7 THOU/MM3 (ref 4.8–10.8)

## 2020-07-01 PROCEDURE — 87086 URINE CULTURE/COLONY COUNT: CPT

## 2020-07-01 PROCEDURE — 2580000003 HC RX 258: Performed by: INTERNAL MEDICINE

## 2020-07-01 PROCEDURE — 6360000002 HC RX W HCPCS: Performed by: INTERNAL MEDICINE

## 2020-07-01 PROCEDURE — 36430 TRANSFUSION BLD/BLD COMPNT: CPT

## 2020-07-01 PROCEDURE — 99233 SBSQ HOSP IP/OBS HIGH 50: CPT | Performed by: INTERNAL MEDICINE

## 2020-07-01 PROCEDURE — 83935 ASSAY OF URINE OSMOLALITY: CPT

## 2020-07-01 PROCEDURE — 2500000003 HC RX 250 WO HCPCS: Performed by: INTERNAL MEDICINE

## 2020-07-01 PROCEDURE — 84300 ASSAY OF URINE SODIUM: CPT

## 2020-07-01 PROCEDURE — P9016 RBC LEUKOCYTES REDUCED: HCPCS

## 2020-07-01 PROCEDURE — 6370000000 HC RX 637 (ALT 250 FOR IP): Performed by: INTERNAL MEDICINE

## 2020-07-01 PROCEDURE — 80048 BASIC METABOLIC PNL TOTAL CA: CPT

## 2020-07-01 PROCEDURE — 1200000003 HC TELEMETRY R&B

## 2020-07-01 PROCEDURE — 2580000003 HC RX 258: Performed by: PHYSICIAN ASSISTANT

## 2020-07-01 PROCEDURE — 81001 URINALYSIS AUTO W/SCOPE: CPT

## 2020-07-01 PROCEDURE — 82330 ASSAY OF CALCIUM: CPT

## 2020-07-01 PROCEDURE — 94760 N-INVAS EAR/PLS OXIMETRY 1: CPT

## 2020-07-01 PROCEDURE — 85025 COMPLETE CBC W/AUTO DIFF WBC: CPT

## 2020-07-01 PROCEDURE — 83550 IRON BINDING TEST: CPT

## 2020-07-01 PROCEDURE — 36415 COLL VENOUS BLD VENIPUNCTURE: CPT

## 2020-07-01 PROCEDURE — 99232 SBSQ HOSP IP/OBS MODERATE 35: CPT | Performed by: INTERNAL MEDICINE

## 2020-07-01 PROCEDURE — 6370000000 HC RX 637 (ALT 250 FOR IP): Performed by: PHYSICIAN ASSISTANT

## 2020-07-01 PROCEDURE — 82948 REAGENT STRIP/BLOOD GLUCOSE: CPT

## 2020-07-01 PROCEDURE — 83540 ASSAY OF IRON: CPT

## 2020-07-01 RX ORDER — GABAPENTIN 300 MG/1
300 CAPSULE ORAL 2 TIMES DAILY
Status: DISCONTINUED | OUTPATIENT
Start: 2020-07-01 | End: 2020-07-11 | Stop reason: HOSPADM

## 2020-07-01 RX ORDER — MIDODRINE HYDROCHLORIDE 2.5 MG/1
2.5 TABLET ORAL
Status: DISCONTINUED | OUTPATIENT
Start: 2020-07-01 | End: 2020-07-06

## 2020-07-01 RX ORDER — FAMOTIDINE 20 MG/1
20 TABLET, FILM COATED ORAL DAILY
Status: DISCONTINUED | OUTPATIENT
Start: 2020-07-01 | End: 2020-07-01 | Stop reason: SDUPTHER

## 2020-07-01 RX ADMIN — ATORVASTATIN CALCIUM 20 MG: 20 TABLET, FILM COATED ORAL at 21:48

## 2020-07-01 RX ADMIN — GABAPENTIN 300 MG: 300 CAPSULE ORAL at 21:48

## 2020-07-01 RX ADMIN — GABAPENTIN 300 MG: 300 CAPSULE ORAL at 09:30

## 2020-07-01 RX ADMIN — INSULIN GLARGINE 30 UNITS: 100 INJECTION, SOLUTION SUBCUTANEOUS at 09:46

## 2020-07-01 RX ADMIN — Medication 10 ML: at 23:05

## 2020-07-01 RX ADMIN — SODIUM CHLORIDE, PRESERVATIVE FREE 10 ML: 5 INJECTION INTRAVENOUS at 21:49

## 2020-07-01 RX ADMIN — INSULIN LISPRO 2 UNITS: 100 INJECTION, SOLUTION INTRAVENOUS; SUBCUTANEOUS at 21:49

## 2020-07-01 RX ADMIN — TAMSULOSIN HYDROCHLORIDE 0.4 MG: 0.4 CAPSULE ORAL at 21:48

## 2020-07-01 RX ADMIN — FAMOTIDINE 20 MG: 10 INJECTION, SOLUTION INTRAVENOUS at 09:30

## 2020-07-01 RX ADMIN — LINEZOLID 600 MG: 600 TABLET, FILM COATED ORAL at 21:48

## 2020-07-01 RX ADMIN — CYCLOBENZAPRINE 10 MG: 10 TABLET, FILM COATED ORAL at 09:30

## 2020-07-01 RX ADMIN — CALCIUM GLUCONATE 2 G: 98 INJECTION, SOLUTION INTRAVENOUS at 23:04

## 2020-07-01 RX ADMIN — METOPROLOL SUCCINATE 50 MG: 50 TABLET, FILM COATED, EXTENDED RELEASE ORAL at 09:30

## 2020-07-01 RX ADMIN — FINASTERIDE 5 MG: 5 TABLET, FILM COATED ORAL at 09:30

## 2020-07-01 RX ADMIN — MIDODRINE HYDROCHLORIDE 2.5 MG: 2.5 TABLET ORAL at 09:30

## 2020-07-01 RX ADMIN — LINEZOLID 600 MG: 600 TABLET, FILM COATED ORAL at 09:31

## 2020-07-01 ASSESSMENT — PAIN SCALES - GENERAL
PAINLEVEL_OUTOF10: 0
PAINLEVEL_OUTOF10: 0

## 2020-07-01 NOTE — CARE COORDINATION
7/1/20, 8:47 AM EDT    DISCHARGE ON GOING [address]  day: 4  Location: 6-09/009-A Reason for admit: AMS (altered mental status) [R41.82]   Procedure: 06/29  -Open treatment right subtrochanteric femur fracture with a cephalo-medullary nail by Dr Grzegorz Goodwin of Care: POD #2, ortho following, fell here 06/29, N/V checks, dressing care, PT/OT, dressing care, pain control, diabetic mgmt. , nephrology/hospitalist, hgb 8.2, IVF bolus for low B/P 90'U systolic, creat 3.7 (was 2.5), nephrology follows, strict I/O, poor urine output, supp. O2 2L/min. Barriers to Discharge: not medically ready    PCP: Artem Simon MD  Readmission Risk Score: 23%    Patient Goals/Plan/Treatment Preferences: plans return [facility]; SW following.

## 2020-07-01 NOTE — PROGRESS NOTES
Hospitalist Progress Note    Patient:  Greg Strauss      Unit/Bed:6-09/009-A    YOB: 1934    MRN: [id number]       Acct: [de-identified]     PCP: Nessa Mccurdy MD    Date of Admission: 6/27/2020    Active Hospital Problems    Diagnosis Date Noted    Closed right hip fracture ([facility] [address] [S72.001A] 06/29/2020    AMS (altered mental status) [R41.82] 06/28/2020     Assessment and Plan:     1. Acute Metabolic Encephalopathy: Likely 2/2 UTI/acute urinary retention.   a. CT head showed: no acute findings. Ammonia wnl. Mixed Blood Gas unremarkable.   b. Immediate 1300mL UOP with change of engel  c. Drastically improving with IV ABx. AAOx4.   2. Acute Complicated UTI / BPH with indwelling engel, with acute retention  a. Frequent UTIs 3-4 since dec 2019  b. UA ([facility]): 2+ bacteria, WBC 21-30, moderate LE, neg Nitrites  c. Resume Finasteride, Tamsulosin. Started on Ceftriaxone 1g q24hr and PO linezolid (for hx VRE UTI). Change engel. d. Ux growing Enterococcus. Cont Linezolid. Stop Rocephin. 3. 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 3-4+ pitting edema BLE (new per daughter), Denies SOB.   b. CXR shows prominent interstitium with likely mild pulmonary edema. BNP 3k  c. Started IV Lasix 40 BID, BB. Daily weights. Unclear why no ACEi (consider adding with resolution of EFREN). d. Will hold diuresis given worsening kidney function per Nephrology. 4. EFREN on CKD stage: Likely multifactorial due to hypotension and anemia   a. Cr up to 3.7 today from 2.5. Likely worsening post surgery with hypotension and drop in hemoglobin. b. Start Midodrine 2.5, hold BP medications. Change Protonix to Pepcid. c. Nephrology following. Strict I/Os. Daily weights   5. Left scrotal Pain / Bilateral Hydrocele with pseudo septations  a. US scrotum shows prominent amount of hydrocele formation on the right, moderate hydrocele on the left.  Fluid on the right appears clear, fluid on the left appears to be markedly complex with multiple pseudo-septations through it.  b. Consult Urology.   6. 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 IDDM type 2, controlled: Continue home lantus at reduced dose, medium dose SSI. Goal BG of 140-180 while hospitalized. Hypoglycemia order set. Accucheck ACHS.   8. GERD: Resume PPI   9. Chronic back pain: Hold flexeril, PRN tramadol while altered. Resume as needed.   10. 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 Fall with Femur Fracture: had a unwitnessed fall on . Found to have a subsequent femur fracture. No bleeds. Cardiology cleared for surgery. S/P successful surgery on . PT/OT. 12. Post Op Related Acute Blood Loss Anemia: Hb down from 10.8 to 8.2 to 6.9. No melena, hematochezia. Cont to hold ASA and Plavix. S/P 1U blood . Monitor H&H        Chief Complaint: AMS     Hospital Course: 80year old white male nonsmoker with PMH of BPH (with chronic indwelling engel since ), CKD stage 3, HLD, hiatal hernia, CAD s/p MAI to LCx (), IDDM type 2 who presented to [facility] ED via EMS from [address] [place] [address] on 20 c/o altered mental status x 2 days with associated hallucinations, testicular swelling (seen in the ED on 20), [address] [facility], [address]. He has a hx of frequent UTIs and was started on bactrim for UTI on 20. Per wife/daughter, at baseline he is oriented with no confusion. 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 Family also notes that the LE edema was no present 2 days ago when he was taken to the ED for scrotal pain. On interview, patient is unable to tell me why he is at the hospital and intermittently responds to external stimuli, but is oriented to person, , year, president, month, place. He endorses scrotal pain with. Denies fever, abdominal pain, SOB, CP, lightheadedness.     In the ED, vitals show: HR 68, /75, 92% on RA and afebrile.  Labs show: wbc 12.4, hgb 10.8, MCV 94.2, plt 298, cr 1.9, BUN 34, BUN: Cr 18, Ca 8.2, LFTs unremarkable, lactic 1.1, UA shows 2+ bacteria, WBC 21-30, moderate LE, neg Nitrites, moderate hematuria, trace proteinuria. CXR shows no acute findings. Stable mild bibasilar atelectasis. US scrotom shows prominent amount of hydrocele formation on the right, moderate hydrocele on the left. Fluid on the right appears clear, fluid on the left appears to be markedly complex with multiple pseudo-septations through it. .        Subjective: overnight pt became hypotensive and found to have low hemoglobin. Improved with 500 ns bolus and 1U blood. This morning pt alert and oriented to self, place date and situation. Able to converse with me. Reports feeling fine, just weak and tired. No chest pain or sob. No nausea or vomiting. No diarrhea.         Medications:  Reviewed    Infusion Medications    dextrose       Scheduled Medications    midodrine  2.5 mg Oral TID WC    gabapentin  300 mg Oral BID    famotidine (PEPCID) injection  20 mg Intravenous Daily    insulin glargine  30 Units Subcutaneous Daily    sodium chloride  20 mL Intravenous Once    [Held by provider] aspirin  81 mg Oral Daily    atorvastatin  20 mg Oral Nightly    [Held by provider] clopidogrel  75 mg Oral Daily    cyclobenzaprine  10 mg Oral Daily    finasteride  5 mg Oral Daily    [Held by provider] isosorbide mononitrate  30 mg Oral Daily    metoprolol succinate  50 mg Oral Daily    tamsulosin  0.4 mg Oral Nightly    sodium chloride flush  10 mL Intravenous 2 times per day    insulin lispro  0-12 Units Subcutaneous TID     insulin lispro  0-6 Units Subcutaneous Nightly    calcium replacement protocol   Other RX Placeholder    linezolid  600 mg Oral 2 times per day    [Held by provider] furosemide  40 mg Intravenous BID     PRN Meds: HYDROmorphone **OR** HYDROmorphone, HYDROcodone-acetaminophen, HYDROcodone-acetaminophen, docusate sodium, traMADol, sodium chloride flush, acetaminophen **OR** acetaminophen, promethazine **OR** ondansetron, glucose, dextrose, glucagon (rDNA), dextrose      Intake/Output Summary (Last 24 hours) at 7/1/2020 1222  Last data filed at 7/1/2020 0857  Gross per 24 hour   Intake 3779.6 ml   Output 205 ml   Net 3574.6 ml       Diet:  DIET RENAL; Carb Control: 3 carb choices (45 gms)/meal    Exam:  BP (!) 121/56   Pulse 81   Temp 98 °F (36.7 °C) (Oral)   Resp 18   Wt 287 lb 9.6 oz (130.5 kg)   SpO2 98%   BMI 40.11 kg/m²     General:  White male well groomed, well-nourished, well-developed who appears stated age. Head: Normocephalic and atraumatic. EENT: No exophthalmos noted. No scleral or conjunctiva icterus, injection or pallor noted. No maxillary or frontal sinus tenderness to percussion. Neck: Supple. Trachea midline. No thyromegaly. Thorax/Lungs: Thorax is symmetrical with good expansion. Breath sounds CTA and equal b/l without rales, wheezing, or rhonchi.  No retractions or use of abdominal muscles. Cardiac: S1, S2, RRR without murmur, rub, or gallop. No JVD  Abdomen: Abdomen large but soft, nontender to palpation, without guarding or rigidity. Normoactive BS  Peripheral Vasculature: Extremities warm, dry with 3+ pitting edema to RLE, and 1+ pitting edema to LLE stump, no varicosities or stasis changes, DP pulses 2+ b/l. Brisk capillary refill. Skin:  Skin warm and moist. No lesions. Stasis changes to RLE shin with mild erythema and desquamation without warmth.   Psych:  Alert and oriented x4. Lymph:  No supraclavicular or cervical adenopathy. Neurologic: No focal deficits. No Seizures.     Labs:   Recent Labs     07/01/20  0600   WBC 17.7*   HGB 8.2*   HCT 27.7*        Recent Labs     07/01/20  0600      K 4.5      CO2 24   BUN 58*   CREATININE 3.7*   CALCIUM 7.2*     No results for input(s): AST, ALT, BILIDIR, BILITOT, ALKPHOS in the last 72 hours.   No results for input(s): INR in the last 72 hours. No results for input(s): Reena Denise in the last 72 hours. Urinalysis:      Lab Results   Component Value Date    NITRU NEGATIVE 06/28/2020    WBCUA  06/28/2020    BACTERIA NONE SEEN 06/28/2020    RBCUA 25-50 06/28/2020    BLOODU MODERATE 06/28/2020    SPECGRAV 1.011 06/28/2020    Kulwinder Deni Castillo 994 NEGATIVE 05/22/2017       Radiology:   All imaging reviewed     Diet: DIET RENAL; Carb Control: 3 carb choices (45 gms)/meal      Code Status: Full Code            Electronically signed by Yen Kennedy MD on 7/1/2020 at 12:22 PM

## 2020-07-01 NOTE — PROGRESS NOTES
(Apply to back PRN for Pain)   cyclobenzaprine (FLEXERIL) 10 MG tablet, 10 mg daily Taking once daily for leg spasms and the Every 8 hours PRN TID for Pain. Hold if drowsy, confused, or sleepy. Incontinence Supplies (BEDSIDE DRAINAGE BAG) MISC, Dispense one, 2000 cc overnight urinary bag  insulin NPH (HUMULIN N;NOVOLIN N) 100 UNIT/ML injection vial, Inject 10 Units into the skin 2 times daily (Patient taking differently: Inject 22 Units into the skin nightly Inject 22 units qhs and 28 units daily)  tamsulosin (FLOMAX) 0.4 MG capsule, Take 1 capsule by mouth nightly  loperamide (IMODIUM) 2 MG capsule, Take 2 mg by mouth 4 times daily as needed for Diarrhea (for diarrhea related to weakness)  atorvastatin (LIPITOR) 20 MG tablet, Take 20 mg by mouth nightly  pantoprazole (PROTONIX) 40 MG tablet, Take 40 mg by mouth nightly   nitroGLYCERIN (NITROSTAT) 0.4 MG SL tablet, Place 1 tablet under the tongue every 5 minutes as needed for Chest pain  acetaminophen (TYLENOL) 325 MG tablet, Take 650 mg by mouth every 6 hours   isosorbide mononitrate (IMDUR) 30 MG CR tablet, Take 30 mg by mouth daily.       Current Medications:     Scheduled Meds:    midodrine  2.5 mg Oral TID WC    gabapentin  300 mg Oral BID    famotidine  20 mg Oral Daily    insulin glargine  30 Units Subcutaneous Daily    sodium chloride  20 mL Intravenous Once    [Held by provider] aspirin  81 mg Oral Daily    atorvastatin  20 mg Oral Nightly    [Held by provider] clopidogrel  75 mg Oral Daily    cyclobenzaprine  10 mg Oral Daily    finasteride  5 mg Oral Daily    [Held by provider] isosorbide mononitrate  30 mg Oral Daily    metoprolol succinate  50 mg Oral Daily    tamsulosin  0.4 mg Oral Nightly    sodium chloride flush  10 mL Intravenous 2 times per day    insulin lispro  0-12 Units Subcutaneous TID     insulin lispro  0-6 Units Subcutaneous Nightly    calcium replacement protocol   Other RX Placeholder    linezolid  600 mg Oral 2 times per day   [facility] by provider] furosemide  40 mg Intravenous BID     Continuous Infusions:    dextrose       PRN Meds:  HYDROmorphone **OR** HYDROmorphone, HYDROcodone-acetaminophen, HYDROcodone-acetaminophen, docusate sodium, traMADol, sodium chloride flush, acetaminophen **OR** acetaminophen, promethazine **OR** ondansetron, glucose, dextrose, glucagon (rDNA), dextrose    Input/Output:       I/O last 3 completed shifts: In: 3529.6 [P.O.:1310; I.V.:1577.9; Blood:641.7]  Out: 205 [Urine:205]. Patient Vitals for the past 96 hrs (Last 3 readings):   Weight   20 0327 287 lb 9.6 oz (130.5 kg)   20 0329 291 lb (132 kg)   20 0412 290 lb 3.2 oz (131.6 kg)       Vital Signs:   Temperature:  Temp: 98.3 °F (36.8 °C)  TMax:   Temp (24hrs), Av.2 °F (36.8 °C), Min:97.4 °F (36.3 °C), Max:98.8 °F (37.1 °C)    Respirations:  Resp: 18  Pulse:   Pulse: 71  BP:    BP: (!) 121/56  BP Range: Systolic (30LKT), PDJ:584 , Min:64 , JND:123       Diastolic (58OIE), TVX:09, Min:35, Max:78      Physical Examination:     General:  Awake, alert  HEENT: NC/AT/ MMM  Chest:               Diminished but clear  Cardiac:  S1 S2   Abdomen: Soft, non-tender,  Neuro:  No facial droop, No Asterixis  SKIN:  No rashes, good skin turgor.   Extremities:  1+ edema right leg, + left BKA    Labs:       Recent Labs     20  0524 20  0554 20  2135 20  0600   WBC 14.9* 13.7*  --  17.7*   RBC 3.56* 2.70*  --  2.78*   HGB 10.7* 8.2* 6.9* 8.2*   HCT 34.9* 26.8* 20.4* 27.7*   MCV 98.0* 99.3*  --  99.6*   MCH 30.1 30.4  --  29.5   MCHC 30.7* 30.6*  --  29.6*    222  --  184   MPV 9.8 10.4  --  10.3      BMP:   Recent Labs     20  0524 20  0554 20  0600    139 136   K 3.7 5.1 4.5    98 100   CO2 28 24 24   BUN 32* 47* 58*   CREATININE 1.9* 2.5* 3.7*   GLUCOSE 80 263* 159*   CALCIUM 8.1* 7.6* 7.2*      Phosphorus:   No results for input(s): PHOS in the last 72 hours.  Magnesium:    Recent Labs     06/29/20  0524 06/30/20  0554   MG 1.6 2.2     Albumin:    No results for input(s): LABALBU in the last 72 hours. BNP:      Lab Results   Component Value Date    BNP 2,278 11/07/2019     LYN:    No results found for: LYN  SPEP:  Lab Results   Component Value Date    PROT 6.5 06/27/2020    PROT 6.1 06/10/2016     UPEP:   No results found for: LABPE  C3:   No results found for: C3  C4:   No results found for: C4  MPO ANCA:   No results found for: MPO  PR3 ANCA:   No results found for: PR3  Anti-GBM:   No results found for: GBMABIGG  Hep BsAg:       No results found for: HEPBSAG  Hep C AB:        No results found for: HEPCAB    Urinalysis/Chemistries:      Lab Results   Component Value Date    NITRU NEGATIVE 06/28/2020    COLORU YELLOW 06/28/2020    PHUR 5.5 06/28/2020    LABCAST NONE SEEN 06/28/2020    LABCAST NONE SEEN 06/28/2020    WBCUA  06/28/2020    RBCUA 25-50 06/28/2020    YEAST NONE SEEN 06/28/2020    BACTERIA NONE SEEN 06/28/2020    SPECGRAV 1.011 06/28/2020    LEUKOCYTESUR LARGE 06/28/2020    UROBILINOGEN 0.2 06/28/2020    BILIRUBINUR NEGATIVE 06/28/2020    BLOODU MODERATE 06/28/2020    GLUCOSEU NEGATIVE 05/22/2017    KETUA NEGATIVE 06/28/2020     Urine Sodium:   No results found for: EDOUARD  Urine Potassium:  No results found for: KUR  Urine Chloride:  No results found for: CLUR  Urine Osmolarity:   Lab Results   Component Value Date    OSMOU 238 04/16/2015     Urine Protein:   No components found for: TOTALPROTEIN, URINE   Urine Creatinine:     Lab Results   Component Value Date    LABCREA 70.6 05/31/2019     Urine Eosinophils:  No components found for: UEOS        Impression and Plan:  1. Oliguric EFREN on CKD IIIb, worsening due to ischemic ATN from hypotension. Will stop IV fluids. Add Midodrine low dose. Will stop Protonix, change to pepcid. If worsens may need RRT. 2. Volume overload :stop IV fluids due to oliguria. If worsens will need RRT.    3. HTN s/p hypotension. Add low dose Midodrine. Hold Imdur and add hold parameters   4. Acute blood loss anemia s/p PRBC transfusion  5. Right femur fx s/p fall with repair  6. UTI due to Enterococcus  7. Right hydrocele  8. Urinary retention with chronic engel  9. DM  10. Systolic CHF        Please don't hesitate to call with any questions.   Electronically signed by [id number] Karina Wolf DO on 7/1/2020 at 8:19 AM

## 2020-07-02 LAB
ANION GAP SERPL CALCULATED.3IONS-SCNC: 13 MEQ/L (ref 8–16)
BACTERIA: ABNORMAL
BILIRUBIN URINE: NEGATIVE
BLOOD, URINE: ABNORMAL
BUN BLDV-MCNC: 63 MG/DL (ref 7–22)
CALCIUM IONIZED: 0.93 MMOL/L (ref 1.12–1.32)
CALCIUM SERPL-MCNC: 7.1 MG/DL (ref 8.5–10.5)
CASTS: ABNORMAL /LPF
CASTS: ABNORMAL /LPF
CHARACTER, URINE: CLEAR
CHLORIDE BLD-SCNC: 100 MEQ/L (ref 98–111)
CO2: 23 MEQ/L (ref 23–33)
COLOR: YELLOW
CREAT SERPL-MCNC: 3.5 MG/DL (ref 0.4–1.2)
CRYSTALS: ABNORMAL
EPITHELIAL CELLS, UA: ABNORMAL /HPF
GFR SERPL CREATININE-BSD FRML MDRD: 17 ML/MIN/1.73M2
GLUCOSE BLD-MCNC: 214 MG/DL (ref 70–108)
GLUCOSE BLD-MCNC: 217 MG/DL (ref 70–108)
GLUCOSE BLD-MCNC: 226 MG/DL (ref 70–108)
GLUCOSE BLD-MCNC: 258 MG/DL (ref 70–108)
GLUCOSE BLD-MCNC: 261 MG/DL (ref 70–108)
GLUCOSE, URINE: NEGATIVE MG/DL
HCT VFR BLD CALC: 25.9 % (ref 42–52)
HCT VFR BLD CALC: 26.1 % (ref 42–52)
HEMOGLOBIN: 7.9 GM/DL (ref 14–18)
HEMOGLOBIN: 8 GM/DL (ref 14–18)
KETONES, URINE: NEGATIVE
LEUKOCYTE ESTERASE, URINE: ABNORMAL
MAGNESIUM: 2.2 MG/DL (ref 1.6–2.4)
MISCELLANEOUS LAB TEST RESULT: ABNORMAL
NITRITE, URINE: NEGATIVE
ORGANISM: ABNORMAL
ORGANISM: ABNORMAL
OSMOLALITY URINE: 325 MOSMOL/KG (ref 250–750)
PH UA: 5 (ref 5–9)
POTASSIUM SERPL-SCNC: 4.4 MEQ/L (ref 3.5–5.2)
PROTEIN UA: 30 MG/DL
RBC URINE: ABNORMAL /HPF
RENAL EPITHELIAL, UA: ABNORMAL
SODIUM BLD-SCNC: 136 MEQ/L (ref 135–145)
SODIUM URINE: < 20 MEQ/L
SPECIFIC GRAVITY UA: 1.01 (ref 1–1.03)
URINE CULTURE, ROUTINE: ABNORMAL
UROBILINOGEN, URINE: 0.2 EU/DL (ref 0–1)
WBC UA: ABNORMAL /HPF
YEAST: ABNORMAL

## 2020-07-02 PROCEDURE — 83735 ASSAY OF MAGNESIUM: CPT

## 2020-07-02 PROCEDURE — 6360000002 HC RX W HCPCS: Performed by: INTERNAL MEDICINE

## 2020-07-02 PROCEDURE — 82948 REAGENT STRIP/BLOOD GLUCOSE: CPT

## 2020-07-02 PROCEDURE — 6360000002 HC RX W HCPCS: Performed by: PHYSICIAN ASSISTANT

## 2020-07-02 PROCEDURE — 1200000003 HC TELEMETRY R&B

## 2020-07-02 PROCEDURE — 85018 HEMOGLOBIN: CPT

## 2020-07-02 PROCEDURE — 6370000000 HC RX 637 (ALT 250 FOR IP): Performed by: INTERNAL MEDICINE

## 2020-07-02 PROCEDURE — 97162 PT EVAL MOD COMPLEX 30 MIN: CPT

## 2020-07-02 PROCEDURE — 82330 ASSAY OF CALCIUM: CPT

## 2020-07-02 PROCEDURE — 97530 THERAPEUTIC ACTIVITIES: CPT

## 2020-07-02 PROCEDURE — 2580000003 HC RX 258: Performed by: PHYSICIAN ASSISTANT

## 2020-07-02 PROCEDURE — 99232 SBSQ HOSP IP/OBS MODERATE 35: CPT | Performed by: INTERNAL MEDICINE

## 2020-07-02 PROCEDURE — 6370000000 HC RX 637 (ALT 250 FOR IP): Performed by: PHYSICIAN ASSISTANT

## 2020-07-02 PROCEDURE — 36415 COLL VENOUS BLD VENIPUNCTURE: CPT

## 2020-07-02 PROCEDURE — 2500000003 HC RX 250 WO HCPCS: Performed by: INTERNAL MEDICINE

## 2020-07-02 PROCEDURE — 80048 BASIC METABOLIC PNL TOTAL CA: CPT

## 2020-07-02 PROCEDURE — 85014 HEMATOCRIT: CPT

## 2020-07-02 RX ORDER — CALCIUM CARBONATE 500(1250)
500 TABLET ORAL 2 TIMES DAILY
Status: DISCONTINUED | OUTPATIENT
Start: 2020-07-02 | End: 2020-07-10 | Stop reason: CLARIF

## 2020-07-02 RX ORDER — FUROSEMIDE 10 MG/ML
40 INJECTION INTRAMUSCULAR; INTRAVENOUS 2 TIMES DAILY
Status: DISCONTINUED | OUTPATIENT
Start: 2020-07-02 | End: 2020-07-05

## 2020-07-02 RX ORDER — BUMETANIDE 0.25 MG/ML
1 INJECTION, SOLUTION INTRAMUSCULAR; INTRAVENOUS 2 TIMES DAILY
Status: DISCONTINUED | OUTPATIENT
Start: 2020-07-02 | End: 2020-07-02 | Stop reason: RX

## 2020-07-02 RX ADMIN — INSULIN LISPRO 3 UNITS: 100 INJECTION, SOLUTION INTRAVENOUS; SUBCUTANEOUS at 20:17

## 2020-07-02 RX ADMIN — FINASTERIDE 5 MG: 5 TABLET, FILM COATED ORAL at 08:24

## 2020-07-02 RX ADMIN — CYCLOBENZAPRINE 10 MG: 10 TABLET, FILM COATED ORAL at 08:35

## 2020-07-02 RX ADMIN — INSULIN GLARGINE 30 UNITS: 100 INJECTION, SOLUTION SUBCUTANEOUS at 08:37

## 2020-07-02 RX ADMIN — METOPROLOL SUCCINATE 50 MG: 50 TABLET, FILM COATED, EXTENDED RELEASE ORAL at 08:24

## 2020-07-02 RX ADMIN — ATORVASTATIN CALCIUM 20 MG: 20 TABLET, FILM COATED ORAL at 20:21

## 2020-07-02 RX ADMIN — GABAPENTIN 300 MG: 300 CAPSULE ORAL at 20:21

## 2020-07-02 RX ADMIN — CALCIUM GLUCONATE 2 G: 98 INJECTION, SOLUTION INTRAVENOUS at 08:36

## 2020-07-02 RX ADMIN — FAMOTIDINE 20 MG: 10 INJECTION, SOLUTION INTRAVENOUS at 08:35

## 2020-07-02 RX ADMIN — FUROSEMIDE 40 MG: 10 INJECTION, SOLUTION INTRAMUSCULAR; INTRAVENOUS at 18:27

## 2020-07-02 RX ADMIN — CALCIUM 500 MG: 500 TABLET ORAL at 20:21

## 2020-07-02 RX ADMIN — LINEZOLID 600 MG: 600 TABLET, FILM COATED ORAL at 20:21

## 2020-07-02 RX ADMIN — TAMSULOSIN HYDROCHLORIDE 0.4 MG: 0.4 CAPSULE ORAL at 20:21

## 2020-07-02 RX ADMIN — SODIUM CHLORIDE, PRESERVATIVE FREE 10 ML: 5 INJECTION INTRAVENOUS at 20:23

## 2020-07-02 RX ADMIN — LINEZOLID 600 MG: 600 TABLET, FILM COATED ORAL at 08:24

## 2020-07-02 RX ADMIN — GABAPENTIN 300 MG: 300 CAPSULE ORAL at 08:24

## 2020-07-02 RX ADMIN — MIDODRINE HYDROCHLORIDE 2.5 MG: 2.5 TABLET ORAL at 08:24

## 2020-07-02 RX ADMIN — MIDODRINE HYDROCHLORIDE 2.5 MG: 2.5 TABLET ORAL at 18:28

## 2020-07-02 RX ADMIN — CALCIUM 500 MG: 500 TABLET ORAL at 08:36

## 2020-07-02 RX ADMIN — FUROSEMIDE 40 MG: 10 INJECTION, SOLUTION INTRAMUSCULAR; INTRAVENOUS at 08:35

## 2020-07-02 RX ADMIN — SODIUM CHLORIDE, PRESERVATIVE FREE 10 ML: 5 INJECTION INTRAVENOUS at 08:39

## 2020-07-02 ASSESSMENT — PAIN SCALES - GENERAL
PAINLEVEL_OUTOF10: 0
PAINLEVEL_OUTOF10: 0

## 2020-07-02 NOTE — PROGRESS NOTES
Status: Done Yesterday 2216 by BRITTNEY Abdullahi [facility] [place]   Reason: New Bag   Message: Ical - 0.94   Sent: Yesterday 2156 by Garima Razo RN

## 2020-07-02 NOTE — PROGRESS NOTES
Status: Unread by pharmacy   Reason: New Bag   Message: Ical-0.93   Sent: Today 0628 by Lit Lowery RN

## 2020-07-02 NOTE — PLAN OF CARE
Problem: Falls - Risk of:  Goal: Will remain free from falls  Description: Will remain free from falls  Outcome: Ongoing  Note: Call light within reach. Side rails up x2. Bed alarm on. Non skid slippers available. Problem: Falls - Risk of:  Goal: Absence of physical injury  Description: Absence of physical injury  Outcome: Ongoing  Note: There has not been any episodes of physical injury at this time in the shift. Problem: Skin Integrity:  Goal: Absence of new skin breakdown  Description: Absence of new skin breakdown  Outcome: Ongoing  Note: No evidence of any new skin breakdown at this time in the shift. Problem: Discharge Planning:  Goal: Participates in care planning  Description: Participates in care planning  Outcome: Ongoing  Note: Patient plans to be discharged to [address] [place] [address] [address] when medically stable. Problem: Bowel Function - Altered:  Goal: Bowel elimination is within specified parameters  Description: Bowel elimination is within specified parameters  Outcome: Ongoing  Note: No BM yet this shift. BS active X4. Problem: Cardiac Output - Decreased:  Goal: Hemodynamic stability will improve  Description: Hemodynamic stability will improve  Outcome: Ongoing  Note:   Vitals:    07/01/20 2128   BP: (!) 114/51   Pulse: 67   Resp: 18   Temp: 98.1 °F (36.7 °C)   SpO2: 100%          Problem: Mental Status - Impaired:  Goal: Mental status will be restored to baseline  Description: Mental status will be restored to baseline  Outcome: Ongoing  Note: Patient has returned back to baseline     Problem: Pain:  Goal: Control of acute pain  Description: Control of acute pain  Outcome: Ongoing  Note: Patient free from pain this shift. Pain rated on 0-10 pain rating scale. Will continue to reassess.        Problem: Serum Glucose Level - Abnormal:  Goal: Ability to maintain appropriate glucose levels will improve to within specified parameters  Description: Ability to maintain appropriate glucose levels will improve to within specified parameters  Outcome: Ongoing  [facility]   Care plan reviewed with patient. Patient verbalize understanding of the plan of care and contribute to goal setting.

## 2020-07-02 NOTE — PROGRESS NOTES
Laney Jean 60  INPATIENT OCCUPATIONAL THERAPY  [facility] RENAL TELEMETRY 6K  EVALUATION    Time:    Time In:   Time Out: 1104  Timed Code Treatment Minutes: 9 Minutes  Minutes: 19          Date: 2020  Patient Name: Lis Noel,   Gender: male      MRN: [id number]  : 1934  (80 y.o.)  Referring Practitioner: MAX Khanna CNP  Diagnosis: altered mental status  Additional Pertinent Hx: Per MD note on 2020:  80year old white male nonsmoker with PMH of BPH (with chronic indwelling engel since ), CKD stage 3, HLD, hiatal hernia, CAD s/p MAI to LCx (), IDDM type 2 who presented to [facility] ED via EMS from [facility] on 20 c/o altered mental status x 2 days with associated hallucinations, testicular swelling (seen in the ED on 20), dec UOP per nursing home. He has a hx of frequent UTIs and was started on bactrim for UTI on 20. Per wife/daughter, at baseline he is oriented with no confusion. 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   s/p Open treatment right subtrochanteric femur fracture with a cephalo-medullary nail [id number] on  2020    Restrictions/Precautions:  Restrictions/Precautions: Fall Risk  Position Activity Restriction  Other position/activity restrictions: s/p ORIF R femur on 2020, hx L AKA    Subjective  Chart Reviewed: Yes, Orders, Progress Notes, History and Physical  Patient assessed for rehabilitation services?: Yes  Family / Caregiver Present: No    Subjective: cooperative    Pain:  Pain Assessment  Patient Currently in Pain: Yes(no number given, c/o R knee pain with mobility)    Social/Functional History:  Type of Home: Facility([facility])  Home Layout: One level  Home Access: Level entry  Home Equipment: Wheelchair-electric(drew lift)           ADL Assistance: Needs assistance  Homemaking Responsibilities: No  Ambulation Assistance: (Reports drew lift use since AKA aprox 3 years ago)  Transfer Assistance: Needs assistance       Cognition/Orientation:     Overall Cognitive Status: Exceptions(short attention span d/t drowsiness)    ADL's:  LE Dressing: Dependent/Total(for donning & doffing R slipper sock)       Functional Mobility:  Bed mobility  Rolling to Left: Maximum assistance(x 2)  Rolling to Right: Dependent/Total(x 2)           Upper Extremity Assessment:   LUE AROM : Exceptions(shoulder flexion to 45 degrees, distal WFL)  RUE AROM : WFL    LUE Strength  Gross LUE Strength: (elbow 4-/5)  RUE Strength  Gross RUE Strength: (4-/5 grossly)    Sensation  Overall Sensation Status: Impaired(reports numbness in BUE)     **Initiated BUE AROM exercises x 10 reps for shoulder flexion, abduction, elbow flexion/extension. Pt tolerated fair with RBs between exercises. Activity Tolerance: Patient limited by fatigue       Assessment:  Assessment: Pt demo decreased endurance & strength for bed mobility & seated grooming tasks. Continued OT recommended to faciliate improved UB endurance & strength to A with bed mobility & ADLs. Performance deficits / Impairments: Decreased endurance, Decreased strength  Prognosis: Fair  REQUIRES OT FOLLOW UP: Yes  Decision Making: High Complexity  Safety Devices in place: Yes  Type of devices: All fall risk precautions in place, Call light within reach(sitter in room at end of session)    Treatment Initiated: Treatment and education initiated within context of evaluation. Evaluation time included review of current medical information, gathering information related to past medical, social and functional history, completion of standardized testing, formal and informal observation of tasks, assessment of data and development of plan of care and goals. Treatment time included skilled education and facilitation of tasks to increase safety and independence with ADL's for improved functional independence and quality of life.     Discharge Recommendations:  ECF with OT    Patient Education:  OT Education: OT Role, Plan of Care  Barriers to Learning: short attention span    Equipment Recommendations: Other: Pt has needed AE at [facility]. Plan:  Times per week: 1-2x  Current Treatment Recommendations: ROM, Strengthening, Endurance Training    Goals:  Patient goals : Pt did not state  Short term goals  Time Frame for Short term goals: until discharge  Short term goal 1: Complete BUE AROM/light strengthening exercises x 10 reps with min RBs to increase UB endurance for grooming tasks  Short term goal 2: Complete bed mobility with mod A x 1 for increase ease of hygiene   Long term goals  Time Frame for Long term goals : No LTG set d/t short ELOS  See long-term goal time frame for expected duration of plan of care. If no long-term goals established, a short length of stay is anticipated. Following session, patient left in safe position with all fall risk precautions in place.

## 2020-07-02 NOTE — CARE COORDINATION
7/2/20, 11:28 AM EDT    DISCHARGE ONGOING EVALUATION:     [address] day: 5  [facility] Reason for admit: AMS (altered mental status) [R41.82]   Treatment Plan of Care: Creat 3.5, Ical 0.93, Ca+ 7.1, Hgb 7.9. Weight is up 9#. IV bumex changed to bid per nephrology. PT/OT evals. Barriers to Discharge:  not medically ready.    PCP: Dee Lugo MD  Readmission Risk Score: 23%  Patient Goals/Plan/Treatment Preferences: return to [facility] 1803

## 2020-07-02 NOTE — PROGRESS NOTES
needed (Apply to back PRN for Pain)   cyclobenzaprine (FLEXERIL) 10 MG tablet, 10 mg daily Taking once daily for leg spasms and the Every 8 hours PRN TID for Pain. Hold if drowsy, confused, or sleepy. Incontinence Supplies (BEDSIDE DRAINAGE BAG) MISC, Dispense one, 2000 cc overnight urinary bag  insulin NPH (HUMULIN N;NOVOLIN N) 100 UNIT/ML injection vial, Inject 10 Units into the skin 2 times daily (Patient taking differently: Inject 22 Units into the skin nightly Inject 22 units qhs and 28 units daily)  tamsulosin (FLOMAX) 0.4 MG capsule, Take 1 capsule by mouth nightly  loperamide (IMODIUM) 2 MG capsule, Take 2 mg by mouth 4 times daily as needed for Diarrhea (for diarrhea related to weakness)  atorvastatin (LIPITOR) 20 MG tablet, Take 20 mg by mouth nightly  pantoprazole (PROTONIX) 40 MG tablet, Take 40 mg by mouth nightly   nitroGLYCERIN (NITROSTAT) 0.4 MG SL tablet, Place 1 tablet under the tongue every 5 minutes as needed for Chest pain  acetaminophen (TYLENOL) 325 MG tablet, Take 650 mg by mouth every 6 hours   isosorbide mononitrate (IMDUR) 30 MG CR tablet, Take 30 mg by mouth daily.       Current Medications:     Scheduled Meds:    calcium gluconate IVPB  2 g Intravenous Once    calcium elemental  500 mg Oral BID    midodrine  2.5 mg Oral TID WC    gabapentin  300 mg Oral BID    famotidine (PEPCID) injection  20 mg Intravenous Daily    insulin glargine  30 Units Subcutaneous Daily    sodium chloride  20 mL Intravenous Once    [Held by provider] aspirin  81 mg Oral Daily    atorvastatin  20 mg Oral Nightly    [Held by provider] clopidogrel  75 mg Oral Daily    cyclobenzaprine  10 mg Oral Daily    finasteride  5 mg Oral Daily    [Held by provider] isosorbide mononitrate  30 mg Oral Daily    metoprolol succinate  50 mg Oral Daily    tamsulosin  0.4 mg Oral Nightly    sodium chloride flush  10 mL Intravenous 2 times per day    insulin lispro  0-12 Units Subcutaneous TID     insulin lispro 0-6 Units Subcutaneous Nightly    calcium replacement protocol   Other RX Placeholder    linezolid  600 mg Oral 2 times per day    [Held by provider] furosemide  40 mg Intravenous BID     Continuous Infusions:    dextrose       PRN Meds:  HYDROmorphone **OR** HYDROmorphone, HYDROcodone-acetaminophen, HYDROcodone-acetaminophen, docusate sodium, traMADol, sodium chloride flush, acetaminophen **OR** acetaminophen, promethazine **OR** ondansetron, glucose, dextrose, glucagon (rDNA), dextrose    Input/Output:       I/O last 3 completed shifts: In: 2486.1 [P.O.:2380; I.V.:106.1]  Out: 700 [Urine:700]. Patient Vitals for the past 96 hrs (Last 3 readings):   Weight   20 0310 296 lb 5 oz (134.4 kg)   20 0327 287 lb 9.6 oz (130.5 kg)   20 0329 291 lb (132 kg)       Vital Signs:   Temperature:  Temp: 98.2 °F (36.8 °C)  TMax:   Temp (24hrs), Av.9 °F (36.6 °C), Min:97.5 °F (36.4 °C), Max:98.2 °F (36.8 °C)    Respirations:  Resp: 18  Pulse:   Pulse: 74  BP:    BP: 127/61  BP Range: Systolic (39QKT), JSX:494 , Min:114 , UMJ:336       Diastolic (71ZMF), OEQ:86, Min:51, Max:63      Physical Examination:     General:  Awake, alert  HEENT: NC/AT/ MMM  Chest:               Diminished no audible rales  Cardiac:  S1 S2   Abdomen: Soft, non-tender,  Neuro:  No facial droop, No Asterixis  SKIN:  No rashes, good skin turgor.   Extremities:  3+ edema right leg, + left BKA with high edema    Labs:       Recent Labs     20  0554 20  2135 20  0600 20  0035   WBC 13.7*  --  17.7*  --    RBC 2.70*  --  2.78*  --    HGB 8.2* 6.9* 8.2* 7.9*   HCT 26.8* 20.4* 27.7* 25.9*   MCV 99.3*  --  99.6*  --    MCH 30.4  --  29.5  --    MCHC 30.6*  --  29.6*  --      --  184  --    MPV 10.4  --  10.3  --       BMP:   Recent Labs     20  0600 20  1514 20  0604    133* 136   K 4.5 4.0 4.4    97* 100   CO2 24 25 23   BUN 58* 60* 63*   CREATININE 3.7* 3.6* 3.5*   GLUCOSE 159* 175* 214*   CALCIUM 7.2* 7.2* 7.1*      Phosphorus:   No results for input(s): PHOS in the last 72 hours. Magnesium:    Recent Labs     06/30/20  0554 07/02/20  0604   MG 2.2 2.2     Albumin:    No results for input(s): LABALBU in the last 72 hours. BNP:      Lab Results   Component Value Date    BNP 2,278 11/07/2019     LYN:    No results found for: LYN  SPEP:  Lab Results   Component Value Date    PROT 6.5 06/27/2020    PROT 6.1 06/10/2016     UPEP:   No results found for: LABPE  C3:   No results found for: C3  C4:   No results found for: C4  MPO ANCA:   No results found for: MPO  PR3 ANCA:   No results found for: PR3  Anti-GBM:   No results found for: GBMABIGG  Hep BsAg:       No results found for: HEPBSAG  Hep C AB:        No results found for: HEPCAB    Urinalysis/Chemistries:      Lab Results   Component Value Date    NITRU NEGATIVE 07/01/2020    COLORU YELLOW 07/01/2020    PHUR 5.0 07/01/2020    LABCAST 8-15 HYALINE 07/01/2020    LABCAST NONE SEEN 07/01/2020    WBCUA 15-25 07/01/2020    RBCUA 3-5 07/01/2020    YEAST NONE SEEN 07/01/2020    BACTERIA NONE SEEN 07/01/2020    SPECGRAV 1.015 07/01/2020    LEUKOCYTESUR TRACE 07/01/2020    UROBILINOGEN 0.2 07/01/2020    BILIRUBINUR NEGATIVE 07/01/2020    BLOODU MODERATE 07/01/2020    GLUCOSEU NEGATIVE 05/22/2017    KETUA NEGATIVE 07/01/2020     Urine Sodium:   No results found for: EDOUARD  Urine Potassium:  No results found for: KUR  Urine Chloride:  No results found for: CLUR  Urine Osmolarity:   Lab Results   Component Value Date    OSMOU 325 07/01/2020     Urine Protein:   No components found for: TOTALPROTEIN, URINE   Urine Creatinine:     Lab Results   Component Value Date    LABCREA 70.6 05/31/2019     Urine Eosinophils:  No components found for: UEOS        Impression and Plan:  1. EFREN on CKD IIIb, appears prerenal due to hypotension, creatinine a little improved today. Fluid status is worse. Will start Bumex 1 mg IV BID.   No indication to start RRT yet, will closely monitor  2. Volume overload :see above   3. HTN s/p hypotension. Improved, on low dose Midodrine  4. Acute blood loss anemia s/p PRBC transfusion, Hgb stable continue to monitor  5. Hypocalcemia receiving replacement. Add Oscal, check 25 Vit D  6. Right femur fx s/p fall with repair  7. UTI due to Enterococcus  8. Right hydrocele  9. Urinary retention with chronic engel  10. DM  11. Systolic CHF        Please don't hesitate to call with any questions.   Electronically signed by Quentin Haro DO on 7/2/2020 at 7:07 AM

## 2020-07-02 NOTE — PROGRESS NOTES
Hospitalist Progress Note    Patient:  Nohemi Esteves      Unit/Bed:6K-09/009-A    YOB: 1934    MRN: [id number]       Acct: [de-identified]     PCP: Matt Beltran MD    Date of Admission: 6/27/2020    Active Hospital Problems    Diagnosis Date Noted    Closed right hip fracture ([facility] [address] [S72.001A] 06/29/2020    AMS (altered mental status) [R41.82] 06/28/2020     Assessment and Plan:     1. Acute Metabolic Encephalopathy: Likely 2/2 UTI/acute urinary retention.   a. CT head showed: no acute findings. Ammonia wnl. Mixed Blood Gas unremarkable.   b. Immediate 1300mL UOP with change of engel  c. Drastically improving with IV ABx. AAOx4.   2. Acute Complicated UTI / BPH with indwelling engel, with acute retention  a. Frequent UTIs 3-4 since dec 2019  b. UA ([facility]): 2+ bacteria, WBC 21-30, moderate LE, neg Nitrites  c. Resume Finasteride, Tamsulosin. Started on Ceftriaxone 1g q24hr and PO linezolid (for hx VRE UTI). Change engel. d. Ux growing Enterococcus. Cont Linezolid. Stop Rocephin.   3. 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 3-4+ pitting edema BLE (new per daughter), Denies SOB. Weight is up.   b. CXR shows prominent interstitium with likely mild pulmonary edema. BNP 3k  c. Cont IV Lasix 40 BID, BB. Daily weights. Nephrology managing. 4. EFREN on CKD stage: Likely multifactorial due to hypotension and anemia   a. Cr down today from 3.7 to 3.5. Likely worsening post surgery with hypotension and drop in hemoglobin. b. Start Midodrine 2.5, hold BP medications. Change Protonix to Pepcid. c. Nephrology following. Strict I/Os. Daily weights   5. Left scrotal Pain / Bilateral Hydrocele with pseudo septations  a. US scrotum shows prominent amount of hydrocele formation on the right, moderate hydrocele on the left.  Fluid on the right appears clear, fluid on the left appears to be markedly complex with multiple pseudo-septations through it.  b. Consult Urology.   6. 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 IDDM type 2, controlled: Continue home lantus at reduced dose, medium dose SSI. Goal BG of 140-180 while hospitalized. Hypoglycemia order set. Accucheck ACHS.   8. GERD: Resume PPI   9. Chronic back pain: Hold flexeril, PRN tramadol while altered. Resume as needed.   10. 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 Fall with Femur Fracture: had a unwitnessed fall on . Found to have a subsequent femur fracture. No bleeds. Cardiology cleared for surgery.  S/P successful surgery on . PT/OT. 12. Post Op Related Acute Blood Loss Anemia: Hb down from 10.8 to 8.2 to 6.9. No melena, hematochezia. Cont to hold ASA and Plavix. S/P 1U blood . Monitor H&H          Chief Complaint: AMS     Hospital Course: Per HPI \"80 year old white male nonsmoker with PMH of BPH (with chronic indwelling engel since ), CKD stage 3, HLD, hiatal hernia, CAD s/p MAI to LCx (), IDDM type 2 who presented to [facility] ED via EMS from [facility] on 20 c/o altered mental status x 2 days with associated hallucinations, testicular swelling (seen in the ED on 20), [address] [employer], [address]. He has a hx of frequent UTIs and was started on bactrim for UTI on 20. Per wife/daughter, at baseline he is oriented with no confusion. 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 Family also notes that the LE edema was no present 2 days ago when he was taken to the ED for scrotal pain. On interview, patient is unable to tell me why he is at the hospital and intermittently responds to external stimuli, but is oriented to person, , year, president, month, place. He endorses scrotal pain with. Denies fever, abdominal pain, SOB, CP, lightheadedness.     In the ED, vitals show: HR 68, /75, 92% on RA and afebrile.  Labs show: wbc 12.4, hgb 10.8, MCV 94.2, plt 298, cr 1.9, BUN 34, BUN: Cr 18, Ca 8.2, LFTs unremarkable, lactic 1.1, UA shows 2+ bacteria, WBC 21-30, moderate LE, neg Nitrites, moderate hematuria, trace proteinuria. CXR shows no acute findings. Stable mild bibasilar atelectasis. US scrotom shows prominent amount of hydrocele formation on the right, moderate hydrocele on the left. Fluid on the right appears clear, fluid on the left appears to be markedly complex with multiple pseudo-septations through it. \"      Subjective: no acute events overnight. BP more stable, Hb stable. Mental status appears to be almost baseline. Pt able to hold up conversation. No fevers, chills, chest pain or sob. Tolerating PO intake.        Medications:  Reviewed    Infusion Medications    dextrose       Scheduled Medications    calcium elemental  500 mg Oral BID    furosemide  40 mg Intravenous BID    midodrine  2.5 mg Oral TID WC    gabapentin  300 mg Oral BID    famotidine (PEPCID) injection  20 mg Intravenous Daily    insulin glargine  30 Units Subcutaneous Daily    sodium chloride  20 mL Intravenous Once    [Held by provider] aspirin  81 mg Oral Daily    atorvastatin  20 mg Oral Nightly    [Held by provider] clopidogrel  75 mg Oral Daily    cyclobenzaprine  10 mg Oral Daily    finasteride  5 mg Oral Daily    [Held by provider] isosorbide mononitrate  30 mg Oral Daily    metoprolol succinate  50 mg Oral Daily    tamsulosin  0.4 mg Oral Nightly    sodium chloride flush  10 mL Intravenous 2 times per day    insulin lispro  0-12 Units Subcutaneous TID     insulin lispro  0-6 Units Subcutaneous Nightly    calcium replacement protocol   Other RX Placeholder    linezolid  600 mg Oral 2 times per day     PRN Meds: HYDROmorphone **OR** HYDROmorphone, HYDROcodone-acetaminophen, HYDROcodone-acetaminophen, docusate sodium, traMADol, sodium chloride flush, acetaminophen **OR** acetaminophen, promethazine **OR** ondansetron, glucose, dextrose, glucagon (rDNA), dextrose      Intake/Output Summary (Last 24 hours) at 7/2/2020 1054  Last data filed at 7/2/2020 0658  Gross per 24 hour   Intake 1886.07 ml   Output 700 ml   Net 1186.07 ml       Diet:  DIET RENAL; Carb Control: 3 carb choices (45 gms)/meal    Exam:  BP (!) 118/58   Pulse 73   Temp 98.1 °F (36.7 °C) (Oral)   Resp 16   Wt 296 lb 5 oz (134.4 kg)   SpO2 98%   BMI 41.33 kg/m²     General:  White male well groomed, well-nourished, well-developed who appears stated age. Head: Normocephalic and atraumatic. EENT: No exophthalmos noted. No scleral or conjunctiva icterus, injection or pallor noted. No maxillary or frontal sinus tenderness to percussion. Neck: Supple. Trachea midline. No thyromegaly. Thorax/Lungs: Thorax is symmetrical with good expansion. Breath sounds CTA and equal b/l without rales, wheezing, or rhonchi.  No retractions or use of abdominal muscles. Cardiac: S1, S2, RRR without murmur, rub, or gallop. No JVD  Abdomen: Abdomen large but soft, nontender to palpation, without guarding or rigidity. Normoactive BS  Peripheral Vasculature: Extremities warm, dry with 3+ pitting edema to RLE, and 1+ pitting edema to LLE stump, no varicosities or stasis changes, DP pulses 2+ b/l. Brisk capillary refill. Skin:  Skin warm and moist. No lesions. Stasis changes to RLE shin with mild erythema and desquamation without warmth.   Psych:  Alert and oriented x4. Lymph:  No supraclavicular or cervical adenopathy. Neurologic: No focal deficits. No Seizures.       Labs:   Recent Labs     07/01/20  0600 07/02/20  0035   WBC 17.7*  --    HGB 8.2* 7.9*   HCT 27.7* 25.9*     --      Recent Labs     07/02/20  0604      K 4.4      CO2 23   BUN 63*   CREATININE 3.5*   CALCIUM 7.1*     No results for input(s): AST, ALT, BILIDIR, BILITOT, ALKPHOS in the last 72 hours. No results for input(s): INR in the last 72 hours. No results for input(s): Gary Edmonds in the last 72 hours.     Urinalysis:      Lab Results   Component Value Date    NITRU NEGATIVE 07/01/2020    WBCUA 15-25 07/01/2020    BACTERIA NONE SEEN 07/01/2020    RBCUA 3-5 07/01/2020    BLOODU MODERATE 07/01/2020    SPECGRAV 1.015 07/01/2020    GLUCOSEU NEGATIVE 05/22/2017       Radiology:   All imaging reviewed     Diet: DIET RENAL; Carb Control: 3 carb choices (45 gms)/meal      Code Status: Full Code            Electronically signed by Sidney Arroyo MD on 7/2/2020 at 10:54 AM

## 2020-07-02 NOTE — PROGRESS NOTES
[facility]  INPATIENT PHYSICAL THERAPY  EVALUATION  STRZ RENAL TELEMETRY 6K - [id number]    Time In: 3068  Time Out: 1413  Timed Code Treatment Minutes: 15 Minutes  Minutes: 21          Date: 2020  Patient Name: Alan Nagel,  Gender:  male        MRN: [id number]  : 1934  (80 y.o.)      Referring Practitioner: DENY Holland CNP  Diagnosis: AMS  Additional Pertinent Hx: Per MD note on 2020:  80year old white male nonsmoker with PMH of BPH (with chronic indwelling engel since ), CKD stage 3, HLD, hiatal hernia, CAD s/p MAI to LCx (), IDDM type 2 who presented to [facility] ED via EMS from [facility] on 20 c/o altered mental status x 2 days with associated hallucinations, testicular swelling (seen in the ED on 20), dec UOP per nursing home. He has a hx of frequent UTIs and was started on bactrim for UTI on 20. Per wife/daughter, at baseline he is oriented with no confusion. 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 s/p Open treatment right subtrochanteric femur fracture with a cephalo-medullary nail [id number] on  2020     Restrictions/Precautions:  Restrictions/Precautions: Fall Risk, General Precautions  Position Activity Restriction  Other position/activity restrictions: s/p ORIF R femur on 2020, hx L AKA    Subjective:  Chart Reviewed: Yes  Patient assessed for rehabilitation services?: Yes  Subjective: Pt resting in bed with spouse present and both agree with therapy to see. General:  Overall Orientation Status: Within Functional Limits(Pt was conversant and aware of his situation.)    Vision: Impaired  Vision Exceptions: Wears glasses at all times    Hearing: Within functional limits         Pain: not reported. Pt did grumble with some mobility.     Social/Functional History:    Type of Home: Facility([facility])  Home Layout: One level  Home Access: Level entry  Home Equipment: session, patient left in safe position with all fall risk precautions in place. Iram Iraheta.  [employer], [place] [address]

## 2020-07-03 LAB
ANION GAP SERPL CALCULATED.3IONS-SCNC: 11 MEQ/L (ref 8–16)
BUN BLDV-MCNC: 59 MG/DL (ref 7–22)
CALCIUM IONIZED: 1.03 MMOL/L (ref 1.12–1.32)
CALCIUM SERPL-MCNC: 7.6 MG/DL (ref 8.5–10.5)
CHLORIDE BLD-SCNC: 100 MEQ/L (ref 98–111)
CO2: 25 MEQ/L (ref 23–33)
CREAT SERPL-MCNC: 3.7 MG/DL (ref 0.4–1.2)
GFR SERPL CREATININE-BSD FRML MDRD: 16 ML/MIN/1.73M2
GLUCOSE BLD-MCNC: 151 MG/DL (ref 70–108)
GLUCOSE BLD-MCNC: 157 MG/DL (ref 70–108)
GLUCOSE BLD-MCNC: 189 MG/DL (ref 70–108)
GLUCOSE BLD-MCNC: 210 MG/DL (ref 70–108)
GLUCOSE BLD-MCNC: 257 MG/DL (ref 70–108)
HCT VFR BLD CALC: 26.6 % (ref 42–52)
HEMOGLOBIN: 8.3 GM/DL (ref 14–18)
MAGNESIUM: 1.9 MG/DL (ref 1.6–2.4)
PHOSPHORUS: 4.8 MG/DL (ref 2.4–4.7)
POTASSIUM SERPL-SCNC: 3.8 MEQ/L (ref 3.5–5.2)
SODIUM BLD-SCNC: 136 MEQ/L (ref 135–145)
URINE CULTURE, ROUTINE: NORMAL
VITAMIN D 25-HYDROXY: 19 NG/ML (ref 30–100)

## 2020-07-03 PROCEDURE — 2700000000 HC OXYGEN THERAPY PER DAY

## 2020-07-03 PROCEDURE — 82948 REAGENT STRIP/BLOOD GLUCOSE: CPT

## 2020-07-03 PROCEDURE — 82306 VITAMIN D 25 HYDROXY: CPT

## 2020-07-03 PROCEDURE — 36415 COLL VENOUS BLD VENIPUNCTURE: CPT

## 2020-07-03 PROCEDURE — 94760 N-INVAS EAR/PLS OXIMETRY 1: CPT

## 2020-07-03 PROCEDURE — 85014 HEMATOCRIT: CPT

## 2020-07-03 PROCEDURE — 6370000000 HC RX 637 (ALT 250 FOR IP): Performed by: INTERNAL MEDICINE

## 2020-07-03 PROCEDURE — 82330 ASSAY OF CALCIUM: CPT

## 2020-07-03 PROCEDURE — 2500000003 HC RX 250 WO HCPCS: Performed by: INTERNAL MEDICINE

## 2020-07-03 PROCEDURE — 99232 SBSQ HOSP IP/OBS MODERATE 35: CPT | Performed by: INTERNAL MEDICINE

## 2020-07-03 PROCEDURE — 2580000003 HC RX 258: Performed by: PHYSICIAN ASSISTANT

## 2020-07-03 PROCEDURE — 1200000003 HC TELEMETRY R&B

## 2020-07-03 PROCEDURE — 6370000000 HC RX 637 (ALT 250 FOR IP): Performed by: PHYSICIAN ASSISTANT

## 2020-07-03 PROCEDURE — 80048 BASIC METABOLIC PNL TOTAL CA: CPT

## 2020-07-03 PROCEDURE — 84100 ASSAY OF PHOSPHORUS: CPT

## 2020-07-03 PROCEDURE — 85018 HEMOGLOBIN: CPT

## 2020-07-03 PROCEDURE — 83735 ASSAY OF MAGNESIUM: CPT

## 2020-07-03 PROCEDURE — 99233 SBSQ HOSP IP/OBS HIGH 50: CPT | Performed by: INTERNAL MEDICINE

## 2020-07-03 PROCEDURE — 6360000002 HC RX W HCPCS: Performed by: INTERNAL MEDICINE

## 2020-07-03 RX ADMIN — SODIUM CHLORIDE, PRESERVATIVE FREE 10 ML: 5 INJECTION INTRAVENOUS at 10:02

## 2020-07-03 RX ADMIN — INSULIN GLARGINE 30 UNITS: 100 INJECTION, SOLUTION SUBCUTANEOUS at 09:57

## 2020-07-03 RX ADMIN — INSULIN LISPRO 1 UNITS: 100 INJECTION, SOLUTION INTRAVENOUS; SUBCUTANEOUS at 20:30

## 2020-07-03 RX ADMIN — LINEZOLID 600 MG: 600 TABLET, FILM COATED ORAL at 20:25

## 2020-07-03 RX ADMIN — GABAPENTIN 300 MG: 300 CAPSULE ORAL at 20:25

## 2020-07-03 RX ADMIN — LINEZOLID 600 MG: 600 TABLET, FILM COATED ORAL at 09:52

## 2020-07-03 RX ADMIN — CALCIUM 500 MG: 500 TABLET ORAL at 09:53

## 2020-07-03 RX ADMIN — FAMOTIDINE 20 MG: 10 INJECTION, SOLUTION INTRAVENOUS at 09:52

## 2020-07-03 RX ADMIN — ACETAMINOPHEN 650 MG: 325 TABLET ORAL at 00:13

## 2020-07-03 RX ADMIN — FUROSEMIDE 40 MG: 10 INJECTION, SOLUTION INTRAMUSCULAR; INTRAVENOUS at 09:52

## 2020-07-03 RX ADMIN — METOPROLOL SUCCINATE 50 MG: 50 TABLET, FILM COATED, EXTENDED RELEASE ORAL at 09:53

## 2020-07-03 RX ADMIN — SODIUM CHLORIDE, PRESERVATIVE FREE 10 ML: 5 INJECTION INTRAVENOUS at 20:25

## 2020-07-03 RX ADMIN — FUROSEMIDE 40 MG: 10 INJECTION, SOLUTION INTRAMUSCULAR; INTRAVENOUS at 16:58

## 2020-07-03 RX ADMIN — ATORVASTATIN CALCIUM 20 MG: 20 TABLET, FILM COATED ORAL at 20:25

## 2020-07-03 RX ADMIN — CALCIUM 500 MG: 500 TABLET ORAL at 20:25

## 2020-07-03 RX ADMIN — FINASTERIDE 5 MG: 5 TABLET, FILM COATED ORAL at 09:53

## 2020-07-03 RX ADMIN — GABAPENTIN 300 MG: 300 CAPSULE ORAL at 09:53

## 2020-07-03 RX ADMIN — TAMSULOSIN HYDROCHLORIDE 0.4 MG: 0.4 CAPSULE ORAL at 20:25

## 2020-07-03 ASSESSMENT — PAIN SCALES - GENERAL
PAINLEVEL_OUTOF10: 3
PAINLEVEL_OUTOF10: 0
PAINLEVEL_OUTOF10: 0

## 2020-07-03 NOTE — PROGRESS NOTES
Renal Progress Note    Assessment and Plan:    1. Acute kidney injury mostly stable. 2.  Stage IIIb chronic kidney disease  3. Cardiomyopathy with low ejection fraction  4. Urinary retention  5. Urinary tract infection  6. Mental confusion slowly improving  7. Right hip fracture post fall status post surgery  8. Status post fall  9. Diabetes mellitus type 2  10. Deconditioned state  PLAN:   Labs reviewed  Serum creatinine slightly increased today  Medications reviewed  No changes for now  May however decrease furosemide from 40 mg IV twice a day to once a day tomorrow if the serum creatinine continues to rise. Labs in the morning  We will continue to follow    Patient Active Problem List:     [employer] dual pacer     Hyperlipidemia     Diabetes mellitus ([address]     Chronic renal failure, stage 3 (moderate) ([facility])     Chronic osteoarthritis     Benign prostatic hyperplasia with urinary obstruction     Other lymphedema     CAD (coronary artery disease)     Essential hypertension     Gastroesophageal reflux disease without esophagitis     EFREN (acute kidney injury) ([address]     Diabetic peripheral neuropathy ([facility])     Chronic back pain     Hypoxia     Leukocytosis     Pain, dental     CKD (chronic kidney disease) stage 3, GFR 30-59 ml/min ([facility])     CKD (chronic kidney disease), stage III ([facility])     Dental abscess     Abnormal stress test     Diarrhea     S/P PTCA: 5/12/2015: Stenting of proximal left circumflex artery with Xience 2.75X15 mm, post-dilated to 3.70 mm.      Hypotension     Renal insufficiency     Hematoma     Acute on chronic renal insufficiency     CAD S/P percutaneous coronary angioplasty     HTN (hypertension)     Hyperlipidemia with target LDL less than 70     Sepsis ([address]     Urinary retention     Scrotal edema     Cellulitis of scrotum     Altered mental status     Anasarca associated with disorder of kidney     Hyponatremia     ARF (acute renal failure) with tubular necrosis ([facility])     Pleural effusion     Urinary tract infection associated with indwelling urethral catheter ([facility])     Decreased hearing     Other acute kidney failure (HCC)     Hypokalemia     Acute on chronic combined systolic and diastolic CHF (congestive heart failure) ([facility])     AMS (altered mental status)     Closed right hip fracture ([facility])      Subjective:   Admit Date: 6/27/2020    Interval History:   Seen for acute kidney injury  Sleeping this morning but easily arousable  Denies any complaint  Updated by the staff  No issues  Blood pressure is reasonable.     Urine output is good        Medications:   Scheduled Meds:   calcium elemental  500 mg Oral BID    furosemide  40 mg Intravenous BID    midodrine  2.5 mg Oral TID WC    gabapentin  300 mg Oral BID    famotidine (PEPCID) injection  20 mg Intravenous Daily    insulin glargine  30 Units Subcutaneous Daily    sodium chloride  20 mL Intravenous Once    [Held by provider] aspirin  81 mg Oral Daily    atorvastatin  20 mg Oral Nightly    [Held by provider] clopidogrel  75 mg Oral Daily    cyclobenzaprine  10 mg Oral Daily    finasteride  5 mg Oral Daily    [Held by provider] isosorbide mononitrate  30 mg Oral Daily    metoprolol succinate  50 mg Oral Daily    tamsulosin  0.4 mg Oral Nightly    sodium chloride flush  10 mL Intravenous 2 times per day    insulin lispro  0-12 Units Subcutaneous TID     insulin lispro  0-6 Units Subcutaneous Nightly    calcium replacement protocol   Other RX Placeholder    linezolid  600 mg Oral 2 times per day     Continuous Infusions:   dextrose         CBC:   Recent Labs     07/01/20  0600 07/02/20  0035 07/02/20  1251 07/03/20  0051   WBC 17.7*  --   --   --    HGB 8.2* 7.9* 8.0* 8.3*     --   --   --      CMP:    Recent Labs     07/01/20  1514 07/02/20  0604 07/03/20  0554   * 136 136   K 4.0 4.4 3.8   CL 97* 100 100   CO2 25 23 25   BUN 60* 63* 59*   CREATININE 3.6* 3.5* 3.7*   GLUCOSE 175* 214* 157*   CALCIUM 7.2* **This report has been created using voice recognition software. It may contain minor errors which are inherent in voice recognition technology. **      Final report electronically signed by Dr. Roney Ruiz on 6/29/2020 2:43 AM      XR FEMUR RIGHT (MIN 2 VIEWS)   Final Result      Displaced, mildly angulated, and overriding fracture of the proximal right femoral shaft. Right knee and hip joint DJD. Severe muscular atrophy. **This report has been created using voice recognition software. It may contain minor errors which are inherent in voice recognition technology. **       Final report electronically signed by Dr. Roney Ruiz on 6/29/2020 2:36 AM      XR PELVIS (1-2 VIEWS)   Final Result    Displaced and overriding fracture of the right femoral shaft. See the accompanying right femur series report. Bilateral hip joint DJD. **This report has been created using voice recognition software. It may contain minor errors which are inherent in voice recognition technology. **      Final report electronically signed by Dr. Roney Ruiz on 6/29/2020 2:34 AM      XR SHOULDER LEFT (MIN 2 VIEWS)   Final Result    No acute fracture or dislocation. Severe glenohumeral joint DJD. **This report has been created using voice recognition software. It may contain minor errors which are inherent in voice recognition technology. **      Final report electronically signed by Dr. Roney Ruiz on 6/29/2020 2:41 AM      CT CERVICAL SPINE WO CONTRAST   Final Result    No fracture or subluxation. Multilevel degenerative disc disease and DJD. Predominant left-sided neural foraminal stenoses. Bilateral mastoid disease, more severe on the right. **This report has been created using voice recognition software. It may contain minor errors which are inherent in voice recognition technology. **      Final report electronically signed by Dr. Roney Ruiz on 6/29/2020 1:26 AM      CT HEAD WO CONTRAST moist   Neck:supple with no thyromegaly or carotid bruit  Cardiovascular:  S1, S2 without murmur or rubs   Respiratory:  Clear to ausculation without wheezes, rhonchi or rales  Abdomen: +bs, soft, no tenderness to palpation and no palpable mass. No abdominal bruit. Obese. Ext: 3+ bilateral LE edema. Left BKA noted. Musculoskeletal:Intact  Neuro:Alert and awake. Speech is slow. Answers questions appropriately. No obvious focal deficit.       Electronically signed by Jameson Amaro MD on 7/3/2020 at 1:51 PM

## 2020-07-03 NOTE — PROGRESS NOTES
Hospitalist Progress Note    Patient:  Smita Carrier      Unit/Bed:6K-09/009-A    YOB: 1934    MRN: [id number]       Acct: [de-identified]     PCP: Jose C Jeff MD    Date of Admission: 6/27/2020    Active Hospital Problems    Diagnosis Date Noted    Closed right hip fracture ([facility] [address] [S72.001A] 06/29/2020    AMS (altered mental status) [R41.82] 06/28/2020     Assessment and Plan:     1. Acute Metabolic Encephalopathy (ISVICHFS): WXSAOC 5/4 UTI/acute urinary retention.   a. CT head showed: no acute findings. Ammonia wnl. Mixed Blood Gas unremarkable.   b. Immediate 1300mL UOP with change of engel  c. Drastically improving with IV AVJ. YWIB0.   2. Acute Complicated UTI / BPH with indwelling engel, with acute retention  a. Frequent UTIs 3-4 since dec 2019  b. UA ([facility]): 2+ bacteria, WBC 21-30, moderate LE, neg Nitrites  c. Resume Finasteride, Tamsulosin. Started on Ceftriaxone 1g q24hr and PO linezolid (for hx VRE UTI). Change engel. d. Ux growing Enterococcus. Cont Linezolid (Day 6). Stop Rocephin.   3. 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 3-4+ pitting edema BLE (new per daughter), Denies SOB. Weight is up.   b. CXR shows prominent interstitium with likely mild pulmonary edema. BNP 3k  c. Cont IV Lasix 40 BID, BB. Daily weights. Nephrology managing. Good urine output. 4. EFREN on CKD stage: Likely multifactorial due to hypotension and anemia   a. Cr up today to 3.7  b. Start Midodrine 2.5, hold BP medications. Change Protonix to Pepcid.   c. Nephrology following. Strict I/Os. Daily weights   5. Left scrotal Pain / Bilateral Hydrocele with pseudo septations  a. US scrotum shows prominent amount of hydrocele formation on the right, moderate hydrocele on the left.  Fluid on the right appears clear, fluid on the left appears to be markedly complex with multiple pseudo-septations through it.  b. Consult Urology.   6. 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 CXR shows no acute findings. Stable mild bibasilar atelectasis. US scrotom shows prominent amount of hydrocele formation on the right, moderate hydrocele on the left. Fluid on the right appears clear, fluid on the left appears to be markedly complex with multiple pseudo-septations through it. \"       Subjective: no acute events overnight. Mental status appears to be at baseline, alert to self, place date and situation. Tolerating PO intake. No fevers, chills, chest pain, sob, or abdominal pain. No cough.        Medications:  Reviewed    Infusion Medications    dextrose       Scheduled Medications    calcium elemental  500 mg Oral BID    furosemide  40 mg Intravenous BID    midodrine  2.5 mg Oral TID WC    gabapentin  300 mg Oral BID    famotidine (PEPCID) injection  20 mg Intravenous Daily    insulin glargine  30 Units Subcutaneous Daily    sodium chloride  20 mL Intravenous Once    [Held by provider] aspirin  81 mg Oral Daily    atorvastatin  20 mg Oral Nightly    [Held by provider] clopidogrel  75 mg Oral Daily    cyclobenzaprine  10 mg Oral Daily    finasteride  5 mg Oral Daily    [Held by provider] isosorbide mononitrate  30 mg Oral Daily    metoprolol succinate  50 mg Oral Daily    tamsulosin  0.4 mg Oral Nightly    sodium chloride flush  10 mL Intravenous 2 times per day    insulin lispro  0-12 Units Subcutaneous TID     insulin lispro  0-6 Units Subcutaneous Nightly    calcium replacement protocol   Other RX Placeholder    linezolid  600 mg Oral 2 times per day     PRN Meds: HYDROmorphone **OR** HYDROmorphone, HYDROcodone-acetaminophen, HYDROcodone-acetaminophen, docusate sodium, traMADol, sodium chloride flush, acetaminophen **OR** acetaminophen, promethazine **OR** ondansetron, glucose, dextrose, glucagon (rDNA), dextrose      Intake/Output Summary (Last 24 hours) at 7/3/2020 1001  Last data filed at 7/3/2020 0301  Gross per 24 hour   Intake 924 ml   Output 1750 ml   Net -826 ml       Diet:  DIET RENAL; Carb Control: 3 carb choices (45 gms)/meal    Exam:  /63   Pulse 76   Temp 97.7 °F (36.5 °C) (Oral)   Resp 18   Wt 289 lb 11.2 oz (131.4 kg)   SpO2 96%   BMI 40.40 kg/m²     General:  White male well groomed, well-nourished, well-developed who appears stated age.   Head: Normocephalic and atraumatic. EENT: No exophthalmos noted. No scleral or conjunctiva icterus, injection or pallor noted. No maxillary or frontal sinus tenderness to percussion. Neck: Supple. Trachea midline. No thyromegaly. Thorax/Lungs: Thorax is symmetrical with good expansion. Breath sounds CTA and equal b/l without rales, wheezing, or rhonchi.  No retractions or use of abdominal muscles. Cardiac: S1, S2, RRR without murmur, rub, or gallop. No JVD  Abdomen: Abdomen large but soft, nontender to palpation, without guarding or rigidity. Normoactive BS  Peripheral Vasculature: Extremities warm, dry with 3+ pitting edema to RLE, and 1+ pitting edema to LLE stump, no varicosities or stasis changes, DP pulses 2+ b/l. Brisk capillary refill. Skin:  Skin warm and moist. No lesions. Stasis changes to RLE shin with mild erythema and desquamation without warmth.   Psych:  Alert and oriented x4.   Lymph:  No supraclavicular or cervical adenopathy. Neurologic: No focal deficits. No Seizures.       Labs:   Recent Labs     07/01/20  0600  07/03/20  0051   WBC 17.7*  --   --    HGB 8.2*   < > 8.3*   HCT 27.7*   < > 26.6*     --   --     < > = values in this interval not displayed. Recent Labs     07/03/20  0554      K 3.8      CO2 25   BUN 59*   CREATININE 3.7*   CALCIUM 7.6*   PHOS 4.8*     No results for input(s): AST, ALT, BILIDIR, BILITOT, ALKPHOS in the last 72 hours. No results for input(s): INR in the last 72 hours.   No results for input(s): Nelson Ortega in the last 72 hours. Urinalysis:      Lab Results   Component Value Date    NITRU NEGATIVE 07/01/2020    WBCUA 15-25 07/01/2020    BACTERIA NONE SEEN 07/01/2020    RBCUA 3-5 07/01/2020    BLOODU MODERATE 07/01/2020    SPECGRAV 1.015 07/01/2020    GLUCOSEU NEGATIVE 05/22/2017       Radiology:   All imaging reviewed     Diet: DIET RENAL; Carb Control: 3 carb choices (45 gms)/meal      Code Status: Full Code            Electronically signed by Chasity Frod MD on 7/3/2020 at 10:01 AM

## 2020-07-03 NOTE — PLAN OF CARE
Problem: Falls - Risk of:  Goal: Will remain free from falls  Description: Will remain free from falls  Outcome: Ongoing  Note: Call light within reach. Side rails up x2. Bed alarm on. Non skid slippers available. Problem: Falls - Risk of:  Goal: Absence of physical injury  Description: Absence of physical injury  Outcome: Ongoing  Note: There has not been any episodes of physical injury at this time in the shift. Problem: Skin Integrity:  Goal: Absence of new skin breakdown  Description: Absence of new skin breakdown  Outcome: Ongoing  Note: No evidence of any new skin breakdown at this time in the shift. Problem: Discharge Planning:  Goal: Participates in care planning  Description: Participates in care planning  Outcome: Ongoing  Note: Patient plans to be discharged to [address] [place] [address] [address] when medically stable. Problem: Bowel Function - Altered:  Goal: Bowel elimination is within specified parameters  Description: Bowel elimination is within specified parameters  Outcome: Ongoing  Note: Multiple BM throughout the day  BS active X4     Problem: Cardiac Output - Decreased:  Goal: Hemodynamic stability will improve  Description: Hemodynamic stability will improve  Outcome: Ongoing  Note:   Vitals:    07/02/20 1939   BP: 108/61   Pulse: 62   Resp: 18   Temp: 97.1 °F (36.2 °C)   SpO2: 100%          Problem: Mental Status - Impaired:  Goal: Mental status will be restored to baseline  Description: Mental status will be restored to baseline  Outcome: Ongoing  Note: Mental status has returned to baseline     Problem: Pain:  Goal: Control of acute pain  Description: Control of acute pain  Outcome: Ongoing  Note: Patient free from pain this shift. Pain rated on 0-10 pain rating scale. Will continue to reassess.        Problem: Serum Glucose Level - Abnormal:  Goal: Ability to maintain appropriate glucose levels will improve to within specified parameters  Description: Ability to maintain appropriate glucose levels will improve to within specified parameters  Outcome: Ongoing  Note: ACHS SS   Care plan reviewed with patient. Patient verbalize understanding of the plan of care and contribute to goal setting.

## 2020-07-04 LAB
ANION GAP SERPL CALCULATED.3IONS-SCNC: 10 MEQ/L (ref 8–16)
BASOPHILS # BLD: 0.3 %
BASOPHILS ABSOLUTE: 0 THOU/MM3 (ref 0–0.1)
BUN BLDV-MCNC: 56 MG/DL (ref 7–22)
CALCIUM SERPL-MCNC: 7.9 MG/DL (ref 8.5–10.5)
CHLORIDE BLD-SCNC: 99 MEQ/L (ref 98–111)
CO2: 28 MEQ/L (ref 23–33)
CREAT SERPL-MCNC: 3.5 MG/DL (ref 0.4–1.2)
EOSINOPHIL # BLD: 3.1 %
EOSINOPHILS ABSOLUTE: 0.3 THOU/MM3 (ref 0–0.4)
ERYTHROCYTE [DISTWIDTH] IN BLOOD BY AUTOMATED COUNT: 15.6 % (ref 11.5–14.5)
ERYTHROCYTE [DISTWIDTH] IN BLOOD BY AUTOMATED COUNT: 53.5 FL (ref 35–45)
GFR SERPL CREATININE-BSD FRML MDRD: 17 ML/MIN/1.73M2
GLUCOSE BLD-MCNC: 143 MG/DL (ref 70–108)
GLUCOSE BLD-MCNC: 149 MG/DL (ref 70–108)
GLUCOSE BLD-MCNC: 150 MG/DL (ref 70–108)
GLUCOSE BLD-MCNC: 151 MG/DL (ref 70–108)
GLUCOSE BLD-MCNC: 202 MG/DL (ref 70–108)
HCT VFR BLD CALC: 26.6 % (ref 42–52)
HCT VFR BLD CALC: 28.7 % (ref 42–52)
HEMOGLOBIN: 8 GM/DL (ref 14–18)
HEMOGLOBIN: 8.7 GM/DL (ref 14–18)
IMMATURE GRANS (ABS): 0.06 THOU/MM3 (ref 0–0.07)
IMMATURE GRANULOCYTES: 0.6 %
LYMPHOCYTES # BLD: 21.8 %
LYMPHOCYTES ABSOLUTE: 2.2 THOU/MM3 (ref 1–4.8)
MAGNESIUM: 1.8 MG/DL (ref 1.6–2.4)
MCH RBC QN AUTO: 29.7 PG (ref 26–33)
MCHC RBC AUTO-ENTMCNC: 30.3 GM/DL (ref 32.2–35.5)
MCV RBC AUTO: 98 FL (ref 80–94)
MONOCYTES # BLD: 6.7 %
MONOCYTES ABSOLUTE: 0.7 THOU/MM3 (ref 0.4–1.3)
NUCLEATED RED BLOOD CELLS: 0 /100 WBC
PLATELET # BLD: 220 THOU/MM3 (ref 130–400)
PMV BLD AUTO: 10.1 FL (ref 9.4–12.4)
POTASSIUM SERPL-SCNC: 3.5 MEQ/L (ref 3.5–5.2)
RBC # BLD: 2.93 MILL/MM3 (ref 4.7–6.1)
SEG NEUTROPHILS: 67.5 %
SEGMENTED NEUTROPHILS ABSOLUTE COUNT: 7 THOU/MM3 (ref 1.8–7.7)
SODIUM BLD-SCNC: 137 MEQ/L (ref 135–145)
WBC # BLD: 10.3 THOU/MM3 (ref 4.8–10.8)

## 2020-07-04 PROCEDURE — 82948 REAGENT STRIP/BLOOD GLUCOSE: CPT

## 2020-07-04 PROCEDURE — 2580000003 HC RX 258: Performed by: PHYSICIAN ASSISTANT

## 2020-07-04 PROCEDURE — 85018 HEMOGLOBIN: CPT

## 2020-07-04 PROCEDURE — 83735 ASSAY OF MAGNESIUM: CPT

## 2020-07-04 PROCEDURE — 6360000002 HC RX W HCPCS: Performed by: INTERNAL MEDICINE

## 2020-07-04 PROCEDURE — 2500000003 HC RX 250 WO HCPCS: Performed by: INTERNAL MEDICINE

## 2020-07-04 PROCEDURE — 94760 N-INVAS EAR/PLS OXIMETRY 1: CPT

## 2020-07-04 PROCEDURE — 1200000003 HC TELEMETRY R&B

## 2020-07-04 PROCEDURE — 6370000000 HC RX 637 (ALT 250 FOR IP): Performed by: INTERNAL MEDICINE

## 2020-07-04 PROCEDURE — 6370000000 HC RX 637 (ALT 250 FOR IP): Performed by: PHYSICIAN ASSISTANT

## 2020-07-04 PROCEDURE — 36415 COLL VENOUS BLD VENIPUNCTURE: CPT

## 2020-07-04 PROCEDURE — 99232 SBSQ HOSP IP/OBS MODERATE 35: CPT | Performed by: INTERNAL MEDICINE

## 2020-07-04 PROCEDURE — 6360000002 HC RX W HCPCS: Performed by: PHYSICIAN ASSISTANT

## 2020-07-04 PROCEDURE — 99233 SBSQ HOSP IP/OBS HIGH 50: CPT | Performed by: INTERNAL MEDICINE

## 2020-07-04 PROCEDURE — 85025 COMPLETE CBC W/AUTO DIFF WBC: CPT

## 2020-07-04 PROCEDURE — 80048 BASIC METABOLIC PNL TOTAL CA: CPT

## 2020-07-04 PROCEDURE — 85014 HEMATOCRIT: CPT

## 2020-07-04 RX ADMIN — CALCIUM 500 MG: 500 TABLET ORAL at 21:48

## 2020-07-04 RX ADMIN — LINEZOLID 600 MG: 600 TABLET, FILM COATED ORAL at 09:09

## 2020-07-04 RX ADMIN — LINEZOLID 600 MG: 600 TABLET, FILM COATED ORAL at 21:48

## 2020-07-04 RX ADMIN — FUROSEMIDE 40 MG: 10 INJECTION, SOLUTION INTRAMUSCULAR; INTRAVENOUS at 09:09

## 2020-07-04 RX ADMIN — FAMOTIDINE 20 MG: 10 INJECTION, SOLUTION INTRAVENOUS at 09:09

## 2020-07-04 RX ADMIN — FINASTERIDE 5 MG: 5 TABLET, FILM COATED ORAL at 09:09

## 2020-07-04 RX ADMIN — MIDODRINE HYDROCHLORIDE 2.5 MG: 2.5 TABLET ORAL at 11:42

## 2020-07-04 RX ADMIN — SODIUM CHLORIDE, PRESERVATIVE FREE 10 ML: 5 INJECTION INTRAVENOUS at 09:10

## 2020-07-04 RX ADMIN — CALCIUM 500 MG: 500 TABLET ORAL at 09:09

## 2020-07-04 RX ADMIN — TAMSULOSIN HYDROCHLORIDE 0.4 MG: 0.4 CAPSULE ORAL at 21:48

## 2020-07-04 RX ADMIN — SODIUM CHLORIDE, PRESERVATIVE FREE 10 ML: 5 INJECTION INTRAVENOUS at 21:48

## 2020-07-04 RX ADMIN — GABAPENTIN 300 MG: 300 CAPSULE ORAL at 21:48

## 2020-07-04 RX ADMIN — METOPROLOL SUCCINATE 50 MG: 50 TABLET, FILM COATED, EXTENDED RELEASE ORAL at 09:09

## 2020-07-04 RX ADMIN — Medication 10 ML: at 01:57

## 2020-07-04 RX ADMIN — GABAPENTIN 300 MG: 300 CAPSULE ORAL at 09:09

## 2020-07-04 RX ADMIN — CYCLOBENZAPRINE 10 MG: 10 TABLET, FILM COATED ORAL at 09:09

## 2020-07-04 RX ADMIN — MIDODRINE HYDROCHLORIDE 2.5 MG: 2.5 TABLET ORAL at 17:23

## 2020-07-04 RX ADMIN — FUROSEMIDE 40 MG: 10 INJECTION, SOLUTION INTRAMUSCULAR; INTRAVENOUS at 17:23

## 2020-07-04 RX ADMIN — ATORVASTATIN CALCIUM 20 MG: 20 TABLET, FILM COATED ORAL at 21:48

## 2020-07-04 RX ADMIN — INSULIN GLARGINE 30 UNITS: 100 INJECTION, SOLUTION SUBCUTANEOUS at 09:09

## 2020-07-04 RX ADMIN — ONDANSETRON 4 MG: 2 INJECTION INTRAMUSCULAR; INTRAVENOUS at 01:57

## 2020-07-04 RX ADMIN — MIDODRINE HYDROCHLORIDE 2.5 MG: 2.5 TABLET ORAL at 09:09

## 2020-07-04 RX ADMIN — HYDROCODONE BITARTRATE AND ACETAMINOPHEN 1 TABLET: 5; 325 TABLET ORAL at 11:41

## 2020-07-04 RX ADMIN — INSULIN LISPRO 1 UNITS: 100 INJECTION, SOLUTION INTRAVENOUS; SUBCUTANEOUS at 21:50

## 2020-07-04 ASSESSMENT — PAIN SCALES - GENERAL
PAINLEVEL_OUTOF10: 0
PAINLEVEL_OUTOF10: 0
PAINLEVEL_OUTOF10: 4
PAINLEVEL_OUTOF10: 0

## 2020-07-04 NOTE — PROGRESS NOTES
Renal Progress Note    Assessment and Plan:    1. Acute kidney injury with serum creatinine slightly improved today. 2.  Stage IIIb chronic kidney disease  3. Cardiomyopathy with low ejection fraction  4. Urinary retention  5. Urinary tract infection  6. Right hip fracture post fall status post surgery  7. Status post fall  8. Diabetes mellitus type 2    PLAN:   Labs reviewed  Serum creatinine slightly improved today  Medications reviewed  No changes for now  Continue furosemide  Labs in the morning  We will continue to follow    Patient Active Problem List:     [employer] dual pacer     Hyperlipidemia     Diabetes mellitus ([address]     Chronic renal failure, stage 3 (moderate) ([facility])     Chronic osteoarthritis     Benign prostatic hyperplasia with urinary obstruction     Other lymphedema     CAD (coronary artery disease)     Essential hypertension     Gastroesophageal reflux disease without esophagitis     EFREN (acute kidney injury) ([address]     Diabetic peripheral neuropathy ([facility])     Chronic back pain     Hypoxia     Leukocytosis     Pain, dental     CKD (chronic kidney disease) stage 3, GFR 30-59 ml/min ([facility])     CKD (chronic kidney disease), stage III ([facility])     Dental abscess     Abnormal stress test     Diarrhea     S/P PTCA: 5/12/2015: Stenting of proximal left circumflex artery with Xience 2.75X15 mm, post-dilated to 3.70 mm.      Hypotension     Renal insufficiency     Hematoma     Acute on chronic renal insufficiency     CAD S/P percutaneous coronary angioplasty     HTN (hypertension)     Hyperlipidemia with target LDL less than 70     Sepsis ([address]     Urinary retention     Scrotal edema     Cellulitis of scrotum     Altered mental status     Anasarca associated with disorder of kidney     Hyponatremia     ARF (acute renal failure) with tubular necrosis (HCC)     Pleural effusion     Urinary tract infection associated with indwelling urethral catheter (HCC)     Decreased hearing     Other acute kidney failure (HCC)     Hypokalemia     Acute on chronic combined systolic and diastolic CHF (congestive heart failure) (HCC)     AMS (altered mental status)     Closed right hip fracture ([facility])      Subjective:   Admit Date: 6/27/2020    Interval History:   Seen for acute kidney injury  Sleeping this morning and appears comfortable  Updated by the staff  No issues  Blood pressure is good  Urine output is good        Medications:   Scheduled Meds:   calcium elemental  500 mg Oral BID    furosemide  40 mg Intravenous BID    midodrine  2.5 mg Oral TID WC    gabapentin  300 mg Oral BID    famotidine (PEPCID) injection  20 mg Intravenous Daily    insulin glargine  30 Units Subcutaneous Daily    sodium chloride  20 mL Intravenous Once    aspirin  81 mg Oral Daily    atorvastatin  20 mg Oral Nightly    clopidogrel  75 mg Oral Daily    cyclobenzaprine  10 mg Oral Daily    finasteride  5 mg Oral Daily    [Held by provider] isosorbide mononitrate  30 mg Oral Daily    metoprolol succinate  50 mg Oral Daily    tamsulosin  0.4 mg Oral Nightly    sodium chloride flush  10 mL Intravenous 2 times per day    insulin lispro  0-12 Units Subcutaneous TID WC    insulin lispro  0-6 Units Subcutaneous Nightly    calcium replacement protocol   Other RX Placeholder    linezolid  600 mg Oral 2 times per day     Continuous Infusions:   dextrose         CBC:   Recent Labs     07/02/20  1251 07/03/20  0051 07/04/20  0616   WBC  --   --  10.3   HGB 8.0* 8.3* 8.7*   PLT  --   --  220     CMP:    Recent Labs     07/02/20  0604 07/03/20  0554 07/04/20  0616    136 137   K 4.4 3.8 3.5    100 99   CO2 23 25 28   BUN 63* 59* 56*   CREATININE 3.5* 3.7* 3.5*   GLUCOSE 214* 157* 143*   CALCIUM 7.1* 7.6* 7.9*   LABGLOM 17* 16* 17*     Troponin: No results for input(s): TROPONINI in the last 72 hours. BNP: No results for input(s): BNP in the last 72 hours. INR: No results for input(s): INR in the last 72 hours.   Lipids: No results for input(s): CHOL, LDLDIRECT, TRIG, HDL, AMYLASE, LIPASE in the last 72 hours. Liver: No results for input(s): AST, ALT, ALKPHOS, PROT, LABALBU, BILITOT in the last 72 hours. Invalid input(s): BILDIR  Iron:  No results for input(s): IRONS, FERRITIN in the last 72 hours. Invalid input(s): LABIRONS  FLUORO FOR SURGICAL PROCEDURES   Final Result      XR FEMUR RIGHT (MIN 2 VIEWS)   Final Result   ORIF as above. **This report has been created using voice recognition software. It may contain minor errors which are inherent in voice recognition technology. **      Final report electronically signed by Dr. Josemanuel Cunningham on 6/29/2020 4:04 PM      XR LUMBAR SPINE (2-3 VIEWS)   Final Result   No acute fracture. Multilevel subluxations as discussed above, likely degenerative in nature. Multilevel degenerative disc disease and DJD. Bilateral hip joint DJD. **This report has been created using voice recognition software. It may contain minor errors which are inherent in voice recognition technology. **      Final report electronically signed by Dr. Tammy Cadena on 6/29/2020 2:39 AM      XR THORACIC SPINE (3 VIEWS)   Final Result    No acute fracture or subluxation. Multilevel bridging osteophytes consistent with DISH. Mild cardiomegaly. **This report has been created using voice recognition software. It may contain minor errors which are inherent in voice recognition technology. **      Final report electronically signed by Dr. Tammy Cadena on 6/29/2020 2:32 AM      XR ANKLE RIGHT (2 VIEWS)   Final Result    No acute fracture or dislocation. Soft tissue swelling most pronounced over the medial malleolus. Calcaneal spurs. Mid foot DJD. **This report has been created using voice recognition software. It may contain minor errors which are inherent in voice recognition technology. **      Final report electronically signed by Dr. Tammy Cadena on 6/29/2020 2:43 AM      XR FEMUR RIGHT (MIN 2 VIEWS)   Final Result      Displaced, mildly angulated, and overriding fracture of the proximal right femoral shaft. Right knee and hip joint DJD. Severe muscular atrophy. **This report has been created using voice recognition software. It may contain minor errors which are inherent in voice recognition technology. **       Final report electronically signed by Dr. Morgan Churchill on 6/29/2020 2:36 AM      XR PELVIS (1-2 VIEWS)   Final Result    Displaced and overriding fracture of the right femoral shaft. See the accompanying right femur series report. Bilateral hip joint DJD. **This report has been created using voice recognition software. It may contain minor errors which are inherent in voice recognition technology. **      Final report electronically signed by Dr. Morgan Churchill on 6/29/2020 2:34 AM      XR SHOULDER LEFT (MIN 2 VIEWS)   Final Result    No acute fracture or dislocation. Severe glenohumeral joint DJD. **This report has been created using voice recognition software. It may contain minor errors which are inherent in voice recognition technology. **      Final report electronically signed by Dr. Morgan Churchill on 6/29/2020 2:41 AM      CT CERVICAL SPINE WO CONTRAST   Final Result    No fracture or subluxation. Multilevel degenerative disc disease and DJD. Predominant left-sided neural foraminal stenoses. Bilateral mastoid disease, more severe on the right. **This report has been created using voice recognition software. It may contain minor errors which are inherent in voice recognition technology. **      Final report electronically signed by Dr. Morgan Churchill on 6/29/2020 1:26 AM      CT HEAD WO CONTRAST   Final Result      No acute ischemic infarct, hemorrhage, or mass effect. Senescent changes as discussed above. Bilateral mastoid disease, more severe on the right. Mild sinus disease.       **This report has been created using voice recognition software. It may contain minor errors which are inherent in voice recognition technology. **      Final report electronically signed by Dr. Jose Guadalupe Messina on 6/29/2020 1:18 AM      CT INTERPRETATION OF OUTSIDE IMAGES   Final Result      No acute ischemic infarct, hemorrhage, or mass effect. Senescent changes as discussed above. Severe right and minimal left mastoid disease. Very mild sinus disease. **This report has been created using voice recognition software. It may contain minor errors which are inherent in voice recognition technology. **      Final report electronically signed by Dr. Jose Guadalupe Messina on 6/28/2020 7:06 AM      XR CHEST PORTABLE   Final Result      Interstitial prominence likely chronic however cannot exclude minimal interstitial pulmonary edema. No focal pneumonia. **This report has been created using voice recognition software. It may contain minor errors which are inherent in voice recognition technology. **      Final report electronically signed by Dr. Jose Guadalupe Messina on 6/28/2020 1:23 AM            Objective:   Vitals: BP (!) 129/58   Pulse 86   Temp 98.1 °F (36.7 °C) (Oral)   Resp 16   Wt 283 lb 6.4 oz (128.5 kg)   SpO2 94%   BMI 39.53 kg/m²    Wt Readings from Last 3 Encounters:   07/04/20 283 lb 6.4 oz (128.5 kg)   06/17/20 284 lb 4.8 oz (129 kg)   05/20/20 281 lb 9.6 oz (127.7 kg)      24HR INTAKE/OUTPUT:      Intake/Output Summary (Last 24 hours) at 7/4/2020 1519  Last data filed at 7/4/2020 1332  Gross per 24 hour   Intake 1088 ml   Output 1875 ml   Net -787 ml       Constitutional: Comfortably asleep  Skin:normal with no rash or lesions. HEENT:Pupils are reactive . Throat is clear . Oral mucosa is moist   Neck:supple with no thyromegaly or carotid bruit  Cardiovascular:  S1, S2 without murmur or rubs   Respiratory:  Clear to ausculation without wheezes, rhonchi or rales  Abdomen: Soft. Good bowel sounds. Ext: 2+ bilateral LE edema.   Left BKA noted.   Musculoskeletal:Intact  Neuro: Deferred      Electronically signed by Yash May MD on 7/4/2020 at 3:19 PM

## 2020-07-04 NOTE — PROGRESS NOTES
Hospitalist Progress Note    Patient:  Yudith Gonzalez      Unit/Bed:6K-09/009-A    YOB: 1934    MRN: [id number]       Acct: [de-identified]     PCP: Seven Cho MD    Date of Admission: 6/27/2020    Active Hospital Problems    Diagnosis Date Noted    Closed right hip fracture ([facility] [S72.001A] 06/29/2020    AMS (altered mental status) [R41.82] 06/28/2020     Assessment and Plan:     1. Acute Metabolic Encephalopathy (UYBOPGAH): JOWCGL 0/6 UTI/acute urinary retention.   a. CT head showed: no acute findings. Ammonia wnl. Mixed Blood Gas unremarkable.   b. Immediate 1300mL UOP with change of engel  c. Drastically improving with IV WLQ. [id number].   2. Acute Complicated UTI / BPH with indwelling engel, with acute retention  a. Frequent UTIs 3-4 since dec 2019  b. UA ([facility]): 2+ bacteria, WBC 21-30, moderate LE, neg Nitrites  c. Resume Finasteride, Tamsulosin. Started on Ceftriaxone 1g q24hr and PO linezolid (for hx VRE UTI). Change engel. d. Ux growing Enterococcus. Cont Linezolid (Day 7). Stop Rocephin.   3. 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 3-4+ pitting edema BLE (new per daughter), Denies SOB. Weight is up.   b. CXR shows prominent interstitium with likely mild pulmonary edema. BNP 3k  c. Cont IV Lasix 40 BID, BB. Daily weights. Nephrology managing. Good urine output. 4. EFREN on CKD stage: Likely multifactorial due to hypotension and anemia   a. Cr down today to 3.5 from 3.7  b. Start Midodrine 2.5, hold BP medications. Change Protonix to Pepcid.   c. Nephrology following. Strict I/Os. Daily weights   5. Left scrotal Pain / Bilateral Hydrocele with pseudo septations  a. US scrotum shows prominent amount of hydrocele formation on the right, moderate hydrocele on the left.  Fluid on the right appears clear, fluid on the left appears to be markedly complex with multiple pseudo-septations through it.  b. Consult Urology.   6. 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 IDDM type 2, controlled: Continue home lantus at reduced dose, medium dose SSI. Goal BG of 140-180 while hospitalized. Hypoglycemia order set. Accucheck ACHS.   8. GERD: stop PPI and start Pepcid. 9. Chronic back pain: Hold flexeril, PRN tramadol while altered. Resume as needed.   10. 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 Fall with Femur Fracture: had a unwitnessed fall on . Found to have a subsequent femur fracture. No bleeds. Cardiology cleared for surgery.  S/P successful surgery on . PT/OT. 12. Post Op Related Acute Blood Loss Anemia: Hb went down from 10.8 to 8.2 to 6.9. No melena, hematochezia.  S/P 1U blood . Hb has been stable. ASA and Plavix resumed on . Cont to monitor H&H. Chief Complaint: AMS     Hospital Course: Per HPI \"80 year old white male nonsmoker with PMH of BPH (with chronic indwelling negel since ), CKD stage 3, HLD, hiatal hernia, CAD s/p MAI to LCx (), IDDM type 2 who presented to [facility] ED via EMS from [facility] on 20 c/o altered mental status x 2 days with associated hallucinations, testicular swelling (seen in the ED on 20), [address] [place], [address] home. He has a hx of frequent UTIs and was started on bactrim for UTI on 20. Per wife/daughter, at baseline he is oriented with no confusion. 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 Family also notes that the LE edema was no present 2 days ago when he was taken to the ED for scrotal pain. On interview, patient is unable to tell me why he is at the hospital and intermittently responds to external stimuli, but is oriented to person, , year, president, month, place. He endorses scrotal pain with. Denies fever, abdominal pain, SOB, CP, lightheadedness.     In the ED, vitals show: HR 68, /75, 92% on RA and afebrile.  Labs show: wbc 12.4, hgb 10.8, MCV 94.2, plt 298, cr 1.9, BUN 34, BUN: Cr 18, Ca 8.2, LFTs unremarkable, lactic 1.1, UA shows 2+ bacteria, WBC 21-30, moderate LE, neg Nitrites, moderate hematuria, trace proteinuria. CXR shows no acute findings. Stable mild bibasilar atelectasis. US scrotom shows prominent amount of hydrocele formation on the right, moderate hydrocele on the left. Fluid on the right appears clear, fluid on the left appears to be markedly complex with multiple pseudo-septations through it. \"      Subjective: no acute events overnight. Mental status appears to be at baseline, alert to self, place date and situation. Tolerating PO intake. No fevers, chills, chest pain, sob, or abdominal pain.        Medications:  Reviewed    Infusion Medications    dextrose       Scheduled Medications    calcium elemental  500 mg Oral BID    furosemide  40 mg Intravenous BID    midodrine  2.5 mg Oral TID WC    gabapentin  300 mg Oral BID    famotidine (PEPCID) injection  20 mg Intravenous Daily    insulin glargine  30 Units Subcutaneous Daily    sodium chloride  20 mL Intravenous Once    aspirin  81 mg Oral Daily    atorvastatin  20 mg Oral Nightly    clopidogrel  75 mg Oral Daily    cyclobenzaprine  10 mg Oral Daily    finasteride  5 mg Oral Daily    [Held by provider] isosorbide mononitrate  30 mg Oral Daily    metoprolol succinate  50 mg Oral Daily    tamsulosin  0.4 mg Oral Nightly    sodium chloride flush  10 mL Intravenous 2 times per day    insulin lispro  0-12 Units Subcutaneous TID WC    insulin lispro  0-6 Units Subcutaneous Nightly    calcium replacement protocol   Other RX Placeholder    linezolid  600 mg Oral 2 times per day     PRN Meds: HYDROmorphone **OR** HYDROmorphone, HYDROcodone-acetaminophen, HYDROcodone-acetaminophen, docusate sodium, traMADol, sodium chloride flush, acetaminophen **OR** acetaminophen, promethazine **OR** ondansetron, glucose, dextrose, glucagon (rDNA), dextrose      Intake/Output Summary (Last 24 hours) at 7/4/2020 1052  Last data filed at 7/4/2020 0806  Gross per 24 hour   Intake 1078 ml   Output 2875 ml   Net -1797 ml       Diet:  DIET RENAL; Carb Control: 3 carb choices (45 gms)/meal    Exam:  BP (!) 110/53   Pulse 93   Temp 98.8 °F (37.1 °C) (Oral)   Resp 16   Wt 283 lb 6.4 oz (128.5 kg)   SpO2 99% Comment: above goal, oxygen weaned to 1l  BMI 39.53 kg/m²     General:  White male well groomed, well-nourished, well-developed who appears stated age.   Head: Normocephalic and atraumatic. EENT: No exophthalmos noted. No scleral or conjunctiva icterus, injection or pallor noted. No maxillary or frontal sinus tenderness to percussion. Neck: Supple. Trachea midline. No thyromegaly. Thorax/Lungs: Thorax is symmetrical with good expansion. Breath sounds CTA and equal b/l without rales, wheezing, or rhonchi.  No retractions or use of abdominal muscles. Cardiac: S1, S2, RRR without murmur, rub, or gallop. No JVD  Abdomen: Abdomen large but soft, nontender to palpation, without guarding or rigidity. Normoactive BS  Peripheral Vasculature: Extremities warm, dry with 3+ pitting edema to RLE, and 1+ pitting edema to LLE stump, no varicosities or stasis changes, DP pulses 2+ b/l. Brisk capillary refill. Skin:  Skin warm and moist. No lesions. Stasis changes to RLE shin with mild erythema and desquamation without warmth.   Psych:  Alert and oriented x4.   Lymph:  No supraclavicular or cervical adenopathy. Neurologic: No focal deficits. No Seizures.       Labs:   Recent Labs     07/04/20  0616   WBC 10.3   HGB 8.7*   HCT 28.7*        Recent Labs     07/03/20  0554 07/04/20  0616    137   K 3.8 3.5    99   CO2 25 28   BUN 59* 56*   CREATININE 3.7* 3.5*   CALCIUM 7.6* 7.9*   PHOS 4.8*  --      No results for input(s): AST, ALT, BILIDIR, BILITOT, ALKPHOS in the last 72 hours. No results for input(s): INR in the last 72 hours. No results for input(s): Jonathanold Hodgkins in the last 72 hours.     Urinalysis:      Lab Results   Component Value Date    NITRU NEGATIVE 07/01/2020    WBCUA 15-25 07/01/2020    BACTERIA NONE SEEN 07/01/2020    RBCUA 3-5 07/01/2020    BLOODU MODERATE 07/01/2020    SPECGRAV 1.015 07/01/2020    Kulwinderuzair Castillo 994 NEGATIVE 05/22/2017       Radiology:   All imaging reviewed     Diet: DIET RENAL; Carb Control: 3 carb choices (45 gms)/meal      Code Status: Full Code            Electronically signed by Zena Dos Santos MD on 7/4/2020 at 10:51 AM

## 2020-07-04 NOTE — PLAN OF CARE
specified parameters  Outcome: Ongoing  Note: Chem ACHS   Care plan reviewed with patient. Patient verbalize understanding of the plan of care and contribute to goal setting.

## 2020-07-04 NOTE — PLAN OF CARE
Problem: Falls - Risk of:  Goal: Will remain free from falls  Description: Will remain free from falls  Outcome: Ongoing  Call light within reach. Side rails up x2. Bed alarm on. Non skid slippers available. Problem: Falls - Risk of:  Goal: Absence of physical injury  Description: Absence of physical injury  Outcome: Ongoing  Call light within reach. Side rails up x2. Bed alarm on. Non skid slippers available. Problem: Skin Integrity:  Goal: Absence of new skin breakdown  Description: Absence of new skin breakdown  Outcome: Ongoing  Patient free from skin breakdown. Patient turns self and makes frequent positional changes. Will continue to monitor. Problem: Discharge Planning:  Goal: Participates in care planning  Description: Participates in care planning  Outcome: Ongoing  Patient plans to be discharged to [facility] when medically stable. Problem: Cardiac Output - Decreased:  Goal: Hemodynamic stability will improve  Description: Hemodynamic stability will improve  Outcome: Ongoing  VSS, will continue to monitor     Problem: Mental Status - Impaired:  Goal: Mental status will be restored to baseline  Description: Mental status will be restored to baseline  Outcome: Ongoing  Patient alert and oriented throughout the shift     Problem: Pain:  Goal: Control of acute pain  Description: Control of acute pain  Outcome: Ongoing  Patient complained of pain in his right leg throughout the shift. PRN norco given as requested. Will monitor. Problem: Serum Glucose Level - Abnormal:  Goal: Ability to maintain appropriate glucose levels will improve to within specified parameters  Description: Ability to maintain appropriate glucose levels will improve to within specified parameters  Outcome: Ongoing  Chem AC/HS, insulin given per sliding scale. Care plan reviewed with patient. Patient verbalize understanding of the plan of care and contribute to goal setting. Vaccine Information Statement(s) for was given today. This has been reviewed, questions answered, and verbal consent given by Patient for injection(s) and administration of Tetanus/Diphtheria (Td) .

## 2020-07-05 ENCOUNTER — APPOINTMENT (OUTPATIENT)
Dept: GENERAL RADIOLOGY | Age: 85
DRG: 981 | End: 2020-07-05
Attending: FAMILY MEDICINE
Payer: MEDICARE

## 2020-07-05 LAB
ANION GAP SERPL CALCULATED.3IONS-SCNC: 12 MEQ/L (ref 8–16)
BUN BLDV-MCNC: 59 MG/DL (ref 7–22)
CALCIUM SERPL-MCNC: 7.9 MG/DL (ref 8.5–10.5)
CHLORIDE BLD-SCNC: 98 MEQ/L (ref 98–111)
CO2: 27 MEQ/L (ref 23–33)
CREAT SERPL-MCNC: 3.8 MG/DL (ref 0.4–1.2)
GFR SERPL CREATININE-BSD FRML MDRD: 15 ML/MIN/1.73M2
GLUCOSE BLD-MCNC: 100 MG/DL (ref 70–108)
GLUCOSE BLD-MCNC: 103 MG/DL (ref 70–108)
GLUCOSE BLD-MCNC: 149 MG/DL (ref 70–108)
GLUCOSE BLD-MCNC: 189 MG/DL (ref 70–108)
GLUCOSE BLD-MCNC: 209 MG/DL (ref 70–108)
HCT VFR BLD CALC: 28.3 % (ref 42–52)
HEMOGLOBIN: 8.5 GM/DL (ref 14–18)
INR BLD: 1.08 (ref 0.85–1.13)
MAGNESIUM: 1.8 MG/DL (ref 1.6–2.4)
POTASSIUM SERPL-SCNC: 3.7 MEQ/L (ref 3.5–5.2)
SODIUM BLD-SCNC: 137 MEQ/L (ref 135–145)

## 2020-07-05 PROCEDURE — 82948 REAGENT STRIP/BLOOD GLUCOSE: CPT

## 2020-07-05 PROCEDURE — 6370000000 HC RX 637 (ALT 250 FOR IP): Performed by: PHYSICIAN ASSISTANT

## 2020-07-05 PROCEDURE — 6370000000 HC RX 637 (ALT 250 FOR IP): Performed by: INTERNAL MEDICINE

## 2020-07-05 PROCEDURE — 71045 X-RAY EXAM CHEST 1 VIEW: CPT

## 2020-07-05 PROCEDURE — 6360000002 HC RX W HCPCS: Performed by: INTERNAL MEDICINE

## 2020-07-05 PROCEDURE — 99232 SBSQ HOSP IP/OBS MODERATE 35: CPT | Performed by: INTERNAL MEDICINE

## 2020-07-05 PROCEDURE — 85610 PROTHROMBIN TIME: CPT

## 2020-07-05 PROCEDURE — 80048 BASIC METABOLIC PNL TOTAL CA: CPT

## 2020-07-05 PROCEDURE — 2580000003 HC RX 258: Performed by: PHYSICIAN ASSISTANT

## 2020-07-05 PROCEDURE — 85014 HEMATOCRIT: CPT

## 2020-07-05 PROCEDURE — 83735 ASSAY OF MAGNESIUM: CPT

## 2020-07-05 PROCEDURE — 1200000003 HC TELEMETRY R&B

## 2020-07-05 PROCEDURE — 2500000003 HC RX 250 WO HCPCS: Performed by: INTERNAL MEDICINE

## 2020-07-05 PROCEDURE — 36415 COLL VENOUS BLD VENIPUNCTURE: CPT

## 2020-07-05 PROCEDURE — 94760 N-INVAS EAR/PLS OXIMETRY 1: CPT

## 2020-07-05 PROCEDURE — 85018 HEMOGLOBIN: CPT

## 2020-07-05 RX ORDER — METOLAZONE 5 MG/1
5 TABLET ORAL ONCE
Status: COMPLETED | OUTPATIENT
Start: 2020-07-05 | End: 2020-07-05

## 2020-07-05 RX ORDER — FUROSEMIDE 10 MG/ML
60 INJECTION INTRAMUSCULAR; INTRAVENOUS 2 TIMES DAILY
Status: DISCONTINUED | OUTPATIENT
Start: 2020-07-05 | End: 2020-07-06

## 2020-07-05 RX ADMIN — ASPIRIN 81 MG 81 MG: 81 TABLET ORAL at 08:35

## 2020-07-05 RX ADMIN — FUROSEMIDE 60 MG: 10 INJECTION, SOLUTION INTRAMUSCULAR; INTRAVENOUS at 17:55

## 2020-07-05 RX ADMIN — INSULIN GLARGINE 30 UNITS: 100 INJECTION, SOLUTION SUBCUTANEOUS at 08:39

## 2020-07-05 RX ADMIN — SODIUM CHLORIDE, PRESERVATIVE FREE 10 ML: 5 INJECTION INTRAVENOUS at 08:23

## 2020-07-05 RX ADMIN — FINASTERIDE 5 MG: 5 TABLET, FILM COATED ORAL at 08:34

## 2020-07-05 RX ADMIN — CYCLOBENZAPRINE 10 MG: 10 TABLET, FILM COATED ORAL at 08:34

## 2020-07-05 RX ADMIN — METOLAZONE 5 MG: 5 TABLET ORAL at 16:40

## 2020-07-05 RX ADMIN — FUROSEMIDE 40 MG: 10 INJECTION, SOLUTION INTRAMUSCULAR; INTRAVENOUS at 08:35

## 2020-07-05 RX ADMIN — GABAPENTIN 300 MG: 300 CAPSULE ORAL at 08:34

## 2020-07-05 RX ADMIN — CALCIUM 500 MG: 500 TABLET ORAL at 21:36

## 2020-07-05 RX ADMIN — ATORVASTATIN CALCIUM 20 MG: 20 TABLET, FILM COATED ORAL at 21:36

## 2020-07-05 RX ADMIN — GABAPENTIN 300 MG: 300 CAPSULE ORAL at 21:36

## 2020-07-05 RX ADMIN — TAMSULOSIN HYDROCHLORIDE 0.4 MG: 0.4 CAPSULE ORAL at 21:36

## 2020-07-05 RX ADMIN — CLOPIDOGREL BISULFATE 75 MG: 75 TABLET ORAL at 08:35

## 2020-07-05 RX ADMIN — CALCIUM 500 MG: 500 TABLET ORAL at 08:34

## 2020-07-05 RX ADMIN — LINEZOLID 600 MG: 600 TABLET, FILM COATED ORAL at 08:34

## 2020-07-05 RX ADMIN — METOPROLOL SUCCINATE 50 MG: 50 TABLET, FILM COATED, EXTENDED RELEASE ORAL at 08:34

## 2020-07-05 RX ADMIN — FAMOTIDINE 20 MG: 10 INJECTION, SOLUTION INTRAVENOUS at 08:35

## 2020-07-05 RX ADMIN — INSULIN LISPRO 2 UNITS: 100 INJECTION, SOLUTION INTRAVENOUS; SUBCUTANEOUS at 21:36

## 2020-07-05 ASSESSMENT — PAIN SCALES - GENERAL
PAINLEVEL_OUTOF10: 0

## 2020-07-05 ASSESSMENT — PAIN SCALES - WONG BAKER: WONGBAKER_NUMERICALRESPONSE: 0

## 2020-07-05 NOTE — PLAN OF CARE
Problem: Falls - Risk of:  Goal: Will remain free from falls  Description: Will remain free from falls  7/5/2020 1110 by Iwona Kolb RN  Outcome: Ongoing  Note: No falls noted this shift. Continue falling star program. Bed alarm on, bed in low position. Call light and personal belongings in reach. Telesitter in place        Problem: Skin Integrity:  Goal: Will show no infection signs and symptoms  Description: Will show no infection signs and symptoms  7/5/2020 1110 by Iwona Kolb RN  Outcome: Ongoing  Note: No new signs or symptoms of skin breakdown noted this shift, encouraging patient to turn and reposition self in bed q2h  Incision site intact new dressing applied minimal serous drainage present      Problem: Discharge Planning:  Goal: Participates in care planning  Description: Participates in care planning  7/5/2020 1110 by Iwona Kolb RN  Outcome: Ongoing  Note: Patient plans to to return to [facility] when medically stable      Problem: Bowel Function - Altered:  Goal: Bowel elimination is within specified parameters  Description: Bowel elimination is within specified parameters  Outcome: Ongoing  Note: Patient had x1 bowel movement so far today      Problem: Cardiac Output - Decreased:  Goal: Hemodynamic stability will improve  Description: Hemodynamic stability will improve  7/5/2020 1110 by Iwona Kolb RN  Outcome: Ongoing  Note: VS taken every 4 hours and as needed      Problem: Mental Status - Impaired:  Goal: Mental status will be restored to baseline  Description: Mental status will be restored to baseline  7/5/2020 1110 by Iwona Kolb RN  Outcome: Ongoing  Note: Patient alert and oriented x4 able to follow commands and answer questions appropriately      Problem: Pain:  Goal: Control of acute pain  Description: Control of acute pain  7/5/2020 1110 by Iwona Kolb RN  Outcome: Ongoing  Note: Patient free from pain this shift.  Pain rated on 0-10 pain rating scale. Will continue to reassess. Problem: Serum Glucose Level - Abnormal:  Goal: Ability to maintain appropriate glucose levels will improve to within specified parameters  Description: Ability to maintain appropriate glucose levels will improve to within specified parameters  7/5/2020 1110 by Stephenie Youngblood RN  Outcome: Ongoing  Note:    Ref. Range 7/5/2020 06:26   POC Glucose Latest Ref Range: 70 - 108 mg/dl 103        Problem: Cardiac Output - Decreased:  Intervention: Leg elevation  Note: R leg elevated on pillow and L stump elevated as well. Patient receiving IV lasix for +3 edema      Problem: Physical Regulation:  Intervention: Implement contact precautions  Note: Patient in contact precautions for history VRE    Care plan reviewed with patient and family. Patient and family verbalize understanding of the plan of care and contribute to goal setting.

## 2020-07-05 NOTE — PROGRESS NOTES
Hospitalist Progress Note    Patient:  Kimberly Eckert      Unit/Bed:6K-09/009-A    YOB: 1934    MRN: [id number]       Acct: [de-identified]     PCP: Sukh Wilson MD    Date of Admission: 6/27/2020    Active Hospital Problems    Diagnosis Date Noted    Closed right hip fracture ([address] [S72.001A] 06/29/2020    AMS (altered mental status) [R41.82] 06/28/2020     Assessment/Plan:    1. Acute Metabolic Encephalopathy (resolved): Likely 2/2 UTI/acute urinary retention. CT head showed: no acute findings. Ammonia wnl. Mixed Blood Gas unremarkable. Immediate 1300mL UOP with change of engel. Drastically improving with IV ABx and engel exchange.  [id number].   2. Acute Complicated UTI / BPH with indwelling engel, with acute retention: Frequent UTIs 3-4 since dec 2019. UA ([facility]): 2+ bacteria, WBC 21-30, moderate LE, neg Nitrites. Ux growing Enterococcus. Completed 8 days of Linezolid. Resume Finasteride, Tamsulosin. 3. 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 Weight is up. CXR shows prominent interstitium with likely mild pulmonary edema. BNP 3k. 2D Echo 6/2020 EF 45% with Grade 1 DD. Cont IV Lasix 40 BID. Strict I/Os. Daily weights. Low Na diet. 4. Acute Hypoxic Resp. Failure: pt requiring 1-2L NC. Pt drops mainly when sleeping to 87% on RA. Likely AKIRA. 5. EFREN on CKD stage: Likely multifactorial due to hypotension and anemia. Start Midodrine 2.5, hold BP medications. Nephrology following. Strict I/Os. Daily weights     --7/5: Cr up from 3.5 to 3.8. Appreciate Nephrology assistance. Pt still diffusely anasarcic. 6. Fall with Femur Fracture: had a unwitnessed fall on 6/29. Found to have a subsequent femur fracture. No bleeds. Cardiology cleared for surgery.  S/P successful surgery on 6/29. PT/OT. 7. Post Op Related Acute Blood Loss Anemia: Hb went down from 10.8 to 8.2 to 6.9. No melena, hematochezia.  S/P 1U blood 7/1. Hb has been stable.  ASA and appears to be markedly complex with multiple pseudo-septations through it. \"      Subjective: no acute events overnight. Mental status appears to be at baseline, alert to self, place date and situation. Tolerating PO intake. No fevers, chills, chest pain, sob, or abdominal pain.         Medications:  Reviewed    Infusion Medications    dextrose       Scheduled Medications    calcium elemental  500 mg Oral BID    furosemide  40 mg Intravenous BID    midodrine  2.5 mg Oral TID WC    gabapentin  300 mg Oral BID    famotidine (PEPCID) injection  20 mg Intravenous Daily    insulin glargine  30 Units Subcutaneous Daily    sodium chloride  20 mL Intravenous Once    aspirin  81 mg Oral Daily    atorvastatin  20 mg Oral Nightly    clopidogrel  75 mg Oral Daily    cyclobenzaprine  10 mg Oral Daily    finasteride  5 mg Oral Daily    [Held by provider] isosorbide mononitrate  30 mg Oral Daily    metoprolol succinate  50 mg Oral Daily    tamsulosin  0.4 mg Oral Nightly    sodium chloride flush  10 mL Intravenous 2 times per day    insulin lispro  0-12 Units Subcutaneous TID WC    insulin lispro  0-6 Units Subcutaneous Nightly    calcium replacement protocol   Other RX Placeholder    linezolid  600 mg Oral 2 times per day     PRN Meds: HYDROmorphone **OR** HYDROmorphone, HYDROcodone-acetaminophen, HYDROcodone-acetaminophen, docusate sodium, traMADol, sodium chloride flush, acetaminophen **OR** acetaminophen, promethazine **OR** ondansetron, glucose, dextrose, glucagon (rDNA), dextrose      Intake/Output Summary (Last 24 hours) at 7/5/2020 0919  Last data filed at 7/5/2020 0340  Gross per 24 hour   Intake 1340 ml   Output 800 ml   Net 540 ml       Diet:  DIET RENAL; Carb Control: 3 carb choices (45 gms)/meal    Exam:  BP (!) 130/58   Pulse 92   Temp 98.2 °F (36.8 °C) (Oral)   Resp 16   Wt 291 lb (132 kg)   SpO2 96%   BMI 40.59 kg/m²     General:  White male well groomed, well-nourished, well-developed who appears stated age.   Head: Normocephalic and atraumatic. EENT: No exophthalmos noted. No scleral or conjunctiva icterus, injection or pallor noted. No maxillary or frontal sinus tenderness to percussion. Neck: Supple. Trachea midline. No thyromegaly. Thorax/Lungs: Thorax is symmetrical with good expansion. Breath sounds CTA and equal b/l without rales, wheezing, or rhonchi.  No retractions or use of abdominal muscles. Cardiac: S1, S2, RRR without murmur, rub, or gallop. No JVD  Abdomen: Abdomen large but soft, nontender to palpation, without guarding or rigidity. Normoactive BS  Peripheral Vasculature: Extremities warm, dry with 3+ pitting edema to RLE, and 1+ pitting edema to LLE stump, no varicosities or stasis changes, DP pulses 2+ b/l. Brisk capillary refill. Skin:  Skin warm and moist. No lesions. Stasis changes to RLE shin with mild erythema and desquamation without warmth.   Psych:  Alert and oriented x4.   Lymph:  No supraclavicular or cervical adenopathy. Neurologic: No focal deficits. No Seizures.       Labs:   Recent Labs     07/04/20  0616 07/04/20  2307   WBC 10.3  --    HGB 8.7* 8.0*   HCT 28.7* 26.6*     --      Recent Labs     07/03/20  0554  07/05/20  0637      < > 137   K 3.8   < > 3.7      < > 98   CO2 25   < > 27   BUN 59*   < > 59*   CREATININE 3.7*   < > 3.8*   CALCIUM 7.6*   < > 7.9*   PHOS 4.8*  --   --     < > = values in this interval not displayed. No results for input(s): AST, ALT, BILIDIR, BILITOT, ALKPHOS in the last 72 hours. Recent Labs     07/05/20  0637   INR 1.08     No results for input(s): Dulcie Mean in the last 72 hours. Urinalysis:      Lab Results   Component Value Date    NITRU NEGATIVE 07/01/2020    WBCUA 15-25 07/01/2020    BACTERIA NONE SEEN 07/01/2020    RBCUA 3-5 07/01/2020    BLOODU MODERATE 07/01/2020    SPECGRAV 1.015 07/01/2020    GLUCOSEU NEGATIVE 05/22/2017       Radiology:   All imaging reviewed     Diet: DIET RENAL; Carb Control: 3 carb choices (45 gms)/meal      Code Status: Full Code            Electronically signed by Chepe Haney MD on 7/5/2020 at 9:19 AM

## 2020-07-05 NOTE — PROGRESS NOTES
Renal Progress Note  [facility]    Patient :  Tamera Corrigan; 80 y.o. MRN# [id number]  Location:  [facility]63/522-J  Attending:  Ashley Hoffmann MD  Admit Date:  6/27/2020   Hospital Day: 8      Subjective:     Nephrology is following the patient for EFREN/CKD. Patient seen and examined. Still swollen. On 1 L NC. Denies SOB. Weight is up. Urine output poor response to diuretics over past 24 hours. Outpatient Medications:     Medications Prior to Admission: bumetanide (BUMEX) 1 MG tablet, Take 1 tablet by mouth 2 times daily  potassium chloride (KLOR-CON M) 20 MEQ extended release tablet, Take 1 tablet by mouth 2 times daily  insulin aspart (NOVOLOG) 100 UNIT/ML injection vial, Inject into the skin 3 times daily (before meals) Per Sliding Scale = No Insulin, 140-199= 2 Units, 200-249= 4 Units, 250-290= 6 Units, 300-349= 8 Units, 350-399= 10 Units, 400+= 12 Units  finasteride (PROSCAR) 5 MG tablet, Take 1 tablet by mouth daily  gabapentin (NEURONTIN) 300 MG capsule, Take 300 mg by mouth 3 times daily. metoprolol succinate ER (TOPROL-XL) 50 MG XL tablet, Take 1 tablet by mouth daily  clopidogrel (PLAVIX) 75 MG tablet, Take 1 tablet by mouth daily  aspirin 81 MG chewable tablet, Take 1 tablet by mouth daily  Multiple Vitamins-Minerals (THERAPEUTIC MULTIVITAMIN-MINERALS) tablet, Take 1 tablet by mouth daily  [DISCONTINUED] traMADol (ULTRAM) 50 MG tablet, Take 50 mg by mouth 2 times daily as needed for Pain.   BASAGLAR KWIKPEN 100 UNIT/ML injection pen, Inject 75 Units into the skin daily   docusate sodium (COLACE) 100 MG capsule, Take 100 mg by mouth daily as needed for Constipation  benzonatate (TESSALON) 200 MG capsule, Take 200 mg by mouth every 8 hours as needed for Cough  BD AUTOSHIELD DUO 30G X 5 MM MISC,   guaiFENesin (ROBITUSSIN) 100 MG/5ML liquid, Take 10 mLs by mouth every 6 hours as needed   ASPERCREME W/LIDOCAINE EX, Apply topically every 4 hours as needed (Apply to back PRN for Pain)   cyclobenzaprine (FLEXERIL) 10 MG tablet, 10 mg daily Taking once daily for leg spasms and the Every 8 hours PRN TID for Pain. Hold if drowsy, confused, or sleepy. Incontinence Supplies (BEDSIDE DRAINAGE BAG) MISC, Dispense one, 2000 cc overnight urinary bag  insulin NPH (HUMULIN N;NOVOLIN N) 100 UNIT/ML injection vial, Inject 10 Units into the skin 2 times daily (Patient taking differently: Inject 22 Units into the skin nightly Inject 22 units qhs and 28 units daily)  tamsulosin (FLOMAX) 0.4 MG capsule, Take 1 capsule by mouth nightly  loperamide (IMODIUM) 2 MG capsule, Take 2 mg by mouth 4 times daily as needed for Diarrhea (for diarrhea related to weakness)  atorvastatin (LIPITOR) 20 MG tablet, Take 20 mg by mouth nightly  pantoprazole (PROTONIX) 40 MG tablet, Take 40 mg by mouth nightly   nitroGLYCERIN (NITROSTAT) 0.4 MG SL tablet, Place 1 tablet under the tongue every 5 minutes as needed for Chest pain  acetaminophen (TYLENOL) 325 MG tablet, Take 650 mg by mouth every 6 hours   isosorbide mononitrate (IMDUR) 30 MG CR tablet, Take 30 mg by mouth daily.       Current Medications:     Scheduled Meds:    furosemide  60 mg Intravenous BID    metOLazone  5 mg Oral Once    calcium elemental  500 mg Oral BID    midodrine  2.5 mg Oral TID WC    gabapentin  300 mg Oral BID    famotidine (PEPCID) injection  20 mg Intravenous Daily    insulin glargine  30 Units Subcutaneous Daily    sodium chloride  20 mL Intravenous Once    aspirin  81 mg Oral Daily    atorvastatin  20 mg Oral Nightly    clopidogrel  75 mg Oral Daily    cyclobenzaprine  10 mg Oral Daily    finasteride  5 mg Oral Daily    [Held by provider] isosorbide mononitrate  30 mg Oral Daily    metoprolol succinate  50 mg Oral Daily    tamsulosin  0.4 mg Oral Nightly    sodium chloride flush  10 mL Intravenous 2 times per day    insulin lispro  0-12 Units Subcutaneous TID WC    insulin lispro  0-6 Units Subcutaneous Nightly    calcium for input(s): LABALBU in the last 72 hours. BNP:      Lab Results   Component Value Date    BNP 2,278 11/07/2019     LYN:    No results found for: LYN  SPEP:  Lab Results   Component Value Date    PROT 6.5 06/27/2020    PROT 6.1 06/10/2016     UPEP:   No results found for: LABPE  C3:   No results found for: C3  C4:   No results found for: C4  MPO ANCA:   No results found for: MPO  PR3 ANCA:   No results found for: PR3  Anti-GBM:   No results found for: GBMABIGG  Hep BsAg:       No results found for: HEPBSAG  Hep C AB:        No results found for: HEPCAB    Urinalysis/Chemistries:      Lab Results   Component Value Date    NITRU NEGATIVE 07/01/2020    COLORU YELLOW 07/01/2020    PHUR 5.0 07/01/2020    LABCAST 8-15 HYALINE 07/01/2020    LABCAST NONE SEEN 07/01/2020    WBCUA 15-25 07/01/2020    RBCUA 3-5 07/01/2020    YEAST NONE SEEN 07/01/2020    BACTERIA NONE SEEN 07/01/2020    SPECGRAV 1.015 07/01/2020    LEUKOCYTESUR TRACE 07/01/2020    UROBILINOGEN 0.2 07/01/2020    BILIRUBINUR NEGATIVE 07/01/2020    BLOODU MODERATE 07/01/2020    GLUCOSEU NEGATIVE 05/22/2017    KETUA NEGATIVE 07/01/2020     Urine Sodium:   No results found for: EDOUARD  Urine Potassium:  No results found for: KUR  Urine Chloride:  No results found for: CLUR  Urine Osmolarity:   Lab Results   Component Value Date    OSMOU 325 07/01/2020     Urine Protein:   No components found for: TOTALPROTEIN, URINE   Urine Creatinine:     Lab Results   Component Value Date    LABCREA 70.6 05/31/2019     Urine Eosinophils:  No components found for: UEOS        Impression and Plan:  1. EFREN on CKD IIIb due to post-op hypotension/ATN: with fluid overload and poor response to diuretics, will increase lasix to 60 mg IV BID and give dose of metolazone. If renal fxn worsens or poor urine output may need RRT to assist with volume status. Will follow closely  2. Volume overload :see above   3. HTN s/p hypotension. Improved  4.  Acute blood loss anemia s/p PRBC transfusion, Hgb stable   5. Right femur fx s/p fall with repair  6. UTI due to Enterococcus  7. Right hydrocele  8. Urinary retention with chronic engel  9. DM  10. Systolic CHF        Please don't hesitate to call with any questions.   Electronically signed by Jose L Koehler DO on 7/5/2020 at 1:43 PM

## 2020-07-05 NOTE — PLAN OF CARE
Problem: Falls - Risk of:  Goal: Will remain free from falls  Description: Will remain free from falls  7/5/2020 0243 by Gissell Barnes RN  Outcome: Ongoing  Note: Bed alarm on. call light in reach, bed lowest position and wheels locked. Educated on use of call light before ambulation and when in need of assistance. Patient expressed understanding. But pt is confused at times. Due to recent fall pt has telesitter in room. Nonskid socks on, AKA on left. Hourly visual checks performed and charted. Toileting offered to patient. Pt has engel in. Arm band & falling star in place. Will continue to monitor. No falls during shift. Problem: Skin Integrity:  Goal: Will show no infection signs and symptoms  Description: Will show no infection signs and symptoms  7/5/2020 0243 by Gissell Barnes RN  Outcome: Ongoing  Note: No new skin breakdown or tears noted. Mucus membranes pink and moist and intact. Will continue to assess and monitor skin. Turned and repositioned pt every 2 hours and as needed. Problem: Discharge Planning:  Goal: Participates in care planning  Description: Participates in care planning  7/5/2020 0243 by Gissell Barnes RN  Outcome: Ongoing  Note: No discharge date at this time.  on case. Pt. Planning to return to [facility] after discharge. Will monitor for after discharge needs. Problem: Mental Status - Impaired:  Goal: Mental status will be restored to baseline  Description: Mental status will be restored to baseline  7/5/2020 0243 by Gissell Barnes RN  Outcome: Ongoing  Note: Pt can be confused at times after just waking up. Problem: Cardiac Output - Decreased:  Goal: Hemodynamic stability will improve  Description: Hemodynamic stability will improve  7/5/2020 0247 by Gissell Barnes RN  Outcome: Ongoing  Note: Vitals sign stable. Will continue to monitor. Pt has a tele sitter in room. pt alert and oriented during this shift.          Problem: Serum Glucose Level - Abnormal:  Goal: Ability to maintain appropriate glucose levels will improve to within specified parameters  Description: Ability to maintain appropriate glucose levels will improve to within specified parameters  7/5/2020 0250 by Sarrah Opitz, RN  Outcome: Ongoing  Note: Pt is chem AC/HS. Humalog given at bed time according to sliding scale. Problem: Pain:  Goal: Control of acute pain  Description: Control of acute pain  7/5/2020 0243 by Sarrah Opitz, RN  Outcome: Ongoing  Note: Pt denied any pain during this shift. Will continue to monitor and assess for pain. Care plan reviewed with patient . Patient verbalize understanding of the plan of care and contribute to goal setting.

## 2020-07-06 LAB
ANION GAP SERPL CALCULATED.3IONS-SCNC: 14 MEQ/L (ref 8–16)
ANION GAP SERPL CALCULATED.3IONS-SCNC: 16 MEQ/L (ref 8–16)
BUN BLDV-MCNC: 57 MG/DL (ref 7–22)
BUN BLDV-MCNC: 58 MG/DL (ref 7–22)
CALCIUM IONIZED: 1.04 MMOL/L (ref 1.12–1.32)
CALCIUM SERPL-MCNC: 7.8 MG/DL (ref 8.5–10.5)
CALCIUM SERPL-MCNC: 7.8 MG/DL (ref 8.5–10.5)
CHLORIDE BLD-SCNC: 94 MEQ/L (ref 98–111)
CHLORIDE BLD-SCNC: 98 MEQ/L (ref 98–111)
CO2: 26 MEQ/L (ref 23–33)
CO2: 26 MEQ/L (ref 23–33)
CREAT SERPL-MCNC: 3.2 MG/DL (ref 0.4–1.2)
CREAT SERPL-MCNC: 3.5 MG/DL (ref 0.4–1.2)
GFR SERPL CREATININE-BSD FRML MDRD: 17 ML/MIN/1.73M2
GFR SERPL CREATININE-BSD FRML MDRD: 19 ML/MIN/1.73M2
GLUCOSE BLD-MCNC: 135 MG/DL (ref 70–108)
GLUCOSE BLD-MCNC: 164 MG/DL (ref 70–108)
GLUCOSE BLD-MCNC: 189 MG/DL (ref 70–108)
GLUCOSE BLD-MCNC: 193 MG/DL (ref 70–108)
GLUCOSE BLD-MCNC: 194 MG/DL (ref 70–108)
GLUCOSE BLD-MCNC: 249 MG/DL (ref 70–108)
HCT VFR BLD CALC: 28.5 % (ref 42–52)
HCT VFR BLD CALC: 29.2 % (ref 42–52)
HCT VFR BLD CALC: 29.5 % (ref 42–52)
HEMOGLOBIN: 8.6 GM/DL (ref 14–18)
HEMOGLOBIN: 9.1 GM/DL (ref 14–18)
HEMOGLOBIN: 9.2 GM/DL (ref 14–18)
MAGNESIUM: 1.8 MG/DL (ref 1.6–2.4)
POTASSIUM SERPL-SCNC: 3.4 MEQ/L (ref 3.5–5.2)
POTASSIUM SERPL-SCNC: 4 MEQ/L (ref 3.5–5.2)
SODIUM BLD-SCNC: 136 MEQ/L (ref 135–145)
SODIUM BLD-SCNC: 138 MEQ/L (ref 135–145)

## 2020-07-06 PROCEDURE — 82330 ASSAY OF CALCIUM: CPT

## 2020-07-06 PROCEDURE — 2580000003 HC RX 258: Performed by: INTERNAL MEDICINE

## 2020-07-06 PROCEDURE — 6370000000 HC RX 637 (ALT 250 FOR IP): Performed by: PHYSICIAN ASSISTANT

## 2020-07-06 PROCEDURE — 6370000000 HC RX 637 (ALT 250 FOR IP): Performed by: INTERNAL MEDICINE

## 2020-07-06 PROCEDURE — 80048 BASIC METABOLIC PNL TOTAL CA: CPT

## 2020-07-06 PROCEDURE — 1200000003 HC TELEMETRY R&B

## 2020-07-06 PROCEDURE — 82948 REAGENT STRIP/BLOOD GLUCOSE: CPT

## 2020-07-06 PROCEDURE — 94760 N-INVAS EAR/PLS OXIMETRY 1: CPT

## 2020-07-06 PROCEDURE — 6370000000 HC RX 637 (ALT 250 FOR IP): Performed by: FAMILY MEDICINE

## 2020-07-06 PROCEDURE — 85014 HEMATOCRIT: CPT

## 2020-07-06 PROCEDURE — 36415 COLL VENOUS BLD VENIPUNCTURE: CPT

## 2020-07-06 PROCEDURE — 6360000002 HC RX W HCPCS: Performed by: INTERNAL MEDICINE

## 2020-07-06 PROCEDURE — 85018 HEMOGLOBIN: CPT

## 2020-07-06 PROCEDURE — 2500000003 HC RX 250 WO HCPCS: Performed by: INTERNAL MEDICINE

## 2020-07-06 PROCEDURE — 2580000003 HC RX 258: Performed by: PHYSICIAN ASSISTANT

## 2020-07-06 PROCEDURE — 99232 SBSQ HOSP IP/OBS MODERATE 35: CPT | Performed by: INTERNAL MEDICINE

## 2020-07-06 PROCEDURE — 83735 ASSAY OF MAGNESIUM: CPT

## 2020-07-06 PROCEDURE — 99232 SBSQ HOSP IP/OBS MODERATE 35: CPT | Performed by: FAMILY MEDICINE

## 2020-07-06 RX ORDER — ERGOCALCIFEROL 1.25 MG/1
50000 CAPSULE ORAL WEEKLY
Status: DISCONTINUED | OUTPATIENT
Start: 2020-07-06 | End: 2020-07-11 | Stop reason: HOSPADM

## 2020-07-06 RX ORDER — FUROSEMIDE 10 MG/ML
60 INJECTION INTRAMUSCULAR; INTRAVENOUS ONCE
Status: COMPLETED | OUTPATIENT
Start: 2020-07-06 | End: 2020-07-06

## 2020-07-06 RX ORDER — METOLAZONE 5 MG/1
5 TABLET ORAL ONCE
Status: COMPLETED | OUTPATIENT
Start: 2020-07-06 | End: 2020-07-06

## 2020-07-06 RX ADMIN — ASPIRIN 81 MG 81 MG: 81 TABLET ORAL at 09:41

## 2020-07-06 RX ADMIN — ERGOCALCIFEROL 50000 UNITS: 1.25 CAPSULE ORAL at 12:27

## 2020-07-06 RX ADMIN — FUROSEMIDE 5 MG/HR: 10 INJECTION, SOLUTION INTRAMUSCULAR; INTRAVENOUS at 09:51

## 2020-07-06 RX ADMIN — INSULIN LISPRO 1 UNITS: 100 INJECTION, SOLUTION INTRAVENOUS; SUBCUTANEOUS at 21:23

## 2020-07-06 RX ADMIN — GABAPENTIN 300 MG: 300 CAPSULE ORAL at 20:32

## 2020-07-06 RX ADMIN — CALCIUM 500 MG: 500 TABLET ORAL at 09:41

## 2020-07-06 RX ADMIN — GABAPENTIN 300 MG: 300 CAPSULE ORAL at 09:41

## 2020-07-06 RX ADMIN — CALCIUM 500 MG: 500 TABLET ORAL at 20:32

## 2020-07-06 RX ADMIN — INSULIN GLARGINE 30 UNITS: 100 INJECTION, SOLUTION SUBCUTANEOUS at 09:52

## 2020-07-06 RX ADMIN — CLOPIDOGREL BISULFATE 75 MG: 75 TABLET ORAL at 09:41

## 2020-07-06 RX ADMIN — FINASTERIDE 5 MG: 5 TABLET, FILM COATED ORAL at 09:41

## 2020-07-06 RX ADMIN — SODIUM CHLORIDE, PRESERVATIVE FREE 10 ML: 5 INJECTION INTRAVENOUS at 09:42

## 2020-07-06 RX ADMIN — POTASSIUM BICARBONATE 40 MEQ: 782 TABLET, EFFERVESCENT ORAL at 09:40

## 2020-07-06 RX ADMIN — TAMSULOSIN HYDROCHLORIDE 0.4 MG: 0.4 CAPSULE ORAL at 20:32

## 2020-07-06 RX ADMIN — METOLAZONE 5 MG: 5 TABLET ORAL at 09:51

## 2020-07-06 RX ADMIN — POTASSIUM BICARBONATE 20 MEQ: 782 TABLET, EFFERVESCENT ORAL at 20:32

## 2020-07-06 RX ADMIN — CYCLOBENZAPRINE 10 MG: 10 TABLET, FILM COATED ORAL at 09:41

## 2020-07-06 RX ADMIN — FUROSEMIDE 60 MG: 10 INJECTION, SOLUTION INTRAMUSCULAR; INTRAVENOUS at 09:42

## 2020-07-06 RX ADMIN — METOPROLOL SUCCINATE 50 MG: 50 TABLET, FILM COATED, EXTENDED RELEASE ORAL at 09:41

## 2020-07-06 RX ADMIN — ATORVASTATIN CALCIUM 20 MG: 20 TABLET, FILM COATED ORAL at 20:32

## 2020-07-06 RX ADMIN — FAMOTIDINE 20 MG: 10 INJECTION, SOLUTION INTRAVENOUS at 09:51

## 2020-07-06 RX ADMIN — HYDROCODONE BITARTRATE AND ACETAMINOPHEN 1 TABLET: 5; 325 TABLET ORAL at 11:05

## 2020-07-06 ASSESSMENT — PAIN SCALES - GENERAL
PAINLEVEL_OUTOF10: 4
PAINLEVEL_OUTOF10: 0
PAINLEVEL_OUTOF10: 3
PAINLEVEL_OUTOF10: 0

## 2020-07-06 NOTE — PROGRESS NOTES
[address] [place] [id number]  SPEECH THERAPY MISSED TREATMENT NOTE  [facility] TELEMET[facility] 6K      Date: 2020  Patient Name: Nati Garrett        MRN: [id number]    : 1934  (80 y.o.)    REASON FOR MISSED TREATMENT: Patient currently unavailable for cognitive linguistic evaluation on this date. D/t currently off the floor. ST plans to re-attempt on a later date/time as schedule permits, pt is medically appropriate and/or available. Denise Saba MA., WLC-SNQ

## 2020-07-06 NOTE — CARE COORDINATION
7/6/20, 12:18 PM EDT    DISCHARGE ON GOING [address] day: 9  Location: -09/009-A Reason for admit: AMS (altered mental status) [R41.82]   Procedure: POD 7 s/p right intertan  Treatment Plan of Care: K+ 3.4, creat 3.5, Ical 1.04. 97% on 1L. Ortho has s/o with f/u. Nephrology has initiated lasix gtt. Dr. Lance Marion is not planning RRT at this time, still monitoring for diuretic response. Barriers to Discharge: not medically stable  PCP: Korey Cole MD  Readmission Risk Score: 24%  Patient Goals/Plan/Treatment Preferences: return to [facility].

## 2020-07-06 NOTE — PROGRESS NOTES
(FLEXERIL) 10 MG tablet, 10 mg daily Taking once daily for leg spasms and the Every 8 hours PRN TID for Pain. Hold if drowsy, confused, or sleepy. Incontinence Supplies (BEDSIDE DRAINAGE BAG) MISC, Dispense one, 2000 cc overnight urinary bag  insulin NPH (HUMULIN N;NOVOLIN N) 100 UNIT/ML injection vial, Inject 10 Units into the skin 2 times daily (Patient taking differently: Inject 22 Units into the skin nightly Inject 22 units qhs and 28 units daily)  tamsulosin (FLOMAX) 0.4 MG capsule, Take 1 capsule by mouth nightly  loperamide (IMODIUM) 2 MG capsule, Take 2 mg by mouth 4 times daily as needed for Diarrhea (for diarrhea related to weakness)  atorvastatin (LIPITOR) 20 MG tablet, Take 20 mg by mouth nightly  pantoprazole (PROTONIX) 40 MG tablet, Take 40 mg by mouth nightly   nitroGLYCERIN (NITROSTAT) 0.4 MG SL tablet, Place 1 tablet under the tongue every 5 minutes as needed for Chest pain  acetaminophen (TYLENOL) 325 MG tablet, Take 650 mg by mouth every 6 hours   isosorbide mononitrate (IMDUR) 30 MG CR tablet, Take 30 mg by mouth daily.       Current Medications:     Scheduled Meds:    furosemide  60 mg Intravenous Once    potassium bicarb-citric acid  40 mEq Oral Once    potassium bicarb-citric acid  20 mEq Oral BID    calcium elemental  500 mg Oral BID    gabapentin  300 mg Oral BID    famotidine (PEPCID) injection  20 mg Intravenous Daily    insulin glargine  30 Units Subcutaneous Daily    sodium chloride  20 mL Intravenous Once    aspirin  81 mg Oral Daily    atorvastatin  20 mg Oral Nightly    clopidogrel  75 mg Oral Daily    cyclobenzaprine  10 mg Oral Daily    finasteride  5 mg Oral Daily    [Held by provider] isosorbide mononitrate  30 mg Oral Daily    metoprolol succinate  50 mg Oral Daily    tamsulosin  0.4 mg Oral Nightly    sodium chloride flush  10 mL Intravenous 2 times per day    insulin lispro  0-12 Units Subcutaneous TID     insulin lispro  0-6 Units Subcutaneous Nightly    calcium replacement protocol   Other RX Placeholder     Continuous Infusions:    furosemide (LASIX) 1mg/ml infusion      dextrose       PRN Meds:  HYDROmorphone **OR** HYDROmorphone, HYDROcodone-acetaminophen, HYDROcodone-acetaminophen, docusate sodium, traMADol, sodium chloride flush, acetaminophen **OR** acetaminophen, promethazine **OR** ondansetron, glucose, dextrose, glucagon (rDNA), dextrose    Input/Output:       I/O last 3 completed shifts: In: 240 [P.O.:240]  Out: 2600 [Urine:2600]. Patient Vitals for the past 96 hrs (Last 3 readings):   Weight   20 0329 290 lb 4.8 oz (131.7 kg)   20 0340 291 lb (132 kg)   20 0447 283 lb 6.4 oz (128.5 kg)       Vital Signs:   Temperature:  Temp: 97.7 °F (36.5 °C)  TMax:   Temp (24hrs), Av.8 °F (36.6 °C), Min:97.5 °F (36.4 °C), Max:98 °F (36.7 °C)    Respirations:  Resp: 18  Pulse:   Pulse: 77  BP:    BP: 135/61  BP Range: Systolic (22XZO), DSB:491 , Min:129 , MOD:570       Diastolic (26JRI), NEC:98, Min:60, Max:72      Physical Examination:     General:  Awake, alert  HEENT: NC/AT/ MMM  Chest:               Diminished no audible rales  Cardiac:  S1 S2   Abdomen: Soft, non-tender,  Neuro:  No facial droop, No Asterixis  SKIN:  No rashes, good skin turgor.   Extremities:  3+ edema right leg, + left BKA with thigh edema    Labs:       Recent Labs     20  0616 20  2307 20  1115 20  2253   WBC 10.3  --   --   --    RBC 2.93*  --   --   --    HGB 8.7* 8.0* 8.5* 8.6*   HCT 28.7* 26.6* 28.3* 28.5*   MCV 98.0*  --   --   --    MCH 29.7  --   --   --    MCHC 30.3*  --   --   --      --   --   --    MPV 10.1  --   --   --       BMP:   Recent Labs     20  0616 20  0637 20  0623    137 138   K 3.5 3.7 3.4*   CL 99 98 98   CO2 28 27 26   BUN 56* 59* 58*   CREATININE 3.5* 3.8* 3.5*   GLUCOSE 143* 100 135*   CALCIUM 7.9* 7.9* 7.8*      Phosphorus:     No results for input(s): PHOS in the last 72 start him on lasix drip at 5 mg/hour and give additional ose of metolazone today  2. Volume overload :see above   3. HTN s/p hypotension. Improved can stop midodrine  4. Hypokalemia from diuretics: effer k 40 meq po x 1 then start 20 meq BID, check BMP q 12 hours while on diuretic drip  5. Acute blood loss anemia s/p PRBC transfusion, Hgb stable   6. Right femur fx s/p fall with repair  7. UTI due to Enterococcus  8. Right hydrocele  9. Urinary retention with chronic engel  10. DM  11. Systolic CHF    Discussed with daughter at bedside. Patient's concern was that if dialysis is needed it may be permanent but I started if we need to do it we would do a few treatments and monitor his response but for now will continue with diuretics and see how he does. Please don't hesitate to call with any questions.   Electronically signed by Erica Valenzuela, DO on 7/6/2020 at 8:37 AM

## 2020-07-06 NOTE — PROGRESS NOTES
This RN spoke with patient wife and daughter today and updated them on plan of care. They voiced they would like to speak with nephrology doctor tomorrow regarding information about dialysis. Night shift nurse updated.  No further questions at this time

## 2020-07-06 NOTE — PROGRESS NOTES
Hospitalist Progress Note    Patient:  Jennifer Chen      Unit/Bed:6K-009-A    YOB: 1934    MRN: [id number]       Acct: [de-identified]     PCP: Analilia Dawson MD    Date of Admission: 2020      Chief Complaint: [address] Course:       Per HPI \"80 year old white male nonsmoker with PMH of BPH (with chronic indwelling engel since ), CKD stage 3, HLD, hiatal hernia, CAD s/p MAI to LCx (), IDDM type 2 who presented to [facility] ED via EMS from [facility] on 20 c/o altered mental status x 2 days with associated hallucinations, testicular swelling (seen in the ED on 20), [address] [place], [address]. He has a hx of frequent UTIs and was started on bactrim for UTI on 20. Per wife/daughter, at baseline he is oriented with no confusion. 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 Family also notes that the LE edema was no present 2 days ago when he was taken to the ED for scrotal pain. On interview, patient is unable to tell me why he is at the hospital and intermittently responds to external stimuli, but is oriented to person, , year, president, month, place. He endorses scrotal pain with. Denies fever, abdominal pain, SOB, CP, lightheadedness.     In the ED, vitals show: HR 68, /75, 92% on RA and afebrile. Labs show: wbc 12.4, hgb 10.8, MCV 94.2, plt 298, cr 1.9, BUN 34, BUN: Cr 18, Ca 8.2, LFTs unremarkable, lactic 1.1, UA shows 2+ bacteria, WBC 21-30, moderate LE, neg Nitrites, moderate hematuria, trace proteinuria. CXR shows no acute findings. Stable mild bibasilar atelectasis. US scrotom shows prominent amount of hydrocele formation on the right, moderate hydrocele on the left. Fluid on the right appears clear, fluid on the left appears to be markedly complex with multiple pseudo-septations through it. \"    Patient was admitted under hospital medicine service for acute encephalopathy likely due to UTI. 6/28: pt fell and sustained right femur fracture. Orthopedics was consulted and patient underwent ORIF on 6/29/2020. Subjective:     Patient seen and examined. Pt reports scrotal/legs swelling about the same. He does report some dry cough. He denies shortness of breath, fever, chills. Medications:  Reviewed    Infusion Medications    furosemide (LASIX) 1mg/ml infusion 5 mg/hr (07/06/20 0977)    dextrose       Scheduled Medications    potassium bicarb-citric acid  20 mEq Oral BID    vitamin D  50,000 Units Oral Weekly    calcium elemental  500 mg Oral BID    gabapentin  300 mg Oral BID    famotidine (PEPCID) injection  20 mg Intravenous Daily    insulin glargine  30 Units Subcutaneous Daily    sodium chloride  20 mL Intravenous Once    aspirin  81 mg Oral Daily    atorvastatin  20 mg Oral Nightly    clopidogrel  75 mg Oral Daily    cyclobenzaprine  10 mg Oral Daily    finasteride  5 mg Oral Daily    [Held by provider] isosorbide mononitrate  30 mg Oral Daily    metoprolol succinate  50 mg Oral Daily    tamsulosin  0.4 mg Oral Nightly    sodium chloride flush  10 mL Intravenous 2 times per day    insulin lispro  0-12 Units Subcutaneous TID WC    insulin lispro  0-6 Units Subcutaneous Nightly    calcium replacement protocol   Other RX Placeholder     PRN Meds: HYDROmorphone **OR** HYDROmorphone, HYDROcodone-acetaminophen, HYDROcodone-acetaminophen, docusate sodium, traMADol, sodium chloride flush, acetaminophen **OR** acetaminophen, promethazine **OR** ondansetron, glucose, dextrose, glucagon (rDNA), dextrose      Intake/Output Summary (Last 24 hours) at 7/6/2020 1103  Last data filed at 7/6/2020 0425  Gross per 24 hour   Intake 240 ml   Output 2600 ml   Net -2360 ml       Diet:  DIET RENAL; Low Sodium (2 GM);  Daily Fluid Restriction: 1800 ml    Exam:  /64   Pulse 81   Temp 97.9 °F (36.6 °C) (Oral)   Resp 18   Wt 290 lb 4.8 oz (131.7 kg)   SpO2 97%   BMI 40.49 kg/m² General:  alert, not in acute distress  Head: Normocephalic and atraumatic. EENT: No exophthalmos noted. No scleral or conjunctiva icterus, injection or pallor noted. No maxillary or frontal sinus tenderness to percussion. Neck: Supple. Trachea midline. No thyromegaly. Thorax/Lungs: Thorax is symmetrical with good expansion.  No retractions or use of abdominal muscles. (+) crackles on both lung bases and GORGE field. No rhonchi, no wheezing. Cardiac: S1, S2, RRR without murmur, rub, or gallop. No JVD  Abdomen: slightly distended, soft, nontender to palpation, without guarding or rigidity. Normoactive BS  : (+) B/L scrotal swelling, non-tender to touch, engel cath in placed, with clear, non-bloody yellow urine   Neurological exam reveals alert, oriented x3, normal speech, no focal findings or movement disorder noted. Exam of extremities: (+) left AKA, RLE: hard to palpate pedal pulses due to edema, 3 + pitting edema on right foot up to right thigh, no clubbing or cyanosis  Skin: (+) pinkish area near both groin     Chaperoned by RN [address] [facility]      Labs:   Recent Labs     07/04/20  0616  07/06/20  1017   WBC 10.3  --   --    HGB 8.7*   < > 9.1*   HCT 28.7*   < > 29.5*     --   --     < > = values in this interval not displayed. Recent Labs     07/06/20  0623      K 3.4*   CL 98   CO2 26   BUN 58*   CREATININE 3.5*   CALCIUM 7.8*     No results for input(s): AST, ALT, BILIDIR, BILITOT, ALKPHOS in the last 72 hours. Recent Labs     07/05/20  0637   INR 1.08     No results for input(s): Burnadette Lundborg in the last 72 hours. Urinalysis:      Lab Results   Component Value Date    NITRU NEGATIVE 07/01/2020    WBCUA 15-25 07/01/2020    BACTERIA NONE SEEN 07/01/2020    RBCUA 3-5 07/01/2020    BLOODU MODERATE 07/01/2020    SPECGRAV 1.015 07/01/2020    GLUCOSEU NEGATIVE 05/22/2017       Radiology:   All imaging reviewed       Assessment/Plan:      Acute Metabolic Encephalopathy likely secondary to UTI/acute urinary retention, resolved     -CT head showed: no acute findings. Ammonia wnl. Mixed Blood Gas unremarkable. Immediate 1300mL UOP with change of engel. Drastically improved with IV ABx and engel exchange.  AAOx3 and answers questions appropriately today. Acute Complicated UTI / BPH with chronic suprapubic catheter, with acute retention ( acute retention resolved)    -Frequent UTIs 3-4 since dec 2019. UA ([facility]): 2+ bacteria, WBC 21-30, moderate LE, neg Nitrites.   -Ux growing Enterococcus. Completed 8 days of Linezolid, last dose of abx 7/5.   -cont  Finasteride andTamsulosin. 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 -CXR shows prominent interstitium with likely mild pulmonary edema. -BNP 3k.   -2D Echo 6/2020 EF 45% with Grade 1 DD.   -nephrology switched Cont IV Lasix to lasix gtt 7/6. Appreciate nephrology input  -Daily weights, I/Os, fluid and salt restrictions        Acute Hypoxic Resp. Failure secondary to CHF exacerbation, improving     - Pt drops mainly when sleeping to 87% on RA. Likely AKIRA. Yeny Boop pt was requiring 1-2L NC. Now on RA, saturating well.   -cont to monitor oxygen saturation      EFREN on CKD stage 4, likely multifactorial due to hypotension vs anemia vs volume overload, slightly improving        -Creatinine 1.9 on admission, now 3.5 from 3.8 yesterday. Baseline creatinine ranges between 1.7-2.0.  -cont Midodrine 2.5, hold BP medications. -Nephrology on-board. Appreciate nephrology input.   -Daily weights, I/Os, fluid and salt restrictions    Fall with Femur Fracture    -had a unwitnessed fall on 6/29. Found to have a subsequent femur fracture. No bleeds. Cardiology cleared for surgery.  S/P successful surgery on 6/29. PT/OT. Post Op Related Acute Blood Loss Anemia    Hb 9.1 today from 8.6 yesterday.   Baseline hemoglobin around 8  -No melena, hematochezia.  S/P 1U blood 7/1 due to hemoglobin of 6.9 on 6/30/2020.  - ASA and Plavix resumed on 7/4. Cont to monitor H&H.     Hypoalbuminemia, rule out malnutrition    -Dietitian consult    Hypocalcemia likely due to hypoalbuminemia and vitamin D deficiency    -Start ergocalciferol  -Calcium replacement protocol  -Check calcium and ionized calcium in a.m.  -Repeat vitamin D in 4-6 weeks and follow-up with PCP        3rd Deg Heart Block s/p pacemaker (2010): noted      DM type 2, controlled    -Continue home lantus at reduced dose, medium dose SSI. Goal BG of 140-180 while hospitalized. Hypoglycemia order set. Accucheck ACHS.       Chronic back pain: Hold flexeril, PRN tramadol. Resume as needed.     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 Disposition: DC back to [place] [id number] once medically stable        Diet: DIET RENAL; Low Sodium (2 GM);  Daily Fluid Restriction: 1800 ml      Code Status: Full Code            Electronically signed by Moises Damon MD on 7/6/2020 at 11:03 AM

## 2020-07-06 NOTE — PLAN OF CARE
Problem: Falls - Risk of:  Goal: Will remain free from falls  Description: Will remain free from falls  Outcome: Ongoing  Call light within reach. Side rails up x2. Bed alarm on. Non skid slippers available. Problem: Falls - Risk of:  Goal: Absence of physical injury  Description: Absence of physical injury  Outcome: Ongoing    Problem: Skin Integrity:  Goal: Will show no infection signs and symptoms  Description: Will show no infection signs and symptoms  Outcome: Ongoing    Problem: Skin Integrity:  Goal: Absence of new skin breakdown  Description: Absence of new skin breakdown  Outcome: Ongoing  Patient free from skin breakdown. Patient turns self and makes frequent positional changes. Will continue to monitor. Problem: Discharge Planning:  Goal: Participates in care planning  Description: Participates in care planning  Outcome: Ongoing  Patient plans to be discharged back to [facility] when medical stable. Problem: Bowel Function - Altered:  Goal: Bowel elimination is within specified parameters  Description: Bowel elimination is within specified parameters  Outcome: Ongoing  No BM noted this shift. Problem: Cardiac Output - Decreased:  Goal: Hemodynamic stability will improve  Description: Hemodynamic stability will improve  Outcome: Ongoing  VSS, will monitor     Problem: Pain:  Goal: Control of acute pain  Description: Control of acute pain  Outcome: Ongoing  Patient complained of right leg pain from his ORIF. Pain rated on 0-10 pain rating scale. Will continue to reassess. Problem: Serum Glucose Level - Abnormal:  Goal: Ability to maintain appropriate glucose levels will improve to within specified parameters  Description: Ability to maintain appropriate glucose levels will improve to within specified parameters  Outcome: Ongoing  Chem AC/HS, will monitor     Care plan reviewed with patient.   Patient verbalize understanding of the plan of care and contribute to goal

## 2020-07-06 NOTE — PROGRESS NOTES
Orthopaedic Progress Note      SUBJECTIVE   Mr. Jimbo Hargrove is post op day # 7 s/p R intertan  Pt seen resting in bed with family at bedside  Pt states pain has improved, family states he uses a drew lift at [facility]  Right leg incision dry and intact    Family states no dialysis today, monitoring labs  pt noted to have chronic foot drop     Allergies:     Allergies as of 06/27/2020    (No Known Allergies)     Current Inpatient Medications:  Current Facility-Administered Medications: furosemide (LASIX) injection 60 mg, 60 mg, Intravenous, Once  furosemide (LASIX) 100 mg in dextrose 5 % 100 mL infusion, 5 mg/hr, Intravenous, Continuous  potassium bicarb-citric acid (EFFER-K) effervescent tablet 40 mEq, 40 mEq, Oral, Once  potassium bicarb-citric acid (EFFER-K) effervescent tablet 20 mEq, 20 mEq, Oral, BID  metOLazone (ZAROXOLYN) tablet 5 mg, 5 mg, Oral, Once  calcium elemental (OSCAL) tablet 500 mg, 500 mg, Oral, BID  gabapentin (NEURONTIN) capsule 300 mg, 300 mg, Oral, BID  famotidine (PEPCID) injection 20 mg, 20 mg, Intravenous, Daily  insulin glargine (LANTUS) injection vial 30 Units, 30 Units, Subcutaneous, Daily  0.9 % sodium chloride bolus, 20 mL, Intravenous, Once  HYDROmorphone (DILAUDID) injection 0.25 mg, 0.25 mg, Intravenous, Q4H PRN **OR** HYDROmorphone (DILAUDID) injection 0.5 mg, 0.5 mg, Intravenous, Q4H PRN  HYDROcodone-acetaminophen (NORCO) 5-325 MG per tablet 1 tablet, 1 tablet, Oral, Q6H PRN  HYDROcodone-acetaminophen (NORCO)  MG per tablet 1 tablet, 1 tablet, Oral, Q6H PRN  aspirin chewable tablet 81 mg, 81 mg, Oral, Daily  atorvastatin (LIPITOR) tablet 20 mg, 20 mg, Oral, Nightly  clopidogrel (PLAVIX) tablet 75 mg, 75 mg, Oral, Daily  cyclobenzaprine (FLEXERIL) tablet 10 mg, 10 mg, Oral, Daily  docusate sodium (COLACE) capsule 100 mg, 100 mg, Oral, Daily PRN  finasteride (PROSCAR) tablet 5 mg, 5 mg, Oral, Daily  [Held by provider] isosorbide mononitrate (IMDUR) extended release tablet 30 mg, 30 mg,

## 2020-07-06 NOTE — PROGRESS NOTES
[facility]           Progress Note    Patient Name:  Wilfredo Denny  Medical Record Number:  [id number]  YOB: 1934    Date:  7/6/2020  Time:  2:55 PM  Completed By:  Gillian Callaway RN    Reason for Palliative Care Evaluation:  Code status. Pt admitted for AMS, UTI. Pt fell while here and had hip fracture, surgery was done 6-. Pt has had EFREN, which remains unresolved. Advance Directives  [] [facility] DNR Form  [x] Living Will  [x] Medical POA    Current Code Status  [x] Full Resuscitation  [] DNR-Comfort Care-Arrest  [] DNR-Comfort Care  [] Limited   [] No CPR   [] No shock   [] No ET intubation/reintubation   [] No resuscitative medications   [] Other limitation:    Family/Patient Discussion:  Pt is resting quietly in bed with eyes closed, easy respirations. Appears asleep, writer did not disturb. Pt's wife and Kaiser Battles, are here. Palliative care introduced, supportive discussion followed. Family reports that pt has been in ECF since 2017, after he had a BKA. Pt uses motorized scooter and drew lift at [facility] for mobility. Family states that pt's baseline mentation is alert and oriented to all spheres. Code status discussed, pt's wife states that pt had \" DNR\" in place \" quite a while ago\" but pt then rescinded that and expressed to family that he wanted Full code status. Respect for pt's decision expressed, writer explained that family is not being pressured to change anything. Family is able to accurately explain current pt condition and plan of care, they state no questions or needs at this time. Emotional support and encouragement given. Plan/Follow-Up:  Continue current plan of care. Palliative care will continue to follow supportively. Floor to notify PRN if acute needs arise.      Gillian Callaway RN  7/6/2020,  2:55 PM

## 2020-07-07 LAB
ANION GAP SERPL CALCULATED.3IONS-SCNC: 12 MEQ/L (ref 8–16)
ANION GAP SERPL CALCULATED.3IONS-SCNC: 12 MEQ/L (ref 8–16)
BUN BLDV-MCNC: 58 MG/DL (ref 7–22)
BUN BLDV-MCNC: 59 MG/DL (ref 7–22)
CALCIUM IONIZED: 0.99 MMOL/L (ref 1.12–1.32)
CALCIUM SERPL-MCNC: 8.2 MG/DL (ref 8.5–10.5)
CALCIUM SERPL-MCNC: 8.5 MG/DL (ref 8.5–10.5)
CHLORIDE BLD-SCNC: 92 MEQ/L (ref 98–111)
CHLORIDE BLD-SCNC: 97 MEQ/L (ref 98–111)
CO2: 28 MEQ/L (ref 23–33)
CO2: 30 MEQ/L (ref 23–33)
CREAT SERPL-MCNC: 3.1 MG/DL (ref 0.4–1.2)
CREAT SERPL-MCNC: 3.2 MG/DL (ref 0.4–1.2)
GFR SERPL CREATININE-BSD FRML MDRD: 19 ML/MIN/1.73M2
GFR SERPL CREATININE-BSD FRML MDRD: 19 ML/MIN/1.73M2
GLUCOSE BLD-MCNC: 143 MG/DL (ref 70–108)
GLUCOSE BLD-MCNC: 166 MG/DL (ref 70–108)
GLUCOSE BLD-MCNC: 218 MG/DL (ref 70–108)
GLUCOSE BLD-MCNC: 219 MG/DL (ref 70–108)
GLUCOSE BLD-MCNC: 220 MG/DL (ref 70–108)
GLUCOSE BLD-MCNC: 241 MG/DL (ref 70–108)
MAGNESIUM: 1.8 MG/DL (ref 1.6–2.4)
POTASSIUM SERPL-SCNC: 3.8 MEQ/L (ref 3.5–5.2)
POTASSIUM SERPL-SCNC: 3.8 MEQ/L (ref 3.5–5.2)
PROCALCITONIN: 0.18 NG/ML (ref 0.01–0.09)
SODIUM BLD-SCNC: 134 MEQ/L (ref 135–145)
SODIUM BLD-SCNC: 137 MEQ/L (ref 135–145)

## 2020-07-07 PROCEDURE — 6370000000 HC RX 637 (ALT 250 FOR IP): Performed by: INTERNAL MEDICINE

## 2020-07-07 PROCEDURE — 97530 THERAPEUTIC ACTIVITIES: CPT

## 2020-07-07 PROCEDURE — 82330 ASSAY OF CALCIUM: CPT

## 2020-07-07 PROCEDURE — 2500000003 HC RX 250 WO HCPCS: Performed by: INTERNAL MEDICINE

## 2020-07-07 PROCEDURE — 6360000002 HC RX W HCPCS: Performed by: INTERNAL MEDICINE

## 2020-07-07 PROCEDURE — 92523 SPEECH SOUND LANG COMPREHEN: CPT

## 2020-07-07 PROCEDURE — 2580000003 HC RX 258: Performed by: INTERNAL MEDICINE

## 2020-07-07 PROCEDURE — 6370000000 HC RX 637 (ALT 250 FOR IP): Performed by: PHYSICIAN ASSISTANT

## 2020-07-07 PROCEDURE — 2580000003 HC RX 258: Performed by: FAMILY MEDICINE

## 2020-07-07 PROCEDURE — 82948 REAGENT STRIP/BLOOD GLUCOSE: CPT

## 2020-07-07 PROCEDURE — 97110 THERAPEUTIC EXERCISES: CPT

## 2020-07-07 PROCEDURE — 6370000000 HC RX 637 (ALT 250 FOR IP): Performed by: FAMILY MEDICINE

## 2020-07-07 PROCEDURE — 83735 ASSAY OF MAGNESIUM: CPT

## 2020-07-07 PROCEDURE — 1200000003 HC TELEMETRY R&B

## 2020-07-07 PROCEDURE — 80048 BASIC METABOLIC PNL TOTAL CA: CPT

## 2020-07-07 PROCEDURE — 6360000002 HC RX W HCPCS: Performed by: FAMILY MEDICINE

## 2020-07-07 PROCEDURE — 36415 COLL VENOUS BLD VENIPUNCTURE: CPT

## 2020-07-07 PROCEDURE — 84145 PROCALCITONIN (PCT): CPT

## 2020-07-07 PROCEDURE — 99232 SBSQ HOSP IP/OBS MODERATE 35: CPT | Performed by: INTERNAL MEDICINE

## 2020-07-07 PROCEDURE — 99232 SBSQ HOSP IP/OBS MODERATE 35: CPT | Performed by: FAMILY MEDICINE

## 2020-07-07 RX ADMIN — MAGNESIUM GLUCONATE 500 MG ORAL TABLET 400 MG: 500 TABLET ORAL at 20:31

## 2020-07-07 RX ADMIN — ASPIRIN 81 MG 81 MG: 81 TABLET ORAL at 08:17

## 2020-07-07 RX ADMIN — POTASSIUM BICARBONATE 20 MEQ: 782 TABLET, EFFERVESCENT ORAL at 20:31

## 2020-07-07 RX ADMIN — INSULIN LISPRO 2 UNITS: 100 INJECTION, SOLUTION INTRAVENOUS; SUBCUTANEOUS at 20:19

## 2020-07-07 RX ADMIN — CALCIUM 500 MG: 500 TABLET ORAL at 08:17

## 2020-07-07 RX ADMIN — Medication: at 09:53

## 2020-07-07 RX ADMIN — ATORVASTATIN CALCIUM 20 MG: 20 TABLET, FILM COATED ORAL at 20:18

## 2020-07-07 RX ADMIN — CALCIUM 500 MG: 500 TABLET ORAL at 20:18

## 2020-07-07 RX ADMIN — FUROSEMIDE 5 MG/HR: 10 INJECTION, SOLUTION INTRAMUSCULAR; INTRAVENOUS at 05:48

## 2020-07-07 RX ADMIN — GABAPENTIN 300 MG: 300 CAPSULE ORAL at 08:17

## 2020-07-07 RX ADMIN — GABAPENTIN 300 MG: 300 CAPSULE ORAL at 20:18

## 2020-07-07 RX ADMIN — METOPROLOL SUCCINATE 50 MG: 50 TABLET, FILM COATED, EXTENDED RELEASE ORAL at 08:17

## 2020-07-07 RX ADMIN — TAMSULOSIN HYDROCHLORIDE 0.4 MG: 0.4 CAPSULE ORAL at 20:19

## 2020-07-07 RX ADMIN — FINASTERIDE 5 MG: 5 TABLET, FILM COATED ORAL at 08:17

## 2020-07-07 RX ADMIN — CLOPIDOGREL BISULFATE 75 MG: 75 TABLET ORAL at 08:17

## 2020-07-07 RX ADMIN — CYCLOBENZAPRINE 10 MG: 10 TABLET, FILM COATED ORAL at 08:17

## 2020-07-07 RX ADMIN — FAMOTIDINE 20 MG: 10 INJECTION, SOLUTION INTRAVENOUS at 08:17

## 2020-07-07 RX ADMIN — Medication: at 20:26

## 2020-07-07 RX ADMIN — HYDROCODONE BITARTRATE AND ACETAMINOPHEN 1 TABLET: 10; 325 TABLET ORAL at 00:02

## 2020-07-07 RX ADMIN — INSULIN GLARGINE 30 UNITS: 100 INJECTION, SOLUTION SUBCUTANEOUS at 08:26

## 2020-07-07 RX ADMIN — Medication: at 03:44

## 2020-07-07 RX ADMIN — POTASSIUM BICARBONATE 20 MEQ: 782 TABLET, EFFERVESCENT ORAL at 08:27

## 2020-07-07 RX ADMIN — CALCIUM GLUCONATE 2 G: 98 INJECTION, SOLUTION INTRAVENOUS at 13:14

## 2020-07-07 RX ADMIN — MAGNESIUM GLUCONATE 500 MG ORAL TABLET 400 MG: 500 TABLET ORAL at 11:31

## 2020-07-07 ASSESSMENT — PAIN DESCRIPTION - PROGRESSION
CLINICAL_PROGRESSION: GRADUALLY WORSENING

## 2020-07-07 ASSESSMENT — PAIN SCALES - GENERAL
PAINLEVEL_OUTOF10: 7
PAINLEVEL_OUTOF10: 0

## 2020-07-07 ASSESSMENT — PAIN DESCRIPTION - PAIN TYPE: TYPE: ACUTE PAIN

## 2020-07-07 ASSESSMENT — PAIN DESCRIPTION - DESCRIPTORS: DESCRIPTORS: ACHING

## 2020-07-07 ASSESSMENT — PAIN DESCRIPTION - ONSET: ONSET: GRADUAL

## 2020-07-07 ASSESSMENT — PAIN DESCRIPTION - FREQUENCY: FREQUENCY: INTERMITTENT

## 2020-07-07 NOTE — PROGRESS NOTES
Patient bathed in CHG soap. Pinxav cream applied per order. ACE wrap applied to RLE per Dr. Mikki Enrique request. This RN educated patient how to use incentive spirometry.

## 2020-07-07 NOTE — PLAN OF CARE
Problem: Falls - Risk of:  Goal: Will remain free from falls  Description: Will remain free from falls  7/7/2020 0222 by Norma Verdin RN  Outcome: Ongoing  Note: Falling star placed outside of patient's room. 2/4 bed rails up. Non-skid socks provided. Call light and personal items with in reach. Bed alarm on. Problem: Skin Integrity:  Goal: Absence of new skin breakdown  Description: Absence of new skin breakdown  7/7/2020 0222 by Norma Verdin RN  Outcome: Ongoing  Note: No new skin breakdown noted at this time. Will continue to reassess. Patient turns and repositions self in bed frequent       Problem: Discharge Planning:  Goal: Discharged to appropriate level of care  Description: Discharged to appropriate level of care  7/7/2020 0222 by Norma Verdin RN  Outcome: Ongoing  Note: Patient plans to discharge to [facility] when medically stable. Problem: Bowel Function - Altered:  Goal: Bowel elimination is within specified parameters  Description: Bowel elimination is within specified parameters  7/7/2020 0222 by Norma Verdin RN  Outcome: Ongoing  Note: Patient having several soft brown stool. Will continue to reassess. Problem: Cardiac Output - Decreased:  Goal: Hemodynamic stability will improve  Description: Hemodynamic stability will improve  7/7/2020 0222 by Norma Verdin RN  Outcome: Ongoing  Note:   Vitals:    07/07/20 0002   BP: (!) 147/70   Pulse: 89   Resp: 18   Temp: 98.2 °F (36.8 °C)   SpO2: 92%     Monitoring every 4 hours. Will continue to reassess. Problem: Mental Status - Impaired:  Goal: Mental status will be restored to baseline  Description: Mental status will be restored to baseline  7/7/2020 0222 by Norma Verdin RN  Outcome: Ongoing  Note: Patient alert and oriented x4 with intermittent confusion. Will continue to reassess.       Problem: Pain:  Goal: Pain level will decrease  Description: Pain level will decrease  7/7/2020 0222 by Norma Verdin

## 2020-07-07 NOTE — PROGRESS NOTES
[facility]  INPATIENT PHYSICAL THERAPY  DAILY NOTE  STRZ RENAL TELEMETRY 6K - 4V-97/312-F    Time In: 6072  Time Out: 1107  Timed Code Treatment Minutes: 30 Minutes  Minutes: 30          Date: 2020  Patient Name: Yudith Gonzalez,  Gender:  male        MRN: [id number]  : 1934  (80 y.o.)     Referring Practitioner: DENY Abel CNP  Diagnosis: AMS  Additional Pertinent Hx: Per MD note on 2020:  80year old white male nonsmoker with PMH of BPH (with chronic indwelling engel since ), CKD stage 3, HLD, hiatal hernia, CAD s/p MAI to LCx (), IDDM type 2 who presented to [facility] ED via EMS from [facility] on 20 c/o altered mental status x 2 days with associated hallucinations, testicular swelling (seen in the ED on 20), dec UOP per nursing home. He has a hx of frequent UTIs and was started on bactrim for UTI on 20. Per wife/daughter, at baseline he is oriented with no confusion. 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 s/p Open treatment right subtrochanteric femur fracture with a cephalo-medullary nail [id number] on  2020     Prior Level of Function:  Type of Home: Facility([facility])  Home Layout: One level  Home Access: Level entry  Home Equipment: Wheelchair-electric(drew lift)        ADL Assistance: Needs assistance  Homemaking Responsibilities: No  Ambulation Assistance: (Reports drew lift use since AKA aprox 3 years ago)  Transfer Assistance: Needs assistance    Restrictions/Precautions:  Restrictions/Precautions: Fall Risk, General Precautions  Position Activity Restriction  Other position/activity restrictions: s/p ORIF R femur on 2020, hx L AKA    SUBJECTIVE: Pt in bed, agreeable to PT . Daughter present    PAIN: 0/10: at rest.With mobility and ROM face rating 4/10    OBJECTIVE:  Bed Mobility:  Rolling to Right: Maximum Assistance, X 1   Supine to Sit: Moderate Assistance, Maximum Assistance, X 1, with head of bed raised, with increased time for completion  Sit to Supine: Moderate Assistance, Maximum Assistance, X 2, with head of bed raised, with increased time for completion   Scooting: Moderate Assistance, Maximum Assistance, X 1, with head of bed raised    Transfers:  Not Tested        Balance:  Static Sitting Balance: Moderate Assistance, X 1      Exercise:  Patient was guided in 1 set(s) 10 reps of exercise to right lower extremities. Ankle pumps, Glut sets, Quad sets, Heelslides, Short arc quads and Hip abduction/adduction. Exercises were completed for increased independence with functional mobility. Functional Outcome Measures: Completed  -PAC Inpatient Mobility without Stair Climbing Raw Score : 7  AM-PAC Inpatient without Stair Climbing T-Scale Score : 28.66    ASSESSMENT:  Assessment: Patient progressing toward established goals. Activity Tolerance:  Patient tolerance of  treatment: good.       Equipment Recommendations:Equipment Needed: No  Discharge Recommendations:  Continue to assess pending progress, Subacute/Skilled Nursing Facility, ECF with PT    Plan: Times per week: 3-5x O  Times per day: Daily  Specific instructions for Next Treatment: bed mobility, transfers, ther ex, ther act,   Current Treatment Recommendations: Strengthening, ADL/Self-care Training, ROM, Balance Training, Functional Mobility Training, Home Exercise Program, Positioning, Transfer Training, Wheelchair Mobility Training, Safety Education & Training    Patient Education  Patient Education: Plan of Care, Home Exercise Program, Bed Mobility    Goals:  Patient goals : go home  Short term goals  Time Frame for Short term goals: at discharge  Short term goal 1: Pt to be Mod A x 1 for supine <> sit to get sitting edge of bed  Short term goal 2: Pt to sit edge of bed > 6-7 min with SBA for edge of bed activity balance  Short term goal 3: Pt to tolerate >7 reps each of 4-5 LE exercises for strengthening

## 2020-07-07 NOTE — PROGRESS NOTES
Hospitalist Progress Note    Patient:  Greg Strauss      Unit/Bed:6K-009-A    YOB: 1934    MRN: [id number]       Acct: [de-identified]     PCP: Nessa Mccurdy MD    Date of Admission: 2020      Chief Complaint: [address] Course:       Per HPI \"80 year old white male nonsmoker with PMH of BPH (with chronic indwelling engel since ), CKD stage 3, HLD, hiatal hernia, CAD s/p MAI to LCx (), IDDM type 2 who presented to [facility] ED via EMS from [facility] on 20 c/o altered mental status x 2 days with associated hallucinations, testicular swelling (seen in the ED on 20), [address], [address] home. He has a hx of frequent UTIs and was started on bactrim for UTI on 20. Per wife/daughter, at baseline he is oriented with no confusion. 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 Family also notes that the LE edema was no present 2 days ago when he was taken to the ED for scrotal pain. On interview, patient is unable to tell me why he is at the hospital and intermittently responds to external stimuli, but is oriented to person, , year, president, month, place. He endorses scrotal pain with. Denies fever, abdominal pain, SOB, CP, lightheadedness.     In the ED, vitals show: HR 68, /75, 92% on RA and afebrile. Labs show: wbc 12.4, hgb 10.8, MCV 94.2, plt 298, cr 1.9, BUN 34, BUN: Cr 18, Ca 8.2, LFTs unremarkable, lactic 1.1, UA shows 2+ bacteria, WBC 21-30, moderate LE, neg Nitrites, moderate hematuria, trace proteinuria. CXR shows no acute findings. Stable mild bibasilar atelectasis. US scrotom shows prominent amount of hydrocele formation on the right, moderate hydrocele on the left. Fluid on the right appears clear, fluid on the left appears to be markedly complex with multiple pseudo-septations through it. \"    Patient was admitted under hospital medicine service for acute encephalopathy likely due to UTI. 6/28: pt fell and sustained right femur fracture. Orthopedics was consulted and patient underwent ORIF on 6/29/2020. Subjective:     Patient seen and examined. No overnight events noted. Pt reports scrotal/legs swelling about the same. He does report some dry cough this morning. He denies shortness of breath, fever, chills, abd pain, N/V, chest pain, palpitations. Last BM was yesterday.        Medications:  Reviewed    Infusion Medications    furosemide (LASIX) 1mg/ml infusion 5 mg/hr (07/07/20 0545)    dextrose       Scheduled Medications    calcium replacement protocol   Other RX Placeholder    magnesium oxide  400 mg Oral BID    potassium bicarb-citric acid  20 mEq Oral BID    vitamin D  50,000 Units Oral Weekly    zinc oxide   Topical BID    calcium elemental  500 mg Oral BID    gabapentin  300 mg Oral BID    famotidine (PEPCID) injection  20 mg Intravenous Daily    insulin glargine  30 Units Subcutaneous Daily    sodium chloride  20 mL Intravenous Once    aspirin  81 mg Oral Daily    atorvastatin  20 mg Oral Nightly    clopidogrel  75 mg Oral Daily    cyclobenzaprine  10 mg Oral Daily    finasteride  5 mg Oral Daily    [Held by provider] isosorbide mononitrate  30 mg Oral Daily    metoprolol succinate  50 mg Oral Daily    tamsulosin  0.4 mg Oral Nightly    sodium chloride flush  10 mL Intravenous 2 times per day    insulin lispro  0-12 Units Subcutaneous TID WC    insulin lispro  0-6 Units Subcutaneous Nightly     PRN Meds: HYDROmorphone **OR** HYDROmorphone, HYDROcodone-acetaminophen, HYDROcodone-acetaminophen, docusate sodium, traMADol, sodium chloride flush, acetaminophen **OR** acetaminophen, promethazine **OR** ondansetron, glucose, dextrose, glucagon (rDNA), dextrose      Intake/Output Summary (Last 24 hours) at 7/7/2020 0815  Last data filed at 7/7/2020 0347  Gross per 24 hour   Intake 589.63 ml   Output 3450 ml   Net -2860.37 ml       Diet:  DIET RENAL; Low Sodium (2 GM); Daily Fluid Restriction: 1800 ml  Dietary Nutrition Supplements: Diabetic Oral Supplement    Exam:  /60   Pulse 89   Temp 98.4 °F (36.9 °C) (Oral)   Resp 18   Ht 5' 11\" (1.803 m)   Wt 288 lb 1.6 oz (130.7 kg)   SpO2 92%   BMI 40.18 kg/m²     General:  alert, not in acute distress  Head: Normocephalic and atraumatic. EENT: No exophthalmos noted. No scleral or conjunctiva icterus, injection or pallor noted. No maxillary or frontal sinus tenderness to percussion. Neck: Supple. Trachea midline. No thyromegaly. Thorax/Lungs: Thorax is symmetrical with good expansion.  No retractions or use of abdominal muscles. (+) crackles on both lung bases and GORGE field. No rhonchi, no wheezing. Cardiac: S1, S2, RRR without murmur, rub, or gallop. No JVD  Abdomen: slightly distended, soft, nontender to palpation, without guarding or rigidity. Normoactive BS  : (+) suprapubic cath in placed. Did not examine scrotal and groin area today ( no chaperone available)  Neurological exam reveals alert, oriented x3, normal speech, no focal findings or movement disorder noted. Exam of extremities: (+) left AKA, RLE: hard to palpate pedal pulses due to edema, 2-3 + pitting edema on right foot up to right thigh, no clubbing or cyanosis      Labs:   Recent Labs     07/06/20  2235   HGB 9.2*   HCT 29.2*     Recent Labs     07/07/20  0602      K 3.8   CL 97*   CO2 28   BUN 58*   CREATININE 3.1*   CALCIUM 8.2*     No results for input(s): AST, ALT, BILIDIR, BILITOT, ALKPHOS in the last 72 hours. Recent Labs     07/05/20  0637   INR 1.08     No results for input(s): Mila Grumet in the last 72 hours. Urinalysis:      Lab Results   Component Value Date    NITRU NEGATIVE 07/01/2020    WBCUA 15-25 07/01/2020    BACTERIA NONE SEEN 07/01/2020    RBCUA 3-5 07/01/2020    BLOODU MODERATE 07/01/2020    SPECGRAV 1.015 07/01/2020    GLUCOSEU NEGATIVE 05/22/2017       Radiology:   All imaging reviewed Assessment/Plan:      Acute Metabolic Encephalopathy likely secondary to UTI/acute urinary retention, resolved     -CT head showed: no acute findings. Ammonia wnl. Mixed Blood Gas unremarkable. Immediate 1300mL UOP with change of engel. Drastically improved with IV ABx and engel exchange.  AAOx3 and answers questions appropriately today. Acute Complicated UTI / BPH with chronic suprapubic catheter, with acute retention ( acute retention resolved)    -Frequent UTIs 3-4 since dec 2019. UA ([facility]): 2+ bacteria, WBC 21-30, moderate LE, neg Nitrites.   -Ux grew Enterococcus feacalis, morganella morganii. Completed 8 days of Linezolid, last dose of abx 7/5.   -cont  Finasteride andTamsulosin. -has chronic suprapubic cath, last exchanged 6/28/2020 per RN       Acute on chronic decompensated Combined Diastolic PST GTTCO 99%    2-3+ pitting edema BLE. -CXR shows prominent interstitium with likely mild pulmonary edema. Repeat cxr on 7/5: Mild retrocardiac atelectasis/pneumonia. Overall appearance of chest improved from prior  -BNP 3k.   -2D Echo 6/2020 EF 45% with Grade 1 DD.   -nephrology switched IV Lasix to lasix gtt 7/6. Appreciate nephrology input  -I/O: -2,560.4 cc/hr for past 24 hours  -wt down to 288 lbs today from 290 lbs on 7/6  -Daily weights, I/Os, fluid and salt restrictions      Mild retrocardiac atelectasis/pneumonia    -noted on cxr 7/5, suspect this is more atelectasis. Pt has dry cough. Will check procal  -start IS      Acute Hypoxic Resp. Failure secondary to CHF exacerbation, improved    - Pt drops mainly when sleeping to 87% on RA. Likely AKIRA. Henrik Cristina pt was requiring 1-2L NC. Now on RA, saturating well.   -cont to monitor oxygen saturation      EFREN on CKD stage 4, likely multifactorial due to hypotension vs anemia vs volume overload, slightly improving        -Creatinine 1.9 on admission, now 3.1 from 3.2. Baseline creatinine ranges between 1.7-2.0.  -cont Midodrine 2.5, hold BP medications. -Nephrology on-board. Appreciate nephrology input.   -Daily weights, I/Os, fluid and salt restrictions    Fall with Femur Fracture    -had a unwitnessed fall on 6/29. Found to have a subsequent femur fracture. No bleeds. Cardiology cleared for surgery.  S/P successful surgery on 6/29. PT/OT. Post Op Related Acute Blood Loss Anemia    Hb stable at 9.2. Baseline hemoglobin around 8  -No melena, hematochezia.  S/P 1U blood 7/1 due to hemoglobin of 6.9 on 6/30/2020.  - ASA and Plavix resumed on 7/4. Cont to monitor H&H.     Hypoalbuminemia, rule out malnutrition    -Dietitian consult    Hypocalcemia likely due to hypoalbuminemia and vitamin D deficiency    -Started ergocalciferol 7/6  -Calcium replacement protocol  -ical 0.99 today. Check calcium and ionized calcium in a.m.  -Repeat vitamin D in 4-6 weeks and follow-up with PCP        3rd Deg Heart Block s/p pacemaker (2010): noted      DM type 2, controlled    -Continue home lantus at reduced dose, medium dose SSI. Goal BG of 140-180 while hospitalized. Hypoglycemia order set. Accucheck ACHS.       Chronic back pain: Hold flexeril, PRN tramadol. Resume as needed.     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 Hypomagnesemia, stable    -start magnesium oxide oral   -magnesium level in am         Disposition: DC back to [facility] once medically stable        Diet: DIET RENAL; Low Sodium (2 GM);  Daily Fluid Restriction: 1800 ml  Dietary Nutrition Supplements: Diabetic Oral Supplement      Code Status: Full Code            Electronically signed by Marquita Duran MD on 7/7/2020 at 8:58 AM

## 2020-07-07 NOTE — PROGRESS NOTES
Renal Progress Note  [facility]    Patient :  Xin Manriquez; 80 y.o. MRN# [id number]  Location:  6Y-16/[facility]  Attending: Monica Sethi MD  Admit Date:  6/27/2020   Hospital Day: 10      Subjective:     Nephrology is following the patient for EFREN/CKD. Patient seen and examined. On lasix drip at 5 mg/hour. Urine output improved. Swelling a little better. Denies SOB. On room air. Outpatient Medications:     Medications Prior to Admission: bumetanide (BUMEX) 1 MG tablet, Take 1 tablet by mouth 2 times daily  potassium chloride (KLOR-CON M) 20 MEQ extended release tablet, Take 1 tablet by mouth 2 times daily  insulin aspart (NOVOLOG) 100 UNIT/ML injection vial, Inject into the skin 3 times daily (before meals) Per Sliding Scale = No Insulin, 140-199= 2 Units, 200-249= 4 Units, 250-290= 6 Units, 300-349= 8 Units, 350-399= 10 Units, 400+= 12 Units  finasteride (PROSCAR) 5 MG tablet, Take 1 tablet by mouth daily  gabapentin (NEURONTIN) 300 MG capsule, Take 300 mg by mouth 3 times daily. metoprolol succinate ER (TOPROL-XL) 50 MG XL tablet, Take 1 tablet by mouth daily  clopidogrel (PLAVIX) 75 MG tablet, Take 1 tablet by mouth daily  aspirin 81 MG chewable tablet, Take 1 tablet by mouth daily  Multiple Vitamins-Minerals (THERAPEUTIC MULTIVITAMIN-MINERALS) tablet, Take 1 tablet by mouth daily  [DISCONTINUED] traMADol (ULTRAM) 50 MG tablet, Take 50 mg by mouth 2 times daily as needed for Pain.   BASAGLAR KWIKPEN 100 UNIT/ML injection pen, Inject 75 Units into the skin daily   docusate sodium (COLACE) 100 MG capsule, Take 100 mg by mouth daily as needed for Constipation  benzonatate (TESSALON) 200 MG capsule, Take 200 mg by mouth every 8 hours as needed for Cough  BD AUTOSHIELD DUO 30G X 5 MM MISC,   guaiFENesin (ROBITUSSIN) 100 MG/5ML liquid, Take 10 mLs by mouth every 6 hours as needed   ASPERCREME W/LIDOCAINE EX, Apply topically every 4 hours as needed (Apply to back PRN for Pain)   cyclobenzaprine (FLEXERIL) 10 MG tablet, 10 mg daily Taking once daily for leg spasms and the Every 8 hours PRN TID for Pain. Hold if drowsy, confused, or sleepy. Incontinence Supplies (BEDSIDE DRAINAGE BAG) MISC, Dispense one, 2000 cc overnight urinary bag  insulin NPH (HUMULIN N;NOVOLIN N) 100 UNIT/ML injection vial, Inject 10 Units into the skin 2 times daily (Patient taking differently: Inject 22 Units into the skin nightly Inject 22 units qhs and 28 units daily)  tamsulosin (FLOMAX) 0.4 MG capsule, Take 1 capsule by mouth nightly  loperamide (IMODIUM) 2 MG capsule, Take 2 mg by mouth 4 times daily as needed for Diarrhea (for diarrhea related to weakness)  atorvastatin (LIPITOR) 20 MG tablet, Take 20 mg by mouth nightly  pantoprazole (PROTONIX) 40 MG tablet, Take 40 mg by mouth nightly   nitroGLYCERIN (NITROSTAT) 0.4 MG SL tablet, Place 1 tablet under the tongue every 5 minutes as needed for Chest pain  acetaminophen (TYLENOL) 325 MG tablet, Take 650 mg by mouth every 6 hours   isosorbide mononitrate (IMDUR) 30 MG CR tablet, Take 30 mg by mouth daily.       Current Medications:     Scheduled Meds:    calcium replacement protocol   Other RX Placeholder    magnesium oxide  400 mg Oral BID    potassium bicarb-citric acid  20 mEq Oral BID    vitamin D  50,000 Units Oral Weekly    zinc oxide   Topical BID    calcium elemental  500 mg Oral BID    gabapentin  300 mg Oral BID    famotidine (PEPCID) injection  20 mg Intravenous Daily    insulin glargine  30 Units Subcutaneous Daily    sodium chloride  20 mL Intravenous Once    aspirin  81 mg Oral Daily    atorvastatin  20 mg Oral Nightly    clopidogrel  75 mg Oral Daily    cyclobenzaprine  10 mg Oral Daily    finasteride  5 mg Oral Daily    [Held by provider] isosorbide mononitrate  30 mg Oral Daily    metoprolol succinate  50 mg Oral Daily    tamsulosin  0.4 mg Oral Nightly    sodium chloride flush  10 mL Intravenous 2 times per day    insulin lispro  0-12 Units Subcutaneous TID     insulin lispro  0-6 Units Subcutaneous Nightly     Continuous Infusions:    furosemide (LASIX) 1mg/ml infusion 5 mg/hr (20 0548)    dextrose       PRN Meds:  HYDROmorphone **OR** HYDROmorphone, HYDROcodone-acetaminophen, HYDROcodone-acetaminophen, docusate sodium, traMADol, sodium chloride flush, acetaminophen **OR** acetaminophen, promethazine **OR** ondansetron, glucose, dextrose, glucagon (rDNA), dextrose    Input/Output:       I/O last 3 completed shifts: In: 889.6 [P.O.:800; I.V.:89.6]  Out: 3450 [Urine:3450]. Patient Vitals for the past 96 hrs (Last 3 readings):   Weight   20 0345 288 lb 1.6 oz (130.7 kg)   20 0329 290 lb 4.8 oz (131.7 kg)   20 0340 291 lb (132 kg)       Vital Signs:   Temperature:  Temp: 98.4 °F (36.9 °C)  TMax:   Temp (24hrs), Av.3 °F (36.8 °C), Min:98.1 °F (36.7 °C), Max:98.4 °F (36.9 °C)    Respirations:  Resp: 18  Pulse:   Pulse: 89  BP:    BP: 128/60  BP Range: Systolic (65PSC), TQQ:211 , Min:118 , HXD:019       Diastolic (82EOE), WWU:35, Min:55, Max:72      Physical Examination:     General:  Awake, alert  HEENT: NC/AT/ MMM  Chest:               Diminished no audible rales  Cardiac:  S1 S2   Abdomen: Soft, non-tender,  Neuro:  No facial droop, No Asterixis  SKIN:  No rashes, good skin turgor. Extremities:  2+ edema right leg, + left BKA with thigh edema, improved + arm edema    Labs:       Recent Labs     20  2253 20  1017 20  2235   HGB 8.6* 9.1* 9.2*   HCT 28.5* 29.5* 29.2*      BMP:   Recent Labs     20  0623 20  1742 20  0602    136 137   K 3.4* 4.0 3.8   CL 98 94* 97*   CO2 26 26 28   BUN 58* 57* 58*   CREATININE 3.5* 3.2* 3.1*   GLUCOSE 135* 189* 143*   CALCIUM 7.8* 7.8* 8.2*      Phosphorus:     No results for input(s): PHOS in the last 72 hours.   Magnesium:    Recent Labs     20  0637 20  0623 20  0602   MG 1.8 1.8 1.8 Albumin:    No results for input(s): LABALBU in the last 72 hours. BNP:      Lab Results   Component Value Date    BNP 2,278 11/07/2019     LYN:    No results found for: LYN  SPEP:  Lab Results   Component Value Date    PROT 6.5 06/27/2020    PROT 6.1 06/10/2016     UPEP:   No results found for: LABPE  C3:   No results found for: C3  C4:   No results found for: C4  MPO ANCA:   No results found for: MPO  PR3 ANCA:   No results found for: PR3  Anti-GBM:   No results found for: GBMABIGG  Hep BsAg:       No results found for: HEPBSAG  Hep C AB:        No results found for: HEPCAB    Urinalysis/Chemistries:      Lab Results   Component Value Date    NITRU NEGATIVE 07/01/2020    COLORU YELLOW 07/01/2020    PHUR 5.0 07/01/2020    LABCAST 8-15 HYALINE 07/01/2020    LABCAST NONE SEEN 07/01/2020    WBCUA 15-25 07/01/2020    RBCUA 3-5 07/01/2020    YEAST NONE SEEN 07/01/2020    BACTERIA NONE SEEN 07/01/2020    SPECGRAV 1.015 07/01/2020    LEUKOCYTESUR TRACE 07/01/2020    UROBILINOGEN 0.2 07/01/2020    BILIRUBINUR NEGATIVE 07/01/2020    BLOODU MODERATE 07/01/2020    GLUCOSEU NEGATIVE 05/22/2017    KETUA NEGATIVE 07/01/2020     Urine Sodium:   No results found for: EDOUARD  Urine Potassium:  No results found for: KUR  Urine Chloride:  No results found for: CLUR  Urine Osmolarity:   Lab Results   Component Value Date    OSMOU 325 07/01/2020     Urine Protein:   No components found for: TOTALPROTEIN, URINE   Urine Creatinine:     Lab Results   Component Value Date    LABCREA 70.6 05/31/2019     Urine Eosinophils:  No components found for: UEOS        Impression and Plan:  1. EFREN on CKD IIIb due to post-op hypotension/ATN with volume overload : improving, volume status improving with diuretic drip. No need for RRT at this time will continue with lasix drip today  2. Volume overload :see above   3. HTN s/p hypotension. Improved   4. Hypokalemia from diuretics: on effer-K 20 meq BID  5.  Acute blood loss anemia s/p PRBC transfusion, Hgb stable   6. Right femur fx s/p fall with repair  7. UTI due to Enterococcus  8. Right hydrocele  9. Urinary retention with chronic engel  10. DM  11. Systolic CHF            Please don't hesitate to call with any questions.   Electronically signed by [id number] Karina Wolf DO on 7/7/2020 at 11:05 AM

## 2020-07-07 NOTE — PLAN OF CARE
Care plan reviewed with patient and family. Patient and family verbalize understanding of the plan of care and contribute to goal setting. Problem: Falls - Risk of:  Goal: Will remain free from falls  Description: Will remain free from falls  Outcome: Ongoing  Note: No falls noted this shift. Continue falling star program. Bed alarm on, bed in low position. Call light and personal belongings in reach. Patient uses call light appropriately. Goal: Absence of physical injury  Description: Absence of physical injury  Outcome: Ongoing  Note: No falls noted this shift. Continue falling star program. Bed alarm on, bed in low position. Call light and personal belongings in reach. Patient uses call light appropriately. Problem: Skin Integrity:  Goal: Will show no infection signs and symptoms  Description: Will show no infection signs and symptoms  Outcome: Ongoing  Note: VSS, no fevers  Goal: Absence of new skin breakdown  Description: Absence of new skin breakdown  Outcome: Ongoing  Note: No new signs or symptoms of skin breakdown noted this shift, encouraging patient to turn and reposition self in bed q2h  Pinxav, scrotum sling, ACE wrap RLE, right heel elevated on pillow     Problem: Discharge Planning:  Goal: Participates in care planning  Description: Participates in care planning  Outcome: Ongoing  Note: Patient aware and updated on plan of care    Goal: Discharged to appropriate level of care  Description: Discharged to appropriate level of care  Outcome: Ongoing  Note: Plan to discharge back to [facility]     Problem:  Bowel Function - Altered:  Goal: Bowel elimination is within specified parameters  Description: Bowel elimination is within specified parameters  Outcome: Ongoing  Note: WNL, BM today      Problem: Cardiac Output - Decreased:  Goal: Hemodynamic stability will improve  Description: Hemodynamic stability will improve  Outcome: Ongoing  Note: VSS, Lasix drip at 5, nephrology

## 2020-07-07 NOTE — PROGRESS NOTES
feels he receives too much food here in hospital, eating~ 50% of meals here. Glucerna started yesterday by writer and pt reports he received and is drinking. Weight hard to assess due to edema/fluid fluctuations. 7/3/20: Phosphorus 4.8,  7/7/20: BUN 58, Creatinine 3.1, Glucose 143, Chemstick 166. Vitamin D, lasix, oscal, pepcid, lantus, humalog. Pt denies any nausea/vomiting. BM 7/6/20. Pt and family member report understanding of current diet. · Wound Type: (right hip surgical incision, left thigh surgical )  · Current Nutrition Therapies:  · Oral Diet Orders: Carb Control 5 Carbs/Meal, Renal, 2gm Sodium, Fluid Restriction(1800 ml fluid restriction)   · Oral Diet intake: 1-25%, 51-75%  · Oral Nutrition Supplement (ONS) Orders: Diabetic Oral Supplement(Glucerna BID)  · ONS intake:  % per pt/family  · Anthropometric Measures:  · Ht: 5' 11\" (180.3 cm)   · Current Body Wt: 288 lb 1.6 oz (130.7 kg)(+2 generalized, +3 LE edema)  · Admission Body Wt: 289 lb 9.6 oz (131.4 kg)(6/27/20 +4 RLE edema)  · Usual Body Wt: (~ 279# per pt but fluctuates with fluid/edema. Per EMR: 1/7/20: 254#, 2/28/20: 272#, 5/12/20: 275#)  · % Weight Change:  ,  Hard to assess due to fluid/edema fluctuations, on lasix  · Ideal Body Wt: 162 lb (73.5 kg)(adjusted for left BKA),   · BMI Classification: BMI > or equal to 40.0 Obese Class III(42.3 adjusted for BKA)    Nutrition Interventions:   Continue current diet, Start ONS  Continued Inpatient Monitoring, Education Initiated, Coordination of Care(Encouraged oral intake, good blood sugar control, diet compliance, and ONS use to assist with wound healing efforts.)    Nutrition Evaluation:   · Evaluation: Goals set   · Goals: Patient will consume 75% or more of meals during LOS to assist in wound healing efforts.     · Monitoring: Meal Intake, Supplement Intake, Diet Tolerance, Wound Healing, Weight, Pertinent Labs      Electronically signed by Joselito Samuel RD, JOSSELINE on 7/7/20 at 9:51 AM EDT    Contact [phone]

## 2020-07-07 NOTE — CARE COORDINATION
7/7/20, 7:37 AM EDT    DISCHARGE ONGOING EVALUATION:     [address] day: 10  Location: [address] [place] [address]- Reason for admit: AMS (altered mental status) [R41.82]   Procedure: 6/29 right hip intertan  Treatment Plan of Care: Creat 3.1. Lasix gtt, DM management. Nephrology has not yet decided if HD will be needed. Palliative care involved. Barriers to Discharge: not medically stable. PCP: Korey Cole MD  Readmission Risk Score: 25%  Patient Goals/Plan/Treatment Preferences: return to [facility]    Update: [employer] message sent to Dr. Allison Cabrera to make aware of LOS and ask about potential timing of discharge. Dr. Allison Cabrera states will likely need 1-2 days more of lasix gtt and then may be ready for discharge.

## 2020-07-07 NOTE — PLAN OF CARE
Problem: Nutrition  Goal: Optimal nutrition therapy  Outcome: Ongoing   Nutrition Problem: Increased nutrient needs  Intervention: Food and/or Nutrient Delivery: Continue current diet, Start ONS  Nutritional Goals: Patient will consume 75% or more of meals during LOS to assist in wound healing efforts.

## 2020-07-07 NOTE — PROGRESS NOTES
ml/min ([address] 4/16/2015    Edema     Hiatal hernia     Hyperlipidemia     Hypertension     Osteoarthritis     S/P PTCA: 5/12/2015: Stenting of proximal left circumflex artery with Xience 2.75X15 mm, post-dilated to 3.70 mm. 5/12/2015 5/12/2015: Stenting of proximal left circumflex artery with Xience 2.75X15 mm, post-dilated to 3.70 mm. Dr. Chavez Anselmo Type II or unspecified type diabetes mellitus without mention of complication, not stated as uncontrolled        Pain: No pain reported. Subjective:  Pt resting in bed upon arrival, awake and alert. Daughter present at bedside, providing intermittent support and encouragement; family does endorse recent increase in confusion levels within this hospitalization. Nephrology with approximately 5 minutes of rounding during evaluation to provide updates on medical POC. 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 Hobbies: Card games ([place] [employer]  Vision Status: Glasses  Hearing: WFL  Type of Home: Facility([facility])  Home Layout: One level  Home Access: Level entry  Home Equipment: Wheelchair-electric(drew lift)    ORAL MOTOR:  Facial / Labial WFL    Lingual WFL    Dentition WFL    Velum WFL    Vocal Quality WFL    Sensation WFL    Cough Not Tested      SPEECH / VOICE:  Speech and Voice appear to be grossly intact for basic and complex daily communication    LANGUAGE:  Receptive and Expressive:  Receptive and expressive language skills appear to be grossly intact for basic and complex daily communication. No apparent communicative breakdowns at dialogue level with pt successful for conveying desired message. Independent executions of multi-step commands throughout evaluation.      COGNITION:  [place] Cognitive Assessment [facility]) version 8.1 completed. Pt scored 18/25. Normal is greater than or equal to 21/25. *adjusted score given pt not able to complete visuospatial/executive functioning subtests given low vision (lacking magnifying glass)  Orientation: 4/6  Immediate Recall: 4/5  Short-Term Recall: 2/5  Divergent Naming: WFL, 11 items named in 45 second time frame (target=11 in 1 minute)  Problem Solving: 3/3  Reasonin/2 verbal  Sequencin/1  Thought Organization: WFL  Insight: Adequate  Attention: Adequate sustained and selective attenion, basic level  Math Computation: 4/5 serial subtraction  Executive Functioning: DNT    SWALLOWING:  Current Diet: Regular with thin liquids with RN Elena Romero and family denying concerns r/t swallow function. RECOMMENDATIONS/ASSESSMENT:  DIAGNOSTIC IMPRESSIONS:  Pt presents with a mild cognitive deficit given impaired temporal orientation, immediate/delayed recall, working memory, verbal reasoning, higher level problem solving, math computation, and mental flexibility. Expressive and receptive language domains grossly intact with no apparent communicative breakdowns. No dysphagia, dysarthria, or dysphonia. Question potential baseline achievement of cognitive status given low cognitive demand in ECF setting, however, skilled ST services are recommended on a trial bases to address FUNCTIONAL measures to ensure safe participation and re-integration into facility environment. Rehabilitation Potential: good    EDUCATION:  Learner: Patient and Family  Education:  Reviewed results and recommendations of this evaluation, Reviewed ST goals and Plan of Care and Reviewed recommendations for follow-up  Evaluation of Education: Carmen Lucero understanding and Needs further instruction    PLAN:  Skilled SLP intervention on acute care 3-5 x per week or until goals met and/or pt plateaus in function. Specific interventions for next session may include: cognitive tasks. PATIENT GOAL:    Did not state.   Will further assess during treatment. SHORT TERM GOALS:  Short-term Goals  Timeframe for Short-term Goals: 2 weeks  Goal 1: Pt will complete functional immediate and delayed recall tasks with 80% accuracy given min cues (focus on compensatory strategies) to improve retention of pertinent information. Goal 2: Pt will complete problem solving and verbal/visual reasoning tasks with 80% accuracy given min cues to improve safe participation within ADL completion.      LONG TERM GOALS:  No LTG established given short SAL Hoffman M.A., [address] [place] [address]

## 2020-07-08 LAB
ANION GAP SERPL CALCULATED.3IONS-SCNC: 11 MEQ/L (ref 8–16)
ANION GAP SERPL CALCULATED.3IONS-SCNC: 12 MEQ/L (ref 8–16)
BUN BLDV-MCNC: 57 MG/DL (ref 7–22)
BUN BLDV-MCNC: 58 MG/DL (ref 7–22)
CALCIUM IONIZED: 1.09 MMOL/L (ref 1.12–1.32)
CALCIUM SERPL-MCNC: 8.2 MG/DL (ref 8.5–10.5)
CALCIUM SERPL-MCNC: 8.3 MG/DL (ref 8.5–10.5)
CHLORIDE BLD-SCNC: 86 MEQ/L (ref 98–111)
CHLORIDE BLD-SCNC: 92 MEQ/L (ref 98–111)
CO2: 31 MEQ/L (ref 23–33)
CO2: 33 MEQ/L (ref 23–33)
CREAT SERPL-MCNC: 2.8 MG/DL (ref 0.4–1.2)
CREAT SERPL-MCNC: 3.1 MG/DL (ref 0.4–1.2)
GFR SERPL CREATININE-BSD FRML MDRD: 19 ML/MIN/1.73M2
GFR SERPL CREATININE-BSD FRML MDRD: 22 ML/MIN/1.73M2
GLUCOSE BLD-MCNC: 187 MG/DL (ref 70–108)
GLUCOSE BLD-MCNC: 199 MG/DL (ref 70–108)
GLUCOSE BLD-MCNC: 206 MG/DL (ref 70–108)
GLUCOSE BLD-MCNC: 213 MG/DL (ref 70–108)
GLUCOSE BLD-MCNC: 217 MG/DL (ref 70–108)
GLUCOSE BLD-MCNC: 259 MG/DL (ref 70–108)
HCT VFR BLD CALC: 27.5 % (ref 42–52)
HEMOGLOBIN: 8.6 GM/DL (ref 14–18)
MAGNESIUM: 1.7 MG/DL (ref 1.6–2.4)
POTASSIUM SERPL-SCNC: 3.6 MEQ/L (ref 3.5–5.2)
POTASSIUM SERPL-SCNC: 3.9 MEQ/L (ref 3.5–5.2)
SARS-COV-2, NAAT: NOT DETECTED
SODIUM BLD-SCNC: 130 MEQ/L (ref 135–145)
SODIUM BLD-SCNC: 135 MEQ/L (ref 135–145)

## 2020-07-08 PROCEDURE — 6370000000 HC RX 637 (ALT 250 FOR IP): Performed by: PHYSICIAN ASSISTANT

## 2020-07-08 PROCEDURE — 6370000000 HC RX 637 (ALT 250 FOR IP): Performed by: INTERNAL MEDICINE

## 2020-07-08 PROCEDURE — 99232 SBSQ HOSP IP/OBS MODERATE 35: CPT | Performed by: INTERNAL MEDICINE

## 2020-07-08 PROCEDURE — 94760 N-INVAS EAR/PLS OXIMETRY 1: CPT

## 2020-07-08 PROCEDURE — 83735 ASSAY OF MAGNESIUM: CPT

## 2020-07-08 PROCEDURE — 99232 SBSQ HOSP IP/OBS MODERATE 35: CPT | Performed by: FAMILY MEDICINE

## 2020-07-08 PROCEDURE — 82948 REAGENT STRIP/BLOOD GLUCOSE: CPT

## 2020-07-08 PROCEDURE — 97530 THERAPEUTIC ACTIVITIES: CPT

## 2020-07-08 PROCEDURE — 97110 THERAPEUTIC EXERCISES: CPT

## 2020-07-08 PROCEDURE — 2580000003 HC RX 258: Performed by: INTERNAL MEDICINE

## 2020-07-08 PROCEDURE — 80048 BASIC METABOLIC PNL TOTAL CA: CPT

## 2020-07-08 PROCEDURE — 6370000000 HC RX 637 (ALT 250 FOR IP): Performed by: FAMILY MEDICINE

## 2020-07-08 PROCEDURE — 6360000002 HC RX W HCPCS: Performed by: INTERNAL MEDICINE

## 2020-07-08 PROCEDURE — 97129 THER IVNTJ 1ST 15 MIN: CPT

## 2020-07-08 PROCEDURE — U0002 COVID-19 LAB TEST NON-CDC: HCPCS

## 2020-07-08 PROCEDURE — 82330 ASSAY OF CALCIUM: CPT

## 2020-07-08 PROCEDURE — 2580000003 HC RX 258: Performed by: PHYSICIAN ASSISTANT

## 2020-07-08 PROCEDURE — 85014 HEMATOCRIT: CPT

## 2020-07-08 PROCEDURE — 36415 COLL VENOUS BLD VENIPUNCTURE: CPT

## 2020-07-08 PROCEDURE — 2500000003 HC RX 250 WO HCPCS: Performed by: INTERNAL MEDICINE

## 2020-07-08 PROCEDURE — 85018 HEMOGLOBIN: CPT

## 2020-07-08 PROCEDURE — 1200000003 HC TELEMETRY R&B

## 2020-07-08 RX ORDER — METOLAZONE 5 MG/1
5 TABLET ORAL ONCE
Status: COMPLETED | OUTPATIENT
Start: 2020-07-08 | End: 2020-07-08

## 2020-07-08 RX ADMIN — CALCIUM 500 MG: 500 TABLET ORAL at 08:30

## 2020-07-08 RX ADMIN — Medication: at 20:20

## 2020-07-08 RX ADMIN — TAMSULOSIN HYDROCHLORIDE 0.4 MG: 0.4 CAPSULE ORAL at 20:12

## 2020-07-08 RX ADMIN — METOLAZONE 5 MG: 5 TABLET ORAL at 10:46

## 2020-07-08 RX ADMIN — FINASTERIDE 5 MG: 5 TABLET, FILM COATED ORAL at 08:30

## 2020-07-08 RX ADMIN — MAGNESIUM GLUCONATE 500 MG ORAL TABLET 400 MG: 500 TABLET ORAL at 08:30

## 2020-07-08 RX ADMIN — POTASSIUM BICARBONATE 20 MEQ: 782 TABLET, EFFERVESCENT ORAL at 13:26

## 2020-07-08 RX ADMIN — GABAPENTIN 300 MG: 300 CAPSULE ORAL at 08:30

## 2020-07-08 RX ADMIN — METOPROLOL SUCCINATE 50 MG: 50 TABLET, FILM COATED, EXTENDED RELEASE ORAL at 08:30

## 2020-07-08 RX ADMIN — FAMOTIDINE 20 MG: 10 INJECTION, SOLUTION INTRAVENOUS at 08:30

## 2020-07-08 RX ADMIN — CYCLOBENZAPRINE 10 MG: 10 TABLET, FILM COATED ORAL at 08:30

## 2020-07-08 RX ADMIN — CLOPIDOGREL BISULFATE 75 MG: 75 TABLET ORAL at 08:30

## 2020-07-08 RX ADMIN — FUROSEMIDE 5 MG/HR: 10 INJECTION, SOLUTION INTRAMUSCULAR; INTRAVENOUS at 20:12

## 2020-07-08 RX ADMIN — POTASSIUM BICARBONATE 20 MEQ: 782 TABLET, EFFERVESCENT ORAL at 08:30

## 2020-07-08 RX ADMIN — MAGNESIUM GLUCONATE 500 MG ORAL TABLET 400 MG: 500 TABLET ORAL at 20:12

## 2020-07-08 RX ADMIN — POTASSIUM BICARBONATE 20 MEQ: 782 TABLET, EFFERVESCENT ORAL at 20:17

## 2020-07-08 RX ADMIN — GABAPENTIN 300 MG: 300 CAPSULE ORAL at 20:12

## 2020-07-08 RX ADMIN — FUROSEMIDE 5 MG/HR: 10 INJECTION, SOLUTION INTRAMUSCULAR; INTRAVENOUS at 01:08

## 2020-07-08 RX ADMIN — Medication: at 08:31

## 2020-07-08 RX ADMIN — INSULIN GLARGINE 30 UNITS: 100 INJECTION, SOLUTION SUBCUTANEOUS at 07:48

## 2020-07-08 RX ADMIN — SODIUM CHLORIDE, PRESERVATIVE FREE 10 ML: 5 INJECTION INTRAVENOUS at 08:31

## 2020-07-08 RX ADMIN — ASPIRIN 81 MG 81 MG: 81 TABLET ORAL at 08:30

## 2020-07-08 RX ADMIN — CALCIUM 500 MG: 500 TABLET ORAL at 20:12

## 2020-07-08 RX ADMIN — ATORVASTATIN CALCIUM 20 MG: 20 TABLET, FILM COATED ORAL at 20:12

## 2020-07-08 RX ADMIN — HYDROCODONE BITARTRATE AND ACETAMINOPHEN 1 TABLET: 5; 325 TABLET ORAL at 16:54

## 2020-07-08 ASSESSMENT — PAIN DESCRIPTION - FREQUENCY: FREQUENCY: INTERMITTENT

## 2020-07-08 ASSESSMENT — PAIN SCALES - GENERAL
PAINLEVEL_OUTOF10: 5
PAINLEVEL_OUTOF10: 0
PAINLEVEL_OUTOF10: 0

## 2020-07-08 ASSESSMENT — PAIN DESCRIPTION - ORIENTATION: ORIENTATION: RIGHT

## 2020-07-08 ASSESSMENT — PAIN DESCRIPTION - LOCATION: LOCATION: HIP

## 2020-07-08 ASSESSMENT — PAIN DESCRIPTION - DESCRIPTORS: DESCRIPTORS: ACHING

## 2020-07-08 ASSESSMENT — PAIN DESCRIPTION - PROGRESSION: CLINICAL_PROGRESSION: GRADUALLY WORSENING

## 2020-07-08 ASSESSMENT — PAIN - FUNCTIONAL ASSESSMENT: PAIN_FUNCTIONAL_ASSESSMENT: PREVENTS OR INTERFERES SOME ACTIVE ACTIVITIES AND ADLS

## 2020-07-08 ASSESSMENT — PAIN DESCRIPTION - PAIN TYPE: TYPE: ACUTE PAIN

## 2020-07-08 ASSESSMENT — PAIN DESCRIPTION - ONSET: ONSET: ON-GOING

## 2020-07-08 NOTE — PROGRESS NOTES
[facility]  INPATIENT SPEECH THERAPY  [facility] RENAL TELEMETRY 6K  DAILY NOTE    TIME   SLP Individual Minutes  Time In: 2435  Time Out: 5743  Minutes: 14  Timed Code Treatment Minutes: 14 Minutes       Date: 2020  Patient Name: Tamera Corrigan      CSN: [id number]   : 1934  (80 y.o.)  Gender: male   Referring Physician: Sudhakar Hanson MD  Diagnosis: AMS  Secondary Diagnosis: Cognitive deficits   Precautions: Fall Risk  Current Diet: Regular diet with thin liquids  Swallowing Strategies: Standard Universal Swallow Precautions  Date of Last MBS: Not Applicable    Pain:  No pain reported. Subjective:  Patient seen at bedside following completion of lunch. Patient cooperative, although lethargic. Patient's wife present and supportive. Patient's wife reports, \"good success with lunch this date. \"    Short-Term Goals:  SHORT TERM GOAL #1:  Goal 1: Pt will complete functional immediate and delayed recall tasks with 80% accuracy given min cues (focus on compensatory strategies) to improve retention of pertinent information. INTERVENTIONS: Orientation:  Name-indep  -month-indep, date-indep, year-mod cues  IRMA-indep  Month-indep  Year-unable to elicit despite max cues  Location-indep  Time of day-indep    Recall x2 new units of information (ST name + therapy discipline): Immediate recall: 2/2 indep, 5 minute delay: 1/2 indep, 1/2 mod cues     Education provided re: compensatory memory strategies (association), example provided. SHORT TERM GOAL #2:  Goal 2: Pt will complete problem solving and verbal/visual reasoning tasks with 80% accuracy given min cues to improve safe participation within ADL completion. INTERVENTIONS:   Problem solving: Patient able to state use of call light; however, UNABLE to locate. ST located call light and placed on patient's lap.  ST then asked patient to provide rational to support use of call light; patient required at least moderate level of cues to generate functional reason x2 and rational to utilize call light. Will require ongoing education. Reasoning: Patient presented with hypothetical situation, cued to generate x2 reasons per situation  Trial 1 Hospital: 1/2 indep, 1/2 mod cues  Trial 2 Stomach ache: 1/2 indep, 1/2 mod cues  Trial 3 Call light: 1/2 indep, 1/2 mod cues  *decreased mental flexibility noted        EDUCATION:  Learner: Patient and Significant Other  Education:  Reviewed results and recommendations of this evaluation, Reviewed ST goals and Plan of Care, Reviewed recommendations for follow-up, Education Related to Potential Risks and Complications Due to Impairment/Illness/Injury and Education Related to Prevention of Recurrence of Impairment/Illness/Injury  Evaluation of Education: Verbalizes understanding, Demonstrates with assistance and Needs further instruction    ASSESSMENT/PLAN:  Activity Tolerance:  Patient tolerance of  treatment: fair. Assessment/Plan: Patient progressing toward established goals. Continues to require skilled care of licensed speech pathologist to progress toward achievement of established goals and plan of care. .     Plan for Next Session: Problem solving/reasoning, memory     SONA Forrest 23

## 2020-07-08 NOTE — PROGRESS NOTES
Renal Progress Note  [facility]    Patient :  Brenda Jean Baptiste; 80 y.o. MRN# [id number]  Location:  [facility]76/790-B  Attending: Jennifer Ward MD  Admit Date:  6/27/2020   Hospital Day: 11      Subjective:     Nephrology is following the patient for EFREN/CKD. This is a late entry. Patient was seen earlier this morning. On lasix drip, good urine output. Net negative 7.2 liters since admission, weight down 7 pounds since yesterday. No SOB. On room air. States he still feels pretty swollen. Outpatient Medications:     Medications Prior to Admission: bumetanide (BUMEX) 1 MG tablet, Take 1 tablet by mouth 2 times daily  potassium chloride (KLOR-CON M) 20 MEQ extended release tablet, Take 1 tablet by mouth 2 times daily  insulin aspart (NOVOLOG) 100 UNIT/ML injection vial, Inject into the skin 3 times daily (before meals) Per Sliding Scale = No Insulin, 140-199= 2 Units, 200-249= 4 Units, 250-290= 6 Units, 300-349= 8 Units, 350-399= 10 Units, 400+= 12 Units  finasteride (PROSCAR) 5 MG tablet, Take 1 tablet by mouth daily  gabapentin (NEURONTIN) 300 MG capsule, Take 300 mg by mouth 3 times daily. metoprolol succinate ER (TOPROL-XL) 50 MG XL tablet, Take 1 tablet by mouth daily  clopidogrel (PLAVIX) 75 MG tablet, Take 1 tablet by mouth daily  aspirin 81 MG chewable tablet, Take 1 tablet by mouth daily  Multiple Vitamins-Minerals (THERAPEUTIC MULTIVITAMIN-MINERALS) tablet, Take 1 tablet by mouth daily  [DISCONTINUED] traMADol (ULTRAM) 50 MG tablet, Take 50 mg by mouth 2 times daily as needed for Pain.   BASAGLAR KWIKPEN 100 UNIT/ML injection pen, Inject 75 Units into the skin daily   docusate sodium (COLACE) 100 MG capsule, Take 100 mg by mouth daily as needed for Constipation  benzonatate (TESSALON) 200 MG capsule, Take 200 mg by mouth every 8 hours as needed for Cough  BD AUTOSHIELD DUO 30G X 5 MM MISC,   guaiFENesin (ROBITUSSIN) 100 MG/5ML liquid, Take 10 mLs by mouth every 6 hours as needed   ASPERCREME W/LIDOCAINE EX, Apply topically every 4 hours as needed (Apply to back PRN for Pain)   cyclobenzaprine (FLEXERIL) 10 MG tablet, 10 mg daily Taking once daily for leg spasms and the Every 8 hours PRN TID for Pain. Hold if drowsy, confused, or sleepy. Incontinence Supplies (BEDSIDE DRAINAGE BAG) MISC, Dispense one, 2000 cc overnight urinary bag  insulin NPH (HUMULIN N;NOVOLIN N) 100 UNIT/ML injection vial, Inject 10 Units into the skin 2 times daily (Patient taking differently: Inject 22 Units into the skin nightly Inject 22 units qhs and 28 units daily)  tamsulosin (FLOMAX) 0.4 MG capsule, Take 1 capsule by mouth nightly  loperamide (IMODIUM) 2 MG capsule, Take 2 mg by mouth 4 times daily as needed for Diarrhea (for diarrhea related to weakness)  atorvastatin (LIPITOR) 20 MG tablet, Take 20 mg by mouth nightly  pantoprazole (PROTONIX) 40 MG tablet, Take 40 mg by mouth nightly   nitroGLYCERIN (NITROSTAT) 0.4 MG SL tablet, Place 1 tablet under the tongue every 5 minutes as needed for Chest pain  acetaminophen (TYLENOL) 325 MG tablet, Take 650 mg by mouth every 6 hours   isosorbide mononitrate (IMDUR) 30 MG CR tablet, Take 30 mg by mouth daily.       Current Medications:     Scheduled Meds:    insulin lispro  0-6 Units Subcutaneous TID WC    insulin lispro  0-3 Units Subcutaneous Nightly    insulin lispro  4 Units Subcutaneous TID     potassium bicarb-citric acid  20 mEq Oral TID    calcium replacement protocol   Other RX Placeholder    magnesium oxide  400 mg Oral BID    vitamin D  50,000 Units Oral Weekly    zinc oxide   Topical BID    calcium elemental  500 mg Oral BID    gabapentin  300 mg Oral BID    famotidine (PEPCID) injection  20 mg Intravenous Daily    insulin glargine  30 Units Subcutaneous Daily    sodium chloride  20 mL Intravenous Once    aspirin  81 mg Oral Daily    atorvastatin  20 mg Oral Nightly    clopidogrel  75 mg Oral Daily    cyclobenzaprine  10 mg Oral Daily input(s): PHOS in the last 72 hours. Magnesium:    Recent Labs     07/06/20  0623 07/07/20  0602 07/08/20  0552   MG 1.8 1.8 1.7     Albumin:    No results for input(s): LABALBU in the last 72 hours. BNP:      Lab Results   Component Value Date    BNP 2,278 11/07/2019     LYN:    No results found for: LYN  SPEP:  Lab Results   Component Value Date    PROT 6.5 06/27/2020    PROT 6.1 06/10/2016     UPEP:   No results found for: LABPE  C3:   No results found for: C3  C4:   No results found for: C4  MPO ANCA:   No results found for: MPO  PR3 ANCA:   No results found for: PR3  Anti-GBM:   No results found for: GBMABIGG  Hep BsAg:       No results found for: HEPBSAG  Hep C AB:        No results found for: HEPCAB    Urinalysis/Chemistries:      Lab Results   Component Value Date    NITRU NEGATIVE 07/01/2020    COLORU YELLOW 07/01/2020    PHUR 5.0 07/01/2020    LABCAST 8-15 HYALINE 07/01/2020    LABCAST NONE SEEN 07/01/2020    WBCUA 15-25 07/01/2020    RBCUA 3-5 07/01/2020    YEAST NONE SEEN 07/01/2020    BACTERIA NONE SEEN 07/01/2020    SPECGRAV 1.015 07/01/2020    LEUKOCYTESUR TRACE 07/01/2020    UROBILINOGEN 0.2 07/01/2020    BILIRUBINUR NEGATIVE 07/01/2020    BLOODU MODERATE 07/01/2020    GLUCOSEU NEGATIVE 05/22/2017    KETUA NEGATIVE 07/01/2020     Urine Sodium:   No results found for: EDOUARD  Urine Potassium:  No results found for: KUR  Urine Chloride:  No results found for: CLUR  Urine Osmolarity:   Lab Results   Component Value Date    OSMOU 325 07/01/2020     Urine Protein:   No components found for: TOTALPROTEIN, URINE   Urine Creatinine:     Lab Results   Component Value Date    LABCREA 70.6 05/31/2019     Urine Eosinophils:  No components found for: UEOS        Impression and Plan:  1. 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  Volume overload :improving but still with significant edema will keep lasix drip for 1 more day and give dose of metolazone, will likely change to intermittent diuretics tomorrow  3. HTN s/p hypotension. Improved   4. Hypokalemia from diuretics: Increase Effer-K to TID  5. Acute blood loss anemia s/p PRBC transfusion, Hgb stable   6. Right femur fx s/p fall with repair  7. UTI due to Enterococcus  8. Right hydrocele  9. Urinary retention with chronic engel  10. DM  11. Systolic CHF            Please don't hesitate to call with any questions.   Electronically signed by Jessica Gross DO on 7/8/2020 at 11:07 AM

## 2020-07-08 NOTE — PLAN OF CARE
Problem: Falls - Risk of:  Goal: Will remain free from falls  Description: Will remain free from falls  7/8/2020 0020 by Ramon Lawson RN  Outcome: Ongoing  Note: Falling star placed outside of patient's room. 2/4 bed rails up. Non-skid socks provided. Call light and personal items with in reach. Bed alarm on. Tele sitter at bedside. Problem: Skin Integrity:  Goal: Absence of new skin breakdown  Description: Absence of new skin breakdown  7/8/2020 0020 by Ramon Lawson RN  Outcome: Ongoing  Note: No new skin breakdown noted at this time. Will continue to reassess. Patient turns and repositions self in bed frequent       Problem: Discharge Planning:  Goal: Discharged to appropriate level of care  Description: Discharged to appropriate level of care  7/8/2020 0020 by Ramon Lawson RN  Outcome: Ongoing  Note: Patient plans to discharge to [facility] when medically stable. Problem: Bowel Function - Altered:  Goal: Bowel elimination is within specified parameters  Description: Bowel elimination is within specified parameters  7/8/2020 0020 by Ramon Lawson RN  Outcome: Ongoing  Note: Monitoring bowel movements. Patient call out to use bedpan and is incontinent of bowel at times. Stool soft and brown in color. Will continue to reassess. Problem: Cardiac Output - Decreased:  Goal: Hemodynamic stability will improve  Description: Hemodynamic stability will improve  7/8/2020 0020 by Ramon Lawson RN  Outcome: Ongoing  Note:   Vitals:    07/07/20 2015   BP: 136/80   Pulse: 86   Resp: 18   Temp: 98.3 °F (36.8 °C)   SpO2: 96%     Monitoring vitals every 4 hours. Will continue to reassess. Problem: Mental Status - Impaired:  Goal: Mental status will be restored to baseline  Description: Mental status will be restored to baseline  7/8/2020 0020 by Ramon Lawson RN  Outcome: Ongoing  Note: Patient alert and oriented x4 with intermittent confusion.  Will continue to reassess. Problem: Pain:  Goal: Control of acute pain  Description: Control of acute pain  7/8/2020 0020 by Isael Thurman RN  Outcome: Ongoing  Note: Patient rates pain on 0-10 pain scale. States pain is a 0 on 0-10 scale. States goal is 0. Repositioned for comfort. Will continue to reassess. Problem: Serum Glucose Level - Abnormal:  Goal: Ability to maintain appropriate glucose levels will improve to within specified parameters  Description: Ability to maintain appropriate glucose levels will improve to within specified parameters  7/8/2020 0020 by Isael Thurman RN  Outcome: Ongoing  Note: Monitoring blood glucose ACHS. Insulin given as needed per [facility]. Will continue to reassess. Problem: Nutrition  Goal: Optimal nutrition therapy  7/8/2020 0020 by Isael Thurman RN  Outcome: Ongoing  Note: Patient on Carb control diet, low sodium, 1800 mL fluid restriction. Tolerating well. Monitoring intake and output. Will continue to reassess. Care plan reviewed with patient. No concerns voiced.

## 2020-07-08 NOTE — PROGRESS NOTES
Hospitalist Progress Note    Patient:  Melchor Shook      Unit/Bed:6K-009-A    YOB: 1934    MRN: [id number]       Acct: [de-identified]     PCP: Danial Chase MD    Date of Admission: 2020      Chief Complaint: [address] Course:       Per HPI \"80 year old white male nonsmoker with PMH of BPH (with chronic indwelling engel since ), CKD stage 3, HLD, hiatal hernia, CAD s/p MAI to LCx (), IDDM type 2 who presented to [facility] ED via EMS from [facility] on 20 c/o altered mental status x 2 days with associated hallucinations, testicular swelling (seen in the ED on 20), [address], [address]. He has a hx of frequent UTIs and was started on bactrim for UTI on 20. Per wife/daughter, at baseline he is oriented with no confusion. 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 Family also notes that the LE edema was no present 2 days ago when he was taken to the ED for scrotal pain. On interview, patient is unable to tell me why he is at the hospital and intermittently responds to external stimuli, but is oriented to person, , year, president, month, place. He endorses scrotal pain with. Denies fever, abdominal pain, SOB, CP, lightheadedness.     In the ED, vitals show: HR 68, /75, 92% on RA and afebrile. Labs show: wbc 12.4, hgb 10.8, MCV 94.2, plt 298, cr 1.9, BUN 34, BUN: Cr 18, Ca 8.2, LFTs unremarkable, lactic 1.1, UA shows 2+ bacteria, WBC 21-30, moderate LE, neg Nitrites, moderate hematuria, trace proteinuria. CXR shows no acute findings. Stable mild bibasilar atelectasis. US scrotom shows prominent amount of hydrocele formation on the right, moderate hydrocele on the left. Fluid on the right appears clear, fluid on the left appears to be markedly complex with multiple pseudo-septations through it. \"    Patient was admitted under hospital medicine service for acute encephalopathy likely due to UTI. 6/28: pt fell and sustained right femur fracture. Orthopedics was consulted and patient underwent ORIF on 6/29/2020. Subjective:     Patient seen and examined. No overnight events noted. Per RN, has intermittent confusion overnight, otherwise pt is doing well. Pt reports scrotal/legs swelling about the same. He denies legs and scrotal pain right now. He said his cough is getting better. He denies shortness of breath, fever, chills, abd pain, N/V, chest pain, palpitations. Last BM was during my rounds, had soft brownish, non-bloody stool.        Medications:  Reviewed    Infusion Medications    furosemide (LASIX) 1mg/ml infusion 5 mg/hr (07/08/20 0108)    dextrose       Scheduled Medications    insulin lispro  0-6 Units Subcutaneous TID WC    insulin lispro  0-3 Units Subcutaneous Nightly    insulin lispro  4 Units Subcutaneous TID WC    calcium replacement protocol   Other RX Placeholder    magnesium oxide  400 mg Oral BID    potassium bicarb-citric acid  20 mEq Oral BID    vitamin D  50,000 Units Oral Weekly    zinc oxide   Topical BID    calcium elemental  500 mg Oral BID    gabapentin  300 mg Oral BID    famotidine (PEPCID) injection  20 mg Intravenous Daily    insulin glargine  30 Units Subcutaneous Daily    sodium chloride  20 mL Intravenous Once    aspirin  81 mg Oral Daily    atorvastatin  20 mg Oral Nightly    clopidogrel  75 mg Oral Daily    cyclobenzaprine  10 mg Oral Daily    finasteride  5 mg Oral Daily    [Held by provider] isosorbide mononitrate  30 mg Oral Daily    metoprolol succinate  50 mg Oral Daily    tamsulosin  0.4 mg Oral Nightly    sodium chloride flush  10 mL Intravenous 2 times per day     PRN Meds: HYDROmorphone **OR** HYDROmorphone, HYDROcodone-acetaminophen, HYDROcodone-acetaminophen, docusate sodium, traMADol, sodium chloride flush, acetaminophen **OR** acetaminophen, promethazine **OR** ondansetron, glucose, dextrose, glucagon (rDNA), dextrose      Intake/Output Summary (Last 24 hours) at 7/8/2020 0737  Last data filed at 7/8/2020 0049  Gross per 24 hour   Intake 1470.29 ml   Output 2750 ml   Net -1279.71 ml       Diet:  Dietary Nutrition Supplements: Diabetic Oral Supplement  DIET CARB CONTROL; Carb Control: 3 carb choices (45 gms)/meal; Low Sodium (2 GM); Daily Fluid Restriction: 1800 ml    Exam:  BP (!) 145/63   Pulse 87   Temp 98.1 °F (36.7 °C) (Oral)   Resp 18   Ht 5' 11\" (1.803 m)   Wt 281 lb (127.5 kg)   SpO2 93%   BMI 39.19 kg/m²     General:  alert, not in acute distress  Head: Normocephalic and atraumatic. EENT: No exophthalmos noted. No scleral or conjunctiva icterus, injection or pallor noted. No maxillary or frontal sinus tenderness to percussion. Neck: Supple. Trachea midline. No thyromegaly. Thorax/Lungs: Thorax is symmetrical with good expansion.  No retractions or use of abdominal muscles. (+) crackles on both lung bases, RLL>LLL. No rhonchi, no wheezing. Cardiac: S1, S2, RRR without murmur, rub, or gallop. No JVD  Abdomen: slightly distended, soft, nontender to palpation, without guarding or rigidity. Normoactive BS  : (+) engel cath in placed. (+) B/L scrotal swelling with minimal tenderness on palpation. Neurological exam reveals alert, oriented x3, normal speech, no focal findings or movement disorder noted. Exam of extremities: (+) left AKA, RLE: hard to palpate pedal pulses due to edema, 2-3 + pitting edema on right foot up to right thigh, 2-3+ left thigh pitting edema no clubbing or cyanosis    Chaperoned by DONAL Azar       Labs:   Recent Labs     07/08/20  0552   HGB 8.6*   HCT 27.5*     Recent Labs     07/08/20  0552      K 3.6   CL 92*   CO2 31   BUN 57*   CREATININE 2.8*   CALCIUM 8.3*     No results for input(s): AST, ALT, BILIDIR, BILITOT, ALKPHOS in the last 72 hours. No results for input(s): INR in the last 72 hours.   No results for input(s): Rishi Nava in the last 72 hours.    Urinalysis:      Lab Results   Component Value Date    NITRU NEGATIVE 07/01/2020    WBCUA 15-25 07/01/2020    BACTERIA NONE SEEN 07/01/2020    RBCUA 3-5 07/01/2020    BLOODU MODERATE 07/01/2020    SPECGRAV 1.015 07/01/2020    GLUCOSEU NEGATIVE 05/22/2017       Radiology: All imaging reviewed       Assessment/Plan:      Acute Metabolic Encephalopathy likely secondary to UTI/acute urinary retention, resolved     -CT head showed: no acute findings. Ammonia wnl. Mixed Blood Gas unremarkable. Immediate 1300mL UOP with change of engel. Drastically improved with IV ABx and engel exchange.  AAOx3 and answers questions appropriately today. Acute Complicated UTI / BPH with chronic engel catheter, with acute retention ( acute retention resolved)    -Frequent UTIs 3-4 since dec 2019. UA ([facility]): 2+ bacteria, WBC 21-30, moderate LE, neg Nitrites.   -Ux grew Enterococcus feacalis, morganella morganii. Completed 8 days of Linezolid, last dose of abx 7/5.   -cont  Finasteride andTamsulosin. -has chronic suprapubic cath, last exchanged 6/28/2020 per RN       Acute on chronic decompensated Combined Diastolic YRU HUBCI 46%, improving     2-3+ pitting edema BLE. -CXR shows prominent interstitium with likely mild pulmonary edema. Repeat cxr on 7/5: Mild retrocardiac atelectasis/pneumonia. Overall appearance of chest improved from prior  -BNP 3k.   -2D Echo 6/2020 EF 45% with Grade 1 DD.   -nephrology switched IV Lasix to lasix gtt 7/6. Appreciate nephrology input  -I/O: -1,279.7 cc/hr for past 24 hours  -wt down to 281 lbs today from 288 lbs on 7/7  -Daily weights, I/Os, fluid and salt restrictions      Mild retrocardiac atelectasis/pneumonia    -noted on cxr 7/5, suspect this is more atelectasis. Pt has dry cough, which is getting better per pt.  procal not significantly elevated, 0.18.   -cont IS      Acute Hypoxic Resp.  Failure secondary to CHF exacerbation, improved    - Pt drops mainly when sleeping to 87% on RA. Likely AKIRA. Ca Rolle pt was requiring 1-2L NC. Now on RA, saturating well.   -cont to monitor oxygen saturation      EFREN on CKD stage 4, likely multifactorial due to hypotension vs anemia vs volume overload, improving        -Creatinine 1.9 on admission, now 2.8 from 3.2. Baseline creatinine ranges between 1.7-2.0.  -cont Midodrine 2.5, hold BP medications. -Nephrology on-board. Appreciate nephrology input.   -Daily weights, I/Os, fluid and salt restrictions    Fall with Femur Fracture    -had a unwitnessed fall on 6/29. Found to have a subsequent femur fracture. No bleeds. Cardiology cleared for surgery.  S/P successful surgery on 6/29. PT/OT. Post Op Related Acute Blood Loss Anemia    Hb 8.6 today from 9.2 yesterday. Baseline hemoglobin around 8  -No melena, hematochezia.  S/P 1U blood 7/1 due to hemoglobin of 6.9 on 6/30/2020.  - ASA and Plavix resumed on 7/4. Cont to monitor H&H.     Hypoalbuminemia, at risk for malnutrition    -Dietitian on-board. Appreciate input     Hypocalcemia likely due to hypoalbuminemia and vitamin D deficiency    -Started ergocalciferol 7/6  -Calcium replacement protocol  -ical 0.99 on 7/7. 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 POC glucose ranges 160s to 240s, mostly 200s in the afternoon. Pt used 42 units total insulin in the past 24 hours. At home, he takes lantus 75 units daily, was started 30 units on admission. Cont lantus as ordered, add lispro 4 units TID w meals, reduce SSI2 to SSI1.    -Goal BG of 140-180 while hospitalized. Hypoglycemia order set. Accucheck ACHS.       Chronic back pain: Hold flexeril, PRN tramadol.  Resume as needed.     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 Hypomagnesemia, stable    -cont magnesium oxide oral   -magnesium level in am     Cognitive deficit    -MOCA 18/25  -ST on-board. Appreciate input   -cont ST    Mild hyponatremia, possibly from diuretics and EFREN on CKD, resolved    -cont to monitor BMP         Disposition: DC back to [facility] once medically stable        Diet: Dietary Nutrition Supplements: Diabetic Oral Supplement  DIET CARB CONTROL; Carb Control: 3 carb choices (45 gms)/meal; Low Sodium (2 GM);  Daily Fluid Restriction: 1800 ml      Code Status: Full Code        Electronically signed by Mag Rivers MD on 7/8/2020 at 7:37 AM

## 2020-07-08 NOTE — PROGRESS NOTES
[address] [address]  INPATIENT PHYSICAL THERAPY  DAILY NOTE  STRZ RENAL TELEMETRY 6K - 5F-39/798-R    Time In: 7817  Time Out: 0132  Timed Code Treatment Minutes: 23 Minutes  Minutes: 23          Date: 2020  Patient Name: Errol Garcia,  Gender:  male        MRN: [id number]  : 1934  (80 y.o.)     Referring Practitioner: DENY Castillo CNP  Diagnosis: AMS  Additional Pertinent Hx: Per MD note on 2020:  80year old white male nonsmoker with PMH of BPH (with chronic indwelling engel since ), CKD stage 3, HLD, hiatal hernia, CAD s/p MAI to LCx (), IDDM type 2 who presented to [facility] ED via EMS from [facility] on 20 c/o altered mental status x 2 days with associated hallucinations, testicular swelling (seen in the ED on 20), dec UOP per nursing home. He has a hx of frequent UTIs and was started on bactrim for UTI on 20. Per wife/daughter, at baseline he is oriented with no confusion. 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 s/p Open treatment right subtrochanteric femur fracture with a cephalo-medullary nail [id number] on  2020     Prior Level of Function:  Type of Home: Facility([facility])  Home Layout: One level  Home Access: Level entry  Home Equipment: Wheelchair-electric(drew lift)        ADL Assistance: Needs assistance  Homemaking Responsibilities: No  Ambulation Assistance: (Reports drew lift use since AKA aprox 3 years ago)  Transfer Assistance: Needs assistance    Restrictions/Precautions:  Restrictions/Precautions: Fall Risk, General Precautions  Position Activity Restriction  Other position/activity restrictions: s/p ORIF R femur on 2020, hx L AKA    SUBJECTIVE: RN approved session. Patient laying in bed upon arrival and agreeable to therapy. Patient's wofe present to observe session.      PAIN: not rated, RLE    OBJECTIVE:  Bed Mobility:  Rolling to Right: Moderate Assistance, with head of bed raised, with verbal cues , with increased time for completion   Supine to Sit: Maximum Assistance, X 1, with head of bed raised, with verbal cues , with increased time for completion  Sit to Supine: Moderate Assistance, Maximum Assistance, X 1, with increased time for completion   Scooting: Maximum Assistance    Transfers:  Not Tested    Ambulation:  Not Tested    Balance:  Static Sitting Balance:  Minimal Assistance, Moderate Assistance, X 1, with verbal cues , cues for posture, sat edge of bed ~3min    Exercise:  Patient was guided in 1 set(s) 10 reps of exercise to both lower extremities. Glut sets, Quad sets, Heelslides (R only) and Hip abduction/adduction. Exercises were completed for increased independence with functional mobility. Functional Outcome Measures: Completed  AM-PAC Inpatient Mobility without Stair Climbing Raw Score : 7  AM-PAC Inpatient without Stair Climbing T-Scale Score : 28.66    ASSESSMENT:  Assessment: Limited by pain and weakness. Activity Tolerance:  Patient tolerance of  treatment: fair.       Equipment Recommendations:Equipment Needed: No  Discharge Recommendations:  Continue to assess pending progress, Subacute/Skilled Nursing Facility, ECF with PT    Plan: Times per week: 3-5x O  Times per day: Daily  Specific instructions for Next Treatment: bed mobility, transfers, ther ex, ther act,   Current Treatment Recommendations: Strengthening, ADL/Self-care Training, ROM, Balance Training, Functional Mobility Training, Home Exercise Program, Positioning, Transfer Training, Wheelchair Mobility Training, Safety Education & Training    Patient Education  Patient Education: Plan of Care, [employer] Mobility, Verbal Exercise Instruction    Goals:  Patient goals : go home  Short term goals  Time Frame for Short term goals: at discharge  Short term goal 1: Pt to be Mod A x 1 for supine <> sit to get sitting edge of bed  Short term goal 2: Pt to sit edge of bed > 6-7 min with SBA for edge of bed activity balance  Short term goal 3: Pt to tolerate >7 reps each of 4-5 LE exercises for strengthening to assist with bed mobility. Long term goals  Time Frame for Long term goals : not set due to short ELOS    Following session, patient left in safe position with all fall risk precautions in place.

## 2020-07-08 NOTE — CARE COORDINATION
7/8/20, 9:11 AM EDT    DISCHARGE ONGOING EVALUATION:     [address] day: 11  Location: [facility]09/009-A Reason for admit: AMS (altered mental status) [R41.82]   Treatment Plan of Care: ical 1.09, creat 2.8, Hgb 8.6. Dr. Kim Rogel states plan to continue Lasix gtt through today due to swelling and crackles in lungs. Plan discharge tomorrow. Barriers to Discharge: not medically ready   PCP: Nessa Mccurdy MD  Readmission Risk Score: 25%  Patient Goals/Plan/Treatment Preferences: return to [facility], if stable.

## 2020-07-08 NOTE — PLAN OF CARE
Care plan reviewed with patient and family. Patient and family verbalize understanding of the plan of care and contribute to goal setting. Problem: Falls - Risk of:  Goal: Will remain free from falls  Description: Will remain free from falls  Outcome: Ongoing  Note: No falls noted this shift. Continue falling star program. Bed alarm on, bed in low position. Call light and personal belongings in reach. Patient uses call light appropriately. Goal: Absence of physical injury  Description: Absence of physical injury  Outcome: Ongoing  Note: No falls noted this shift. Continue falling star program. Bed alarm on, bed in low position. Call light and personal belongings in reach. Patient uses call light appropriately. Problem: Skin Integrity:  Goal: Will show no infection signs and symptoms  Description: Will show no infection signs and symptoms  Outcome: Ongoing  Note: VSS, no signs of infection  Goal: Absence of new skin breakdown  Description: Absence of new skin breakdown  Outcome: Ongoing  Note: No new signs or symptoms of skin breakdown noted this shift, encouraging patient to turn and reposition self in bed q2h       Problem: Discharge Planning:  Goal: Participates in care planning  Description: Participates in care planning  Outcome: Ongoing  Note: Patient aware and updated on plan of care    Goal: Discharged to appropriate level of care  Description: Discharged to appropriate level of care  Outcome: Ongoing  Note: Plan to discharge back to [facility] swab complete and negative      Problem:  Bowel Function - Altered:  Goal: Bowel elimination is within specified parameters  Description: Bowel elimination is within specified parameters  Outcome: Ongoing  Note: Diarrhea, monitoring     Problem: Cardiac Output - Decreased:  Goal: Hemodynamic stability will improve  Description: Hemodynamic stability will improve  Outcome: Ongoing  Note: VSS, Lasix drip at 5, one time dose of 5mg metolazone, nephrology following, monitoring     Problem: Mental Status - Impaired:  Goal: Mental status will be restored to baseline  Description: Mental status will be restored to baseline  Outcome: Ongoing  Note: Patient alert and oriented with intermittent confusion, monitoring      Problem: Pain:  Description: Pain management should include both nonpharmacologic and pharmacologic interventions. Goal: Control of acute pain  Description: Control of acute pain  Outcome: Ongoing  Note: Patient rates pain 5/10 in right hip, aching, norco PRN, pain goal no pain, rest and repositioning, monitoring  Goal: Control of chronic pain  Description: Control of chronic pain  Outcome: Ongoing     Problem: Serum Glucose Level - Abnormal:  Goal: Ability to maintain appropriate glucose levels will improve to within specified parameters  Description: Ability to maintain appropriate glucose levels will improve to within specified parameters  Outcome: Ongoing  Note: [place] ACHS, insulin SS, Lantus, 4 units standing with meals, CC diet      Problem: Pain:  Description: Pain management should include both nonpharmacologic and pharmacologic interventions.   Goal: Pain level will decrease  Description: Pain level will decrease  Outcome: Ongoing  Goal: Control of acute pain  Description: Control of acute pain  Outcome: Ongoing  Goal: Control of chronic pain  Description: Control of chronic pain  Outcome: Ongoing     Problem: Nutrition  Goal: Optimal nutrition therapy  Outcome: Ongoing  Note: Dietician following, glucerna with meals

## 2020-07-09 ENCOUNTER — APPOINTMENT (OUTPATIENT)
Dept: GENERAL RADIOLOGY | Age: 85
DRG: 981 | End: 2020-07-09
Attending: FAMILY MEDICINE
Payer: MEDICARE

## 2020-07-09 LAB
ANION GAP SERPL CALCULATED.3IONS-SCNC: 11 MEQ/L (ref 8–16)
ANION GAP SERPL CALCULATED.3IONS-SCNC: 9 MEQ/L (ref 8–16)
BUN BLDV-MCNC: 57 MG/DL (ref 7–22)
BUN BLDV-MCNC: 58 MG/DL (ref 7–22)
CALCIUM IONIZED: 1.09 MMOL/L (ref 1.12–1.32)
CALCIUM SERPL-MCNC: 8.6 MG/DL (ref 8.5–10.5)
CALCIUM SERPL-MCNC: 8.7 MG/DL (ref 8.5–10.5)
CHLORIDE BLD-SCNC: 87 MEQ/L (ref 98–111)
CHLORIDE BLD-SCNC: 90 MEQ/L (ref 98–111)
CO2: 36 MEQ/L (ref 23–33)
CO2: 37 MEQ/L (ref 23–33)
CREAT SERPL-MCNC: 3 MG/DL (ref 0.4–1.2)
CREAT SERPL-MCNC: 3.3 MG/DL (ref 0.4–1.2)
GFR SERPL CREATININE-BSD FRML MDRD: 18 ML/MIN/1.73M2
GFR SERPL CREATININE-BSD FRML MDRD: 20 ML/MIN/1.73M2
GLUCOSE BLD-MCNC: 176 MG/DL (ref 70–108)
GLUCOSE BLD-MCNC: 181 MG/DL (ref 70–108)
GLUCOSE BLD-MCNC: 296 MG/DL (ref 70–108)
GLUCOSE BLD-MCNC: 308 MG/DL (ref 70–108)
GLUCOSE BLD-MCNC: 330 MG/DL (ref 70–108)
GLUCOSE BLD-MCNC: 339 MG/DL (ref 70–108)
HCT VFR BLD CALC: 28.2 % (ref 42–52)
HEMOGLOBIN: 9 GM/DL (ref 14–18)
MAGNESIUM: 1.6 MG/DL (ref 1.6–2.4)
POTASSIUM SERPL-SCNC: 3.8 MEQ/L (ref 3.5–5.2)
POTASSIUM SERPL-SCNC: 4.3 MEQ/L (ref 3.5–5.2)
REASON FOR REJECTION: NORMAL
REJECTED TEST: NORMAL
SODIUM BLD-SCNC: 134 MEQ/L (ref 135–145)
SODIUM BLD-SCNC: 136 MEQ/L (ref 135–145)

## 2020-07-09 PROCEDURE — 97110 THERAPEUTIC EXERCISES: CPT

## 2020-07-09 PROCEDURE — 36415 COLL VENOUS BLD VENIPUNCTURE: CPT

## 2020-07-09 PROCEDURE — 94761 N-INVAS EAR/PLS OXIMETRY MLT: CPT

## 2020-07-09 PROCEDURE — 6370000000 HC RX 637 (ALT 250 FOR IP): Performed by: INTERNAL MEDICINE

## 2020-07-09 PROCEDURE — 99232 SBSQ HOSP IP/OBS MODERATE 35: CPT | Performed by: INTERNAL MEDICINE

## 2020-07-09 PROCEDURE — 82948 REAGENT STRIP/BLOOD GLUCOSE: CPT

## 2020-07-09 PROCEDURE — 82330 ASSAY OF CALCIUM: CPT

## 2020-07-09 PROCEDURE — 99232 SBSQ HOSP IP/OBS MODERATE 35: CPT | Performed by: FAMILY MEDICINE

## 2020-07-09 PROCEDURE — 85014 HEMATOCRIT: CPT

## 2020-07-09 PROCEDURE — 83735 ASSAY OF MAGNESIUM: CPT

## 2020-07-09 PROCEDURE — 1200000003 HC TELEMETRY R&B

## 2020-07-09 PROCEDURE — 6370000000 HC RX 637 (ALT 250 FOR IP): Performed by: PHYSICIAN ASSISTANT

## 2020-07-09 PROCEDURE — 85018 HEMOGLOBIN: CPT

## 2020-07-09 PROCEDURE — 71045 X-RAY EXAM CHEST 1 VIEW: CPT

## 2020-07-09 PROCEDURE — 2580000003 HC RX 258: Performed by: PHYSICIAN ASSISTANT

## 2020-07-09 PROCEDURE — 2500000003 HC RX 250 WO HCPCS: Performed by: INTERNAL MEDICINE

## 2020-07-09 PROCEDURE — 80048 BASIC METABOLIC PNL TOTAL CA: CPT

## 2020-07-09 PROCEDURE — 6370000000 HC RX 637 (ALT 250 FOR IP): Performed by: FAMILY MEDICINE

## 2020-07-09 RX ORDER — INSULIN GLARGINE 100 [IU]/ML
25 INJECTION, SOLUTION SUBCUTANEOUS 2 TIMES DAILY
Status: DISCONTINUED | OUTPATIENT
Start: 2020-07-09 | End: 2020-07-10

## 2020-07-09 RX ORDER — POLYETHYLENE GLYCOL 3350 17 G/17G
17 POWDER, FOR SOLUTION ORAL DAILY
Status: DISCONTINUED | OUTPATIENT
Start: 2020-07-09 | End: 2020-07-11 | Stop reason: HOSPADM

## 2020-07-09 RX ORDER — DOCUSATE SODIUM 100 MG/1
100 CAPSULE, LIQUID FILLED ORAL 2 TIMES DAILY
Status: DISCONTINUED | OUTPATIENT
Start: 2020-07-09 | End: 2020-07-11 | Stop reason: HOSPADM

## 2020-07-09 RX ORDER — DOCUSATE SODIUM 100 MG/1
100 CAPSULE, LIQUID FILLED ORAL DAILY
Status: DISCONTINUED | OUTPATIENT
Start: 2020-07-09 | End: 2020-07-09

## 2020-07-09 RX ADMIN — CALCIUM 500 MG: 500 TABLET ORAL at 21:07

## 2020-07-09 RX ADMIN — INSULIN GLARGINE 25 UNITS: 100 INJECTION, SOLUTION SUBCUTANEOUS at 21:20

## 2020-07-09 RX ADMIN — CYCLOBENZAPRINE 10 MG: 10 TABLET, FILM COATED ORAL at 08:28

## 2020-07-09 RX ADMIN — DOCUSATE SODIUM 100 MG: 100 CAPSULE, LIQUID FILLED ORAL at 13:13

## 2020-07-09 RX ADMIN — GABAPENTIN 300 MG: 300 CAPSULE ORAL at 21:07

## 2020-07-09 RX ADMIN — FAMOTIDINE 20 MG: 10 INJECTION, SOLUTION INTRAVENOUS at 08:29

## 2020-07-09 RX ADMIN — CALCIUM 500 MG: 500 TABLET ORAL at 08:28

## 2020-07-09 RX ADMIN — DOCUSATE SODIUM 100 MG: 100 CAPSULE, LIQUID FILLED ORAL at 21:20

## 2020-07-09 RX ADMIN — Medication: at 21:16

## 2020-07-09 RX ADMIN — ASPIRIN 81 MG 81 MG: 81 TABLET ORAL at 08:28

## 2020-07-09 RX ADMIN — MAGNESIUM GLUCONATE 500 MG ORAL TABLET 400 MG: 500 TABLET ORAL at 21:07

## 2020-07-09 RX ADMIN — INSULIN GLARGINE 30 UNITS: 100 INJECTION, SOLUTION SUBCUTANEOUS at 08:29

## 2020-07-09 RX ADMIN — CLOPIDOGREL BISULFATE 75 MG: 75 TABLET ORAL at 08:28

## 2020-07-09 RX ADMIN — GABAPENTIN 300 MG: 300 CAPSULE ORAL at 08:28

## 2020-07-09 RX ADMIN — SODIUM CHLORIDE, PRESERVATIVE FREE 10 ML: 5 INJECTION INTRAVENOUS at 21:07

## 2020-07-09 RX ADMIN — METOPROLOL SUCCINATE 50 MG: 50 TABLET, FILM COATED, EXTENDED RELEASE ORAL at 08:28

## 2020-07-09 RX ADMIN — SODIUM CHLORIDE, PRESERVATIVE FREE 10 ML: 5 INJECTION INTRAVENOUS at 08:29

## 2020-07-09 RX ADMIN — FINASTERIDE 5 MG: 5 TABLET, FILM COATED ORAL at 08:28

## 2020-07-09 RX ADMIN — ATORVASTATIN CALCIUM 20 MG: 20 TABLET, FILM COATED ORAL at 21:07

## 2020-07-09 RX ADMIN — POLYETHYLENE GLYCOL 3350 17 G: 17 POWDER, FOR SOLUTION ORAL at 13:13

## 2020-07-09 RX ADMIN — Medication: at 08:22

## 2020-07-09 RX ADMIN — TAMSULOSIN HYDROCHLORIDE 0.4 MG: 0.4 CAPSULE ORAL at 21:07

## 2020-07-09 RX ADMIN — MAGNESIUM GLUCONATE 500 MG ORAL TABLET 400 MG: 500 TABLET ORAL at 08:28

## 2020-07-09 ASSESSMENT — PAIN SCALES - GENERAL
PAINLEVEL_OUTOF10: 0
PAINLEVEL_OUTOF10: 0

## 2020-07-09 NOTE — PROGRESS NOTES
[address] [address]  INPATIENT PHYSICAL THERAPY  DAILY NOTE  STRZ RENAL TELEMETRY 6K - 6R-29/671-B    Time In: 1055  Time Out: 1120  Timed Code Treatment Minutes: 25 Minutes  Minutes: 25          Date: 2020  Patient Name: Jatin He,  Gender:  male        MRN: [id number]  : 1934  (80 y.o.)     Referring Practitioner: DENY Acosta CNP  Diagnosis: AMS  Additional Pertinent Hx: Per MD note on 2020:  80year old white male nonsmoker with PMH of BPH (with chronic indwelling engel since ), CKD stage 3, HLD, hiatal hernia, CAD s/p MAI to LCx (), IDDM type 2 who presented to [facility] ED via EMS from [address] [place] [address] on 20 c/o altered mental status x 2 days with associated hallucinations, testicular swelling (seen in the ED on 20), dec UOP per nursing home. He has a hx of frequent UTIs and was started on bactrim for UTI on 20. Per wife/daughter, at baseline he is oriented with no confusion. 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 s/p Open treatment right subtrochanteric femur fracture with a cephalo-medullary nail 11174 on  2020     Prior Level of Function:  [facility] of Home[facility] Facility([facility]  Home Layout: One level  Home Access: Level entry  Home Equipment: Wheelchair-electric(drew lift)        ADL Assistance: Needs assistance  Homemaking Responsibilities: No  Ambulation Assistance: (Reports drew lift use since AKA theron 3 years ago)  Transfer Assistance: Needs assistance    Restrictions/Precautions:  Restrictions/Precautions: Fall Risk, General Precautions  Position Activity Restriction  Other position/activity restrictions: s/p ORIF R femur on 2020, hx L AKA    SUBJECTIVE: Pt. Laying in bed and pleasantly agrees to therapy session. Pt. Reports fatigue. Radiology arrived during session to take chest x-ray. Lunch arrived at end of session.        PAIN: Not rated: R thigh    OBJECTIVE:  Bed Mobility:  Scooting: Stand by assistance to reposition in bed. Transfers:  Not Tested    Ambulation:  Not Tested    Exercise:  Patient was guided in 1 set(s) 15 reps of exercise to right lower extremities. Ankle pumps, Glut sets, Quad sets, Heelslides, Hip abduction/adduction and Straight leg raises. L LE hip abd/add, straight leg raises, and glut sets. Assistance provided throughout as needed. Exercises were completed for increased independence with functional mobility. Functional Outcome Measures: Not completed       ASSESSMENT:  Assessment: Patient progressing toward established goals. Activity Tolerance:  Patient tolerance of  treatment: good. Equipment Recommendations:Equipment Needed: No  Discharge Recommendations:  Continue to assess pending progress, Subacute/Skilled Nursing Facility, ECF with PT    Plan: Times per week: 3-5x O  Times per day: Daily  Specific instructions for Next Treatment: bed mobility, transfers, ther ex, ther act,   Current Treatment Recommendations: Strengthening, ADL/Self-care Training, ROM, Balance Training, Functional Mobility Training, Home Exercise Program, Positioning, Transfer Training, Wheelchair Mobility Training, Safety Education & Training    Patient Education  Patient Education: Plan of Care, Verbal Exercise Instruction    Goals:  Patient goals : go home  Short term goals  Time Frame for Short term goals: at discharge  Short term goal 1: Pt to be Mod A x 1 for supine <> sit to get sitting edge of bed  Short term goal 2: Pt to sit edge of bed > 6-7 min with SBA for edge of bed activity balance  Short term goal 3: Pt to tolerate >7 reps each of 4-5 LE exercises for strengthening to assist with bed mobility. Long term goals  Time Frame for Long term goals : not set due to short ELOS    Following session, patient left in safe position with all fall risk precautions in place.

## 2020-07-09 NOTE — PLAN OF CARE
Problem: Falls - Risk of:  Goal: Will remain free from falls  Description: Will remain free from falls  7/8/2020 2233 by Mariia Calderon RN  Outcome: Ongoing  Note: Falling star placed outside of patient's room. 2/4 bed rails up. Non-skid socks provided. Call light and personal items with in reach. Bed alarm on. Problem: Skin Integrity:  Goal: Absence of new skin breakdown  Description: Absence of new skin breakdown  7/8/2020 2233 by Mariia Calderon RN  Outcome: Ongoing  Note: No new skin breakdown noted at this time. Will continue to reassess. Patient turns and repositions self in bed frequent       Problem: Discharge Planning:  Goal: Discharged to appropriate level of care  Description: Discharged to appropriate level of care  7/8/2020 2233 by Mariia Calderon RN  Outcome: Ongoing  Note: Patient plans to discharge to [facility]  when medically stable. Problem: Bowel Function - Altered:  Goal: Bowel elimination is within specified parameters  Description: Bowel elimination is within specified parameters  7/8/2020 2233 by Mariia Calderon RN  Outcome: Ongoing  Note: Monitoring intake and output. Having soft brown stool. Will continue to reassess      Problem: Cardiac Output - Decreased:  Goal: Hemodynamic stability will improve  Description: Hemodynamic stability will improve  7/8/2020 2233 by Mariia Calderon RN  Outcome: Ongoing  Note:   Vitals:    07/08/20 2003   BP: (!) 156/70   Pulse: 78   Resp: 16   Temp: 98.2 °F (36.8 °C)   SpO2: 95%     Monitoring vitals [id number]. Will continue to reassess. Problem: Mental Status - Impaired:  Goal: Mental status will be restored to baseline  Description: Mental status will be restored to baseline  7/8/2020 2233 by Mariia Calderon RN  Outcome: Ongoing  Note: Patient alert and oriented x4 with intermittent confusion. Will continue to reassess.       Problem: Pain:  Goal: Control of acute pain  Description: Control of acute pain  7/8/2020 2233 by Ramon Lawson, DONAL  Outcome: Ongoing  Note: Patient rates pain on 0-10 pain scale. States pain is a 0 on 0-10 scale. States goal is 0. Repositioned for comfort. Will continue to reassess. Problem: Serum Glucose Level - Abnormal:  Goal: Ability to maintain appropriate glucose levels will improve to within specified parameters  Description: Ability to maintain appropriate glucose levels will improve to within specified parameters  7/8/2020 2233 by Ramon Lawson, DONAL  Outcome: Ongoing  Note: Monitoring blood glucose ACHS. Insulin coverage given as needed. Will continue to reassess. Problem: Nutrition  Goal: Optimal nutrition therapy  7/8/2020 2233 by Ramon Lawson, RN  Outcome: Ongoing  Note: Patient on carb control diet with 1800 mL fluid restriction. Tolerating well. Will continue to reassess. Care plan reviewed with patient. No concerns voiced.

## 2020-07-09 NOTE — PROGRESS NOTES
Renal Progress Note  [facility]    Patient :  Nati Garrett; 80 y.o. MRN# [id number]  Location:  0J46/816-C  Attending: Angelic Gonzalez MD  Admit Date:  6/27/2020   Hospital Day: 12      Subjective:     Nephrology is following the patient for EFREN/CKD. Patient seen this morning. He is feeling nauseated this morning. States no BM in few days. He does not feel like his swelling is much better however he is net negative 8.2 liters and weight is down significantly. Outpatient Medications:     Medications Prior to Admission: bumetanide (BUMEX) 1 MG tablet, Take 1 tablet by mouth 2 times daily  potassium chloride (KLOR-CON M) 20 MEQ extended release tablet, Take 1 tablet by mouth 2 times daily  insulin aspart (NOVOLOG) 100 UNIT/ML injection vial, Inject into the skin 3 times daily (before meals) Per Sliding Scale = No Insulin, 140-199= 2 Units, 200-249= 4 Units, 250-290= 6 Units, 300-349= 8 Units, 350-399= 10 Units, 400+= 12 Units  finasteride (PROSCAR) 5 MG tablet, Take 1 tablet by mouth daily  gabapentin (NEURONTIN) 300 MG capsule, Take 300 mg by mouth 3 times daily. metoprolol succinate ER (TOPROL-XL) 50 MG XL tablet, Take 1 tablet by mouth daily  clopidogrel (PLAVIX) 75 MG tablet, Take 1 tablet by mouth daily  aspirin 81 MG chewable tablet, Take 1 tablet by mouth daily  Multiple Vitamins-Minerals (THERAPEUTIC MULTIVITAMIN-MINERALS) tablet, Take 1 tablet by mouth daily  [DISCONTINUED] traMADol (ULTRAM) 50 MG tablet, Take 50 mg by mouth 2 times daily as needed for Pain.   BASAGLAR KWIKPEN 100 UNIT/ML injection pen, Inject 75 Units into the skin daily   docusate sodium (COLACE) 100 MG capsule, Take 100 mg by mouth daily as needed for Constipation  benzonatate (TESSALON) 200 MG capsule, Take 200 mg by mouth every 8 hours as needed for Cough  BD AUTOSHIELD DUO 30G X 5 MM MISC,   guaiFENesin (ROBITUSSIN) 100 MG/5ML liquid, Take 10 mLs by mouth every 6 hours as needed   ASPERCREME Daily    finasteride  5 mg Oral Daily    [Held by provider] isosorbide mononitrate  30 mg Oral Daily    metoprolol succinate  50 mg Oral Daily    tamsulosin  0.4 mg Oral Nightly    sodium chloride flush  10 mL Intravenous 2 times per day     Continuous Infusions:    dextrose       PRN Meds:  HYDROmorphone **OR** HYDROmorphone, HYDROcodone-acetaminophen, HYDROcodone-acetaminophen, traMADol, sodium chloride flush, acetaminophen **OR** acetaminophen, promethazine **OR** ondansetron, glucose, dextrose, glucagon (rDNA), dextrose    Input/Output:       I/O last 3 completed shifts: In: 1540.3 [P.O.:1420; I.V.:120.3]  Out: 7916 [Urine:2875]. Patient Vitals for the past 96 hrs (Last 3 readings):   Weight   20 2312 276 lb 11.2 oz (125.5 kg)   20 0315 281 lb (127.5 kg)   20 0345 288 lb 1.6 oz (130.7 kg)       Vital Signs:   Temperature:  Temp: 97.9 °F (36.6 °C)  TMax:   Temp (24hrs), Av °F (36.7 °C), Min:97.6 °F (36.4 °C), Max:98.2 °F (36.8 °C)    Respirations:  Resp: 16  Pulse:   Pulse: 88  BP:    BP: 138/61  BP Range: Systolic (35ZGQ), NRD:691 , Min:138 , ATT:888       Diastolic (07XYO), HDF:45, Min:61, Max:76      Physical Examination:     General:  Awake, alert  HEENT: NC/AT/ MMM  Chest:               diminished  Cardiac:  S1 S2   Abdomen: Soft, non-tender,  Neuro:  No facial droop, No Asterixis  SKIN:  No rashes, good skin turgor. Extremities:  Right leg is wrapped has some dependent edema no thigh edema and no left thigh edema + left BKA    Labs:       Recent Labs     20  2235 20  0552 20  0637   HGB 9.2* 8.6* 9.0*   HCT 29.2* 27.5* 28.2*      BMP:   Recent Labs     20  0552 20  1817 20  0637    130* 136   K 3.6 3.9 3.8   CL 92* 86* 90*   CO2 31 33 37*   BUN 57* 58* 58*   CREATININE 2.8* 3.1* 3.3*   GLUCOSE 187* 217* 181*   CALCIUM 8.3* 8.2* 8.7      Phosphorus:     No results for input(s): PHOS in the last 72 hours.   Magnesium:    Recent Labs 07/07/20  0602 07/08/20  0552 07/09/20  0637   MG 1.8 1.7 1.6     Albumin:    No results for input(s): LABALBU in the last 72 hours. BNP:      Lab Results   Component Value Date    BNP 2,278 11/07/2019     LYN:    No results found for: LYN  SPEP:  Lab Results   Component Value Date    PROT 6.5 06/27/2020    PROT 6.1 06/10/2016     UPEP:   No results found for: LABPE  C3:   No results found for: C3  C4:   No results found for: C4  MPO ANCA:   No results found for: MPO  PR3 ANCA:   No results found for: PR3  Anti-GBM:   No results found for: GBMABIGG  Hep BsAg:       No results found for: HEPBSAG  Hep C AB:        No results found for: HEPCAB    Urinalysis/Chemistries:      Lab Results   Component Value Date    NITRU NEGATIVE 07/01/2020    COLORU YELLOW 07/01/2020    PHUR 5.0 07/01/2020    LABCAST 8-15 HYALINE 07/01/2020    LABCAST NONE SEEN 07/01/2020    WBCUA 15-25 07/01/2020    RBCUA 3-5 07/01/2020    YEAST NONE SEEN 07/01/2020    BACTERIA NONE SEEN 07/01/2020    SPECGRAV 1.015 07/01/2020    LEUKOCYTESUR TRACE 07/01/2020    UROBILINOGEN 0.2 07/01/2020    BILIRUBINUR NEGATIVE 07/01/2020    BLOODU MODERATE 07/01/2020    GLUCOSEU NEGATIVE 05/22/2017    KETUA NEGATIVE 07/01/2020     Urine Sodium:   No results found for: EDOUARD  Urine Potassium:  No results found for: KUR  Urine Chloride:  No results found for: CLUR  Urine Osmolarity:   Lab Results   Component Value Date    OSMOU 325 07/01/2020     Urine Protein:   No components found for: TOTALPROTEIN, URINE   Urine Creatinine:     Lab Results   Component Value Date    LABCREA 70.6 05/31/2019     Urine Eosinophils:  No components found for: UEOS        Impression and Plan:  1. EFREN on CKD IIIb due to post-op hypotension/ATN with volume overload :   -worse today due to diuretics, lasix drip stopped. Volume status improved  -check repeat BMP later this afternoon    2. Volume overload :improved will stop lasix drip   3. HTN s/p hypotension. resolved  4.  Hypokalemia from diuretics: resolved  5. Acid/Base: elevated bicarbonate due to contraction alkalosis from diuretics  6. Hypervolemic hyponatremia resolved  7. Acute blood loss anemia s/p PRBC transfusion, Hgb stable   8. Right femur fx s/p fall with repair  9. UTI due to Enterococcus  10. Right hydrocele  11Urinary retention with chronic engel  12. DM  13. Systolic CHF            Please don't hesitate to call with any questions.   Electronically signed by Kaitlyn Frias DO on 7/9/2020 at 10:20 AM

## 2020-07-09 NOTE — CARE COORDINATION
7/9/20, 7:43 AM EDT    DISCHARGE ONGOING EVALUATION:     [address] day: 12  Location: [address] [place] [address]- Reason for admit: AMS (altered mental status) [R41.82]   Treatment Plan of Care: Creat 3.3, ical 1.09. Dr. Romi Fernandez has stopped the lasix gtt. Jessie Freitas complains of nausea and no BM since Monday. Dr Angi Crawford states will not discharge today due to him not feeling well. Barriers to Discharge: not medically stable  PCP: Afshin Andrews MD  Readmission Risk Score: 26%  Patient Goals/Plan/Treatment Preferences: Na 1808.

## 2020-07-09 NOTE — PROGRESS NOTES
Hospitalist Progress Note    Patient:  Roshan Finley      Unit/Bed:6K-09/009-A    YOB: 1934    MRN: [id number]       Acct: [de-identified]     PCP: Unknown MD Jeff    Date of Admission: 2020      Chief Complaint: [address] Course:       Per HPI \"80 year old white male nonsmoker with PMH of BPH (with chronic indwelling engel since ), CKD stage 3, HLD, hiatal hernia, CAD s/p MAI to LCx (), IDDM type 2 who presented to [facility] ED via EMS from [facility] on 20 c/o altered mental status x 2 days with associated hallucinations, testicular swelling (seen in the ED on 20), [address] [place], [address]. He has a hx of frequent UTIs and was started on bactrim for UTI on 20. Per wife/daughter, at baseline he is oriented with no confusion. 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 Family also notes that the LE edema was no present 2 days ago when he was taken to the ED for scrotal pain. On interview, patient is unable to tell me why he is at the hospital and intermittently responds to external stimuli, but is oriented to person, , year, president, month, place. He endorses scrotal pain with. Denies fever, abdominal pain, SOB, CP, lightheadedness.     In the ED, vitals show: HR 68, /75, 92% on RA and afebrile. Labs show: wbc 12.4, hgb 10.8, MCV 94.2, plt 298, cr 1.9, BUN 34, BUN: Cr 18, Ca 8.2, LFTs unremarkable, lactic 1.1, UA shows 2+ bacteria, WBC 21-30, moderate LE, neg Nitrites, moderate hematuria, trace proteinuria. CXR shows no acute findings. Stable mild bibasilar atelectasis. US scrotom shows prominent amount of hydrocele formation on the right, moderate hydrocele on the left. Fluid on the right appears clear, fluid on the left appears to be markedly complex with multiple pseudo-septations through it. \"    Patient was admitted under hospital medicine service for acute encephalopathy likely due to UTI. 6/28: pt fell and sustained right femur fracture. Orthopedics was consulted and patient underwent ORIF on 6/29/2020. Subjective:     Patient seen and examined. No overnight events noted. He's c/o abd discomfort that started today. He thought he did not have BM for past few days, although he had BM yesterday during my rounds,  had soft brownish, non-bloody stool. Pt reports scrotal/legs swelling about the same. He denies legs and scrotal pain right now. He said his cough is getting better. He's c/o a little short of breath today. He denies fever, chills, N/V, chest pain, palpitations.        Medications:  Reviewed    Infusion Medications    dextrose       Scheduled Medications    polyethylene glycol  17 g Oral Daily    insulin glargine  25 Units Subcutaneous BID    insulin lispro  9 Units Subcutaneous TID WC    docusate sodium  100 mg Oral BID    insulin lispro  0-6 Units Subcutaneous TID WC    insulin lispro  0-3 Units Subcutaneous Nightly    calcium replacement protocol   Other RX Placeholder    magnesium oxide  400 mg Oral BID    vitamin D  50,000 Units Oral Weekly    zinc oxide   Topical BID    calcium elemental  500 mg Oral BID    gabapentin  300 mg Oral BID    famotidine (PEPCID) injection  20 mg Intravenous Daily    sodium chloride  20 mL Intravenous Once    aspirin  81 mg Oral Daily    atorvastatin  20 mg Oral Nightly    clopidogrel  75 mg Oral Daily    cyclobenzaprine  10 mg Oral Daily    finasteride  5 mg Oral Daily    [Held by provider] isosorbide mononitrate  30 mg Oral Daily    metoprolol succinate  50 mg Oral Daily    tamsulosin  0.4 mg Oral Nightly    sodium chloride flush  10 mL Intravenous 2 times per day     PRN Meds: HYDROmorphone **OR** HYDROmorphone, HYDROcodone-acetaminophen, HYDROcodone-acetaminophen, traMADol, sodium chloride flush, acetaminophen **OR** acetaminophen, promethazine **OR** ondansetron, glucose, dextrose, glucagon (rDNA), dextrose      Intake/Output Summary (Last 24 hours) at 7/9/2020 1028  Last data filed at 7/9/2020 0831  Gross per 24 hour   Intake 1960.3 ml   Output 2875 ml   Net -914.7 ml       Diet:  Dietary Nutrition Supplements: Diabetic Oral Supplement  DIET CARB CONTROL; Carb Control: 3 carb choices (45 gms)/meal; Low Sodium (2 GM); Daily Fluid Restriction: 1800 ml    Exam:  /61   Pulse 88   Temp 97.9 °F (36.6 °C) (Oral)   Resp 16   Ht 5' 11\" (1.803 m)   Wt 276 lb 11.2 oz (125.5 kg)   SpO2 92%   BMI 38.59 kg/m²     General:  alert, not in acute distress  Head: Normocephalic and atraumatic. EENT: No exophthalmos noted. No scleral or conjunctiva icterus, injection or pallor noted. No maxillary or frontal sinus tenderness to percussion. Neck: Supple. Trachea midline. No thyromegaly. Thorax/Lungs: Thorax is symmetrical with good expansion.  No retractions or use of abdominal muscles. (+) crackles on both lung bases and GORGE to left mid lung field. No rhonchi, no wheezing. Cardiac: S1, S2, RRR without murmur, rub, or gallop. No JVD  Abdomen: slightly distended, soft, nontender to palpation, without guarding or rigidity. Normoactive BS  : (+) engel cath in placed. Did not examine scrotal area today ( no chaperone available)    Neurological exam reveals alert, oriented x3, normal speech, no focal findings or movement disorder noted. Exam of extremities: (+) left AKA, RLE: hard to palpate pedal pulses due to edema, 2-3 + pitting edema on right foot up to right thigh, 2-3+ left thigh pitting edema no clubbing or cyanosis      Labs:   Recent Labs     07/09/20  0637   HGB 9.0*   HCT 28.2*     Recent Labs     07/09/20  0637      K 3.8   CL 90*   CO2 37*   BUN 58*   CREATININE 3.3*   CALCIUM 8.7     No results for input(s): AST, ALT, BILIDIR, BILITOT, ALKPHOS in the last 72 hours. No results for input(s): INR in the last 72 hours.   No results for input(s): Thelda Rough in the last 72 hours.    Urinalysis:      Lab Results   Component Value Date    NITRU NEGATIVE 07/01/2020    WBCUA 15-25 07/01/2020    BACTERIA NONE SEEN 07/01/2020    RBCUA 3-5 07/01/2020    BLOODU MODERATE 07/01/2020    SPECGRAV 1.015 07/01/2020    GLUCOSEU NEGATIVE 05/22/2017       Radiology: All imaging reviewed       Assessment/Plan:      Acute Metabolic Encephalopathy likely secondary to UTI/acute urinary retention, resolved     -CT head showed: no acute findings. Ammonia wnl. Mixed Blood Gas unremarkable. Immediate 1300mL UOP with change of engel. Drastically improved with IV ABx and engel exchange.  AAOx3 and answers questions appropriately today. Acute Complicated UTI / BPH with chronic engel catheter, with acute retention ( acute retention resolved)    -Frequent UTIs 3-4 since dec 2019. UA ([facility]): 2+ bacteria, WBC 21-30, moderate LE, neg Nitrites.   -Ux grew Enterococcus feacalis, morganella morganii. Completed 8 days of Linezolid, last dose of abx 7/5.   -cont  Finasteride andTamsulosin. -has chronic engel cath, last exchanged 6/28/2020 per RN       Acute on chronic decompensated Combined Diastolic ETC YFYJT 76%, improving     2-3+ pitting edema BLE. -CXR shows prominent interstitium with likely mild pulmonary edema. Repeat cxr on 7/5: Mild retrocardiac atelectasis/pneumonia. Overall appearance of chest improved from prior  -pt is c/o a little sob today, repeat CXR today   -BNP 3k.   -2D Echo 6/2020 EF 45% with Grade 1 DD.   -nephrology switched IV Lasix to lasix gtt 7/6, creatinine slightly worse today, nephro stopped lasix gtt today. Appreciate nephrology input  -received 1 dose of metolazone 7/8  -I/O: -1,334.7 cc/hr for past 24 hours  -wt down to 276 lbs today from 281 lbs on 7/8  -Daily weights, I/Os, fluid and salt restrictions      Mild retrocardiac atelectasis/pneumonia    -noted on cxr 7/5, suspect this is more atelectasis.  Pt has dry cough, which is getting better per pt.  procal not significantly elevated, 0.18.   -c/o little sob today, saturating well on RA. repeat cxr today   -cont IS      Acute Hypoxic Resp. Failure secondary to CHF exacerbation, improved    - Pt drops mainly when sleeping to 87% on RA. Likely AKIRA. [place]aks pt was requiring 1-2L NC. Now on RA, saturating well.   -cont to monitor oxygen saturation      EFREN on CKD stage 4, likely multifactorial due to hypotension vs anemia vs volume overload, worsening        -Creatinine 1.9 on admission, then got worse, highest creatinine 3.8, initially improving, then now 3.3 from 3.1. Baseline creatinine ranges between 1.7-2.0.  -was on Midodrine 2.5, was stopped 7/6. Placed holding parameters on metoprolol. imdur on hol.   -Nephrology on-board. Appreciate nephrology input.   -Daily weights, I/Os, fluid and salt restrictions    Fall with Femur Fracture    -had a unwitnessed fall on 6/29. Found to have a subsequent femur fracture. No bleeds. Cardiology cleared for surgery.  S/P successful surgery on 6/29. PT/OT. Post Op Related Acute Blood Loss Anemia    Hb 9.0 today from 8.6 yesterday. Baseline hemoglobin around 8  -No melena, hematochezia.  S/P 1U blood 7/1 due to hemoglobin of 6.9 on 6/30/2020.  - ASA and Plavix resumed on 7/4. Cont to monitor H&H.     Hypoalbuminemia, at risk for malnutrition    -Dietitian on-board. Appreciate input     Hypocalcemia likely due to hypoalbuminemia and vitamin D deficiency    -Started ergocalciferol 7/6  -Calcium replacement protocol  -ical 1.09 on 7/8. 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 POC glucose ranges 170s to 250s, mostly 200s in the afternoon. Pt used 84 units total insulin in the past 24 hours. At home, he takes lantus 75 units daily, was started 30 units on admission.  Increase lantus as ordered and lispro TID w meals as ordered, cont SSI1.    -Goal BG of 140-180 while hospitalized. Hypoglycemia order set. Accucheck ACHS.       Chronic back pain: Hold flexeril, PRN tramadol. Resume as needed.     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 Hypomagnesemia, stable    -cont magnesium oxide oral   -magnesium level in am     Cognitive deficit    -MOCA 18/25  -ST on-board. Appreciate input   -cont ST    Hyponatremia, possibly from diuretics and EFREN on CKD, resolved    -cont to monitor BMP     Contraction alkalosis due to diuretics    -BMP as ordered         Disposition: DC back to [facility]. [facility]'s once medically stable        Diet: Dietary Nutrition Supplements: Diabetic Oral Supplement  DIET CARB CONTROL; Carb Control: 3 carb choices (45 gms)/meal; Low Sodium (2 GM);  Daily Fluid Restriction: 1800 ml      Code Status: Full Code        Electronically signed by Nikki Suazo MD on 7/9/2020 at 10:28 AM

## 2020-07-10 ENCOUNTER — APPOINTMENT (OUTPATIENT)
Dept: GENERAL RADIOLOGY | Age: 85
DRG: 981 | End: 2020-07-10
Attending: FAMILY MEDICINE
Payer: MEDICARE

## 2020-07-10 LAB
ANION GAP SERPL CALCULATED.3IONS-SCNC: 11 MEQ/L (ref 8–16)
AVERAGE GLUCOSE: 129 MG/DL (ref 70–126)
BUN BLDV-MCNC: 56 MG/DL (ref 7–22)
CALCIUM IONIZED: 1.01 MMOL/L (ref 1.12–1.32)
CALCIUM SERPL-MCNC: 8.7 MG/DL (ref 8.5–10.5)
CHLORIDE BLD-SCNC: 86 MEQ/L (ref 98–111)
CO2: 34 MEQ/L (ref 23–33)
CREAT SERPL-MCNC: 2.9 MG/DL (ref 0.4–1.2)
GFR SERPL CREATININE-BSD FRML MDRD: 21 ML/MIN/1.73M2
GLUCOSE BLD-MCNC: 250 MG/DL (ref 70–108)
GLUCOSE BLD-MCNC: 283 MG/DL (ref 70–108)
GLUCOSE BLD-MCNC: 286 MG/DL (ref 70–108)
GLUCOSE BLD-MCNC: 298 MG/DL (ref 70–108)
GLUCOSE BLD-MCNC: 382 MG/DL (ref 70–108)
HBA1C MFR BLD: 6.3 % (ref 4.4–6.4)
HCT VFR BLD CALC: 27.3 % (ref 42–52)
HEMOGLOBIN: 8.7 GM/DL (ref 14–18)
MAGNESIUM: 1.8 MG/DL (ref 1.6–2.4)
POTASSIUM SERPL-SCNC: 3.9 MEQ/L (ref 3.5–5.2)
SODIUM BLD-SCNC: 131 MEQ/L (ref 135–145)

## 2020-07-10 PROCEDURE — 80048 BASIC METABOLIC PNL TOTAL CA: CPT

## 2020-07-10 PROCEDURE — 2580000003 HC RX 258: Performed by: PHYSICIAN ASSISTANT

## 2020-07-10 PROCEDURE — 6370000000 HC RX 637 (ALT 250 FOR IP): Performed by: INTERNAL MEDICINE

## 2020-07-10 PROCEDURE — 83735 ASSAY OF MAGNESIUM: CPT

## 2020-07-10 PROCEDURE — 6360000002 HC RX W HCPCS: Performed by: FAMILY MEDICINE

## 2020-07-10 PROCEDURE — 6370000000 HC RX 637 (ALT 250 FOR IP): Performed by: PHYSICIAN ASSISTANT

## 2020-07-10 PROCEDURE — 99232 SBSQ HOSP IP/OBS MODERATE 35: CPT | Performed by: FAMILY MEDICINE

## 2020-07-10 PROCEDURE — 1200000003 HC TELEMETRY R&B

## 2020-07-10 PROCEDURE — 82330 ASSAY OF CALCIUM: CPT

## 2020-07-10 PROCEDURE — 85014 HEMATOCRIT: CPT

## 2020-07-10 PROCEDURE — 85018 HEMOGLOBIN: CPT

## 2020-07-10 PROCEDURE — 99232 SBSQ HOSP IP/OBS MODERATE 35: CPT | Performed by: INTERNAL MEDICINE

## 2020-07-10 PROCEDURE — 2500000003 HC RX 250 WO HCPCS: Performed by: INTERNAL MEDICINE

## 2020-07-10 PROCEDURE — 82948 REAGENT STRIP/BLOOD GLUCOSE: CPT

## 2020-07-10 PROCEDURE — 6370000000 HC RX 637 (ALT 250 FOR IP): Performed by: FAMILY MEDICINE

## 2020-07-10 PROCEDURE — 71045 X-RAY EXAM CHEST 1 VIEW: CPT

## 2020-07-10 PROCEDURE — 36415 COLL VENOUS BLD VENIPUNCTURE: CPT

## 2020-07-10 PROCEDURE — 6360000002 HC RX W HCPCS: Performed by: INTERNAL MEDICINE

## 2020-07-10 PROCEDURE — 83036 HEMOGLOBIN GLYCOSYLATED A1C: CPT

## 2020-07-10 RX ORDER — METOPROLOL SUCCINATE 50 MG/1
50 TABLET, EXTENDED RELEASE ORAL DAILY
Qty: 30 TABLET | Refills: 0
Start: 2020-07-10

## 2020-07-10 RX ORDER — ERGOCALCIFEROL 1.25 MG/1
50000 CAPSULE ORAL WEEKLY
Qty: 5 CAPSULE | Refills: 0
Start: 2020-07-13

## 2020-07-10 RX ORDER — INSULIN GLARGINE 100 [IU]/ML
28 INJECTION, SOLUTION SUBCUTANEOUS 2 TIMES DAILY
Status: DISCONTINUED | OUTPATIENT
Start: 2020-07-10 | End: 2020-07-11 | Stop reason: HOSPADM

## 2020-07-10 RX ORDER — CALCIUM CARBONATE 500(1250)
500 TABLET ORAL 2 TIMES DAILY
Qty: 30 TABLET | Refills: 0 | Status: ON HOLD
Start: 2020-07-10 | End: 2022-10-11

## 2020-07-10 RX ORDER — ISOSORBIDE MONONITRATE 30 MG/1
30 TABLET, EXTENDED RELEASE ORAL DAILY
Qty: 30 TABLET | Refills: 0
Start: 2020-07-10

## 2020-07-10 RX ORDER — BUMETANIDE 0.25 MG/ML
1 INJECTION, SOLUTION INTRAMUSCULAR; INTRAVENOUS ONCE
Status: DISCONTINUED | OUTPATIENT
Start: 2020-07-10 | End: 2020-07-10 | Stop reason: ALTCHOICE

## 2020-07-10 RX ORDER — GABAPENTIN 300 MG/1
300 CAPSULE ORAL 2 TIMES DAILY
Qty: 60 CAPSULE | Refills: 0 | Status: ON HOLD | DISCHARGE
Start: 2020-07-10 | End: 2022-10-11

## 2020-07-10 RX ORDER — POTASSIUM CHLORIDE 20 MEQ/1
20 TABLET, EXTENDED RELEASE ORAL DAILY
Status: DISCONTINUED | OUTPATIENT
Start: 2020-07-10 | End: 2020-07-11 | Stop reason: HOSPADM

## 2020-07-10 RX ORDER — INSULIN GLARGINE 100 [IU]/ML
28 INJECTION, SOLUTION SUBCUTANEOUS 2 TIMES DAILY
Qty: 1 VIAL | Refills: 0
Start: 2020-07-10 | End: 2020-07-11

## 2020-07-10 RX ORDER — FUROSEMIDE 10 MG/ML
40 INJECTION INTRAMUSCULAR; INTRAVENOUS ONCE
Status: COMPLETED | OUTPATIENT
Start: 2020-07-10 | End: 2020-07-10

## 2020-07-10 RX ORDER — FAMOTIDINE 20 MG/1
20 TABLET, FILM COATED ORAL 2 TIMES DAILY
Qty: 60 TABLET | Refills: 0
Start: 2020-07-10 | End: 2020-07-10 | Stop reason: SDUPTHER

## 2020-07-10 RX ORDER — CALCIUM CARBONATE 200(500)MG
500 TABLET,CHEWABLE ORAL 2 TIMES DAILY
Status: DISCONTINUED | OUTPATIENT
Start: 2020-07-10 | End: 2020-07-11 | Stop reason: HOSPADM

## 2020-07-10 RX ORDER — BUMETANIDE 1 MG/1
1 TABLET ORAL 2 TIMES DAILY
Status: DISCONTINUED | OUTPATIENT
Start: 2020-07-10 | End: 2020-07-11 | Stop reason: HOSPADM

## 2020-07-10 RX ORDER — FAMOTIDINE 20 MG/1
20 TABLET, FILM COATED ORAL DAILY
Qty: 30 TABLET | Refills: 0 | DISCHARGE
Start: 2020-07-10 | End: 2022-01-18 | Stop reason: ALTCHOICE

## 2020-07-10 RX ADMIN — INSULIN GLARGINE 28 UNITS: 100 INJECTION, SOLUTION SUBCUTANEOUS at 22:59

## 2020-07-10 RX ADMIN — GABAPENTIN 300 MG: 300 CAPSULE ORAL at 22:54

## 2020-07-10 RX ADMIN — CALCIUM 500 MG: 500 TABLET ORAL at 08:31

## 2020-07-10 RX ADMIN — Medication: at 22:57

## 2020-07-10 RX ADMIN — MAGNESIUM GLUCONATE 500 MG ORAL TABLET 400 MG: 500 TABLET ORAL at 22:54

## 2020-07-10 RX ADMIN — CALCIUM 500 MG: 500 TABLET ORAL at 22:56

## 2020-07-10 RX ADMIN — ANTACID TABLETS 500 MG: 500 TABLET, CHEWABLE ORAL at 22:59

## 2020-07-10 RX ADMIN — CLOPIDOGREL BISULFATE 75 MG: 75 TABLET ORAL at 08:27

## 2020-07-10 RX ADMIN — FUROSEMIDE 40 MG: 10 INJECTION, SOLUTION INTRAMUSCULAR; INTRAVENOUS at 09:28

## 2020-07-10 RX ADMIN — ATORVASTATIN CALCIUM 20 MG: 20 TABLET, FILM COATED ORAL at 22:54

## 2020-07-10 RX ADMIN — GABAPENTIN 300 MG: 300 CAPSULE ORAL at 08:31

## 2020-07-10 RX ADMIN — INSULIN GLARGINE 25 UNITS: 100 INJECTION, SOLUTION SUBCUTANEOUS at 08:28

## 2020-07-10 RX ADMIN — CYCLOBENZAPRINE 10 MG: 10 TABLET, FILM COATED ORAL at 08:26

## 2020-07-10 RX ADMIN — MAGNESIUM GLUCONATE 500 MG ORAL TABLET 400 MG: 500 TABLET ORAL at 08:31

## 2020-07-10 RX ADMIN — FINASTERIDE 5 MG: 5 TABLET, FILM COATED ORAL at 08:31

## 2020-07-10 RX ADMIN — FAMOTIDINE 20 MG: 10 INJECTION, SOLUTION INTRAVENOUS at 08:33

## 2020-07-10 RX ADMIN — ASPIRIN 81 MG 81 MG: 81 TABLET ORAL at 08:27

## 2020-07-10 RX ADMIN — DOCUSATE SODIUM 100 MG: 100 CAPSULE, LIQUID FILLED ORAL at 23:00

## 2020-07-10 RX ADMIN — METOPROLOL SUCCINATE 50 MG: 50 TABLET, FILM COATED, EXTENDED RELEASE ORAL at 08:31

## 2020-07-10 RX ADMIN — Medication: at 08:26

## 2020-07-10 RX ADMIN — SODIUM CHLORIDE, PRESERVATIVE FREE 10 ML: 5 INJECTION INTRAVENOUS at 08:32

## 2020-07-10 RX ADMIN — SODIUM CHLORIDE, PRESERVATIVE FREE 10 ML: 5 INJECTION INTRAVENOUS at 22:58

## 2020-07-10 RX ADMIN — DARBEPOETIN ALFA 40 MCG: 40 INJECTION, SOLUTION INTRAVENOUS; SUBCUTANEOUS at 14:43

## 2020-07-10 RX ADMIN — POTASSIUM CHLORIDE 20 MEQ: 1500 TABLET, EXTENDED RELEASE ORAL at 14:43

## 2020-07-10 RX ADMIN — BUMETANIDE 1 MG: 1 TABLET ORAL at 23:00

## 2020-07-10 ASSESSMENT — PAIN SCALES - GENERAL: PAINLEVEL_OUTOF10: 0

## 2020-07-10 NOTE — PROGRESS NOTES
Hospitalist Progress Note    Patient:  Felicia Domínguez      Unit/Bed:6K-009-A    YOB: 1934    MRN: [id number]       Acct: [de-identified]     PCP: Taj Amado MD    Date of Admission: 2020      Chief Complaint: [address] Course:       Per HPI \"80 year old white male nonsmoker with PMH of BPH (with chronic indwelling engel since ), CKD stage 3, HLD, hiatal hernia, CAD s/p MAI to LCx (), IDDM type 2 who presented to [facility] ED via EMS from [facility] on 20 c/o altered mental status x 2 days with associated hallucinations, testicular swelling (seen in the ED on 20), [address], [address] home. He has a hx of frequent UTIs and was started on bactrim for UTI on 20. Per wife/daughter, at baseline he is oriented with no confusion. 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 Family also notes that the LE edema was no present 2 days ago when he was taken to the ED for scrotal pain. On interview, patient is unable to tell me why he is at the hospital and intermittently responds to external stimuli, but is oriented to person, , year, president, month, place. He endorses scrotal pain with. Denies fever, abdominal pain, SOB, CP, lightheadedness.     In the ED, vitals show: HR 68, /75, 92% on RA and afebrile. Labs show: wbc 12.4, hgb 10.8, MCV 94.2, plt 298, cr 1.9, BUN 34, BUN: Cr 18, Ca 8.2, LFTs unremarkable, lactic 1.1, UA shows 2+ bacteria, WBC 21-30, moderate LE, neg Nitrites, moderate hematuria, trace proteinuria. CXR shows no acute findings. Stable mild bibasilar atelectasis. US scrotom shows prominent amount of hydrocele formation on the right, moderate hydrocele on the left. Fluid on the right appears clear, fluid on the left appears to be markedly complex with multiple pseudo-septations through it. \"    Patient was admitted under hospital medicine service for acute encephalopathy likely due to UTI. 6/28: pt fell and sustained right femur fracture. Orthopedics was consulted and patient underwent ORIF on 6/29/2020. Subjective:     Patient seen and examined. No overnight events noted. Initially discharged this morning, then pt was c/o sob, and RN checked oxygen saturation, was 88% on RA, pt was then placed on 1L O2. Pt seen again. Pt states that he feels little short of breath. Still has dry cough. He denies chest pain, palpitations, abd pain, N/V. Legs/scrotal swelling about the same. Last BM this morning. No diarrhea or hard stool.        Medications:  Reviewed    Infusion Medications    dextrose       Scheduled Medications    furosemide  40 mg Intravenous Once    polyethylene glycol  17 g Oral Daily    insulin glargine  25 Units Subcutaneous BID    insulin lispro  9 Units Subcutaneous TID WC    docusate sodium  100 mg Oral BID    insulin lispro  0-6 Units Subcutaneous TID WC    insulin lispro  0-3 Units Subcutaneous Nightly    calcium replacement protocol   Other RX Placeholder    magnesium oxide  400 mg Oral BID    vitamin D  50,000 Units Oral Weekly    zinc oxide   Topical BID    calcium elemental  500 mg Oral BID    gabapentin  300 mg Oral BID    famotidine (PEPCID) injection  20 mg Intravenous Daily    sodium chloride  20 mL Intravenous Once    aspirin  81 mg Oral Daily    atorvastatin  20 mg Oral Nightly    clopidogrel  75 mg Oral Daily    cyclobenzaprine  10 mg Oral Daily    finasteride  5 mg Oral Daily    [Held by provider] isosorbide mononitrate  30 mg Oral Daily    metoprolol succinate  50 mg Oral Daily    tamsulosin  0.4 mg Oral Nightly    sodium chloride flush  10 mL Intravenous 2 times per day     PRN Meds: HYDROmorphone **OR** HYDROmorphone, HYDROcodone-acetaminophen, HYDROcodone-acetaminophen, traMADol, sodium chloride flush, acetaminophen **OR** acetaminophen, promethazine **OR** ondansetron, glucose, dextrose, glucagon (rDNA), dextrose      Intake/Output Summary (Last 24 hours) at 7/10/2020 9632  Last data filed at 7/9/2020 1957  Gross per 24 hour   Intake 420 ml   Output 1250 ml   Net -830 ml       Diet:  Dietary Nutrition Supplements: Diabetic Oral Supplement  DIET CARB CONTROL; Carb Control: 3 carb choices (45 gms)/meal; Low Sodium (2 GM); Daily Fluid Restriction: 1800 ml    Exam:  BP (!) 124/56   Pulse 87   Temp 98.1 °F (36.7 °C) (Oral)   Resp 18   Ht 5' 11\" (1.803 m)   Wt 274 lb (124.3 kg)   SpO2 93%   BMI 38.22 kg/m²     General:  alert, not in acute distress  Head: Normocephalic and atraumatic. EENT: No exophthalmos noted. No scleral or conjunctiva icterus, injection or pallor noted. No maxillary or frontal sinus tenderness to percussion. Neck: Supple. Trachea midline. No thyromegaly. Thorax/Lungs: Thorax is symmetrical with good expansion.  No retractions or use of abdominal muscles. (+) crackles on left mid to lower lung field. No rhonchi, no wheezing. Cardiac: S1, S2, RRR without murmur, rub, or gallop. No JVD  Abdomen: slightly distended, soft, nontender to palpation, without guarding or rigidity. Normoactive BS  : (+) engel cath in placed. Did not examine scrotal area today ( no chaperone available)    Neurological exam reveals alert, oriented x3, normal speech, no focal findings or movement disorder noted. Exam of extremities: (+) left AKA, RLE: hard to palpate pedal pulses due to edema, 2+ pitting edema on right foot up to right thigh, 2+ left thigh pitting edema no clubbing or cyanosis      Labs:   Recent Labs     07/10/20  0428   HGB 8.7*   HCT 27.3*     Recent Labs     07/10/20  0428   *   K 3.9   CL 86*   CO2 34*   BUN 56*   CREATININE 2.9*   CALCIUM 8.7     No results for input(s): AST, ALT, BILIDIR, BILITOT, ALKPHOS in the last 72 hours. No results for input(s): INR in the last 72 hours. No results for input(s): Thelda Rough in the last 72 hours.     Urinalysis:      Lab Results   Component Value Date    NITRU NEGATIVE 07/01/2020    WBCUA 15-25 07/01/2020    BACTERIA NONE SEEN 07/01/2020    RBCUA 3-5 07/01/2020    BLOODU MODERATE 07/01/2020    SPECGRAV 1.015 07/01/2020    Kulwinder Castillo 994 NEGATIVE 05/22/2017       Radiology: All imaging reviewed       Assessment/Plan:      Acute Metabolic Encephalopathy likely secondary to UTI/acute urinary retention, resolved     -CT head showed: no acute findings. Ammonia wnl. Mixed Blood Gas unremarkable. Immediate 1300mL UOP with change of engel. Drastically improved with IV ABx and engel exchange.  AAOx3 and answers questions appropriately today. Acute Complicated UTI / BPH with chronic engel catheter, with acute retention ( acute retention resolved)    -Frequent UTIs 3-4 since dec 2019. UA ([facility]): 2+ bacteria, WBC 21-30, moderate LE, neg Nitrites.   -Ux grew Enterococcus feacalis, morganella morganii. Completed 8 days of Linezolid, last dose of abx 7/5.   -cont  Finasteride and Tamsulosin. -has chronic engel cath, last exchanged 6/28/2020 per RN       Acute on chronic decompensated Combined Diastolic MPK FKHRG 17%, improving     2+ pitting edema BLE. -CXR shows prominent interstitium with likely mild pulmonary edema. Repeat cxr on 7/5: Mild retrocardiac atelectasis/pneumonia. Overall appearance of chest improved from prior  -pt is c/o a little sob today, repeat CXR today   -BNP 3k.   -2D Echo 6/2020 EF 45% with Grade 1 DD.   -nephrology switched IV Lasix to lasix gtt 7/6, creatinine slightly worse today, nephro stopped lasix gtt on 7/9. Appreciate nephrology input  -received 1 dose of metolazone 7/8  -I/O: -410 cc/hr for past 24 hours  -wt down to 274 lbs today from 276 lbs on 7/8  -Daily weights, I/Os, fluid and salt restrictions  -c/o sob today and has hypoxia, Dr. Evonne Simpson recommend give 1 dose of bumex 1 mg IV and repeat cxr      Mild retrocardiac atelectasis/pneumonia    -noted on cxr 7/5, suspect this is more atelectasis.  Pt has dry cough, about the same per pt.  procal not significantly elevated, 0.18 on 7/7.   -c/o little sob today, oxygen  Saturation dropped to 88% on RA, now on 1 lpm O2. repeat cxr on 7/9 slightly worse. Repeat cxr today  -cont IS      Acute Hypoxic Respiratory failure secondary to CHF exacerbation    - Pt drops mainly when sleeping to 87% on RA. Likely AKIRA. Henrik Evans pt was requiring 1-2L NC. Initially improved, now requiring 1 lpm O2 again   -cont to monitor oxygen saturation      EFREN on CKD stage 4, likely multifactorial due to hypotension vs anemia vs volume overload        -Creatinine 1.9 on admission, then got worse, highest creatinine 3.8, initially improving, then 3.3 from 3.1 on 7/9, creatinine down to 2.9 today. Baseline creatinine ranges between 1.7-2.0.  -was on Midodrine 2.5, was stopped 7/6. Placed holding parameters on metoprolol. imdur on hold. -Nephrology on-board. Appreciate nephrology input.   -Daily weights, I/Os, fluid and salt restrictions    Fall with Femur Fracture    -had a unwitnessed fall on 6/29. Found to have a subsequent femur fracture. No bleeds. Cardiology cleared for surgery.  S/P successful surgery on 6/29. PT/OT. Post Op Related Acute Blood Loss Anemia    Hb 9.0 today from 8.6 yesterday. Baseline hemoglobin around 8  -No melena, hematochezia.  S/P 1U blood 7/1 due to hemoglobin of 6.9 on 6/30/2020.  - ASA and Plavix resumed on 7/4. Cont to monitor H&H.     Hypoalbuminemia, at risk for malnutrition    -Dietitian on-board. Appreciate input     Hypocalcemia likely due to hypoalbuminemia and vitamin D deficiency    -Started ergocalciferol 7/6  -Calcium replacement protocol  -ical 1.01 on 7/10. 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 POC glucose ranges 170s to 250s, mostly 200s in the afternoon. Pt used 86 units total insulin in the past 24 hours.  At

## 2020-07-10 NOTE — CARE COORDINATION
7/10/20, 11:09 AM EDT    Patient goals/plan/ treatment preferences discussed by  and . Patient goals/plan/ treatment preferences reviewed with patient/ family. Patient/ family verbalize understanding of discharge plan and are in agreement with goal/plan/treatment preferences. Understanding was demonstrated using the teach back method. AVS provided by RN at time of discharge, which includes all necessary medical information pertaining to the patients current course of illness, treatment, post-discharge goals of care, and treatment preferences. Services After Discharge  Services At/After Discharge: Aide Services, Nursing Services, Skilled Therapy([facility], skilled medicare)   IMM Letter  IMM Letter given to Patient/Family/Significant other/Guardian/POA/by[de-identified] Nikole Aguilar CM  IMM Letter date given[de-identified] 07/09/20  IMM Letter time given[de-identified] [phone]     Patient is an anticipated weekend discharge returning to [facility] under his skilled medicare. Patient had a negative COVID on 7/8/20. SW spoke with Tracie Echols and they are okay with COVID completed on 7/8/20 thru the weekend.  RN updated Blue envelope on chart

## 2020-07-10 NOTE — CARE COORDINATION
7/10/20, 10:34 AM EDT    DISCHARGE ONGOING EVALUATION:     [address] day: 13  Location: [address] [place] [address] Reason for admit: AMS (altered mental status) [R41.82]   Treatment Plan of Care: Had planned for discharge today, but patient c/o SOB and O2 sat was 88%RA. Oxygen applied at 1L. 1 dose of IV Bumex ordered, repeat chest xray. Barriers to Discharge: not medically stable. Likely discharge Saturday if improved.    PCP: Lucy Maya MD  Readmission Risk Score: 22%  Patient Goals/Plan/Treatment Preferences: Edson Solis

## 2020-07-10 NOTE — PROGRESS NOTES
hours PRN TID for Pain. Hold if drowsy, confused, or sleepy.   Incontinence Supplies (BEDSIDE DRAINAGE BAG) MISC, Dispense one, 2000 cc overnight urinary bag  tamsulosin (FLOMAX) 0.4 MG capsule, Take 1 capsule by mouth nightly  [DISCONTINUED] insulin NPH (HUMULIN N;NOVOLIN N) 100 UNIT/ML injection vial, Inject 10 Units into the skin 2 times daily (Patient taking differently: Inject 22 Units into the skin nightly Inject 22 units qhs and 28 units daily)  atorvastatin (LIPITOR) 20 MG tablet, Take 20 mg by mouth nightly  [DISCONTINUED] loperamide (IMODIUM) 2 MG capsule, Take 2 mg by mouth 4 times daily as needed for Diarrhea (for diarrhea related to weakness)  [DISCONTINUED] pantoprazole (PROTONIX) 40 MG tablet, Take 40 mg by mouth nightly   [DISCONTINUED] metoprolol succinate ER (TOPROL-XL) 50 MG XL tablet, Take 1 tablet by mouth daily  nitroGLYCERIN (NITROSTAT) 0.4 MG SL tablet, Place 1 tablet under the tongue every 5 minutes as needed for Chest pain  acetaminophen (TYLENOL) 325 MG tablet, Take 650 mg by mouth every 6 hours     Current Medications:     Scheduled Meds:    insulin glargine  28 Units Subcutaneous BID    insulin lispro  10 Units Subcutaneous TID WC    bumetanide  1 mg Oral BID    potassium chloride  20 mEq Oral Daily    darbepoetin jessy-polysorbate  40 mcg Subcutaneous Once    polyethylene glycol  17 g Oral Daily    docusate sodium  100 mg Oral BID    insulin lispro  0-6 Units Subcutaneous TID WC    insulin lispro  0-3 Units Subcutaneous Nightly    calcium replacement protocol   Other RX Placeholder    magnesium oxide  400 mg Oral BID    vitamin D  50,000 Units Oral Weekly    zinc oxide   Topical BID    calcium elemental  500 mg Oral BID    gabapentin  300 mg Oral BID    famotidine (PEPCID) injection  20 mg Intravenous Daily    sodium chloride  20 mL Intravenous Once    aspirin  81 mg Oral Daily    atorvastatin  20 mg Oral Nightly    clopidogrel  75 mg Oral Daily    cyclobenzaprine  10 mg Oral Daily    finasteride  5 mg Oral Daily    [Held by provider] isosorbide mononitrate  30 mg Oral Daily    metoprolol succinate  50 mg Oral Daily    tamsulosin  0.4 mg Oral Nightly    sodium chloride flush  10 mL Intravenous 2 times per day     Continuous Infusions:    dextrose       PRN Meds:  HYDROmorphone **OR** HYDROmorphone, HYDROcodone-acetaminophen, HYDROcodone-acetaminophen, traMADol, sodium chloride flush, acetaminophen **OR** acetaminophen, promethazine **OR** ondansetron, glucose, dextrose, glucagon (rDNA), dextrose    Input/Output:       I/O last 3 completed shifts: In: 840 [P.O.:840]  Out: 1250 [Urine:1250]. Patient Vitals for the past 96 hrs (Last 3 readings):   Weight   07/10/20 0515 274 lb (124.3 kg)   20 2312 276 lb 11.2 oz (125.5 kg)   20 0315 281 lb (127.5 kg)       Vital Signs:   Temperature:  Temp: 97.8 °F (36.6 °C)  TMax:   Temp (24hrs), Av.1 °F (36.7 °C), Min:97.8 °F (36.6 °C), Max:98.3 °F (36.8 °C)    Respirations:  Resp: 18  Pulse:   Pulse: 91  BP:    BP: 133/63  BP Range: Systolic (81AVO), WPV:624 , Min:124 , UOV:285       Diastolic (45KBB), VSY:43, Min:56, Max:69      Physical Examination:     General:  Awake, alert  HEENT: NC/AT/ MMM  Chest:               Faint rales at bases  Cardiac:  S1 S2   Abdomen: Soft, non-tender,  Neuro:  No facial droop, No Asterixis  SKIN:  No rashes, good skin turgor. Extremities:  Right leg is wrapped has some dependent edema no thigh edema and no left thigh edema + left BKA    Labs:       Recent Labs     20  0552 20  0637 07/10/20  0428   HGB 8.6* 9.0* 8.7*   HCT 27.5* 28.2* 27.3*      BMP:   Recent Labs     20  0637 20  1539 07/10/20  0428    134* 131*   K 3.8 4.3 3.9   CL 90* 87* 86*   CO2 37* 36* 34*   BUN 58* 57* 56*   CREATININE 3.3* 3.0* 2.9*   GLUCOSE 181* 308* 283*   CALCIUM 8.7 8.6 8.7      Phosphorus:     No results for input(s): PHOS in the last 72 hours.   Magnesium:    Recent Labs 07/08/20  0552 07/09/20  0637 07/10/20  0428   MG 1.7 1.6 1.8     Albumin:    No results for input(s): LABALBU in the last 72 hours. BNP:      Lab Results   Component Value Date    BNP 2,278 11/07/2019     LYN:    No results found for: LYN  SPEP:  Lab Results   Component Value Date    PROT 6.5 06/27/2020    PROT 6.1 06/10/2016     UPEP:   No results found for: LABPE  C3:   No results found for: C3  C4:   No results found for: C4  MPO ANCA:   No results found for: MPO  PR3 ANCA:   No results found for: PR3  Anti-GBM:   No results found for: GBMABIGG  Hep BsAg:       No results found for: HEPBSAG  Hep C AB:        No results found for: HEPCAB    Urinalysis/Chemistries:      Lab Results   Component Value Date    NITRU NEGATIVE 07/01/2020    COLORU YELLOW 07/01/2020    PHUR 5.0 07/01/2020    LABCAST 8-15 HYALINE 07/01/2020    LABCAST NONE SEEN 07/01/2020    WBCUA 15-25 07/01/2020    RBCUA 3-5 07/01/2020    YEAST NONE SEEN 07/01/2020    BACTERIA NONE SEEN 07/01/2020    SPECGRAV 1.015 07/01/2020    LEUKOCYTESUR TRACE 07/01/2020    UROBILINOGEN 0.2 07/01/2020    BILIRUBINUR NEGATIVE 07/01/2020    BLOODU MODERATE 07/01/2020    GLUCOSEU NEGATIVE 05/22/2017    KETUA NEGATIVE 07/01/2020     Urine Sodium:   No results found for: EDOUARD  Urine Potassium:  No results found for: KUR  Urine Chloride:  No results found for: CLUR  Urine Osmolarity:   Lab Results   Component Value Date    OSMOU 325 07/01/2020     Urine Protein:   No components found for: TOTALPROTEIN, URINE   Urine Creatinine:     Lab Results   Component Value Date    LABCREA 70.6 05/31/2019     Urine Eosinophils:  No components found for: UEOS        Impression and Plan:  1. EFREN on CKD IIIb due to post-op hypotension/ATN with volume overload :   -overall improved. 2. Volume overload :still swollen but overall much improved and weight is down 22 pounds from max weight. Lasix drip stopped yesterday. Pt c/o SOB this morning, CXR with possible infiltrate.   He received another dose of IV diuretic this morning and will start his home bumex 1 mg BID  3. HTN s/p hypotension. resolved  4. Hypokalemia from diuretics: resolved  5. Acid/Base: elevated bicarbonate due to contraction alkalosis from diuretics  6. Hypervolemic hyponatremia resolved  7. Acute blood loss anemia s/p PRBC transfusion, Hgb still low which may be contributing to his shortness of breath. Will give dose of Aranesp 40 mcg SC today  8. Right femur fx s/p fall with repair  9. UTI due to Enterococcus  10. Right hydrocele  11Urinary retention with chronic engel  12. DM  13. Systolic CHF            Please don't hesitate to call with any questions.   Electronically signed by Roseanna Tapia DO on 7/10/2020 at 12:18 PM

## 2020-07-10 NOTE — FLOWSHEET NOTE
07/10/20 1305   Encounter Summary   Services provided to: Patient   Referral/Consult From: [facility]   Continue Visiting Yes  (7/10/2020 )   Complexity of Encounter Low   Length of Encounter 15 minutes   Routine   Type Follow up   Assessment Approachable   Intervention Nurtured hope   Outcome Comfort   Spiritual/Yazidism   Type Spiritual support   Assessment:  During my contact with the 80 yr old patient, I wanted to evaluate the patients spiritual needs. The pt is presently coping with acute metabolic encephalopathy and shortness of breath. The pt is listed as a Full Code. Interventions:  I offered emotional support and words of encouragement to the pt. Outcomes: The pt was tired and appreciative for the spiritual support. The pt is holding on to hope. Plan:   Continued support would be beneficial for the holistic healing of the patient.

## 2020-07-10 NOTE — PLAN OF CARE
Problem: Falls - Risk of:  Goal: Will remain free from falls  Description: Will remain free from falls  Outcome: Ongoing  Note: Call light within reach. Side rails up x2. Bed alarm on. Non skid slippers available. Problem: Skin Integrity:  Goal: Absence of new skin breakdown  Description: Absence of new skin breakdown  Outcome: Ongoing  Note: Patient has redness on buttocks. Turning patient every 2 hours and prn. Will continue to monitor. Problem: Discharge Planning:  Goal: Discharged to appropriate level of care  Description: Discharged to appropriate level of care  Outcome: Ongoing  Note: Patient plans to be discharged to [facility]  when medically stable. Problem: Cardiac Output - Decreased:  Goal: Hemodynamic stability will improve  Description: Hemodynamic stability will improve  Outcome: Ongoing  Note:   Vitals:    07/10/20 1502   BP: (!) 156/65   Pulse: 85   Resp: 18   Temp: 98.4 °F (36.9 °C)   SpO2: 94%        Care plan reviewed with patient. Patient verbalize understanding of the plan of care and contribute to goal setting.

## 2020-07-11 VITALS
DIASTOLIC BLOOD PRESSURE: 66 MMHG | TEMPERATURE: 98.1 F | BODY MASS INDEX: 38.35 KG/M2 | RESPIRATION RATE: 20 BRPM | HEIGHT: 71 IN | SYSTOLIC BLOOD PRESSURE: 155 MMHG | OXYGEN SATURATION: 97 % | HEART RATE: 76 BPM | WEIGHT: 273.9 LBS

## 2020-07-11 LAB
ANION GAP SERPL CALCULATED.3IONS-SCNC: 9 MEQ/L (ref 8–16)
BUN BLDV-MCNC: 52 MG/DL (ref 7–22)
CALCIUM SERPL-MCNC: 8.6 MG/DL (ref 8.5–10.5)
CHLORIDE BLD-SCNC: 81 MEQ/L (ref 98–111)
CO2: 38 MEQ/L (ref 23–33)
CREAT SERPL-MCNC: 2.8 MG/DL (ref 0.4–1.2)
FOLATE: 16.2 NG/ML (ref 4.8–24.2)
GFR SERPL CREATININE-BSD FRML MDRD: 22 ML/MIN/1.73M2
GLUCOSE BLD-MCNC: 187 MG/DL (ref 70–108)
GLUCOSE BLD-MCNC: 203 MG/DL (ref 70–108)
GLUCOSE BLD-MCNC: 254 MG/DL (ref 70–108)
GLUCOSE BLD-MCNC: 296 MG/DL (ref 70–108)
HCT VFR BLD CALC: 28.2 % (ref 42–52)
HEMOGLOBIN: 9 GM/DL (ref 14–18)
MAGNESIUM: 1.8 MG/DL (ref 1.6–2.4)
POTASSIUM SERPL-SCNC: 3.6 MEQ/L (ref 3.5–5.2)
SODIUM BLD-SCNC: 128 MEQ/L (ref 135–145)
VITAMIN B-12: 947 PG/ML (ref 211–911)

## 2020-07-11 PROCEDURE — 83735 ASSAY OF MAGNESIUM: CPT

## 2020-07-11 PROCEDURE — 6370000000 HC RX 637 (ALT 250 FOR IP): Performed by: INTERNAL MEDICINE

## 2020-07-11 PROCEDURE — 6370000000 HC RX 637 (ALT 250 FOR IP): Performed by: FAMILY MEDICINE

## 2020-07-11 PROCEDURE — 2580000003 HC RX 258: Performed by: PHYSICIAN ASSISTANT

## 2020-07-11 PROCEDURE — 82607 VITAMIN B-12: CPT

## 2020-07-11 PROCEDURE — 85014 HEMATOCRIT: CPT

## 2020-07-11 PROCEDURE — 2500000003 HC RX 250 WO HCPCS: Performed by: INTERNAL MEDICINE

## 2020-07-11 PROCEDURE — 99239 HOSP IP/OBS DSCHRG MGMT >30: CPT | Performed by: PHYSICIAN ASSISTANT

## 2020-07-11 PROCEDURE — 36415 COLL VENOUS BLD VENIPUNCTURE: CPT

## 2020-07-11 PROCEDURE — 85018 HEMOGLOBIN: CPT

## 2020-07-11 PROCEDURE — 82746 ASSAY OF FOLIC ACID SERUM: CPT

## 2020-07-11 PROCEDURE — 80048 BASIC METABOLIC PNL TOTAL CA: CPT

## 2020-07-11 PROCEDURE — 6370000000 HC RX 637 (ALT 250 FOR IP): Performed by: PHYSICIAN ASSISTANT

## 2020-07-11 PROCEDURE — 99232 SBSQ HOSP IP/OBS MODERATE 35: CPT | Performed by: INTERNAL MEDICINE

## 2020-07-11 PROCEDURE — 82948 REAGENT STRIP/BLOOD GLUCOSE: CPT

## 2020-07-11 RX ORDER — INSULIN GLARGINE 100 [IU]/ML
35 INJECTION, SOLUTION SUBCUTANEOUS 2 TIMES DAILY
Qty: 1 VIAL | Refills: 0 | Status: ON HOLD | DISCHARGE
Start: 2020-07-11 | End: 2022-10-11

## 2020-07-11 RX ADMIN — MAGNESIUM GLUCONATE 500 MG ORAL TABLET 400 MG: 500 TABLET ORAL at 08:53

## 2020-07-11 RX ADMIN — BUMETANIDE 1 MG: 1 TABLET ORAL at 08:53

## 2020-07-11 RX ADMIN — GABAPENTIN 300 MG: 300 CAPSULE ORAL at 08:53

## 2020-07-11 RX ADMIN — CLOPIDOGREL BISULFATE 75 MG: 75 TABLET ORAL at 08:51

## 2020-07-11 RX ADMIN — POTASSIUM CHLORIDE 20 MEQ: 1500 TABLET, EXTENDED RELEASE ORAL at 08:51

## 2020-07-11 RX ADMIN — FAMOTIDINE 20 MG: 10 INJECTION, SOLUTION INTRAVENOUS at 09:20

## 2020-07-11 RX ADMIN — INSULIN GLARGINE 28 UNITS: 100 INJECTION, SOLUTION SUBCUTANEOUS at 09:00

## 2020-07-11 RX ADMIN — ASPIRIN 81 MG 81 MG: 81 TABLET ORAL at 08:51

## 2020-07-11 RX ADMIN — POLYETHYLENE GLYCOL 3350 17 G: 17 POWDER, FOR SOLUTION ORAL at 08:51

## 2020-07-11 RX ADMIN — ANTACID TABLETS 500 MG: 500 TABLET, CHEWABLE ORAL at 08:53

## 2020-07-11 RX ADMIN — METOPROLOL SUCCINATE 50 MG: 50 TABLET, FILM COATED, EXTENDED RELEASE ORAL at 08:53

## 2020-07-11 RX ADMIN — TAMSULOSIN HYDROCHLORIDE 0.4 MG: 0.4 CAPSULE ORAL at 01:15

## 2020-07-11 RX ADMIN — SODIUM CHLORIDE, PRESERVATIVE FREE 10 ML: 5 INJECTION INTRAVENOUS at 08:54

## 2020-07-11 RX ADMIN — CYCLOBENZAPRINE 10 MG: 10 TABLET, FILM COATED ORAL at 08:51

## 2020-07-11 RX ADMIN — Medication: at 08:54

## 2020-07-11 RX ADMIN — FINASTERIDE 5 MG: 5 TABLET, FILM COATED ORAL at 08:53

## 2020-07-11 RX ADMIN — DOCUSATE SODIUM 100 MG: 100 CAPSULE, LIQUID FILLED ORAL at 08:51

## 2020-07-11 ASSESSMENT — PAIN SCALES - GENERAL
PAINLEVEL_OUTOF10: 0
PAINLEVEL_OUTOF10: 0

## 2020-07-11 NOTE — PROGRESS NOTES
[facility]   Nephrology progress note  7/11/2020, 3:02 PM      Pt Name:    Jatin He  MRN:     [id number]     YOB: 1934  Admit Date:    6/27/2020 10:32 PM  Primary Care Physician: Artem Simon MD   Room number  9V-44/254-A    Chief Complaint: Nephrology following for EFREN/CKD    Subjective:  Patient seen and examined  Seen earlier today  Wife at bedside  No chest pain or shortness of breath    Objective:  24HR INTAKE/OUTPUT:  No intake or output data in the 24 hours ending 07/11/20 1502  I/O last 3 completed shifts: In: 240 [P.O.:240]  Out: 725 [Urine:725]  No intake/output data recorded. Admission weight: 289 lb 9.6 oz (131.4 kg)  Wt Readings from Last 3 Encounters:   07/11/20 273 lb 14.4 oz (124.2 kg)   06/17/20 284 lb 4.8 oz (129 kg)   05/20/20 281 lb 9.6 oz (127.7 kg)     Body mass index is 38.2 kg/m².     Physical examination  VITALS:     Vitals:    07/11/20 0125 07/11/20 0415 07/11/20 0843 07/11/20 1131   BP: 138/62 132/61 (!) 165/67 (!) 129/59   Pulse: 84 80 78 81   Resp: 18 16 18 16   Temp: 97.8 °F (36.6 °C) 97.4 °F (36.3 °C) 98.1 °F (36.7 °C) 97.7 °F (36.5 °C)   TempSrc: Oral Oral Oral Oral   SpO2: 92% 95% 91% 98%   Weight:  273 lb 14.4 oz (124.2 kg)     Height:         General Appearance: appears comfortable, no distress  Mouth/Throat: Oral mucosa moist  Neck: No JVD  Lungs: Air entry B/L, no rales, no use of accessory muscles  Heart:  S1, S2 heard  GI: soft, non-tender, no guarding  Skin: warm to touch      Lab Data  CBC:   Recent Labs     07/09/20  0637 07/10/20  0428 07/11/20  0551   HGB 9.0* 8.7* 9.0*   HCT 28.2* 27.3* 28.2*     BMP:  Recent Labs     07/09/20  0637 07/09/20  1539 07/10/20  0428 07/11/20  0551    134* 131* 128*   K 3.8 4.3 3.9 3.6   CL 90* 87* 86* 81*   CO2 37* 36* 34* 38*   BUN 58* 57* 56* 52*   CREATININE 3.3* 3.0* 2.9* 2.8*   GLUCOSE 181* 308* 283* 187*   CALCIUM 8.7 8.6 8.7 8.6   MG 1.6  --  1.8 1.8     Hepatic: No results for

## 2020-07-11 NOTE — DISCHARGE SUMMARY
Hospitalist Discharge Summary        Patient: Luis Coffman  YOB: 1934  MRN: [id number]   Acct: [de-identified]    Primary Care Physician: Sunil Krishnamurthy MD    Admit date  6/27/2020    Discharge date:  7/11/2020 1:39 PM    Chief Complaint on presentation :-  AMS    Discharge Assessment and Plan:-   1. Acute Metabolic Encephalopathy 2/2 UTI: CT head negative, ammonia wnl, VBG negative. 1. Engel changed with 1.3L UOP. Mentation improved s/p IV Abx and engel exchange. Now oriented x3. Resting comfortably. 2. Acute Complicated UTI with chronic engel, BPH and acute retention: Multiple UTIs since 12/19. Urine culture grew Enterococcus Faecalis, morganella morganii. Completed 8 day course of Linezolid. Chronic engel catheter in place with exchange occurring 6/28/20. Continue Finasteride/Tamsulosin. 3. 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 Echo 6/20 with EF 45% with grade 1 diastolic dysfunction. Was on IV Lasix then Lasix drip 7/6-7/9 in addition to metolazone x1 dose on 7/8. Received an additional 1mg Bumex IV on 7/10/20. Respiratory status improved on day of d/c. Nephrology agreeable with POC to d/c to ECF. Recommendations to continue Bumex PO on discharge. 4. Acute Hypoxic Respiratory Failure 2/2 CHF exacerbation: Likely 2/2 #3. Remained on 1L O2 via NC at time of discharge. Was instructed that home O2 eval would not be completed prior to d/c to ECF as they would monitor there. Encouraged close f/u with PCP and close monitoring   5. 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 Peaked at 3.8 during admission likely secondary to IV diuresis. Creatine trended down to 2.8, nephrology was agreeable with POC to d/c home and have close f/u outpatient as renal function was improving. Avoid nephrotoxic medications outpatient and renally dose meds. Recheck BMP/Mg in one week and f/u with Dr. Frances Calderon.     6. Fall with Right Subtrochanteric Femur Fx: S/p unwitnessed fall 6/29 with subsequent fracture. S/p open treatment with cephalo-medullary nail with Dr. Jennifer Guerra on 6/29/20. Continue PT/OT. 7. Post-op Acute Blood Loss Anemia with chronic macrocytic anemia: Hgb at baseline appeared to be approximately 8. S/p procedure hgb was 6.9, required 1 unit PRBC. B12/folate wnl. ASA/plavix resumed on 7/4. Hgb remained stable. 8. Hypocalcemia with associated Vit D deficiency: Continue Ergocalciferol. iCal 1.01 on 7/10. Repeat Vit D ordered by Dr. Corina Monte to follow in two weeks. 9. Hypokalemia: Continue K-Clor. Repeat BMP in one week and f/u with nephrology  10. Hypomagnesemia: Continue Mag Oxide. 11. Hyponatremia: Possibly from diuretics/EFREN on CKD. Sodium 128 on day of d/c, appears to be relatively baseline for patient. Discussed with Dr. Dipesh Banuelos, nephrology, who is agreeable with d/c and to repeat BMP in one week and f/u outpatient. 12. 3rd Degree heart block s/p Pacemaker 2010: Noted. 13. 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 reportedly was previously on Lantus 75 units nightly, BG during admission was consistently elevated. Increase Lantus to 35 units BID, continue TID with meals and SSI. Carb control diet outpatient. 14. Cognitive Deficit: 18/25 MOCA, ST following, recommend continuing therapy at [facility]. 15. Chronic Back Pain: Continue home meds. 16. Left AKA 2/2 nonhealing diabetic heel ulcer 2017: Noted. 16. At Risk for Malnutrition: Appreciate dietitian assistance. Initial H and P and Hospital course:-  Per HPI \"80 year old white male nonsmoker with PMH of BPH (with chronic indwelling engel since 2017), CKD stage 3, HLD, hiatal hernia, CAD s/p MAI to LCx (2015), IDDM type 2 who presented to [facility] ED via EMS from [facility] on 6/27/20 c/o altered mental status x 2 days with associated hallucinations, testicular swelling (seen in the ED on 6/25/20), [address], [address].  He has a hx of frequent UTIs and was started on bactrim for UTI on 6/26/20. Per diuresis was subsequently held. Nephrology was consulted for further assistance. Nephrology did change IV Lasix to Lasix drip on 7/6-7/9. Patient is EFREN peaked at creatinine of 3.8. He continued to require supplemental oxygen throughout admission. Nephrology was following the patient, he appeared stable for discharge on 7/10/2020 but developed acute shortness of breath. Chest x-ray showed mild interstitial edema patient was requiring 1 L nasal cannula. Nephrology administered 1 dose of IV Bumex. 7/11/2020 patient's respiratory status had improved there is no longer short of breath though he was requiring 1 L. Lower extremity edema had significantly improved per patient and family. Nephrology stated the patient was stable for discharge at this time to [facility]. Recommend labs including BMP and magnesium in 1 week with close follow-up with nephrology in 1 to 2 weeks. Patient return to ED for new or worsening concerns or complaints. Follow-up with PCP in 1 week. No further questions at this time        Physical Exam:-  Vitals:   Patient Vitals for the past 24 hrs:   BP Temp Temp src Pulse Resp SpO2 Weight   07/11/20 1131 (!) 129/59 97.7 °F (36.5 °C) Oral 81 16 98 % --   07/11/20 0843 (!) 165/67 98.1 °F (36.7 °C) Oral 78 18 91 % --   07/11/20 0415 132/61 97.4 °F (36.3 °C) Oral 80 16 95 % 273 lb 14.4 oz (124.2 kg)   07/11/20 0125 138/62 97.8 °F (36.6 °C) Oral 84 18 92 % --   07/10/20 2100 128/60 98.4 °F (36.9 °C) Oral 78 18 93 % --   07/10/20 1600 -- -- -- -- -- 93 % --   07/10/20 1502 (!) 156/65 98.4 °F (36.9 °C) Oral 85 18 94 % --     Weight:   Weight: 273 lb 14.4 oz (124.2 kg)   24 hour intake/output:     Intake/Output Summary (Last 24 hours) at 7/11/2020 1339  Last data filed at 7/10/2020 1502  Gross per 24 hour   Intake 240 ml   Output 725 ml   Net -485 ml       1. General appearance: No apparent distress, appears stated age and cooperative. 2. HEENT: Normal cephalic, atraumatic without obvious deformity. Pupils equal, round, and reactive to light. Extra ocular muscles intact. Conjunctivae/corneas clear. 3. Neck: Supple, with full range of motion. No jugular venous distention. Trachea midline. 4. Respiratory:  Normal respiratory effort. Clear to auscultation, bilaterally without Rales/Wheezes/Rhonchi. 5. Cardiovascular: Regular rate and rhythm with normal S1/S2 without murmurs, rubs or gallops. 6. Abdomen: Soft, non-tender, non-distended with normal bowel sounds. 7. Musculoskeletal: Left AKA. Right lower extremity with 1+ pitting edema. No cyanosis. 8. Skin: Skin color, texture, turgor normal.  No rashes or lesions. 9. Neurologic:  Neurovascularly intact without any focal sensory/motor deficits. Cranial nerves: II-XII intact, grossly non-focal.  10. Psychiatric: Alert and oriented, thought content appropriate, normal insight  11. Capillary Refill: Brisk,< 3 seconds   12.  Peripheral Pulses: +2 palpable, equal bilaterally       Discharge Medications:-      Medication List      START taking these medications    calcium elemental 500 MG Tabs tablet  Commonly known as:  OSCAL  Take 1 tablet by mouth 2 times daily     famotidine 20 MG tablet  Commonly known as:  PEPCID  Take 1 tablet by mouth daily     insulin glargine 100 UNIT/ML injection vial  Commonly known as:  LANTUS  Inject 28 Units into the skin 2 times daily  Replaces:  Basaglar KwikPen 100 UNIT/ML injection pen     * insulin lispro 100 UNIT/ML injection vial  Commonly known as:  HUMALOG  Inject 0-6 Units into the skin 3 times daily (with meals) **Corrective Low Dose Algorithm**  Glucose: Dose:               No Insulin  140-199 1 Unit  200-249 2 Units  250-299 3 Units  300-349 4 Units  350-399 5 Units  Over 399 6 Units     * insulin lispro 100 UNIT/ML injection vial  Commonly known as:  HUMALOG  Inject 10 Units into the skin 3 times daily (with meals)     magnesium oxide 400 (241.3 Mg) MG Tabs tablet  Commonly known as:  MAG-OX  Take 1 tablet by mouth 2 times daily     vitamin D 1.25 MG (09280 UT) Caps capsule  Commonly known as:  ERGOCALCIFEROL  Take 1 capsule by mouth once a week  Start taking on:  July 13, 2020     zinc oxide 30 % Oint  Commonly known as:  PINXAV  Apply 113.4 g topically 2 times daily Groin area         * This list has 2 medication(s) that are the same as other medications prescribed for you. Read the directions carefully, and ask your doctor or other care provider to review them with you. CHANGE how you take these medications    gabapentin 300 MG capsule  Commonly known as:  NEURONTIN  Take 1 capsule by mouth 2 times daily for 30 days.   What changed:  when to take this     isosorbide mononitrate 30 MG extended release tablet  Commonly known as:  IMDUR  Take 1 tablet by mouth daily HOLD for SBP </=110  What changed:  additional instructions     metoprolol succinate 50 MG extended release tablet  Commonly known as:  TOPROL XL  Take 1 tablet by mouth daily HOLD for SBP </=110 or HR </=55  What changed:  additional instructions        CONTINUE taking these medications    acetaminophen 325 MG tablet  Commonly known as:  TYLENOL     ASPERCREME W/LIDOCAINE EX     aspirin 81 MG chewable tablet     atorvastatin 20 MG tablet  Commonly known as:  LIPITOR     BD AutoShield Duo 30G X 5 MM Misc  Generic drug:  Insulin Pen Needle     Bedside Drainage Bag Misc  Dispense one, 2000 cc overnight urinary bag     benzonatate 200 MG capsule  Commonly known as:  TESSALON     bumetanide 1 MG tablet  Commonly known as:  BUMEX  Take 1 tablet by mouth 2 times daily     clopidogrel 75 MG tablet  Commonly known as:  Plavix  Take 1 tablet by mouth daily     Colace 100 MG capsule  Generic drug:  docusate sodium     cyclobenzaprine 10 MG tablet  Commonly known as:  FLEXERIL     finasteride 5 MG tablet  Commonly known as:  PROSCAR  Take 1 tablet by mouth daily     guaiFENesin 100 MG/5ML liquid  Commonly known as:  ROBITUSSIN     nitroGLYCERIN 0.4 MG SL tablet  Commonly known as:  NITROSTAT  Place 1 tablet under the tongue every 5 minutes as needed for Chest pain     potassium chloride 20 MEQ extended release tablet  Commonly known as:  KLOR-CON M  Take 1 tablet by mouth 2 times daily     tamsulosin 0.4 MG capsule  Commonly known as:  FLOMAX  Take 1 capsule by mouth nightly     therapeutic multivitamin-minerals tablet        STOP taking these medications    Basaglar KwikPen 100 UNIT/ML injection pen  Generic drug:  insulin glargine  Replaced by:  insulin glargine 100 UNIT/ML injection vial     insulin  UNIT/ML injection vial  Commonly known as:  HUMULIN N;NOVOLIN N     loperamide 2 MG capsule  Commonly known as:  IMODIUM     NovoLOG 100 UNIT/ML injection vial  Generic drug:  insulin aspart     pantoprazole 40 MG tablet  Commonly known as:  PROTONIX     traMADol 50 MG tablet  Commonly known as:  ULTRAM        ASK your doctor about these medications    HYDROcodone-acetaminophen 5-325 MG per tablet  Commonly known as:  Norco  Take 1-2 tablets by mouth every 4-6 hours as needed for Pain for up to 7 days. Ask about: Should I take this medication?            Where to Get Your Medications      You can get these medications from any pharmacy    Bring a paper prescription for each of these medications  · HYDROcodone-acetaminophen 5-325 MG per tablet     Information about where to get these medications is not yet available    Ask your nurse or doctor about these medications  · calcium elemental 500 MG Tabs tablet  · famotidine 20 MG tablet  · gabapentin 300 MG capsule  · insulin glargine 100 UNIT/ML injection vial  · insulin lispro 100 UNIT/ML injection vial  · insulin lispro 100 UNIT/ML injection vial  · isosorbide mononitrate 30 MG extended release tablet  · magnesium oxide 400 (241.3 Mg) MG Tabs tablet  · metoprolol succinate 50 MG extended release tablet  · vitamin D 1.25 MG (32997 UT) Caps capsule  · zinc oxide 30 % Oint          Labs :-  Recent Results (from the past 72 hour(s))   POCT Glucose    Collection Time: 07/08/20  3:46 PM   Result Value Ref Range    POC Glucose 213 (H) 70 - 108 mg/dl   COVID-19    Collection Time: 07/08/20  5:30 PM   Result Value Ref Range    SARS-CoV-2, NAAT NOT DETECTED NOT DETECT   Basic Metabolic Panel    Collection Time: 07/08/20  6:17 PM   Result Value Ref Range    Sodium 130 (L) 135 - 145 meq/L    Potassium 3.9 3.5 - 5.2 meq/L    Chloride 86 (L) 98 - 111 meq/L    CO2 33 23 - 33 meq/L    Glucose 217 (H) 70 - 108 mg/dL    BUN 58 (H) 7 - 22 mg/dL    CREATININE 3.1 (HH) 0.4 - 1.2 mg/dL    Calcium 8.2 (L) 8.5 - 10.5 mg/dL   Anion Gap    Collection Time: 07/08/20  6:17 PM   Result Value Ref Range    Anion Gap 11.0 8.0 - 16.0 meq/L   Glomerular Filtration Rate, Estimated    Collection Time: 07/08/20  6:17 PM   Result Value Ref Range    Est, Glom Filt Rate 19 (A) ml/min/1.73m2   POCT Glucose    Collection Time: 07/08/20  8:05 PM   Result Value Ref Range    POC Glucose 206 (H) 70 - 108 mg/dl   POCT Glucose    Collection Time: 07/09/20  6:34 AM   Result Value Ref Range    POC Glucose 176 (H) 70 - 108 mg/dl   Basic Metabolic Panel    Collection Time: 07/09/20  6:37 AM   Result Value Ref Range    Sodium 136 135 - 145 meq/L    Potassium 3.8 3.5 - 5.2 meq/L    Chloride 90 (L) 98 - 111 meq/L    CO2 37 (H) 23 - 33 meq/L    Glucose 181 (H) 70 - 108 mg/dL    BUN 58 (H) 7 - 22 mg/dL    CREATININE 3.3 (HH) 0.4 - 1.2 mg/dL    Calcium 8.7 8.5 - 10.5 mg/dL   Calcium, Ionized    Collection Time: 07/09/20  6:37 AM   Result Value Ref Range    Calcium, Ion 1.09 (L) 1.12 - 1.32 mmol/L   Magnesium    Collection Time: 07/09/20  6:37 AM   Result Value Ref Range    Magnesium 1.6 1.6 - 2.4 mg/dL   Hemoglobin and Hematocrit, Blood    Collection Time: 07/09/20  6:37 AM   Result Value Ref Range    Hemoglobin 9.0 (L) 14.0 - 18.0 gm/dl    Hematocrit 28.2 (L) 42.0 - 52.0 %   Anion Gap    Collection Time: 07/09/20  6:37 AM   Result Value Ref Range    Anion Gap 9.0 8.0 - 16.0 meq/L Glomerular Filtration Rate, Estimated    Collection Time: 07/09/20  6:37 AM   Result Value Ref Range    Est, Glom Filt Rate 18 (A) ml/min/1.73m2   POCT Glucose    Collection Time: 07/09/20 10:49 AM   Result Value Ref Range    POC Glucose 296 (H) 70 - 108 mg/dl   SPECIMEN REJECTION    Collection Time: 07/09/20  3:26 PM   Result Value Ref Range    Rejected Test BMP     Reason for Rejection see below    Basic Metabolic Panel    Collection Time: 07/09/20  3:39 PM   Result Value Ref Range    Sodium 134 (L) 135 - 145 meq/L    Potassium 4.3 3.5 - 5.2 meq/L    Chloride 87 (L) 98 - 111 meq/L    CO2 36 (H) 23 - 33 meq/L    Glucose 308 (H) 70 - 108 mg/dL    BUN 57 (H) 7 - 22 mg/dL    CREATININE 3.0 (HH) 0.4 - 1.2 mg/dL    Calcium 8.6 8.5 - 10.5 mg/dL   Anion Gap    Collection Time: 07/09/20  3:39 PM   Result Value Ref Range    Anion Gap 11.0 8.0 - 16.0 meq/L   Glomerular Filtration Rate, Estimated    Collection Time: 07/09/20  3:39 PM   Result Value Ref Range    Est, Glom Filt Rate 20 (A) ml/min/1.73m2   POCT Glucose    Collection Time: 07/09/20  3:40 PM   Result Value Ref Range    POC Glucose 330 (H) 70 - 108 mg/dl   POCT Glucose    Collection Time: 07/09/20  8:01 PM   Result Value Ref Range    POC Glucose 339 (H) 70 - 108 mg/dl   Hemoglobin and Hematocrit, Blood    Collection Time: 07/10/20  4:28 AM   Result Value Ref Range    Hemoglobin 8.7 (L) 14.0 - 18.0 gm/dl    Hematocrit 27.3 (L) 42.0 - 52.0 %   Magnesium    Collection Time: 07/10/20  4:28 AM   Result Value Ref Range    Magnesium 1.8 1.6 - 2.4 mg/dL   Basic Metabolic Panel    Collection Time: 07/10/20  4:28 AM   Result Value Ref Range    Sodium 131 (L) 135 - 145 meq/L    Potassium 3.9 3.5 - 5.2 meq/L    Chloride 86 (L) 98 - 111 meq/L    CO2 34 (H) 23 - 33 meq/L    Glucose 283 (H) 70 - 108 mg/dL    BUN 56 (H) 7 - 22 mg/dL    CREATININE 2.9 (H) 0.4 - 1.2 mg/dL    Calcium 8.7 8.5 - 10.5 mg/dL   Hemoglobin A1c    Collection Time: 07/10/20  4:28 AM   Result Value Ref Range    Hemoglobin A1C 6.3 4.4 - 6.4 %    AVERAGE GLUCOSE 129 (H) 70 - 126 mg/dL   Calcium, Ionized    Collection Time: 07/10/20  4:28 AM   Result Value Ref Range    Calcium, Ion 1.01 (L) 1.12 - 1.32 mmol/L   Anion Gap    Collection Time: 07/10/20  4:28 AM   Result Value Ref Range    Anion Gap 11.0 8.0 - 16.0 meq/L   Glomerular Filtration Rate, Estimated    Collection Time: 07/10/20  4:28 AM   Result Value Ref Range    Est, Glom Filt Rate 21 (A) ml/min/1.73m2   POCT Glucose    Collection Time: 07/10/20  6:18 AM   Result Value Ref Range    POC Glucose 286 (H) 70 - 108 mg/dl   POCT Glucose    Collection Time: 07/10/20 10:41 AM   Result Value Ref Range    POC Glucose 382 (H) 70 - 108 mg/dl   POCT Glucose    Collection Time: 07/10/20  4:06 PM   Result Value Ref Range    POC Glucose 250 (H) 70 - 108 mg/dl   POCT Glucose    Collection Time: 07/10/20  8:34 PM   Result Value Ref Range    POC Glucose 298 (H) 70 - 108 mg/dl   Hemoglobin and Hematocrit, Blood    Collection Time: 07/11/20  5:51 AM   Result Value Ref Range    Hemoglobin 9.0 (L) 14.0 - 18.0 gm/dl    Hematocrit 28.2 (L) 42.0 - 52.0 %   Vitamin B12 & folate    Collection Time: 07/11/20  5:51 AM   Result Value Ref Range    Vitamin B-12 947 (H) 211 - 911 pg/mL    Folate 16.2 4.8 - 24.2 ng/mL   Basic Metabolic Panel    Collection Time: 07/11/20  5:51 AM   Result Value Ref Range    Sodium 128 (L) 135 - 145 meq/L    Potassium 3.6 3.5 - 5.2 meq/L    Chloride 81 (L) 98 - 111 meq/L    CO2 38 (H) 23 - 33 meq/L    Glucose 187 (H) 70 - 108 mg/dL    BUN 52 (H) 7 - 22 mg/dL    CREATININE 2.8 (H) 0.4 - 1.2 mg/dL    Calcium 8.6 8.5 - 10.5 mg/dL   Magnesium    Collection Time: 07/11/20  5:51 AM   Result Value Ref Range    Magnesium 1.8 1.6 - 2.4 mg/dL   Anion Gap    Collection Time: 07/11/20  5:51 AM   Result Value Ref Range    Anion Gap 9.0 8.0 - 16.0 meq/L   Glomerular Filtration Rate, Estimated    Collection Time: 07/11/20  5:51 AM   Result Value Ref Range    Est, Glom Filt Rate 22 (A) ml/min/1.73m2   POCT Glucose    Collection Time: 07/11/20  6:25 AM   Result Value Ref Range    POC Glucose 203 (H) 70 - 108 mg/dl   POCT Glucose    Collection Time: 07/11/20 11:18 AM   Result Value Ref Range    POC Glucose 296 (H) 70 - 108 mg/dl        Microbiology:    Blood culture #1:   Lab Results   Component Value Date    BC No growth-preliminaryNo growth 05/22/2017       Blood culture #2:No results found for: Hugo Dueñas    Organism:  No results found for: LABGRAM    MRSA culture only:No results found for: [facility]    Urine culture:   Lab Results   Component Value Date    LABURIN No growth-preliminary No growth  07/01/2020     Lab Results   Component Value Date    ORG Enterococcus faecalis - (Group D) 06/28/2020    ORG Morganella morganii ssp. morganii 06/28/2020        Respiratory culture: No results found for: CULTRESP    Aerobic and Anaerobic :  No results found for: LABAERO  No results found for: LABANAE    Urinalysis:      Lab Results   Component Value Date    NITRU NEGATIVE 07/01/2020    WBCUA 15-25 07/01/2020    BACTERIA NONE SEEN 07/01/2020    RBCUA 3-5 07/01/2020    BLOODU MODERATE 07/01/2020    SPECGRAV 1.015 07/01/2020    [place] 994 NEGATIVE 05/22/2017       Radiology:-  Xr Chest Portable    Result Date: 6/28/2020  PROCEDURE: XR CHEST PORTABLE CLINICAL INFORMATION: LE edema, rales, Hx of CHF. COMPARISON: Chest x-ray dated 5/25/2017 TECHNIQUE: AP Portable chest xray FINDINGS: Lines/tubes/devices: There is a left subclavian dual-lead pacemaker, leads in the regions of the right atrium and right ventricle. Lungs/pleura:  Interstitium is mildly prominent likely chronic and accentuated by the lower lung volumes. Cannot exclude minimal interstitial pulmonary edema. There is no focal pneumonia. No pleural effusion. No pneumothorax. Heart: There is mild cardiomegaly. Mediastinum/bertha: No obvious mass or adenopathy. Skeleton: There is left glenohumeral joint DJD.  There is multilevel vertebral endplate spondylosis. Interstitial prominence likely chronic however cannot exclude minimal interstitial pulmonary edema. No focal pneumonia. **This report has been created using voice recognition software. It may contain minor errors which are inherent in voice recognition technology. ** Final report electronically signed by Dr. Laureen Rosas on 6/28/2020 1:23 AM    Ct Interpretation Of Outside Images    Result Date: 6/28/2020  PROCEDURE: CT INTERPRETATION OF OUTSIDE IMAGES CLINICAL INFORMATION: altered mental status, CT head w/o contrast. COMPARISON: Noncontrast brain CT dated 5/26/2017 TECHNIQUE: Noncontrast brain CT was performed at [facility] on 6/27/2020 at 9:13 PM and submitted for interpretation. FINDINGS: Brain: There is no acute ischemic infarct, hemorrhage, midline shift, mass, or mass effect. There are mild deep white matter hypodensities, nonspecific in nature but probably representing small vessel chronic ischemic changes. There is age appropriate cortical atrophy. Ventricles: The ventricles, cisterns and cortical sulci are enlarged concordant with the mild degree of age-appropriate atrophy. No obstructive hydrocephalus. Skull base/calvarium/osseous structures: Unremarkable Soft tissues: Unremarkable Intraorbital contents: Unremarkable Sinuses: There is very mild right maxillary and bilateral ethmoid sinus disease. Mastoids: There is severe right and minimal left mastoid disease. No acute ischemic infarct, hemorrhage, or mass effect. Senescent changes as discussed above. Severe right and minimal left mastoid disease. Very mild sinus disease. **This report has been created using voice recognition software. It may contain minor errors which are inherent in voice recognition technology. ** Final report electronically signed by Dr. Laureen Rosas on 6/28/2020 7:06 AM       Follow-up scheduled after discharge :-    in 1 weeks with Braden Aguilar MD  in 1 weeks with Dr. Shubham Waite nephrology    Consultations during this hospital stay:-  [] NONE [x] Cardiology  [x] Nephrology  [] Hemo onco   [] GI   [] ID  [] Endocrine  [] Pulm    [] Neuro    [] Psych   [x] Urology  [] ENT   [] G SURGERY   [x]Ortho    []CV surg    [] Palliative  [] Hospice [] Pain management   [x]    []TCU   [x] PT/OT  OTHERS:-    Disposition: SNF  Condition at Discharge: Stable    Time Spent:- 45 minutes    Electronically signed by Maribel Amezcua PA-C on 7/11/2020 at 1:39 PM  Discharging Hospitalist

## 2020-07-13 LAB
BUN BLDV-MCNC: 49 MG/DL
CALCIUM SERPL-MCNC: 8.4 MG/DL
CHLORIDE BLD-SCNC: 82 MMOL/L
CO2: 36 MMOL/L
CREAT SERPL-MCNC: 2.3 MG/DL
GFR CALCULATED: 29
GLUCOSE BLD-MCNC: 229 MG/DL
MAGNESIUM: 1.9 MG/DL
POTASSIUM SERPL-SCNC: 3.4 MMOL/L
SODIUM BLD-SCNC: 127 MMOL/L

## 2020-07-15 ENCOUNTER — OUTSIDE SERVICES (OUTPATIENT)
Dept: FAMILY MEDICINE CLINIC | Age: 85
End: 2020-07-15
Payer: MEDICARE

## 2020-07-15 VITALS
TEMPERATURE: 98.1 F | WEIGHT: 267.6 LBS | HEART RATE: 76 BPM | RESPIRATION RATE: 20 BRPM | BODY MASS INDEX: 37.32 KG/M2 | SYSTOLIC BLOOD PRESSURE: 118 MMHG | DIASTOLIC BLOOD PRESSURE: 56 MMHG | OXYGEN SATURATION: 96 %

## 2020-07-15 PROCEDURE — 99309 SBSQ NF CARE MODERATE MDM 30: CPT | Performed by: NURSE PRACTITIONER

## 2020-07-15 ASSESSMENT — ENCOUNTER SYMPTOMS
SORE THROAT: 0
WHEEZING: 0
NAUSEA: 0
EYE REDNESS: 0
CONSTIPATION: 0
COUGH: 0
DIARRHEA: 0
RHINORRHEA: 0
VOMITING: 0
SHORTNESS OF BREATH: 0

## 2020-07-15 NOTE — PROGRESS NOTES
Edd Pacheco is a 80 y.o. male who presents today for his medical conditions/complaints as noted below. Chief Complaint   Patient presents with    Other     Review of labs           HPI:     Lonny Pal is seen today for review of labs. He was readmitted to the facility after a recent admission to the hospital for AMS. Per HPI he is has  PMH of BPH (with chronic indwelling engel since 2017), CKD stage 3, HLD, hiatal hernia, CAD s/p MAI to LCx (2015), IDDM type 2 who presented to [facility] ED via EMS from [facility] on 6/27/20 c/o altered mental status x 2 days with associated hallucinations, testicular swelling (seen in the ED on 6/25/20), [address] [place], [address]. He has a hx of frequent UTIs and was started on bactrim for UTI on 6/26/20. Per wife/daughter, at baseline he is oriented with no confusion. 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 Family also notes that the LE edema was no present 2 days ago when he was taken to the ED for scrotal pain.   In the ED, vitals show: HR 68, /75, 92% on RA and afebrile. Labs show: wbc 12.4, hgb 10.8, MCV 94.2, plt 298, cr 1.9, BUN 34, BUN: Cr 18, Ca 8.2, LFTs unremarkable, lactic 1.1, UA shows 2+ bacteria, WBC 21-30, moderate LE, neg Nitrites, moderate hematuria, trace proteinuria. CXR shows no acute findings. Stable mild bibasilar atelectasis. US scrotom shows prominent amount of hydrocele formation on the right, moderate hydrocele on the left. Fluid on the right appears clear, fluid on the left appears to be markedly complex with multiple pseudo-septations through it. \" Patient was admitted under hospital medicine service for acute encephalopathy likely due to UTI. Unfortunately on  6/28: pt fell and sustained right femur fracture.  Orthopedics was consulted and patient underwent ORIF on 6/29/2020.     Patient developed acute blood loss anemia status post ORIF and required 1 unit packed red blood cell transfusion. Patient's mentation significantly improved with antibiotics. Mental status changes and acute metabolic encephalopathy was likely related to UTI as CT had was negative, ammonia within normal limits and admixed blood gas was unremarkable. Patient's Olmedo catheter was exchanged with improvement in addition to linezolid IV x8 days. During his admission he was noted to have acute decompensated heart failure and started on IV Lasix 40 mg twice daily, although developed worsening kidney function and diuresis was subsequently held. Nephrology was consulted for further assistance. Nephrology did change IV Lasix to Lasix drip on 7/6-7/9. Patient is EFREN peaked at creatinine of 3.8. He continued to require supplemental oxygen throughout admission. Nephrology was following the patient, he appeared stable for discharge on 7/10/2020 but developed acute shortness of breath. Chest x-ray showed mild interstitial edema patient was requiring 1 L nasal cannula. Nephrology administered 1 dose of IV Bumex. 7/11/2020 patient's respiratory status had improved there is no longer short of breath though he was requiring 1 L. Lower extremity edema had significantly improved per patient and family. Nephrology stated the patient was stable for discharge at this time to [facility]. Recommend labs including BMP and magnesium in 1 week with close follow-up with nephrology in 1 to 2 weeks. His labs have been completed and his creatinine is 2.3 and his magnesium is stable. These have been sent to nephrology. He does follow up with nephrology at the end of the month. Prior to discharge he did have a sleep study and we have started his cpap based on this sleep study. He states it was not comfortable last evening. We will continue to work on this.        Past Medical History:   Diagnosis Date    BPH (benign prostatic hyperplasia)     Chronic kidney disease     CKD (chronic kidney disease) stage 3, GFR 30-59 ml/min ([facility]) 4/16/2015    Edema  Hiatal hernia     Hyperlipidemia     Hypertension     Osteoarthritis     S/P PTCA: 5/12/2015: Stenting of proximal left circumflex artery with Xience 2.75X15 mm, post-dilated to 3.70 mm. 5/12/2015 5/12/2015: Stenting of proximal left circumflex artery with Xience 2.75X15 mm, post-dilated to 3.70 mm. Dr. Jackie Garduno Type II or unspecified type diabetes mellitus without mention of complication, not stated as uncontrolled       Past Surgical History:   Procedure Laterality Date    [facility]15    [facility]    CATARACT REMOVAL      BILAT    COLONOSCOPY      CORONARY ANGIOPLASTY  2015    EKG 12-LEAD  5/12/2015         FOOT SURGERY      multiple    HIP SURGERY Right 6/29/2020    RIGHT HIP INTERTAN performed by Jessica Portillo MD at [address] [place] [address]  05/12/2017    PACEMAKER INSERTION      PACEMAKER PLACEMENT      TONSILLECTOMY AND ADENOIDECTOMY         Family History   Problem Relation Age of Onset    Cancer Mother     Diabetes Father     Cancer Brother     Diabetes Paternal Grandmother     Diabetes Paternal Grandfather        Social History     Tobacco Use    Smoking status: Never Smoker    Smokeless tobacco: Never Used   Substance Use Topics    Alcohol use: Yes     Comment: Occasionally      No Known Allergies    Health Maintenance   Topic Date Due    DTaP/Tdap/Td vaccine (1 - Tdap) 11/19/1953    Shingles Vaccine (1 of 2) 11/19/1984    Pneumococcal 65+ years Vaccine (1 of 1 - PPSV23) 11/19/1999    Lipid screen  06/10/2017    Flu vaccine (1) 09/01/2020    Potassium monitoring  07/13/2021    Creatinine monitoring  07/13/2021    Hepatitis A vaccine  Aged Out    Hib vaccine  Aged Out    Meningococcal (ACWY) vaccine  Aged Out       Subjective:      Review of Systems   Constitutional: Positive for activity change and unexpected weight change. Negative for chills, fatigue and fever. HENT: Positive for hearing loss.  Negative for congestion, rhinorrhea and sore throat. Eyes: Positive for visual disturbance. Negative for redness. Respiratory: Negative for cough, shortness of breath and wheezing. Cardiovascular: Negative for chest pain and leg swelling. Gastrointestinal: Negative for constipation, diarrhea, nausea and vomiting. Endocrine: Negative for polydipsia and polyuria. Genitourinary: Negative for dysuria, frequency and hematuria. Musculoskeletal: Negative for arthralgias, gait problem and myalgias. Skin: Negative for rash and wound. Allergic/Immunologic: Negative for environmental allergies and immunocompromised state. Neurological: Negative for dizziness, light-headedness and headaches. Hematological: Does not bruise/bleed easily. Psychiatric/Behavioral: Negative for confusion, dysphoric mood and sleep disturbance. The patient is not nervous/anxious. Objective:     Physical Exam  Vitals signs and nursing note reviewed. Constitutional:       General: He is not in acute distress. Appearance: He is well-developed. HENT:      Head: Normocephalic and atraumatic. Right Ear: External ear normal.      Left Ear: External ear normal.      Nose: Nose normal.   Eyes:      General: No scleral icterus. Right eye: No discharge. Left eye: No discharge. Conjunctiva/sclera: Conjunctivae normal.   Neck:      Musculoskeletal: Neck supple. Thyroid: No thyromegaly. Vascular: No JVD. Cardiovascular:      Rate and Rhythm: Normal rate and regular rhythm. Heart sounds: Normal heart sounds. Pulmonary:      Effort: Pulmonary effort is normal. No respiratory distress. Breath sounds: No stridor. Decreased breath sounds present. No wheezing or rales. Abdominal:      General: Bowel sounds are normal. There is no distension. Palpations: Abdomen is soft. Tenderness: There is no abdominal tenderness.    Genitourinary:     Comments: Catheter in place  Musculoskeletal: Normal range of motion. General: No deformity. Right shoulder: He exhibits no tenderness, no bony tenderness and no pain. Left shoulder: He exhibits no tenderness, no bony tenderness and no pain. Cervical back: He exhibits no tenderness, no bony tenderness and no pain. Thoracic back: He exhibits no tenderness, no bony tenderness, no pain and no spasm. Lumbar back: He exhibits no tenderness, no bony tenderness and no pain. Feet:      Left foot:      Amputation: Left leg is amputated above knee. Lymphadenopathy:      Cervical: No cervical adenopathy. Upper Body:      Right upper body: No supraclavicular adenopathy. Left upper body: No supraclavicular adenopathy. Skin:     General: Skin is warm and dry. Findings: No erythema or rash. Neurological:      Mental Status: He is alert and oriented to person, place, and time. Motor: No atrophy, abnormal muscle tone or seizure activity. Psychiatric:         Speech: Speech normal.         Behavior: Behavior normal.       BP (!) 118/56   Pulse 76   Temp 98.1 °F (36.7 °C)   Resp 20   Wt 267 lb 9.6 oz (121.4 kg)   SpO2 96%   BMI 37.32 kg/m²     Assessment/Plan      1. Closed fracture of right hip with routine healing, subsequent encounter - Therapy to eval and treat. Stryker for pain. Continue to monitor wound. 2. Acute on chronic combined systolic (congestive) and diastolic (congestive) heart failure (HCC)   Monitor weights and lung sounds. Continue Bumex, Metoprolol and monitor labs. 3. Status post above-knee amputation of left lower extremity ([facility] [address] - therapy as directed. Gabapentin for phantom pain. 4. Acute blood loss as cause of postoperative anemia - monitor labs. 5. Complicated UTI (urinary tract infection) - monitor mentation. Hx of UTI. Antibiotics completed.     6. Type 2 diabetes mellitus with other circulatory complication, with long-term current use of insulin (HCC) - chronic and

## 2020-07-21 ENCOUNTER — OUTSIDE SERVICES (OUTPATIENT)
Dept: FAMILY MEDICINE CLINIC | Age: 85
End: 2020-07-21
Payer: MEDICARE

## 2020-07-21 PROCEDURE — 99490 CHRNC CARE MGMT STAFF 1ST 20: CPT | Performed by: PHYSICAL MEDICINE & REHABILITATION

## 2020-07-21 PROCEDURE — 99306 1ST NF CARE HIGH MDM 50: CPT | Performed by: PHYSICAL MEDICINE & REHABILITATION

## 2020-07-22 ENCOUNTER — TELEPHONE (OUTPATIENT)
Dept: CARDIOLOGY CLINIC | Age: 85
End: 2020-07-22

## 2020-07-23 VITALS
WEIGHT: 263 LBS | HEART RATE: 79 BPM | BODY MASS INDEX: 36.82 KG/M2 | SYSTOLIC BLOOD PRESSURE: 117 MMHG | HEIGHT: 71 IN | RESPIRATION RATE: 16 BRPM | TEMPERATURE: 98.9 F | OXYGEN SATURATION: 94 % | DIASTOLIC BLOOD PRESSURE: 70 MMHG

## 2020-07-23 PROBLEM — Z99.89 OSA ON CPAP: Status: ACTIVE | Noted: 2020-07-23

## 2020-07-23 PROBLEM — G47.33 OSA ON CPAP: Status: ACTIVE | Noted: 2020-07-23

## 2020-07-23 ASSESSMENT — ENCOUNTER SYMPTOMS
WHEEZING: 0
SHORTNESS OF BREATH: 0
DIARRHEA: 0
TROUBLE SWALLOWING: 0
SORE THROAT: 0
PHOTOPHOBIA: 0
FACIAL SWELLING: 0
EYE REDNESS: 0
EYE PAIN: 0
SINUS PRESSURE: 0
VOMITING: 0
RHINORRHEA: 0
STRIDOR: 0
CONSTIPATION: 0
CHOKING: 0
NAUSEA: 0
COUGH: 0

## 2020-07-23 NOTE — PROGRESS NOTES
Skyler Seen is a 80 y.o. male who presents today for his medical conditions/complaints as noted below. Chief Complaint   Patient presents with    Other     Admission visit           HPI:     Patrick Moore is an 79 y/o M readmitted to [facility]  after a recent admission to the hospital for altered mental status. He has PMH of BPH (with chronic indwelling engel since 2017), CKD stage 3, HLD, hiatal hernia, CAD s/p MAI to LCx (2015), IDDM type 2 who presented to [facility] ED via EMS from [facility] on 6/27/20 c/o altered mental status x 2 days with associated hallucinations, testicular swelling (seen in the ED on 6/25/20), dec UOP per nursing home. He has a hx of frequent UTIs and was started on bactrim for UTI on 6/26/20. Per wife/daughter, at baseline he is oriented with no confusion. 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 In the ED, vitals show: HR 68, /75, 92% on RA and afebrile. Labs show: wbc 12.4, hgb 10.8, MCV 94.2, plt 298, Cr 1.9, BUN 34, BUN: Cr 18, Ca 8.2, LFTs unremarkable, lactic 1.1, UA shows 2+ bacteria, WBC 21-30, moderate LE, neg Nitrites, moderate hematuria, trace proteinuria. CXR showed no acute findings. Stable mild bibasilar atelectasis. US scrotom showed prominent amount of hydrocele formation on the right, moderate hydrocele on the left. Fluid on the right appeared clear, fluid on the left appeared to be markedly complex with multiple pseudo-septations through it. \" Patient was admitted under hospital medicine service for acute encephalopathy likely due to UTI. Unfortunately on 6/28, pt fell and sustained right femur fracture. Orthopedics was consulted and patient underwent ORIF on 6/29/2020. Patient developed acute blood loss anemia status post ORIF and required 1 unit packed red blood cell transfusion. Patient's mentation significantly improved with antibiotics.  Mental status changes and acute metabolic encephalopathy were likely disease     CKD (chronic kidney disease) stage 3, GFR 30-59 ml/min ([facility]) 4/16/2015    Edema     Hiatal hernia     Hyperlipidemia     Hypertension     Osteoarthritis     S/P PTCA: 5/12/2015: Stenting of proximal left circumflex artery with Xience 2.75X15 mm, post-dilated to 3.70 mm. 5/12/2015 5/12/2015: Stenting of proximal left circumflex artery with Xience 2.75X15 mm, post-dilated to 3.70 mm.  Dr. Lissette Moncada Type II or unspecified type diabetes mellitus without mention of complication, not stated as uncontrolled       Past Surgical History:   Procedure Laterality Date    CARDIAC [address]HETERIZATION  5/8/15    [address]    CATARACT REMOVAL      BILAT    COLONOSCOPY      CORONARY ANGIOPLASTY  2015    EKG 12-LEAD  5/12/2015         FOOT SURGERY      multiple    HIP SURGERY Right 6/29/2020    RIGHT HIP INTERTAN performed by Colin Cabral MD at [address] [place] [address]  05/12/2017    PACEMAKER INSERTION      PACEMAKER PLACEMENT      TONSILLECTOMY AND ADENOIDECTOMY         Family History   Problem Relation Age of Onset    Cancer Mother     Diabetes Father     Cancer Brother     Diabetes Paternal Grandmother     Diabetes Paternal Grandfather        Social History     Tobacco Use    Smoking status: Never Smoker    Smokeless tobacco: Never Used   Substance Use Topics    Alcohol use: Yes     Comment: Occasionally      Current Outpatient Medications   Medication Sig Dispense Refill    insulin glargine (LANTUS) 100 UNIT/ML injection vial Inject 35 Units into the skin 2 times daily 1 vial 0    isosorbide mononitrate (IMDUR) 30 MG extended release tablet Take 1 tablet by mouth daily HOLD for SBP </=110 30 tablet 0    insulin lispro (HUMALOG) 100 UNIT/ML injection vial Inject 0-6 Units into the skin 3 times daily (with meals) **Corrective Low Dose Algorithm**  Glucose: Dose:               No Insulin  140-199 1 Unit  200-249 2 Units  250-299 3 Units  300-349 4 by mouth nightly 30 capsule 3    atorvastatin (LIPITOR) 20 MG tablet Take 20 mg by mouth nightly      clopidogrel (PLAVIX) 75 MG tablet Take 1 tablet by mouth daily 90 tablet 3    nitroGLYCERIN (NITROSTAT) 0.4 MG SL tablet Place 1 tablet under the tongue every 5 minutes as needed for Chest pain 25 tablet 0    aspirin 81 MG chewable tablet Take 1 tablet by mouth daily 30 tablet 3    acetaminophen (TYLENOL) 325 MG tablet Take 650 mg by mouth every 6 hours       Multiple Vitamins-Minerals (THERAPEUTIC MULTIVITAMIN-MINERALS) tablet Take 1 tablet by mouth daily       No current facility-administered medications for this visit. No Known Allergies    Health Maintenance   Topic Date Due    DTaP/Tdap/Td vaccine (1 - Tdap) 11/19/1953    Shingles Vaccine (1 of 2) 11/19/1984    Pneumococcal 65+ years Vaccine (1 of 1 - PPSV23) 11/19/1999    Lipid screen  06/10/2017    Flu vaccine (1) 09/01/2020    Potassium monitoring  07/13/2021    Creatinine monitoring  07/13/2021    Hepatitis A vaccine  Aged Out    Hib vaccine  Aged Out    Meningococcal (ACWY) vaccine  Aged Out       Subjective:      Review of Systems   Constitutional: Positive for activity change. Negative for chills, fatigue and fever. Tolerating therapies. HENT: Negative for congestion, drooling, ear pain, facial swelling, rhinorrhea, sinus pressure, sore throat and trouble swallowing. Eyes: Negative for photophobia, pain, redness and visual disturbance. Respiratory: Negative for cough, choking, shortness of breath, wheezing and stridor. Cardiovascular: Negative for chest pain and leg swelling. Gastrointestinal: Negative for constipation, diarrhea, nausea and vomiting. Endocrine: Negative for polydipsia, polyphagia and polyuria. Genitourinary: Negative for dysuria, flank pain, frequency and hematuria. Has indwelling engel catheter. Musculoskeletal: Positive for arthralgias and gait problem. Negative for myalgias. Left AKA   Skin: Negative for rash and wound. Allergic/Immunologic: Negative for environmental allergies and immunocompromised state. Neurological: Negative for dizziness, seizures, facial asymmetry, speech difficulty, light-headedness and headaches. Hematological: Does not bruise/bleed easily. Psychiatric/Behavioral: Negative for agitation, behavioral problems, confusion, decreased concentration, dysphoric mood and sleep disturbance. The patient is not nervous/anxious. Objective:     Physical Exam  Vitals signs and nursing note reviewed. Constitutional:       General: He is not in acute distress. Appearance: Normal appearance. He is well-developed. He is not ill-appearing, toxic-appearing or diaphoretic. HENT:      Head: Normocephalic and atraumatic. Right Ear: External ear normal.      Left Ear: External ear normal.      Nose: Nose normal. No congestion or rhinorrhea. Mouth/Throat:      Pharynx: Oropharynx is clear. No oropharyngeal exudate or posterior oropharyngeal erythema. Eyes:      General: No scleral icterus. Right eye: No discharge. Left eye: No discharge. Extraocular Movements: Extraocular movements intact. Conjunctiva/sclera: Conjunctivae normal.      Pupils: Pupils are equal, round, and reactive to light. Neck:      Musculoskeletal: Neck supple. No neck rigidity. Thyroid: No thyromegaly. Vascular: No JVD. Cardiovascular:      Rate and Rhythm: Normal rate and regular rhythm. Heart sounds: Normal heart sounds. No friction rub. No gallop. Pulmonary:      Effort: Pulmonary effort is normal. No respiratory distress. Breath sounds: Normal breath sounds. No stridor. No wheezing, rhonchi or rales. Abdominal:      General: Abdomen is flat. Bowel sounds are normal. There is no distension. Palpations: Abdomen is soft. Tenderness: There is no abdominal tenderness. There is no guarding or rebound.    Musculoskeletal: Normal range of motion. General: No deformity. Right shoulder: He exhibits no tenderness, no bony tenderness and no pain. Left shoulder: He exhibits no tenderness, no bony tenderness and no pain. Cervical back: He exhibits no tenderness, no bony tenderness and no pain. Thoracic back: He exhibits no tenderness, no bony tenderness, no pain and no spasm. Lumbar back: He exhibits no tenderness, no bony tenderness and no pain. Comments: Left AKA   Lymphadenopathy:      Cervical: No cervical adenopathy. Upper Body:      Right upper body: No supraclavicular adenopathy. Left upper body: No supraclavicular adenopathy. Skin:     General: Skin is warm and dry. Coloration: Skin is not jaundiced. Findings: No erythema or rash. Neurological:      Mental Status: He is alert and oriented to person, place, and time. Cranial Nerves: No cranial nerve deficit. Motor: No atrophy, abnormal muscle tone or seizure activity. Gait: Gait abnormal.   Psychiatric:         Mood and Affect: Mood normal.         Speech: Speech normal.         Behavior: Behavior normal.         Thought Content: Thought content normal.         Judgment: Judgment normal.         VITALS  /70   Pulse 79   Temp 98.9 °F (37.2 °C)   Resp 16   Ht 5' 11\" (1.803 m)   Wt 263 lb (119.3 kg)   SpO2 94%   BMI 36.68 kg/m²     Assessment/Plan:     1. Closed fracture of right hip with routine healing, subsequent encounter - Therapy  treating. Norco for pain. Continue to monitor wound. CBC in AM.  2. Acute on chronic combined systolic (congestive) and diastolic (congestive) heart failure (HCC)  Monitor weights and lung sounds. Continue Bumex, Metoprolol and monitor labs. 3. Status post above-knee amputation of left lower extremity ([facility] [address] - therapy as directed. Gabapentin for phantom pain. 4. Acute blood loss as cause of postoperative anemia - monitor labs.   5. Complicated UTI (urinary tract

## 2020-07-28 ENCOUNTER — OFFICE VISIT (OUTPATIENT)
Dept: NEPHROLOGY | Age: 85
End: 2020-07-28
Payer: MEDICARE

## 2020-07-28 VITALS
BODY MASS INDEX: 37.24 KG/M2 | WEIGHT: 267 LBS | HEART RATE: 76 BPM | OXYGEN SATURATION: 98 % | DIASTOLIC BLOOD PRESSURE: 64 MMHG | SYSTOLIC BLOOD PRESSURE: 124 MMHG

## 2020-07-28 PROBLEM — N18.30 ANEMIA IN STAGE 3 CHRONIC KIDNEY DISEASE (HCC): Status: ACTIVE | Noted: 2020-07-28

## 2020-07-28 PROBLEM — D63.1 ANEMIA IN STAGE 3 CHRONIC KIDNEY DISEASE (HCC): Status: ACTIVE | Noted: 2020-07-28

## 2020-07-28 PROCEDURE — 4040F PNEUMOC VAC/ADMIN/RCVD: CPT | Performed by: INTERNAL MEDICINE

## 2020-07-28 PROCEDURE — 99214 OFFICE O/P EST MOD 30 MIN: CPT | Performed by: INTERNAL MEDICINE

## 2020-07-28 PROCEDURE — 1123F ACP DISCUSS/DSCN MKR DOCD: CPT | Performed by: INTERNAL MEDICINE

## 2020-07-28 PROCEDURE — 1036F TOBACCO NON-USER: CPT | Performed by: INTERNAL MEDICINE

## 2020-07-28 PROCEDURE — 1111F DSCHRG MED/CURRENT MED MERGE: CPT | Performed by: INTERNAL MEDICINE

## 2020-07-28 PROCEDURE — G8427 DOCREV CUR MEDS BY ELIG CLIN: HCPCS | Performed by: INTERNAL MEDICINE

## 2020-07-28 PROCEDURE — G8417 CALC BMI ABV UP PARAM F/U: HCPCS | Performed by: INTERNAL MEDICINE

## 2020-07-28 NOTE — PROGRESS NOTES
[facility]    [facility], [address]   [place] [facility]  [phone]  Progress Note  7/28/2020 10:00 AM      Pt Name:    Pearl Pérez  YOB: 1934  Primary Care Physician: Korey Cole MD     Chief Complaint:   Chief Complaint   Patient presents with    Follow-up     CKD III        History of Present Illness: This is a hospital follow up visit. Pt was hospitalized at [address] [place] [address] with UTI and he unfortunately had a fall with hip fracture. He had surgery and developed post - op blood loss, hgb dropped and he became hypotensive and developed EFREN and fluid overload. Was on diuretic drip. Renal function improving. He is at [place] [address] from 7/13 showed sodium of 127. K of 3.4. He was placed on 1800 mL fluid restriction. He is on KCl 20 meq BID, he is unsure if this was increased. He is on bumex  1 mg BID. His swelling is improved as well as weights are down to 267 he was up around 290 at one point in the hospital.  He still does have some orthopnea at night. Hx of DM since 1985 +retinopathy + neuropathy, CAD s/p stent (follows with Jason Rehman), CHF ( EF 45%), +pacemaker, BPH, urinary retention with chronic engel catheter, HTN, hyperlipidemia, OA.  + left BKA . Pertinent items are noted in HPI. Past History:  Past Medical History:   Diagnosis Date    BPH (benign prostatic hyperplasia)     Chronic kidney disease     CKD (chronic kidney disease) stage 3, GFR 30-59 ml/min ([facility]) 4/16/2015    Edema     Hiatal hernia     Hyperlipidemia     Hypertension     Osteoarthritis     S/P PTCA: 5/12/2015: Stenting of proximal left circumflex artery with Xience 2.75X15 mm, post-dilated to 3.70 mm. 5/12/2015 5/12/2015: Stenting of proximal left circumflex artery with Xience 2.75X15 mm, post-dilated to 3.70 mm.  Dr. Chavez Anselmo Type II or unspecified type diabetes mellitus without mention of complication, not stated as uncontrolled      Past Surgical History:   Procedure Laterality Date    C[facility]  [facility]8/15    [facility]    CATARACT REMOVAL      BILAT    COLONOSCOPY      CORONARY ANGIOPLASTY  2015    EKG 12-LEAD  5/12/2015         FOOT SURGERY      multiple    HIP SURGERY Right 6/29/2020    RIGHT HIP INTERTAN performed by Rut Baxter MD at [address] [place] [address]  05/12/2017    PACEMAKER INSERTION      PACEMAKER PLACEMENT      TONSILLECTOMY AND ADENOIDECTOMY          VITALS:  /64 (Site: Right Lower Arm, Position: Sitting, Cuff Size: Large Adult)   Pulse 76   Wt 267 lb (121.1 kg)   SpO2 98%   BMI 37.24 kg/m²   Wt Readings from Last 3 Encounters:   07/28/20 267 lb (121.1 kg)   07/11/20 273 lb 14.4 oz (124.2 kg)   06/17/20 284 lb 4.8 oz (129 kg)     Body mass index is 37.24 kg/m².      General Appearance: alert and cooperative with exam, appears comfortable,   HEENT: EOMI, moist oral mucus membranes  Lungs: faint rales at b/L bases noted  Heart: S1, S2 heard, no rub  GI: soft, non-tender, no guarding, no flank pain  Extremities: 1+ LE edema B/L  + left BKA  Skin: warm, dry  Neurologic: no tremor, no asterixis, no focal neurologic deficits     Medications:  Current Outpatient Medications   Medication Sig Dispense Refill    insulin glargine (LANTUS) 100 UNIT/ML injection vial Inject 35 Units into the skin 2 times daily 1 vial 0    isosorbide mononitrate (IMDUR) 30 MG extended release tablet Take 1 tablet by mouth daily HOLD for SBP </=110 30 tablet 0    insulin lispro (HUMALOG) 100 UNIT/ML injection vial Inject 0-6 Units into the skin 3 times daily (with meals) **Corrective Low Dose Algorithm**  Glucose: Dose:               No Insulin  140-199 1 Unit  200-249 2 Units  250-299 3 Units  300-349 4 Units  350-399 5 Units  Over 399 6 Units 1 vial 0    insulin lispro (HUMALOG) 100 UNIT/ML injection vial Inject 10 Units into the skin 3 times daily (with meals) 1 vial 0    metoprolol succinate (NITROSTAT) 0.4 MG SL tablet Place 1 tablet under the tongue every 5 minutes as needed for Chest pain 25 tablet 0    aspirin 81 MG chewable tablet Take 1 tablet by mouth daily 30 tablet 3    acetaminophen (TYLENOL) 325 MG tablet Take 650 mg by mouth every 6 hours       Multiple Vitamins-Minerals (THERAPEUTIC MULTIVITAMIN-MINERALS) tablet Take 1 tablet by mouth daily       No current facility-administered medications for this visit.          Laboratory & Diagnostics:  CBC:   Lab Results   Component Value Date    WBC 10.3 07/04/2020    HGB 9.0 (L) 07/11/2020    HCT 28.2 (L) 07/11/2020    MCV 98.0 (H) 07/04/2020     07/04/2020     BMP:    Lab Results   Component Value Date     07/13/2020     (L) 07/11/2020     (L) 07/10/2020    K 3.4 07/13/2020    K 3.6 07/11/2020    K 3.9 07/10/2020    CL 82 07/13/2020    CL 81 (L) 07/11/2020    CL 86 (L) 07/10/2020    CO2 36.0 07/13/2020    CO2 38 (H) 07/11/2020    CO2 34 (H) 07/10/2020    BUN 49 07/13/2020    BUN 52 (H) 07/11/2020    BUN 56 (H) 07/10/2020    CREATININE 2.3 07/13/2020    CREATININE 2.8 (H) 07/11/2020    CREATININE 2.9 (H) 07/10/2020    GLUCOSE 229 07/13/2020    GLUCOSE 187 (H) 07/11/2020    GLUCOSE 283 (H) 07/10/2020      Hepatic:   Lab Results   Component Value Date    AST 17 06/27/2020    AST 21 11/05/2019    AST 17 05/22/2017    ALT 13 06/27/2020    ALT 21 11/05/2019    ALT 9 (L) 05/22/2017    BILITOT 0.2 (L) 06/27/2020    BILITOT 0.3 11/05/2019    BILITOT 0.3 05/22/2017    ALKPHOS 76 06/27/2020    ALKPHOS 83 11/05/2019    ALKPHOS 84 05/22/2017     BNP:   Lab Results   Component Value Date    BNP 2,278 11/07/2019     Lipids:   Lab Results   Component Value Date    CHOL 126 06/10/2016    HDL 48 06/10/2016     INR:   Lab Results   Component Value Date    INR 1.08 07/05/2020    INR 1.27 (H) 05/22/2017    INR 1.13 05/13/2015     URINE:   Lab Results   Component Value Date    NAUR < 20 07/01/2020     Lab Results   Component Value Date NITRU NEGATIVE 07/01/2020    COLORU YELLOW 07/01/2020    PHUR 5.0 07/01/2020    LABCAST 8-15 HYALINE 07/01/2020    LABCAST NONE SEEN 07/01/2020    WBCUA 15-25 07/01/2020    RBCUA 3-5 07/01/2020    YEAST NONE SEEN 07/01/2020    BACTERIA NONE SEEN 07/01/2020    SPECGRAV 1.015 07/01/2020    LEUKOCYTESUR TRACE 07/01/2020    UROBILINOGEN 0.2 07/01/2020    BILIRUBINUR NEGATIVE 07/01/2020    BLOODU MODERATE 07/01/2020    GLUCOSEU NEGATIVE 05/22/2017    KETUA NEGATIVE 07/01/2020      Microalbumen/Creatinine ratio:  No components found for: RUCREAT        Impression/Plan:   1. Recent EFREN from post-op ATN    2. CKD IIIv due to diabetic kidney disease, HTN, cardiorenal syndrome. Goals of care include slowing rate of progression by controlling blood pressure, blood glucoses and albuminuria and by avoiding nephrotoxins such as NSAIDs and IV contrast.   3. Volume overload: improving on bumex 1 mg BID, but today has crackles on exam, he has low EF may benefit from spironolactone will start 25 mg daily and repeat a bmp  4. Hypokalemia:on kcl 20 meq bid need a repeat bmp  5. Anemia: check iron stores needs EVA +/- IV iron,  Hgb was 8.8 will await iron stores  6. Hypervolemic hyponatrmeia: diuretics and fluid restriction, recheck  7. HTN - controlled  8. DM with microalbuminuria -   9. BPH/LUTS - chronic engel catheter  10. CAD/stent + pacemaker         Bloodwork and medications were reviewed and plan of care discussed with the patient. Return to clinic in 2 months or sooner if the need arises.        Clarita Denis, DO  [facility]

## 2020-07-30 ENCOUNTER — TELEPHONE (OUTPATIENT)
Dept: NEPHROLOGY | Age: 85
End: 2020-07-30

## 2020-07-30 ENCOUNTER — TELEPHONE (OUTPATIENT)
Dept: CARDIOLOGY CLINIC | Age: 85
End: 2020-07-30

## 2020-07-30 LAB
BUN BLDV-MCNC: 33 MG/DL
CALCIUM SERPL-MCNC: 8.1 MG/DL
CHLORIDE BLD-SCNC: 102 MMOL/L
CO2: 28 MMOL/L
CREAT SERPL-MCNC: 1.7 MG/DL
FERRITIN: 236 NG/ML (ref 18–300)
GFR CALCULATED: 41
GLUCOSE BLD-MCNC: 170 MG/DL
IRON SATURATION: 22
IRON: 40
POTASSIUM SERPL-SCNC: 4.9 MMOL/L
SODIUM BLD-SCNC: 138 MMOL/L

## 2020-07-30 NOTE — TELEPHONE ENCOUNTER
Pre op Risk Assessment    Procedure Cysto s/p Tube Insertion  Physician Dr. Lila Swartz  Date of surgery/procedure 8-11-20    Last OV 12-5-19  Last Stress 4-7-15  Last Echo 6-29-20  Last Cath 5-12-15  Last Stent 5-12-15  Is patient on blood thinners Plavix and ASA  Hold Meds/how many days ASA 7 days  Plavix 3 days    Fax clearance to [phone]

## 2020-08-03 ENCOUNTER — TELEPHONE (OUTPATIENT)
Dept: NEPHROLOGY | Age: 85
End: 2020-08-03

## 2020-08-06 LAB
BUN BLDV-MCNC: 39 MG/DL
CALCIUM SERPL-MCNC: 8.1 MG/DL
CHLORIDE BLD-SCNC: 105 MMOL/L
CO2: 28 MMOL/L
CREAT SERPL-MCNC: 1.8 MG/DL
GFR CALCULATED: 38
GLUCOSE BLD-MCNC: 101 MG/DL
HCT VFR BLD CALC: 32.6 % (ref 41–53)
HEMOGLOBIN: 9.6 G/DL (ref 13.5–17.5)
IRON: 45
POTASSIUM SERPL-SCNC: 4.7 MMOL/L
SODIUM BLD-SCNC: 140 MMOL/L

## 2020-08-07 ENCOUNTER — HOSPITAL ENCOUNTER (OUTPATIENT)
Dept: NON INVASIVE DIAGNOSTICS | Age: 85
Discharge: HOME | End: 2020-08-07
Payer: COMMERCIAL

## 2020-08-07 PROCEDURE — 93017 CV STRESS TEST TRACING ONLY: CPT

## 2020-08-07 PROCEDURE — 78452 HT MUSCLE IMAGE SPECT MULT: CPT

## 2020-08-07 PROCEDURE — A9500 TC99M SESTAMIBI: HCPCS | Performed by: NUCLEAR MEDICINE

## 2020-08-07 PROCEDURE — 3430000000 HC RX DIAGNOSTIC RADIOPHARMACEUTICAL: Performed by: NUCLEAR MEDICINE

## 2020-08-07 PROCEDURE — 6360000002 HC RX W HCPCS

## 2020-08-07 RX ADMIN — Medication 9.5 MILLICURIE: at 13:30

## 2020-08-07 RX ADMIN — Medication 33 MILLICURIE: at 14:23

## 2020-08-10 ENCOUNTER — TELEPHONE (OUTPATIENT)
Dept: CARDIOLOGY CLINIC | Age: 85
End: 2020-08-10

## 2020-08-10 LAB
BUN BLDV-MCNC: 43 MG/DL
CALCIUM SERPL-MCNC: 8.4 MG/DL
CHLORIDE BLD-SCNC: 104 MMOL/L
CO2: 26 MMOL/L
CREAT SERPL-MCNC: 1.8 MG/DL
GFR CALCULATED: 38
GLUCOSE BLD-MCNC: 199 MG/DL
POTASSIUM SERPL-SCNC: 4.8 MMOL/L
SODIUM BLD-SCNC: 138 MMOL/L

## 2020-08-14 ENCOUNTER — TELEPHONE (OUTPATIENT)
Dept: CARDIOLOGY CLINIC | Age: 85
End: 2020-08-14

## 2020-08-14 NOTE — LETTER
.. [address] [place] [address]  Phone: [phone]  Fax: [phone]        August 14, 2020    [id number] [facility]  [address] [facility] [address]      Dear Jefry Postin: Sherman Jewell We have been unable to contact you by phone. We are closely watching the battery to your device! Our office did not receive your Carelink transmission which was scheduled for July 21, 2020. Please check the connections to your carelink monitor and send a manual download ASAP. If you need help sending a download, please call [phone] at [phone]. Our office is not responsible for missed transmissions. Please call our office at [phone] if you have questions for the pacemaker/ICD clinic     Thank You for your assistance!     [facility] Pacemaker/ICD clinic

## 2020-08-18 ENCOUNTER — PROCEDURE VISIT (OUTPATIENT)
Dept: CARDIOLOGY CLINIC | Age: 85
End: 2020-08-18

## 2020-08-18 NOTE — PROGRESS NOTES
carelink medtronic dual pacer     NC <91 days  Battery watch     . .Battery longevity:  <1-26 months avg 13  Presenting rhythm  AS     Atrial impedance 406  RV impedance 597    P wave sensing 2.8  R wave sensing not measured     4.3 % atrial paced  99 % RV paced     Atrial threshold 0.625 V  at 0.4ms  RV threshold 0.5 V at 0.4ms  Mode switches   9, all under 1 minute

## 2020-08-19 ENCOUNTER — OUTSIDE SERVICES (OUTPATIENT)
Dept: FAMILY MEDICINE CLINIC | Age: 85
End: 2020-08-19
Payer: MEDICARE

## 2020-08-19 VITALS
TEMPERATURE: 98 F | HEART RATE: 66 BPM | RESPIRATION RATE: 16 BRPM | DIASTOLIC BLOOD PRESSURE: 66 MMHG | SYSTOLIC BLOOD PRESSURE: 128 MMHG | OXYGEN SATURATION: 94 % | WEIGHT: 254.8 LBS | BODY MASS INDEX: 35.54 KG/M2

## 2020-08-19 PROCEDURE — 99309 SBSQ NF CARE MODERATE MDM 30: CPT | Performed by: NURSE PRACTITIONER

## 2020-08-19 PROCEDURE — 99490 CHRNC CARE MGMT STAFF 1ST 20: CPT | Performed by: NURSE PRACTITIONER

## 2020-08-19 ASSESSMENT — ENCOUNTER SYMPTOMS
NAUSEA: 0
RHINORRHEA: 0
COUGH: 0
WHEEZING: 0
VOMITING: 0
CONSTIPATION: 0
EYE REDNESS: 0
SORE THROAT: 0
SHORTNESS OF BREATH: 0
DIARRHEA: 0

## 2020-08-19 NOTE — PROGRESS NOTES
Yudith Gonzalez is a 80 y.o. male that presents for 1 Month Follow-Up (Follow up on chronic health conditions)      This visit was performed via synchronous telecommunication system. Patient is located in the SNF  I am located in my office    HPI:  Maxim Epps is seen today with the assist of the Carlita Mann. He states he has had increased anxiety, depression and not sleeping well. He has lost 8 pounds in the last month and 30 pounds in the last 2 months. He has no swelling of his right lower extremity. He was recently readmitted to the facility after an admission to the hospital for AMS and then sustained a fall and fractured his right femur. He is currently receiving therapy. He also has chronic health conditions of CHF, anemia, DM, OA, neuropathy, BPH, HLD, HTN, GERD and AKIRA. He states he has not been wearing his CPAP at night as he feels his mask is not fitting well. His blood sugars are well controlled on his current regimen and his blood pressures are well controlled. Denies any recent urinary s/s. I have reviewed the patient's past medical history, past surgical history, allergies,medications, social and family history and I have made updates where appropriate. Past Medical History:   Diagnosis Date    BPH (benign prostatic hyperplasia)     Chronic kidney disease     CKD (chronic kidney disease) stage 3, GFR 30-59 ml/min ([facility]) 4/16/2015    Edema     Hiatal hernia     Hyperlipidemia     Hypertension     Osteoarthritis     S/P PTCA: 5/12/2015: Stenting of proximal left circumflex artery with Xience 2.75X15 mm, post-dilated to 3.70 mm. 5/12/2015 5/12/2015: Stenting of proximal left circumflex artery with Xience 2.75X15 mm, post-dilated to 3.70 mm.  Dr. Lissette Moncada Type II or unspecified type diabetes mellitus without mention of complication, not stated as uncontrolled        Past Surgical History:   Procedure Laterality Date    C[facility]IAC [facility]  [facility]8/15    [facility]    CATARACT REMOVAL      BILAT  COLONOSCOPY      CORONARY ANGIOPLASTY  2015    EKG 12-LEAD  5/12/2015         FOOT SURGERY      multiple    HIP SURGERY Right 6/29/2020    RIGHT HIP INTERTAN performed by Anshul Gonzalez MD at [address] [place] [address]  05/12/2017    PACEMAKER INSERTION      PACEMAKER PLACEMENT      TONSILLECTOMY AND ADENOIDECTOMY         Social History     Tobacco Use    Smoking status: Never Smoker    Smokeless tobacco: Never Used   Substance Use Topics    Alcohol use: Yes     Comment: Occasionally    Drug use: No       Family History   Problem Relation Age of Onset    Cancer Mother     Diabetes Father     Cancer Brother     Diabetes Paternal Grandmother     Diabetes Paternal Grandfather          Review of Systems   Constitutional: Positive for appetite change and unexpected weight change (down 8 pounds in the past month and 30 pounds in the last 2 months). Negative for activity change, chills, fatigue and fever. HENT: Positive for hearing loss. Negative for congestion, rhinorrhea and sore throat. Eyes: Positive for visual disturbance. Negative for redness. Respiratory: Negative for cough, shortness of breath and wheezing. Cardiovascular: Negative for chest pain and leg swelling. Gastrointestinal: Negative for constipation, diarrhea, nausea and vomiting. Endocrine: Negative for polydipsia and polyuria. Genitourinary: Negative for dysuria, frequency and hematuria. Musculoskeletal: Positive for arthralgias and gait problem. Negative for myalgias. Skin: Negative for rash and wound. Allergic/Immunologic: Positive for immunocompromised state. Negative for environmental allergies. Neurological: Positive for weakness. Negative for dizziness, light-headedness and headaches. Hematological: Does not bruise/bleed easily. Psychiatric/Behavioral: Positive for dysphoric mood and sleep disturbance. The patient is not nervous/anxious.             PHYSICAL left lower extremity ([facility] [address]  Support patient. Acute blood loss as cause of postoperative anemia  Monitor labs. MVT. Type 2 diabetes mellitus with other circulatory complication, with long-term current use of insulin ([facility])  BS overall stable. Continue Insulin meal time, SSI, and long acting. CKD (chronic kidney disease) stage 3, GFR 30-59 ml/min ([facility])  Follows with nephrology. Continue diuretics and monitor labs. Chronic osteoarthritis  Continue pain meds. Will stop norco and continue Tylenol within the next 30 days. Diabetic peripheral neuropathy ([facility])  Chronic and denies neuropathic pain. Continue Gabapentin. Benign prostatic hyperplasia with urinary obstruction  Continue Flomax and Proscar. Hyperlipidemia, unspecified hyperlipidemia type  Monitor labs and continue Atorvastatin, ASA, and Plavix. Essential (primary) hypertension  Blood pressures stable. Continue Bumex, Aldactone, potassium, Imdur, and metoprolol. Gastroesophageal reflux disease without esophagitis  States appetite is decreased. Will continue to monitor. Continue pepcid. AKIRA on CPAP  Will have respiratory check mask. Reactive depression  Will add Zoloft 50 mg daily. Other insomnia  Will continue melatonin and increase Trazodone to 50mg. No follow-ups on file. Resident has DM, CAD, HLD, HTN, CKD,  and it is expected to last 15 or more months. These chronic conditions place resident at a significant risk of death, acute exacerbation, decline in function or functional decline. The patient's comprehensive plan was revised and  monitored today. I spent 20 minutes reviewing plan. I have seen and examined the patient virtually and chaperone was present. The history was obtained through the patient, past medical records, and the staff. The patient's chart was updated as appropriate. Please see facility EMR for complete medication reconciliation.     Pursuant to the emergency declaration under the [facility], [facility] waiver authority and the [employer] Act, this Virtual  Visit was conducted, with patient's consent, to reduce the patient's risk of exposure to COVID-19 and provide continuity of care for an established patient. Services were provided through a video synchronous discussion virtually to substitute for in-person SNF visit.     Electronically signed by DENY Florence CNP on 8/19/2020 at 5:29 PM

## 2020-09-01 LAB
BILIRUBIN, URINE: NEGATIVE
BLOOD, URINE: NORMAL
BUN BLDV-MCNC: 39 MG/DL
CALCIUM SERPL-MCNC: 8.1 MG/DL
CHLORIDE BLD-SCNC: 98 MMOL/L
CLARITY: CLEAR
CO2: 26 MMOL/L
COLOR: NORMAL
CREAT SERPL-MCNC: 2.2 MG/DL
GFR CALCULATED: 30
GLUCOSE BLD-MCNC: 288 MG/DL
GLUCOSE URINE: NORMAL
KETONES, URINE: NEGATIVE
LEUKOCYTE ESTERASE, URINE: NORMAL
NITRITE, URINE: NEGATIVE
PH UA: 7 (ref 4.5–8)
POTASSIUM SERPL-SCNC: 5.6 MMOL/L
PROTEIN UA: NORMAL
SODIUM BLD-SCNC: 130 MMOL/L
SPECIFIC GRAVITY, URINE: 1.01
UROBILINOGEN, URINE: NORMAL

## 2020-09-04 LAB — POTASSIUM (K+): 5.2

## 2020-09-05 LAB — POTASSIUM (K+): 5.4

## 2020-09-08 LAB — POTASSIUM (K+): 4.9

## 2020-09-11 ENCOUNTER — TELEPHONE (OUTPATIENT)
Dept: NEPHROLOGY | Age: 85
End: 2020-09-11

## 2020-09-11 NOTE — TELEPHONE ENCOUNTER
Spoke to Salazar Coffman at [employer] and she states potassium is D/C 9/11. Salazar Coffman is faxing a med list. Salazar Coffman called asking if we can d/c the fluid restriction?        Please Advise

## 2020-09-13 RX ORDER — HYDROCODONE BITARTRATE AND ACETAMINOPHEN 5; 325 MG/1; MG/1
1 TABLET ORAL EVERY 8 HOURS PRN
Qty: 21 TABLET | Refills: 0 | Status: SHIPPED | OUTPATIENT
Start: 2020-09-13 | End: 2020-09-20

## 2020-09-14 LAB — POTASSIUM (K+): 5

## 2020-09-23 ENCOUNTER — OUTSIDE SERVICES (OUTPATIENT)
Dept: FAMILY MEDICINE CLINIC | Age: 85
End: 2020-09-23
Payer: MEDICARE

## 2020-09-23 VITALS
RESPIRATION RATE: 18 BRPM | SYSTOLIC BLOOD PRESSURE: 132 MMHG | HEART RATE: 78 BPM | OXYGEN SATURATION: 97 % | TEMPERATURE: 98.3 F | BODY MASS INDEX: 34.53 KG/M2 | WEIGHT: 247.6 LBS | DIASTOLIC BLOOD PRESSURE: 66 MMHG

## 2020-09-23 PROBLEM — G47.09 OTHER INSOMNIA: Status: ACTIVE | Noted: 2020-09-23

## 2020-09-23 PROBLEM — F32.9 REACTIVE DEPRESSION: Status: ACTIVE | Noted: 2020-09-23

## 2020-09-23 PROCEDURE — 99490 CHRNC CARE MGMT STAFF 1ST 20: CPT | Performed by: NURSE PRACTITIONER

## 2020-09-23 PROCEDURE — 99309 SBSQ NF CARE MODERATE MDM 30: CPT | Performed by: NURSE PRACTITIONER

## 2020-09-23 ASSESSMENT — ENCOUNTER SYMPTOMS
COUGH: 0
VOMITING: 0
SORE THROAT: 0
WHEEZING: 0
CONSTIPATION: 0
RHINORRHEA: 0
SHORTNESS OF BREATH: 0
NAUSEA: 0
EYE REDNESS: 0
DIARRHEA: 0

## 2020-09-23 NOTE — PROGRESS NOTES
Felicia Domínguez is a 80 y.o. male who presents today for his medical conditions/complaints as noted below. Chief Complaint   Patient presents with    1 Month Follow-Up     Follow up on chronic health conditions           HPI:     Cecille Richards is seen today for follow-up on chronic medical conditions. He has chronic medical conditions of CHF, anemia, DM, OA, neuropathy, BPH, hyperlipidemia, hypertension, GERD and AKIRA. His blood sugars have been mildly elevated over the last several weeks and we will increase his insulin. He states his sleeping and mood have been improved with the most recent increase in his medications. He has no edema today and this has been much improved. He also states that he is much better with his anxiety but overall he does still have some anxiety and some insomnia but does not desire to have this increase. He does have some confusion with most recent changes to his med changes and these will be reviewed today. He did have an elevation in his potassium and when he did leave Kayexalate which did bring this down. We did recheck this and we will recheck this again tomorrow. Planes of some GI symptoms and currently on Pepcid. We will start some omeprazole. Currently on CPAP for AKIRA. Past Medical History:   Diagnosis Date    BPH (benign prostatic hyperplasia)     Chronic kidney disease     CKD (chronic kidney disease) stage 3, GFR 30-59 ml/min ([facility]) 4/16/2015    Edema     Hiatal hernia     Hyperlipidemia     Hypertension     Osteoarthritis     S/P PTCA: 5/12/2015: Stenting of proximal left circumflex artery with Xience 2.75X15 mm, post-dilated to 3.70 mm. 5/12/2015 5/12/2015: Stenting of proximal left circumflex artery with Xience 2.75X15 mm, post-dilated to 3.70 mm.  Dr. Mendez Sheehan Type II or unspecified type diabetes mellitus without mention of complication, not stated as uncontrolled       Past Surgical History:   Procedure Laterality Date    [facility]    CATARACT REMOVAL      BILAT    COLONOSCOPY      CORONARY ANGIOPLASTY  2015    EKG 12-LEAD  5/12/2015         FOOT SURGERY      multiple    HIP SURGERY Right 6/29/2020    RIGHT HIP INTERTAN performed by Ellyn Grimm MD at [address] [place] [address]  05/12/2017    PACEMAKER INSERTION      PACEMAKER PLACEMENT      TONSILLECTOMY AND ADENOIDECTOMY         Family History   Problem Relation Age of Onset    Cancer Mother     Diabetes Father     Cancer Brother     Diabetes Paternal Grandmother     Diabetes Paternal Grandfather        Social History     Tobacco Use    Smoking status: Never Smoker    Smokeless tobacco: Never Used   Substance Use Topics    Alcohol use: Yes     Comment: Occasionally      No Known Allergies    Health Maintenance   Topic Date Due    DTaP/Tdap/Td vaccine (1 - Tdap) 11/19/1953    Shingles Vaccine (1 of 2) 11/19/1984    Pneumococcal 65+ years Vaccine (1 of 1 - PPSV23) 11/19/1999    Lipid screen  06/10/2017    Flu vaccine (1) 09/01/2020    Creatinine monitoring  09/01/2021    Potassium monitoring  09/14/2021    Hepatitis A vaccine  Aged Out    Hib vaccine  Aged Out    Meningococcal (ACWY) vaccine  Aged Out       Subjective:      Review of Systems   Constitutional: Negative for chills, fatigue and fever. HENT: Negative for congestion, rhinorrhea and sore throat. Eyes: Negative for redness and visual disturbance. Respiratory: Negative for cough, shortness of breath and wheezing. Cardiovascular: Negative for chest pain and leg swelling. Gastrointestinal: Negative for constipation, diarrhea, nausea and vomiting. Endocrine: Negative for polydipsia and polyuria. Genitourinary: Negative for dysuria, frequency and hematuria. Musculoskeletal: Negative for arthralgias, gait problem and myalgias. Skin: Negative for rash and wound.    Allergic/Immunologic: Negative for environmental allergies and immunocompromised state. Neurological: Negative for dizziness, light-headedness and headaches. Hematological: Does not bruise/bleed easily. Psychiatric/Behavioral: Positive for sleep disturbance. Negative for dysphoric mood. The patient is nervous/anxious. Objective:     Physical Exam  Vitals signs and nursing note reviewed. Constitutional:       General: He is not in acute distress. Appearance: He is well-developed. HENT:      Head: Normocephalic and atraumatic. Right Ear: External ear normal.      Left Ear: External ear normal.      Nose: Nose normal.   Eyes:      General: No scleral icterus. Right eye: No discharge. Left eye: No discharge. Conjunctiva/sclera: Conjunctivae normal.   Neck:      Musculoskeletal: Neck supple. Thyroid: No thyromegaly. Vascular: No JVD. Cardiovascular:      Rate and Rhythm: Normal rate and regular rhythm. Heart sounds: Normal heart sounds. Pulmonary:      Effort: Pulmonary effort is normal. No respiratory distress. Breath sounds: Normal breath sounds. No stridor. No wheezing or rales. Abdominal:      General: Bowel sounds are normal. There is no distension. Palpations: Abdomen is soft. Tenderness: There is no abdominal tenderness. Musculoskeletal: Normal range of motion. General: No deformity. Right shoulder: He exhibits no tenderness, no bony tenderness and no pain. Left shoulder: He exhibits no tenderness, no bony tenderness and no pain. Cervical back: He exhibits no tenderness, no bony tenderness and no pain. Thoracic back: He exhibits no tenderness, no bony tenderness, no pain and no spasm. Lumbar back: He exhibits no tenderness, no bony tenderness and no pain. Right lower leg: No edema. Feet:      Left foot:      Amputation: Left leg is amputated above knee. Lymphadenopathy:      Cervical: No cervical adenopathy.       Upper Body:      Right upper body: No and Zofran. 12. AKIRA on CPAP -patient monitor breathing. Continue CPAP. 13. Reactive depression -continue to monitor mood. Dose of Zoloft and trazodone. 14. Other insomnia -current dose of trazodone. States sleeping better. Resident has DM, HLD, HTN, CHF and it is expected to last 15 or more months. These chronic conditions place resident at a significant risk of death, acute exacerbation, or functional decline. The patient's comprehensive plan was revised and monitored today. I spent 20 minutes reviewing plan. Patient seen and examined. History partially obtained by chart review and nursing notes. I have reviewed patient's past medical, surgical, social, and family history and have made updates where appropriate.   See facility EMR for updated medication list.       Electronically signed by DENY Fermin CNP on 9/23/2020 at 11:58 AM

## 2020-09-28 LAB
BASOPHILS ABSOLUTE: ABNORMAL
BASOPHILS RELATIVE PERCENT: ABNORMAL
BUN BLDV-MCNC: 35 MG/DL
CALCIUM SERPL-MCNC: 8.5 MG/DL
CHLORIDE BLD-SCNC: 97 MMOL/L
CO2: 28 MMOL/L
CREAT SERPL-MCNC: 2.1 MG/DL
EOSINOPHILS ABSOLUTE: ABNORMAL
EOSINOPHILS RELATIVE PERCENT: ABNORMAL
FERRITIN: 211 NG/ML (ref 18–300)
GFR CALCULATED: 32
GLUCOSE BLD-MCNC: 186 MG/DL
HCT VFR BLD CALC: 36.7 % (ref 41–53)
HEMOGLOBIN: 11.8 G/DL (ref 13.5–17.5)
LYMPHOCYTES ABSOLUTE: ABNORMAL
LYMPHOCYTES RELATIVE PERCENT: ABNORMAL
MCH RBC QN AUTO: ABNORMAL PG
MCHC RBC AUTO-ENTMCNC: ABNORMAL G/DL
MCV RBC AUTO: ABNORMAL FL
MONOCYTES ABSOLUTE: ABNORMAL
MONOCYTES RELATIVE PERCENT: ABNORMAL
NEUTROPHILS ABSOLUTE: ABNORMAL
NEUTROPHILS RELATIVE PERCENT: ABNORMAL
PLATELET # BLD: 301 K/ΜL
PMV BLD AUTO: ABNORMAL FL
POTASSIUM SERPL-SCNC: 4.7 MMOL/L
RBC # BLD: 3.82 10^6/ΜL
SODIUM BLD-SCNC: 135 MMOL/L
WBC # BLD: 12.3 10^3/ML

## 2020-10-02 ENCOUNTER — OFFICE VISIT (OUTPATIENT)
Dept: NEPHROLOGY | Age: 85
End: 2020-10-02
Payer: MEDICARE

## 2020-10-02 VITALS
WEIGHT: 248 LBS | HEART RATE: 72 BPM | BODY MASS INDEX: 34.59 KG/M2 | SYSTOLIC BLOOD PRESSURE: 118 MMHG | OXYGEN SATURATION: 98 % | DIASTOLIC BLOOD PRESSURE: 58 MMHG

## 2020-10-02 PROCEDURE — 1123F ACP DISCUSS/DSCN MKR DOCD: CPT | Performed by: INTERNAL MEDICINE

## 2020-10-02 PROCEDURE — 1036F TOBACCO NON-USER: CPT | Performed by: INTERNAL MEDICINE

## 2020-10-02 PROCEDURE — G8427 DOCREV CUR MEDS BY ELIG CLIN: HCPCS | Performed by: INTERNAL MEDICINE

## 2020-10-02 PROCEDURE — 99213 OFFICE O/P EST LOW 20 MIN: CPT | Performed by: INTERNAL MEDICINE

## 2020-10-02 PROCEDURE — G8484 FLU IMMUNIZE NO ADMIN: HCPCS | Performed by: INTERNAL MEDICINE

## 2020-10-02 PROCEDURE — G8417 CALC BMI ABV UP PARAM F/U: HCPCS | Performed by: INTERNAL MEDICINE

## 2020-10-02 PROCEDURE — 4040F PNEUMOC VAC/ADMIN/RCVD: CPT | Performed by: INTERNAL MEDICINE

## 2020-10-02 RX ORDER — ONDANSETRON 4 MG/1
4 TABLET, FILM COATED ORAL EVERY 6 HOURS PRN
COMMUNITY

## 2020-10-02 RX ORDER — TRAZODONE HYDROCHLORIDE 100 MG/1
100 TABLET ORAL NIGHTLY
Status: ON HOLD | COMMUNITY
End: 2022-10-15 | Stop reason: HOSPADM

## 2020-10-02 RX ORDER — OMEPRAZOLE 20 MG/1
20 CAPSULE, DELAYED RELEASE ORAL 2 TIMES DAILY
Status: ON HOLD | COMMUNITY
End: 2022-10-15 | Stop reason: HOSPADM

## 2020-10-02 RX ORDER — HYDROCODONE BITARTRATE AND ACETAMINOPHEN 5; 325 MG/1; MG/1
1 TABLET ORAL EVERY 6 HOURS PRN
COMMUNITY
End: 2020-11-24 | Stop reason: SDUPTHER

## 2020-10-02 RX ORDER — LANOLIN ALCOHOL/MO/W.PET/CERES
5 CREAM (GRAM) TOPICAL NIGHTLY PRN
Status: ON HOLD | COMMUNITY
End: 2022-10-15 | Stop reason: HOSPADM

## 2020-10-02 RX ORDER — SPIRONOLACTONE 25 MG/1
25 TABLET ORAL DAILY
Status: ON HOLD | COMMUNITY
End: 2022-10-11

## 2020-10-02 RX ORDER — SERTRALINE HYDROCHLORIDE 100 MG/1
100 TABLET, FILM COATED ORAL DAILY
COMMUNITY

## 2020-10-02 RX ORDER — BUSPIRONE HYDROCHLORIDE 15 MG/1
7.5 TABLET ORAL 3 TIMES DAILY
COMMUNITY

## 2020-10-02 RX ORDER — TRIAMCINOLONE ACETONIDE 1 MG/G
CREAM TOPICAL 2 TIMES DAILY
Status: ON HOLD | COMMUNITY
End: 2022-10-11

## 2020-10-02 NOTE — PROGRESS NOTES
[facility]    [facility], [address]   [place]Alyssa MARTINEZ  [phone]  Progress Note  10/2/2020 10:00 AM      Pt Name:    Beth Barid  YOB: 1934  Primary Care Physician: Mao Martinez MD     Chief Complaint:   No chief complaint on file. History of Present Illness: This is a follow up visit for CKD III, fluid overload. K was high last month. Potassium stopped. I started him on Spironolactone 25 mg daily last visit. Also on bumex 1 mg BID. Received Aranesp 60 mcg in August.   Hemoglobin much better. He had abnormal stress test in August.  EF 41%  He had suprapubic cath placed by Dr. Radha Petty on 8/11 at [facility]. Resides at [place].  Today is is doing better. Swelling is better. Weight is down to 248 from 267 last appointment. On bumex 1 mg BID and spironolactone 25 mg daily. C/o vision disturbances since starting Buspar and Zoloft. Hx of DM since 1985 +retinopathy + neuropathy, CAD s/p stent (follows with January Barth), CHF ( EF 45%), +pacemaker, BPH, urinary retention s/p SP cath, HTN, hyperlipidemia, OA.  + left BKA . Pertinent items are noted in HPI. Past History:  Past Medical History:   Diagnosis Date    BPH (benign prostatic hyperplasia)     Chronic kidney disease     CKD (chronic kidney disease) stage 3, GFR 30-59 ml/min 4/16/2015    Edema     Hiatal hernia     Hyperlipidemia     Hypertension     Osteoarthritis     S/P PTCA: 5/12/2015: Stenting of proximal left circumflex artery with Xience 2.75X15 mm, post-dilated to 3.70 mm. 5/12/2015 5/12/2015: Stenting of proximal left circumflex artery with Xience 2.75X15 mm, post-dilated to 3.70 mm.  Dr. Krista Hall Type II or unspecified type diabetes mellitus without mention of complication, not stated as uncontrolled      Past Surgical History:   Procedure Laterality Date    C[facility]  [facility]8/15    [facility]    CATARACT REMOVAL      BILAT    COLONOSCOPY      CORONARY ANGIOPLASTY  2015    EKG 12-LEAD  5/12/2015         FOOT SURGERY      multiple    HIP SURGERY Right 6/29/2020    RIGHT HIP INTERTAN performed by Leobardo Essex, MD at [address] [place] [address]  05/12/2017    PACEMAKER INSERTION      PACEMAKER PLACEMENT      TONSILLECTOMY AND ADENOIDECTOMY          VITALS:  BP (!) 118/58 (Site: Left Upper Arm, Position: Sitting, Cuff Size: Large Adult)   Pulse 72   Wt 248 lb (112.5 kg)   SpO2 98%   BMI 34.59 kg/m²   Wt Readings from Last 3 Encounters:   10/02/20 248 lb (112.5 kg)   09/23/20 247 lb 9.6 oz (112.3 kg)   07/28/20 267 lb (121.1 kg)     Body mass index is 34.59 kg/m². General Appearance: alert and cooperative with exam, appears comfortable,   HEENT: EOMI, moist oral mucus membranes  Lungs: clear  Heart: S1, S2 heard, no rub  GI: soft, non-tender, no guarding, no flank pain  Extremities: no LE edema, + left BKA  Skin: warm, dry  Neurologic: no tremor, no asterixis, no focal neurologic deficits     Medications:  Current Outpatient Medications   Medication Sig Dispense Refill    busPIRone (BUSPAR) 10 MG tablet Take 10 mg by mouth 3 times daily      melatonin 3 MG TABS tablet Take 5 mg by mouth daily      HYDROcodone-acetaminophen (NORCO) 5-325 MG per tablet Take 1 tablet by mouth every 6 hours as needed for Pain.  omeprazole (PRILOSEC) 20 MG delayed release capsule Take 20 mg by mouth daily      spironolactone (ALDACTONE) 25 MG tablet Take 25 mg by mouth daily      traZODone (DESYREL) 100 MG tablet Take 100 mg by mouth nightly      triamcinolone (KENALOG) 0.1 % cream Apply topically 2 times daily Apply topically 2 times daily.       sertraline (ZOLOFT) 100 MG tablet Take 100 mg by mouth daily      ondansetron (ZOFRAN) 4 MG tablet Take 4 mg by mouth every 8 hours as needed for Nausea or Vomiting      insulin glargine (LANTUS) 100 UNIT/ML injection vial Inject 35 Units into the skin 2 times daily (Patient taking differently: Inject 42 Units into the skin 2 times daily ) 1 vial 0    isosorbide mononitrate (IMDUR) 30 MG extended release tablet Take 1 tablet by mouth daily HOLD for SBP </=110 30 tablet 0    insulin lispro (HUMALOG) 100 UNIT/ML injection vial Inject 0-6 Units into the skin 3 times daily (with meals) **Corrective Low Dose Algorithm**  Glucose: Dose:               No Insulin  140-199 1 Unit  200-249 2 Units  250-299 3 Units  300-349 4 Units  350-399 5 Units  Over 399 6 Units 1 vial 0    insulin lispro (HUMALOG) 100 UNIT/ML injection vial Inject 10 Units into the skin 3 times daily (with meals) (Patient taking differently: Inject 14 Units into the skin 3 times daily (with meals) ) 1 vial 0    metoprolol succinate (TOPROL XL) 50 MG extended release tablet Take 1 tablet by mouth daily HOLD for SBP </=110 or HR </=55 30 tablet 0    zinc oxide (PINXAV) 30 % OINT Apply 113.4 g topically 2 times daily Groin area 57 g 0    magnesium oxide (MAG-OX) 400 (241.3 Mg) MG TABS tablet Take 1 tablet by mouth 2 times daily 30 tablet 0    vitamin D (ERGOCALCIFEROL) 1.25 MG (46835 UT) CAPS capsule Take 1 capsule by mouth once a week 5 capsule 0    famotidine (PEPCID) 20 MG tablet Take 1 tablet by mouth daily 30 tablet 0    bumetanide (BUMEX) 1 MG tablet Take 1 tablet by mouth 2 times daily 30 tablet 3    docusate sodium (COLACE) 100 MG capsule Take 100 mg by mouth daily as needed for Constipation      benzonatate (TESSALON) 200 MG capsule Take 200 mg by mouth every 8 hours as needed for Cough      BD AUTOSHIELD DUO 30G X 5 MM MISC       guaiFENesin (ROBITUSSIN) 100 MG/5ML liquid Take 10 mLs by mouth every 6 hours as needed       ASPERCREME W/LIDOCAINE EX Apply topically every 4 hours as needed (Apply to back PRN for Pain)       cyclobenzaprine (FLEXERIL) 10 MG tablet 10 mg daily Taking once daily for leg spasms and the Every 8 hours PRN TID for Pain. Hold if drowsy, confused, or sleepy.       Incontinence Supplies (BEDSIDE DRAINAGE BAG) MISC Dispense one, 2000 cc overnight urinary bag 1 each 0    finasteride (PROSCAR) 5 MG tablet Take 1 tablet by mouth daily 30 tablet 3    tamsulosin (FLOMAX) 0.4 MG capsule Take 1 capsule by mouth nightly 30 capsule 3    atorvastatin (LIPITOR) 20 MG tablet Take 20 mg by mouth nightly      clopidogrel (PLAVIX) 75 MG tablet Take 1 tablet by mouth daily 90 tablet 3    nitroGLYCERIN (NITROSTAT) 0.4 MG SL tablet Place 1 tablet under the tongue every 5 minutes as needed for Chest pain 25 tablet 0    aspirin 81 MG chewable tablet Take 1 tablet by mouth daily 30 tablet 3    acetaminophen (TYLENOL) 325 MG tablet Take 650 mg by mouth every 6 hours       Multiple Vitamins-Minerals (THERAPEUTIC MULTIVITAMIN-MINERALS) tablet Take 1 tablet by mouth daily      calcium elemental (OSCAL) 500 MG TABS tablet Take 1 tablet by mouth 2 times daily (Patient not taking: Reported on 10/2/2020) 30 tablet 0    gabapentin (NEURONTIN) 300 MG capsule Take 1 capsule by mouth 2 times daily for 30 days. 60 capsule 0     No current facility-administered medications for this visit.          Laboratory & Diagnostics:  CBC:   Lab Results   Component Value Date    WBC 12.3 09/28/2020    HGB 11.8 (A) 09/28/2020    HCT 36.7 (A) 09/28/2020    MCV 98.0 (H) 07/04/2020     09/28/2020     BMP:    Lab Results   Component Value Date     09/28/2020     09/01/2020     08/10/2020    K 4.7 09/28/2020    K 5.0 09/14/2020    K 4.9 09/08/2020    CL 97 09/28/2020    CL 98 09/01/2020     08/10/2020    CO2 28.0 09/28/2020    CO2 26.0 09/01/2020    CO2 26.0 08/10/2020    BUN 35 09/28/2020    BUN 39 09/01/2020    BUN 43 08/10/2020    CREATININE 2.1 09/28/2020    CREATININE 2.2 09/01/2020    CREATININE 1.8 08/10/2020    GLUCOSE 186 09/28/2020    GLUCOSE 288 09/01/2020    GLUCOSE 199 08/10/2020      Hepatic:   Lab Results   Component Value Date    AST 17 06/27/2020    AST 21 11/05/2019    AST 17 05/22/2017    ALT 13 06/27/2020    ALT 21 11/05/2019    ALT 9 (L) 05/22/2017    BILITOT 0.2 (L) 06/27/2020    BILITOT 0.3 11/05/2019    BILITOT 0.3 05/22/2017    ALKPHOS 76 06/27/2020    ALKPHOS 83 11/05/2019    ALKPHOS 84 05/22/2017     BNP:   Lab Results   Component Value Date    BNP 2,278 11/07/2019     Lipids:   Lab Results   Component Value Date    CHOL 126 06/10/2016    HDL 48 06/10/2016     INR:   Lab Results   Component Value Date    INR 1.08 07/05/2020    INR 1.27 (H) 05/22/2017    INR 1.13 05/13/2015     URINE:   Lab Results   Component Value Date    NAUR < 20 07/01/2020     Lab Results   Component Value Date    NITRU Negative 09/01/2020    COLORU Light Yellow 09/01/2020    PHUR 7.0 09/01/2020    LABCAST 8-15 HYALINE 07/01/2020    LABCAST NONE SEEN 07/01/2020    WBCUA 15-25 07/01/2020    RBCUA 3-5 07/01/2020    YEAST NONE SEEN 07/01/2020    BACTERIA NONE SEEN 07/01/2020    CLARITYU Clear 09/01/2020    SPECGRAV 1.015 07/01/2020    LEUKOCYTESUR  09/01/2020      Comment:      small    UROBILINOGEN Normal 09/01/2020    BILIRUBINUR Negative 09/01/2020    BLOODU  09/01/2020      Comment:      small    GLUCOSEU  09/01/2020      Comment:      100    KETUA Negative 09/01/2020      Microalbumen/Creatinine ratio:  No components found for: RUCREAT        Impression/Plan:     1. 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 Goals of care include slowing rate of progression by controlling blood pressure, blood glucoses and albuminuria and by avoiding nephrotoxins such as NSAIDs and IV contrast.   2. Volume overload:improving, continue bumex 1 mg BID, spironolactone 25 mg daily  3. S/p hyperkalemia, improved off KCl. Ok to continue spironolactone  4. Hypervolemic hyponatremia:resolved, can increase fluid restriction to 2 liters  5. Anemia: s/p aranesp 60 mcg in august with improvement, repeat H&H next month  6. HTN - controlled  7. DM with microalbuminuria -   8.  Urinary retention s/p SP catheter  9. Systolic CHF  10. Visual disturbances: possibly from Zoloft pt to discuss with PCP        Can increase fluid restriction to 2 L. Continue current diuretics. Repeat labs in 1 month. F/u in 6 months. Bloodwork and medications were reviewed and plan of care discussed with the patient.         Onetha Antis, DO  [facility]

## 2020-10-06 ENCOUNTER — PROCEDURE VISIT (OUTPATIENT)
Dept: CARDIOLOGY CLINIC | Age: 85
End: 2020-10-06
Payer: MEDICARE

## 2020-10-06 PROCEDURE — 93296 REM INTERROG EVL PM/IDS: CPT | Performed by: NUCLEAR MEDICINE

## 2020-10-06 PROCEDURE — 93294 REM INTERROG EVL PM/LDLS PM: CPT | Performed by: NUCLEAR MEDICINE

## 2020-10-06 NOTE — PROGRESS NOTES
DR Richy Yuen PT   [employer] DUAL PACEMAKER   PT IS ON BATTERY WATCH  BATTERY <1-24 MTHS REMAINING  DDDR   ATRIAL IMPEDENCE 428  VENT IMPEDENCE 680  P WAVES >2.8  RV WAVES PACED   ATRIAL THRESHOLD PER THE DEVICE 0.625 @ 0.4  VENT THRESHOLD PER THE DEVICE 0.5 @ 0.4  ATRIAL AND VENT AMPLITUDE PROGRAMMED ADAPTIVE     A PACED 17.4%  V PACED 99.2%

## 2020-10-16 ENCOUNTER — OUTSIDE SERVICES (OUTPATIENT)
Dept: FAMILY MEDICINE CLINIC | Age: 85
End: 2020-10-16
Payer: MEDICARE

## 2020-10-16 VITALS
TEMPERATURE: 98.5 F | OXYGEN SATURATION: 95 % | DIASTOLIC BLOOD PRESSURE: 70 MMHG | WEIGHT: 237.9 LBS | BODY MASS INDEX: 33.18 KG/M2 | HEART RATE: 72 BPM | RESPIRATION RATE: 20 BRPM | SYSTOLIC BLOOD PRESSURE: 138 MMHG

## 2020-10-16 PROCEDURE — 99309 SBSQ NF CARE MODERATE MDM 30: CPT | Performed by: NURSE PRACTITIONER

## 2020-10-16 PROCEDURE — 99490 CHRNC CARE MGMT STAFF 1ST 20: CPT | Performed by: NURSE PRACTITIONER

## 2020-10-16 ASSESSMENT — ENCOUNTER SYMPTOMS
CONSTIPATION: 0
EYE REDNESS: 0
SHORTNESS OF BREATH: 0
RHINORRHEA: 0
NAUSEA: 0
WHEEZING: 0
VOMITING: 0
PHOTOPHOBIA: 1
COUGH: 0
SORE THROAT: 0
DIARRHEA: 0

## 2020-10-16 NOTE — PROGRESS NOTES
Anselmo Sepulveda MD at [facility]  05/12/2017    PACEMAKER INSERTION      PACEMAKER PLACEMENT      TONSILLECTOMY AND ADENOIDECTOMY         Family History   Problem Relation Age of Onset    Cancer Mother     Diabetes Father     Cancer Brother     Diabetes Paternal Grandmother     Diabetes Paternal Grandfather        Social History     Tobacco Use    Smoking status: Never Smoker    Smokeless tobacco: Never Used   Substance Use Topics    Alcohol use: Yes     Comment: Occasionally      No Known Allergies    Health Maintenance   Topic Date Due    DTaP/Tdap/Td vaccine (1 - Tdap) 11[birth year]19/1953    Shingles Vaccine (1 of 2) 11/19/1984    Pneumococcal 65+ years Vaccine (1 of 1 - PPSV23) 11/19/1999    Lipid screen  06/10/2017    Flu vaccine (1) 09/01/2020    Potassium monitoring  09/28/2021    Creatinine monitoring  09/28/2021    Hepatitis A vaccine  Aged Out    Hib vaccine  Aged Out    Meningococcal (ACWY) vaccine  Aged Out       Subjective:      Review of Systems   Constitutional: Negative for chills, fatigue and fever. HENT: Negative for congestion, rhinorrhea and sore throat. Eyes: Positive for photophobia and visual disturbance. Negative for redness. Respiratory: Negative for cough, shortness of breath and wheezing. Cardiovascular: Negative for chest pain and leg swelling. Gastrointestinal: Negative for constipation, diarrhea, nausea and vomiting. Endocrine: Negative for polydipsia and polyuria. Genitourinary: Negative for dysuria, frequency and hematuria. Musculoskeletal: Positive for gait problem. Negative for arthralgias and myalgias. Skin: Positive for pallor. Negative for rash and wound. Allergic/Immunologic: Positive for immunocompromised state. Negative for environmental allergies. Neurological: Negative for dizziness, light-headedness and headaches. Hematological: Does not bruise/bleed easily.    Psychiatric/Behavioral: Negative for dysphoric mood and sleep disturbance. The patient is not nervous/anxious. Objective:     Physical Exam  Vitals signs and nursing note reviewed. Constitutional:       General: He is not in acute distress. Appearance: He is well-developed. HENT:      Head: Normocephalic and atraumatic. Right Ear: External ear normal.      Left Ear: External ear normal.      Nose: Nose normal.   Eyes:      General: No scleral icterus. Right eye: No discharge. Left eye: No discharge. Conjunctiva/sclera: Conjunctivae normal.   Neck:      Musculoskeletal: Neck supple. Thyroid: No thyromegaly. Vascular: No JVD. Cardiovascular:      Rate and Rhythm: Normal rate and regular rhythm. Heart sounds: Normal heart sounds. Pulmonary:      Effort: Pulmonary effort is normal. No respiratory distress. Breath sounds: Normal breath sounds. No stridor. No wheezing or rales. Abdominal:      General: Bowel sounds are normal. There is no distension. Palpations: Abdomen is soft. Tenderness: There is no abdominal tenderness. Genitourinary:     Comments: Catheter in place  Musculoskeletal: Normal range of motion. General: No deformity. Right shoulder: He exhibits no tenderness, no bony tenderness and no pain. Left shoulder: He exhibits no tenderness, no bony tenderness and no pain. Cervical back: He exhibits no tenderness, no bony tenderness and no pain. Thoracic back: He exhibits no tenderness, no bony tenderness, no pain and no spasm. Lumbar back: He exhibits no tenderness, no bony tenderness and no pain. Right lower leg: No edema. Feet:      Left foot:      Amputation: Left leg is amputated above knee. Lymphadenopathy:      Cervical: No cervical adenopathy. Upper Body:      Right upper body: No supraclavicular adenopathy. Left upper body: No supraclavicular adenopathy. Skin:     General: Skin is warm and dry.       Findings: Wound present. No erythema or rash. Neurological:      Mental Status: He is alert and oriented to person, place, and time. Motor: No atrophy, abnormal muscle tone or seizure activity. Psychiatric:         Mood and Affect: Mood is anxious. Speech: Speech normal.         Behavior: Behavior normal.       /70   Pulse 72   Temp 98.5 °F (36.9 °C)   Resp 20   Wt 237 lb 14.4 oz (107.9 kg)   SpO2 95%   BMI 33.18 kg/m²     Assessment/Plan      1. Acute on chronic combined systolic (congestive) and diastolic (congestive) heart failure (HCC) -recent increase in potassium. We will continue metoprolol, Bumex, Aldactone. Labs improved. 2. Status post above-knee amputation of left lower extremity ([facility]) -overall stable continue to monitor for pain. 3. Acute blood loss as cause of postoperative anemia -continue to monitor labs. H/H stable on 9/28   4. Type 2 diabetes mellitus with other circulatory complication, with long-term current use of insulin ([facility]) -elevated. Increase NovoLog at supper. Continue sliding scale insulin and NovoLog, Tradjenta and Levemir. 5. CKD (chronic kidney disease) stage 3, GFR 30-59 ml/min ([facility]) -continue to monitor labs. Follow-up with nephrology. 6. Chronic osteoarthritis -denies pain today. Continue to monitor. 7. Diabetic peripheral neuropathy ([facility] [address] -need to monitor for pain. Will send to opthamology. 8. Benign prostatic hyperplasia with urinary obstruction -continue Flomax and Proscar. No signs and symptoms of infection. Continue Flomax. Follows with urology. No antibiotics unless ordered by urology. 9. Hyperlipidemia, unspecified hyperlipidemia type -continue  atorvastatin, aspirin, and Plavix. 10. Essential (primary) hypertension -is overall stable. Continue Bumex, metoprolol and Aldactone. 11. Gastroesophageal reflux disease without esophagitis -improved on  omeprazole. Continue Pepcid and Zofran.    12. AKIRA on CPAP -patient monitor breathing. Continue CPAP. Monitor adherence. 13. Reactive depression -continue to monitor mood. Dose of Zoloft and trazodone. 14. Other insomnia -current dose of trazodone. States sleeping better. Resident has DM, HLD, HTN, CHF and it is expected to last 15 or more months. These chronic conditions place resident at a significant risk of death, acute exacerbation, or functional decline. The patient's comprehensive plan was revised and monitored today. I spent 20 minutes reviewing plan. Patient seen and examined. History partially obtained by chart review and nursing notes. I have reviewed patient's past medical, surgical, social, and family history and have made updates where appropriate.   See facility EMR for updated medication list.       Electronically signed by DENY Aguayo CNP on 10/16/2020 at 5:34 PM

## 2020-11-06 ENCOUNTER — OUTSIDE SERVICES (OUTPATIENT)
Dept: FAMILY MEDICINE CLINIC | Age: 85
End: 2020-11-06
Payer: MEDICARE

## 2020-11-06 VITALS
SYSTOLIC BLOOD PRESSURE: 128 MMHG | OXYGEN SATURATION: 98 % | RESPIRATION RATE: 16 BRPM | BODY MASS INDEX: 34.46 KG/M2 | TEMPERATURE: 98.2 F | DIASTOLIC BLOOD PRESSURE: 63 MMHG | WEIGHT: 247.1 LBS | HEART RATE: 63 BPM

## 2020-11-06 PROCEDURE — 99490 CHRNC CARE MGMT STAFF 1ST 20: CPT | Performed by: NURSE PRACTITIONER

## 2020-11-06 PROCEDURE — 99309 SBSQ NF CARE MODERATE MDM 30: CPT | Performed by: NURSE PRACTITIONER

## 2020-11-06 ASSESSMENT — ENCOUNTER SYMPTOMS
COUGH: 0
VOMITING: 0
CONSTIPATION: 0
SHORTNESS OF BREATH: 0
DIARRHEA: 0
NAUSEA: 0
RHINORRHEA: 0
SORE THROAT: 0
WHEEZING: 0
EYE REDNESS: 0

## 2020-11-06 NOTE — PROGRESS NOTES
Date    CARDIAC [facility]HE[facility]IZATION  5/8/15    [facility]    CATARACT REMOVAL      BILAT    COLONOSCOPY      CORONARY ANGIOPLASTY  2015    EKG 12-LEAD  5/12/2015         FOOT SURGERY      multiple    HIP SURGERY Right 6/29/2020    RIGHT HIP INTERTAN performed by Anna Blevins MD at [address]  05/12/2017    PACEMAKER INSERTION      PACEMAKER PLACEMENT      TONSILLECTOMY AND ADENOIDECTOMY         Social History     Tobacco Use    Smoking status: Never Smoker    Smokeless tobacco: Never Used   Substance Use Topics    Alcohol use: Yes     Comment: Occasionally    Drug use: No       Family History   Problem Relation Age of Onset    Cancer Mother     Diabetes Father     Cancer Brother     Diabetes Paternal Grandmother     Diabetes Paternal Grandfather          Review of Systems   Constitutional: Negative for chills, fatigue and fever. HENT: Negative for congestion, rhinorrhea and sore throat. Eyes: Negative for redness and visual disturbance. Respiratory: Negative for cough, shortness of breath and wheezing. Cardiovascular: Negative for chest pain and leg swelling. Gastrointestinal: Negative for constipation, diarrhea, nausea and vomiting. Endocrine: Negative for polydipsia and polyuria. Genitourinary: Negative for dysuria, frequency and hematuria. Musculoskeletal: Negative for arthralgias, gait problem and myalgias. Skin: Negative for rash and wound. Allergic/Immunologic: Negative for environmental allergies and immunocompromised state. Neurological: Negative for dizziness, light-headedness and headaches. Hematological: Does not bruise/bleed easily. Psychiatric/Behavioral: Positive for dysphoric mood (anxious; but improved; concerned about wife) and sleep disturbance (at times; but improved). The patient is not nervous/anxious.             PHYSICAL EXAM:  Vitals:    11/06/20 1501   BP: 128/63   Pulse: 63   Resp: 16   Temp: 98.2 °F (36.8 °C)   SpO2: 98%        Physical Exam  Vitals signs and nursing note reviewed. Exam conducted with a chaperone present. Constitutional:       General: He is not in acute distress. Appearance: Normal appearance. He is normal weight. He is not ill-appearing or diaphoretic. HENT:      Head: Normocephalic and atraumatic. Right Ear: Hearing normal.      Left Ear: Hearing normal.      Nose: Nose normal. No rhinorrhea. Mouth/Throat:      Lips: Pink. No lesions. Eyes:      General: Lids are normal. No scleral icterus. Neck:      Musculoskeletal: Normal range of motion and neck supple. Pulmonary:      Effort: Pulmonary effort is normal. No accessory muscle usage, respiratory distress or retractions. Abdominal:      General: Abdomen is flat. There is no distension. Musculoskeletal:      Right lower leg: No edema. Left lower leg: No edema. Skin:     Coloration: Skin is not pale. Findings: No bruising or rash. Neurological:      General: No focal deficit present. Mental Status: He is alert and oriented to person, place, and time. Psychiatric:         Attention and Perception: Attention normal.         Mood and Affect: Mood normal.         Speech: Speech normal.         Behavior: Behavior normal.         Cognition and Memory: Cognition and memory normal.          ASSESSMENT & PLAN  Jean Jacobo was seen today for 1 month follow-up. Diagnoses and all orders for this visit:    Acute on chronic combined systolic (congestive) and diastolic (congestive) heart failure (HCC)  Weight stable. Continue current dose of Bumex, Aldactone,  and Metoprolol. Follow labs. Status post above-knee amputation of left lower extremity ([address]  Denies pain today. Continue Flexeril and Tylenol. Type 2 diabetes mellitus with other circulatory complication, with long-term current use of insulin (HCC)  Blood sugars improved with most recent increase.  Continue current insulin regimen, Januvia, and monitor for hypoglycemia. Stage 3b chronic kidney disease  Follows with nephrology. Will have nursing review most recent labs. Continue to follow. Chronic osteoarthritis  Denies pain. Continue Tylenol. Diabetic peripheral neuropathy (HCC)  Denies pain. Continue Flexeril and Tylenol. Benign prostatic hyperplasia with urinary obstruction  No recent infections. Continue Proscar and flomax. Hyperlipidemia, unspecified hyperlipidemia type  Continue to follow labs. Continue ASA, Atorvastatin, and heart healthy diet. Essential (primary) hypertension  Chronic and continue to monitor blood pressures. Remain overall stable. Continue Bumex, Aldactone, Metoprolol, monitor for hypotension. Gastroesophageal reflux disease without esophagitis  Chronic and denies GI s/s. Continue Pepcid and Omeprazole. AKIRA on CPAP  Continue CPAP. Denies any breathing issues. Reactive depression  Mood improved and sleep improved with Zoloft and Trazodone. Other insomnia  Sleep improved with Trazodone. Continue to monitor. No follow-ups on file. Resident has CHF, DM, HTN, HLD, Depression and it is expected to last 15 or more months. These chronic conditions place resident at a significant risk of death, acute exacerbation, decline in function or functional decline. The patient's comprehensive plan was monitored today. I spent 20 minutes reviewing plan. I have seen and examined the patient virtually and chaperone was present. The history was obtained through the patient, past medical records, and the staff. The patient's chart was updated as appropriate. Please see facility EMR for complete medication reconciliation.     Pursuant to the emergency declaration under the [facility], [address] [place] [address] authority and the [employer] and [employer] Act, this Virtual  Visit was conducted, with patient's consent, to reduce the patient's risk of exposure to COVID-19 and provide continuity of care for an established patient. Services were provided through a video synchronous discussion virtually to substitute for in-person SNF visit.     Electronically signed by DENY Pierce CNP on 11/6/2020 at 3:04 PM

## 2020-11-24 ENCOUNTER — PROCEDURE VISIT (OUTPATIENT)
Dept: CARDIOLOGY CLINIC | Age: 85
End: 2020-11-24

## 2020-11-24 RX ORDER — HYDROCODONE BITARTRATE AND ACETAMINOPHEN 5; 325 MG/1; MG/1
1 TABLET ORAL EVERY 6 HOURS PRN
Qty: 120 TABLET | Refills: 0 | Status: SHIPPED | OUTPATIENT
Start: 2020-11-24 | End: 2020-12-24

## 2020-11-24 NOTE — PROGRESS NOTES
[employer] medtronic dual pacer  NC< 91 days  Battery watch     . .Battery longevity:  <1-18 months   Presenting rhythm  ASVP    Atrial impedance 425  RV impedance 663    P wave sensing 2.8  R wave sensing not measured     22.5 % atrial paced  99.4 % RV paced     Atrial threshold 0.5 V  at 0.4ms  RV threshold 0.625 V at 0.4ms  Mode switches   0

## 2020-12-04 ENCOUNTER — OUTSIDE SERVICES (OUTPATIENT)
Dept: FAMILY MEDICINE CLINIC | Age: 85
End: 2020-12-04
Payer: MEDICARE

## 2020-12-04 VITALS
WEIGHT: 237.2 LBS | RESPIRATION RATE: 16 BRPM | BODY MASS INDEX: 33.08 KG/M2 | TEMPERATURE: 98.2 F | OXYGEN SATURATION: 95 % | SYSTOLIC BLOOD PRESSURE: 145 MMHG | HEART RATE: 70 BPM | DIASTOLIC BLOOD PRESSURE: 68 MMHG

## 2020-12-04 PROCEDURE — 99309 SBSQ NF CARE MODERATE MDM 30: CPT | Performed by: FAMILY MEDICINE

## 2020-12-04 PROCEDURE — 99490 CHRNC CARE MGMT STAFF 1ST 20: CPT | Performed by: FAMILY MEDICINE

## 2020-12-04 ASSESSMENT — ENCOUNTER SYMPTOMS
NAUSEA: 0
CONSTIPATION: 0
SORE THROAT: 0
EYE REDNESS: 0
WHEEZING: 0
SHORTNESS OF BREATH: 0
DIARRHEA: 0
COUGH: 1
RHINORRHEA: 0
VOMITING: 0

## 2020-12-04 NOTE — PROGRESS NOTES
Ector Julien is a 80 y.o. male that presents for 1 Month Follow-Up (Follow up on chronic health conditions)      This visit was performed via synchronous telecommunication system. Patient is located in the SNF  I am located in my office    HPI:  Del Temple is seen today for follow-up on his chronic health conditions. He has chronic conditions of CHF, diabetes, chronic kidney disease, BPH, hyperlipidemia, hypertension, GERD, depression, AKIRA and on CPAP. He does complain of phlegm in his throat and feels like he needs something to help clear this up. We will start Mucinex today as needed for this. His fasting blood sugars have been running 80s to 110 but he does elevate at suppertime. He is currently on Januvia, Lantus, NovoLog at meals and sliding scale at meals. We will increase mealtime insulin at suppertime. I have reviewed the patient's past medical history, past surgical history, allergies,medications, social and family history and I have made updates where appropriate. Past Medical History:   Diagnosis Date    BPH (benign prostatic hyperplasia)     Chronic kidney disease     CKD (chronic kidney disease) stage 3, GFR 30-59 ml/min 4/16/2015    Edema     Hiatal hernia     Hyperlipidemia     Hypertension     Osteoarthritis     S/P PTCA: 5/12/2015: Stenting of proximal left circumflex artery with Xience 2.75X15 mm, post-dilated to 3.70 mm. 5/12/2015 5/12/2015: Stenting of proximal left circumflex artery with Xience 2.75X15 mm, post-dilated to 3.70 mm.  Dr. Marysol Medrano Type II or unspecified type diabetes mellitus without mention of complication, not stated as uncontrolled        Past Surgical History:   Procedure Laterality Date    [facility]    CATARACT REMOVAL      BILAT    COLONOSCOPY      CORONARY ANGIOPLASTY  2015    EKG 12-LEAD  5/12/2015         FOOT SURGERY      multiple    HIP SURGERY Right 6/29/2020    RIGHT HIP INTERTAN performed by Rosanna Ragland MD at [address]  05/12/2017    PACEMAKER INSERTION      PACEMAKER PLACEMENT      TONSILLECTOMY AND ADENOIDECTOMY         Social History     Tobacco Use    Smoking status: Never Smoker    Smokeless tobacco: Never Used   Substance Use Topics    Alcohol use: Yes     Comment: Occasionally    Drug use: No       Family History   Problem Relation Age of Onset    Cancer Mother     Diabetes Father     Cancer Brother     Diabetes Paternal Grandmother     Diabetes Paternal Grandfather          Review of Systems   Constitutional: Negative for chills, fatigue and fever. HENT: Negative for congestion, rhinorrhea and sore throat. Eyes: Negative for redness and visual disturbance. Respiratory: Positive for cough (with phlegm). Negative for shortness of breath and wheezing. Cardiovascular: Negative for chest pain and leg swelling. Gastrointestinal: Negative for constipation, diarrhea, nausea and vomiting. Endocrine: Negative for polydipsia and polyuria. Genitourinary: Negative for dysuria, frequency and hematuria. Musculoskeletal: Positive for arthralgias and gait problem. Negative for myalgias. Skin: Negative for rash and wound. Allergic/Immunologic: Negative for environmental allergies and immunocompromised state. Neurological: Negative for dizziness, light-headedness and headaches. Hematological: Does not bruise/bleed easily. Psychiatric/Behavioral: Negative for dysphoric mood and sleep disturbance. The patient is not nervous/anxious. PHYSICAL EXAM:  Vitals:    12/04/20 1437   BP: (!) 145/68   Pulse: 70   Resp: 16   Temp: 98.2 °F (36.8 °C)   SpO2: 95%        Physical Exam  Vitals signs and nursing note reviewed. Exam conducted with a chaperone present. Constitutional:       General: He is not in acute distress. Appearance: Normal appearance. He is obese. He is not ill-appearing or diaphoretic.    HENT:      Head: Normocephalic and atraumatic. Right Ear: Hearing normal.      Left Ear: Hearing normal.      Nose: Nose normal. No rhinorrhea. Mouth/Throat:      Lips: Pink. No lesions. Eyes:      General: Lids are normal. No scleral icterus. Neck:      Musculoskeletal: Normal range of motion and neck supple. Pulmonary:      Effort: Pulmonary effort is normal. No accessory muscle usage, respiratory distress or retractions. Abdominal:      General: Abdomen is flat. There is no distension. Musculoskeletal:      Right lower leg: No edema. Left lower leg: No edema. Skin:     Coloration: Skin is not pale. Findings: No bruising or rash. Neurological:      General: No focal deficit present. Mental Status: He is alert and oriented to person, place, and time. Psychiatric:         Attention and Perception: Attention normal.         Mood and Affect: Mood normal.         Speech: Speech normal.         Behavior: Behavior normal.         Cognition and Memory: Cognition and memory normal.          ASSESSMENT & PLAN  Ciara Fulton was seen today for 1 month follow-up. Diagnoses and all orders for this visit:    Primary osteoarthritis involving multiple joints  Chronic and continue Norco and Tylenol. Has Flexeril as needed for muscle spasms. Continues to get up in chair daily. Acute on chronic combined systolic (congestive) and diastolic (congestive) heart failure (HCC)  Denies shortness of breath today. No edema and weight is down 30 pounds from last 5 months. Followed by cardiology and labs improved from previous study. Continues to follow with nephrology. Status post above-knee amputation of left lower extremity ([address]  Continue to support. Type 2 diabetes mellitus with other circulatory complication, with long-term current use of insulin (HCC)  Elevated at 5 PM daily. Will increase NovoLog at mealtimes and continue current dose of Lantus and sliding scale insulin along with Januvia.     Stage 3b chronic kidney disease  Continues to follow with nephrology. Labs reviewed from November overall stable and improved. Diabetic peripheral neuropathy (HCC)  Chronic and continue to monitor. No pain complaints today. Continue tylenol and norco.     Benign prostatic hyperplasia with urinary obstruction  Chronic and continue catheter. Follows with urology. Continue current dose of Flomax. Hyperlipidemia, unspecified hyperlipidemia type  Chronic and continue to follow labs. Continue current dose of aspirin and atorvastatin. Tolerating well and will follow-up as needed. Essential (primary) hypertension  Blood pressures overall stable on current regimen of Aldactone, Bumex, and metoprolol. Continue to monitor for lows. Gastroesophageal reflux disease without esophagitis  Denies GI symptoms continue omeprazole. AKIRA on CPAP  Tolerates CPAP. Denies shortness of breath. Reactive depression  Chronic and mood improved on current dose of BuSpar, trazodone, and Zoloft. No follow-ups on file. Resident has CAD, HTN, CHF, AKIRA, DM and it is expected to last 12 or more months. These chronic conditions place resident at a significant risk of death, acute exacerbation, decline in function or functional decline. The patient's comprehensive plan was revised and  monitored today. I spent 20 minutes reviewing plan. I have seen and examined the patient virtually and chaperone was present. The history was obtained through the patient, past medical records, and the staff. The patient's chart was updated as appropriate. Please see facility EMR for complete medication reconciliation.     Pursuant to the emergency declaration under the [facility], [facility]5 waiver authority and the [employer] Act, this Virtual  Visit was conducted, with patient's consent, to reduce the patient's risk of exposure to COVID-19 and provide continuity of care for an established patient. Services were provided through a video synchronous discussion virtually to substitute for in-person SNF visit.     Electronically signed by William Lee MD on 12/4/2020 at 3:09 PM

## 2021-01-06 ENCOUNTER — OUTSIDE SERVICES (OUTPATIENT)
Dept: FAMILY MEDICINE CLINIC | Age: 86
End: 2021-01-06
Payer: MEDICARE

## 2021-01-06 VITALS
OXYGEN SATURATION: 97 % | SYSTOLIC BLOOD PRESSURE: 157 MMHG | TEMPERATURE: 98.2 F | WEIGHT: 239.4 LBS | DIASTOLIC BLOOD PRESSURE: 66 MMHG | HEART RATE: 65 BPM | BODY MASS INDEX: 33.39 KG/M2 | RESPIRATION RATE: 20 BRPM

## 2021-01-06 DIAGNOSIS — I50.43 ACUTE ON CHRONIC COMBINED SYSTOLIC (CONGESTIVE) AND DIASTOLIC (CONGESTIVE) HEART FAILURE (HCC): ICD-10-CM

## 2021-01-06 DIAGNOSIS — F32.9 REACTIVE DEPRESSION: ICD-10-CM

## 2021-01-06 DIAGNOSIS — N18.32 STAGE 3B CHRONIC KIDNEY DISEASE (HCC): ICD-10-CM

## 2021-01-06 DIAGNOSIS — E11.59 TYPE 2 DIABETES MELLITUS WITH OTHER CIRCULATORY COMPLICATION, WITH LONG-TERM CURRENT USE OF INSULIN (HCC): ICD-10-CM

## 2021-01-06 DIAGNOSIS — E11.42 DIABETIC PERIPHERAL NEUROPATHY (HCC): ICD-10-CM

## 2021-01-06 DIAGNOSIS — Z99.89 OSA ON CPAP: ICD-10-CM

## 2021-01-06 DIAGNOSIS — M15.9 PRIMARY OSTEOARTHRITIS INVOLVING MULTIPLE JOINTS: Primary | ICD-10-CM

## 2021-01-06 DIAGNOSIS — E78.5 HYPERLIPIDEMIA, UNSPECIFIED HYPERLIPIDEMIA TYPE: ICD-10-CM

## 2021-01-06 DIAGNOSIS — N13.8 BENIGN PROSTATIC HYPERPLASIA WITH URINARY OBSTRUCTION: ICD-10-CM

## 2021-01-06 DIAGNOSIS — K21.9 GASTROESOPHAGEAL REFLUX DISEASE WITHOUT ESOPHAGITIS: ICD-10-CM

## 2021-01-06 DIAGNOSIS — Z79.4 TYPE 2 DIABETES MELLITUS WITH OTHER CIRCULATORY COMPLICATION, WITH LONG-TERM CURRENT USE OF INSULIN (HCC): ICD-10-CM

## 2021-01-06 DIAGNOSIS — Z89.612 STATUS POST ABOVE-KNEE AMPUTATION OF LEFT LOWER EXTREMITY (HCC): ICD-10-CM

## 2021-01-06 DIAGNOSIS — I10 ESSENTIAL (PRIMARY) HYPERTENSION: ICD-10-CM

## 2021-01-06 DIAGNOSIS — N40.1 BENIGN PROSTATIC HYPERPLASIA WITH URINARY OBSTRUCTION: ICD-10-CM

## 2021-01-06 DIAGNOSIS — G47.33 OSA ON CPAP: ICD-10-CM

## 2021-01-06 PROCEDURE — 99309 SBSQ NF CARE MODERATE MDM 30: CPT | Performed by: NURSE PRACTITIONER

## 2021-01-06 ASSESSMENT — ENCOUNTER SYMPTOMS
VOMITING: 0
CONSTIPATION: 0
COUGH: 0
NAUSEA: 0
RHINORRHEA: 0
SORE THROAT: 0
SHORTNESS OF BREATH: 0
DIARRHEA: 0
WHEEZING: 0
ABDOMINAL PAIN: 1
EYE REDNESS: 0

## 2021-01-06 NOTE — PROGRESS NOTES
Adamaris Akers is a 80 y.o. male who presents today for his medical conditions/complaints as noted below. Chief Complaint   Patient presents with    1 Month Follow-Up     Follow up on chornic health conditions           HPI:     Anjali Tenorio is in today for follow-up on his chronic health conditions including diabetes mellitus, hypertension, chronic kidney disease, insomnia, depression, neuropathy, CHF, osteoarthritis, upper lipidemia, CAD, BPH, phantom pain. He is seen in his room today and states he is having issues with GERD specifically in the evening. States they do give him Mylanta and sometimes this resolves it and other times he is not having resolution of this. He currently is on Pepcid and omeprazole 20 mg once a day. Am going to discontinue the Pepcid as this likely is not helping. I will increase his omeprazole to twice daily. 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 His gabapentin was discontinued at some point and I will restart this at 100 mg at at bedtime. Blood sugars have been much lower and he has had a weight loss which has been gradual since his fall with hip surgery. I am going to decrease his Lantus and discontinue his NovoLog in the morning. He remains on sliding scale, Lantus, Januvia, and NovoLog at 5 PM.      Past Medical History:   Diagnosis Date    BPH (benign prostatic hyperplasia)     Chronic kidney disease     CKD (chronic kidney disease) stage 3, GFR 30-59 ml/min 4/16/2015    Edema     Hiatal hernia     Hyperlipidemia     Hypertension     Osteoarthritis     S/P PTCA: 5/12/2015: Stenting of proximal left circumflex artery with Xience 2.75X15 mm, post-dilated to 3.70 mm. 5/12/2015 5/12/2015: Stenting of proximal left circumflex artery with Xience 2.75X15 mm, post-dilated to 3.70 mm.  Dr. Kunal Claudio  Type II or unspecified type diabetes mellitus without mention of complication, not stated as uncontrolled       Past Surgical History:   Procedure Laterality Date    [facility]    CATARACT REMOVAL      BILAT    COLONOSCOPY      CORONARY ANGIOPLASTY  2015    EKG 12-LEAD  5/12/2015         FOOT SURGERY      multiple    HIP SURGERY Right 6/29/2020    RIGHT HIP INTERTAN performed by Vero Bejarano MD at [address]  05/12/2017    PACEMAKER INSERTION      PACEMAKER PLACEMENT      TONSILLECTOMY AND ADENOIDECTOMY         Family History   Problem Relation Age of Onset    Cancer Mother     Diabetes Father     Cancer Brother     Diabetes Paternal Grandmother     Diabetes Paternal Grandfather        Social History     Tobacco Use    Smoking status: Never Smoker    Smokeless tobacco: Never Used   Substance Use Topics    Alcohol use: Yes     Comment: Occasionally      No Known Allergies    Health Maintenance   Topic Date Due    DTaP/Tdap/Td vaccine (1 - Tdap) 11/19/1953    Shingles Vaccine (1 of 2) 11/19/1984    Pneumococcal 65+ years Vaccine (1 of 1 - PPSV23) 11/19/1999    Lipid screen  06/10/2017    Flu vaccine (1) 09/01/2020    Potassium monitoring  09/28/2021    Creatinine monitoring  09/28/2021    Hepatitis A vaccine  Aged Out    Hib vaccine  Aged Out    Meningococcal (ACWY) vaccine  Aged Out       Subjective:      Review of Systems   Constitutional: Negative for chills, fatigue and fever. HENT: Positive for hearing loss. Negative for congestion, rhinorrhea and sore throat. Eyes: Positive for visual disturbance. Negative for redness. Respiratory: Negative for cough, shortness of breath and wheezing. Cardiovascular: Negative for chest pain and leg swelling. Gastrointestinal: Positive for abdominal pain. Negative for constipation, diarrhea, nausea and vomiting. Endocrine: Negative for polydipsia and polyuria. Genitourinary: Negative for dysuria, frequency and hematuria. Musculoskeletal: Positive for arthralgias and gait problem. Negative for myalgias. Skin: Positive for pallor. Negative for rash and wound. Allergic/Immunologic: Negative for environmental allergies and immunocompromised state. Neurological: Positive for numbness. Negative for dizziness, light-headedness and headaches. Hematological: Does not bruise/bleed easily. Psychiatric/Behavioral: Negative for dysphoric mood and sleep disturbance. The patient is not nervous/anxious. Objective:     Physical Exam  Vitals signs and nursing note reviewed. Constitutional:       General: He is not in acute distress. Appearance: He is well-developed and overweight. HENT:      Head: Normocephalic and atraumatic. Right Ear: External ear normal.      Left Ear: External ear normal. There is impacted cerumen. Nose: Nose normal.   Eyes:      General: No scleral icterus. Right eye: No discharge. Left eye: No discharge. Conjunctiva/sclera: Conjunctivae normal.   Neck:      Musculoskeletal: Neck supple. Thyroid: No thyromegaly. Vascular: No JVD. Cardiovascular:      Rate and Rhythm: Normal rate and regular rhythm. Heart sounds: Normal heart sounds. Pulmonary:      Effort: Pulmonary effort is normal. No respiratory distress. Breath sounds: Normal breath sounds. No stridor. No wheezing or rales. Abdominal:      General: Bowel sounds are normal. There is no distension. Palpations: Abdomen is soft. Tenderness: There is no abdominal tenderness. Musculoskeletal: Normal range of motion. General: No deformity. Right shoulder: He exhibits no tenderness, no bony tenderness and no pain. Left shoulder: He exhibits no tenderness, no bony tenderness and no pain. Cervical back: He exhibits no tenderness, no bony tenderness and no pain. Thoracic back: He exhibits no tenderness, no bony tenderness, no pain and no spasm. Lumbar back: He exhibits no tenderness, no bony tenderness and no pain. Feet:      Left foot:      Amputation: Left leg is amputated above knee. Lymphadenopathy:      Cervical: No cervical adenopathy. Upper Body:      Right upper body: No supraclavicular adenopathy. Left upper body: No supraclavicular adenopathy. Skin:     General: Skin is warm and dry. Findings: No erythema or rash. Neurological:      Mental Status: He is alert and oriented to person, place, and time. Motor: No atrophy, abnormal muscle tone or seizure activity. Psychiatric:         Mood and Affect: Affect is blunt. Speech: Speech normal.         Behavior: Behavior normal.       BP (!) 157/66   Pulse 65   Temp 98.2 °F (36.8 °C)   Resp 20   Wt 239 lb 6.4 oz (108.6 kg)   SpO2 97%   BMI 33.39 kg/m²     Assessment/Plan      1. Primary osteoarthritis involving multiple joints -chronic and continue Norco, Flexeril, and Tylenol. Continue to monitor for pain. 2. Acute on chronic combined systolic (congestive) and diastolic (congestive) heart failure (HCC) -no edema today. Continues to follow with cardiology. Continue current dose of Bumex and Aldactone. 3. Status post above-knee amputation of left lower extremity (HCC) -chronic and complaining of neuropathic pain today. Will start low-dose of gabapentin at bedtime. 4. Type 2 diabetes mellitus with other circulatory complication, with long-term current use of insulin (HCC) -blood sugars much lower. We will decrease Lantus to 38 units at bedtime. We will also discontinue 8 AM NovoLog. Continue NovoLog at 5 PM, Januvia, and blood sugar checks with sliding scale. 5. Stage 3b chronic kidney disease -continues to follow with nephrology. 6. Diabetic peripheral neuropathy ([facility] [address] -we will add low-dose of gabapentin at bedtime. 7. Benign prostatic hyperplasia with urinary obstruction -continues to follow with urology. Continue current dose of Flomax and Proscar. No antibiotics unless cleared by urology. 8. Hyperlipidemia, unspecified hyperlipidemia type -continue to follow labs. Continue current dose of statin, aspirin, and Plavix. 9. Essential (primary) hypertension -blood pressures overall stable with current regimen. Continue to monitor. Labs in place to monitor Bumex and Aldactone. 10. Gastroesophageal reflux disease without esophagitis -increased symptoms in the afternoon and evening. Will discontinue Pepcid and add second dose of omeprazole before evening meal.  Continue to monitor. 11. AKIRA on CPAP -continue CPAP. 12. Reactive depression -chronic and continue current dose of BuSpar in Zoloft. Mood overall stable. Resident has DM, CHF, HTN, HLD, OA and it is expected to last 12 or more months. These chronic conditions place resident at a significant risk of death, acute exacerbation, or functional decline. The patient's comprehensive plan was revised and monitored today. I spent 20 minutes reviewing plan. Patient seen and examined. History partially obtained by chart review and nursing notes. I have reviewed patient's past medical, surgical, social, and family history and have made updates where appropriate.   See facility EMR for updated medication list.       Electronically signed by DENY June CNP on 1/6/2021 at 4:54 PM

## 2021-01-12 ENCOUNTER — OFFICE VISIT (OUTPATIENT)
Dept: CARDIOLOGY CLINIC | Age: 86
End: 2021-01-12
Payer: MEDICARE

## 2021-01-12 ENCOUNTER — PROCEDURE VISIT (OUTPATIENT)
Dept: CARDIOLOGY CLINIC | Age: 86
End: 2021-01-12
Payer: MEDICARE

## 2021-01-12 VITALS — DIASTOLIC BLOOD PRESSURE: 68 MMHG | HEART RATE: 74 BPM | SYSTOLIC BLOOD PRESSURE: 118 MMHG

## 2021-01-12 DIAGNOSIS — Z95.0 PACEMAKER: Primary | ICD-10-CM

## 2021-01-12 DIAGNOSIS — N28.9 RENAL INSUFFICIENCY: Primary | ICD-10-CM

## 2021-01-12 DIAGNOSIS — I10 ESSENTIAL HYPERTENSION: ICD-10-CM

## 2021-01-12 DIAGNOSIS — I42.0 DILATED CARDIOMYOPATHY (HCC): ICD-10-CM

## 2021-01-12 PROCEDURE — 1036F TOBACCO NON-USER: CPT | Performed by: NUCLEAR MEDICINE

## 2021-01-12 PROCEDURE — 93294 REM INTERROG EVL PM/LDLS PM: CPT | Performed by: NUCLEAR MEDICINE

## 2021-01-12 PROCEDURE — G8417 CALC BMI ABV UP PARAM F/U: HCPCS | Performed by: NUCLEAR MEDICINE

## 2021-01-12 PROCEDURE — 1123F ACP DISCUSS/DSCN MKR DOCD: CPT | Performed by: NUCLEAR MEDICINE

## 2021-01-12 PROCEDURE — 4040F PNEUMOC VAC/ADMIN/RCVD: CPT | Performed by: NUCLEAR MEDICINE

## 2021-01-12 PROCEDURE — G8428 CUR MEDS NOT DOCUMENT: HCPCS | Performed by: NUCLEAR MEDICINE

## 2021-01-12 PROCEDURE — 99213 OFFICE O/P EST LOW 20 MIN: CPT | Performed by: NUCLEAR MEDICINE

## 2021-01-12 PROCEDURE — 93296 REM INTERROG EVL PM/IDS: CPT | Performed by: NUCLEAR MEDICINE

## 2021-01-12 PROCEDURE — G8484 FLU IMMUNIZE NO ADMIN: HCPCS | Performed by: NUCLEAR MEDICINE

## 2021-01-12 NOTE — PROGRESS NOTES
[address] [facility],4Th Floor [address] [facility]  Dept: [phone]  Dept Fax: [phone]  Loc: [phone]    Visit Date: 1/12/2021    Alexus Strauss is a 80 y.o. male who presents todayfor:  Chief Complaint   Patient presents with    Check-Up    Congestive Heart Failure    Hypertension     Known cardiorenal renal patient   CHF is stable   Creatinine 2.1  No chest pain   Some baseline dyspnea  In a wheel chair  BP is stable   No dizziness  No syncope  Limited by amputated     HPI:  HPI  Past Medical History:   Diagnosis Date    BPH (benign prostatic hyperplasia)     Chronic kidney disease     CKD (chronic kidney disease) stage 3, GFR 30-59 ml/min 4/16/2015    Edema     Hiatal hernia     Hyperlipidemia     Hypertension     Osteoarthritis     S/P PTCA: 5/12/2015: Stenting of proximal left circumflex artery with Xience 2.75X15 mm, post-dilated to 3.70 mm. 5/12/2015 5/12/2015: Stenting of proximal left circumflex artery with Xience 2.75X15 mm, post-dilated to 3.70 mm.  Dr. Carmella Mc Type II or unspecified type diabetes mellitus without mention of complication, not stated as uncontrolled       Past Surgical History:   Procedure Laterality Date    C[facility]  5/8/15    [facility]    CATARACT REMOVAL      BILAT    COLONOSCOPY      CORONARY ANGIOPLASTY  2015    EKG 12-LEAD  5/12/2015         FOOT SURGERY      multiple    HIP SURGERY Right 6/29/2020    RIGHT HIP INTERTAN performed by Emilia Reynolds MD at [address] [place] [address]  05/12/2017    PACEMAKER INSERTION      PACEMAKER PLACEMENT      TONSILLECTOMY AND ADENOIDECTOMY       Family History   Problem Relation Age of Onset    Cancer Mother     Diabetes Father     Cancer Brother     Diabetes Paternal Grandmother     Diabetes Paternal Grandfather      Social History     Tobacco Use    Smoking status: Never Smoker  Smokeless tobacco: Never Used   Substance Use Topics    Alcohol use: Yes     Comment: Occasionally      Current Outpatient Medications   Medication Sig Dispense Refill    busPIRone (BUSPAR) 10 MG tablet Take 10 mg by mouth 3 times daily      melatonin 3 MG TABS tablet Take 5 mg by mouth daily      omeprazole (PRILOSEC) 20 MG delayed release capsule Take 20 mg by mouth daily      spironolactone (ALDACTONE) 25 MG tablet Take 25 mg by mouth daily      traZODone (DESYREL) 100 MG tablet Take 100 mg by mouth nightly      triamcinolone (KENALOG) 0.1 % cream Apply topically 2 times daily Apply topically 2 times daily.       sertraline (ZOLOFT) 100 MG tablet Take 100 mg by mouth daily      ondansetron (ZOFRAN) 4 MG tablet Take 4 mg by mouth every 8 hours as needed for Nausea or Vomiting      insulin glargine (LANTUS) 100 UNIT/ML injection vial Inject 35 Units into the skin 2 times daily (Patient taking differently: Inject 42 Units into the skin 2 times daily ) 1 vial 0    isosorbide mononitrate (IMDUR) 30 MG extended release tablet Take 1 tablet by mouth daily HOLD for SBP </=110 30 tablet 0    insulin lispro (HUMALOG) 100 UNIT/ML injection vial Inject 0-6 Units into the skin 3 times daily (with meals) **Corrective Low Dose Algorithm**  Glucose: Dose:               No Insulin  140-199 1 Unit  200-249 2 Units  250-299 3 Units  300-349 4 Units  350-399 5 Units  Over 399 6 Units 1 vial 0    insulin lispro (HUMALOG) 100 UNIT/ML injection vial Inject 10 Units into the skin 3 times daily (with meals) (Patient taking differently: Inject 14 Units into the skin 3 times daily (with meals) ) 1 vial 0    metoprolol succinate (TOPROL XL) 50 MG extended release tablet Take 1 tablet by mouth daily HOLD for SBP </=110 or HR </=55 30 tablet 0    zinc oxide (PINXAV) 30 % OINT Apply 113.4 g topically 2 times daily Groin area 57 g 0  calcium elemental (OSCAL) 500 MG TABS tablet Take 1 tablet by mouth 2 times daily (Patient not taking: Reported on 10/2/2020) 30 tablet 0    magnesium oxide (MAG-OX) 400 (241.3 Mg) MG TABS tablet Take 1 tablet by mouth 2 times daily 30 tablet 0    vitamin D (ERGOCALCIFEROL) 1.25 MG (92930 UT) CAPS capsule Take 1 capsule by mouth once a week 5 capsule 0    famotidine (PEPCID) 20 MG tablet Take 1 tablet by mouth daily 30 tablet 0    gabapentin (NEURONTIN) 300 MG capsule Take 1 capsule by mouth 2 times daily for 30 days. 60 capsule 0    bumetanide (BUMEX) 1 MG tablet Take 1 tablet by mouth 2 times daily 30 tablet 3    docusate sodium (COLACE) 100 MG capsule Take 100 mg by mouth daily as needed for Constipation      benzonatate (TESSALON) 200 MG capsule Take 200 mg by mouth every 8 hours as needed for Cough      BD AUTOSHIELD DUO 30G X 5 MM MISC       guaiFENesin (ROBITUSSIN) 100 MG/5ML liquid Take 10 mLs by mouth every 6 hours as needed       ASPERCREME W/LIDOCAINE EX Apply topically every 4 hours as needed (Apply to back PRN for Pain)       cyclobenzaprine (FLEXERIL) 10 MG tablet 10 mg daily Taking once daily for leg spasms and the Every 8 hours PRN TID for Pain. Hold if drowsy, confused, or sleepy.       Incontinence Supplies (BEDSIDE DRAINAGE BAG) MISC Dispense one, 2000 cc overnight urinary bag 1 each 0    finasteride (PROSCAR) 5 MG tablet Take 1 tablet by mouth daily 30 tablet 3    tamsulosin (FLOMAX) 0.4 MG capsule Take 1 capsule by mouth nightly 30 capsule 3    atorvastatin (LIPITOR) 20 MG tablet Take 20 mg by mouth nightly      clopidogrel (PLAVIX) 75 MG tablet Take 1 tablet by mouth daily 90 tablet 3    nitroGLYCERIN (NITROSTAT) 0.4 MG SL tablet Place 1 tablet under the tongue every 5 minutes as needed for Chest pain 25 tablet 0    aspirin 81 MG chewable tablet Take 1 tablet by mouth daily 30 tablet 3    acetaminophen (TYLENOL) 325 MG tablet Take 650 mg by mouth every 6 hours  Multiple Vitamins-Minerals (THERAPEUTIC MULTIVITAMIN-MINERALS) tablet Take 1 tablet by mouth daily       No current facility-administered medications for this visit. No Known Allergies  Health Maintenance   Topic Date Due    DTaP/Tdap/Td vaccine (1 - Tdap) [birth year]19/1953    Shingles Vaccine (1 of 2) 11/19/1984    Lipid screen  06/10/2017    Flu vaccine (1) 09/01/2020    Potassium monitoring  09/28/2021    Creatinine monitoring  09/28/2021    Pneumococcal 65+ years Vaccine  Completed    Hepatitis A vaccine  Aged Out    Hib vaccine  Aged Out    Meningococcal (ACWY) vaccine  Aged Out       Subjective:  Review of Systems  General:   No fever, no chills, No fatigue or weight loss  Pulmonary:    some dyspnea, no wheezing  Cardiac:    Denies recent chest pain,   GI:     No nausea or vomiting, no abdominal pain  Neuro:    No dizziness or light headedness,   Musculoskeletal:  No recent active issues  Extremities:   No edema, no obvious claudication       Objective:  Physical Exam  /68   Pulse 74   General:   Well developed, well nourished  Lungs:   Clear to auscultation  Heart:    Normal S1 S2, Slight murmur. no rubs, no gallops  Abdomen:   Soft, non tender, no organomegalies, positive bowel sounds  Extremities:   No edema, no cyanosis, good peripheral pulses  Neurological:   Awake, alert, oriented. No obvious focal deficits  Musculoskelatal:  No obvious deformities    Assessment:      Diagnosis Orders   1. Renal insufficiency     2. Dilated cardiomyopathy ([address]     3. Essential hypertension     as above  Limited patient   Amputated  Cardiac fair     Plan:  No follow-ups on file. As above  Continue risk factor modification and medical management  Thank you for allowing me to participate in the care of your patient. Please don't hesitate to contact me regarding any further issues related to the patient care    Orders Placed:  No orders of the defined types were placed in this encounter. Medications Prescribed:  No orders of the defined types were placed in this encounter. Discussed use, benefit, and side effects of prescribed medications. All patient questions answered. Pt voicedunderstanding. Instructed to continue current medications, diet and exercise. Continue risk factor modification and medical management. Patient agreed with treatment plan. Follow up as directed.     Electronically signedby Jim Thomas MD on 1/12/2021 at 11:49 AM

## 2021-01-13 LAB
AVERAGE GLUCOSE: 108
HBA1C MFR BLD: 5.4 %

## 2021-02-03 ENCOUNTER — OUTSIDE SERVICES (OUTPATIENT)
Dept: FAMILY MEDICINE CLINIC | Age: 86
End: 2021-02-03
Payer: MEDICARE

## 2021-02-03 VITALS
OXYGEN SATURATION: 99 % | SYSTOLIC BLOOD PRESSURE: 114 MMHG | DIASTOLIC BLOOD PRESSURE: 62 MMHG | HEART RATE: 61 BPM | TEMPERATURE: 98.3 F | RESPIRATION RATE: 16 BRPM | BODY MASS INDEX: 33.95 KG/M2 | WEIGHT: 243.4 LBS

## 2021-02-03 DIAGNOSIS — I10 ESSENTIAL (PRIMARY) HYPERTENSION: ICD-10-CM

## 2021-02-03 DIAGNOSIS — I50.43 ACUTE ON CHRONIC COMBINED SYSTOLIC (CONGESTIVE) AND DIASTOLIC (CONGESTIVE) HEART FAILURE (HCC): ICD-10-CM

## 2021-02-03 DIAGNOSIS — E11.59 TYPE 2 DIABETES MELLITUS WITH OTHER CIRCULATORY COMPLICATION, WITH LONG-TERM CURRENT USE OF INSULIN (HCC): ICD-10-CM

## 2021-02-03 DIAGNOSIS — Z99.89 OSA ON CPAP: ICD-10-CM

## 2021-02-03 DIAGNOSIS — F32.9 REACTIVE DEPRESSION: ICD-10-CM

## 2021-02-03 DIAGNOSIS — E11.42 DIABETIC PERIPHERAL NEUROPATHY (HCC): ICD-10-CM

## 2021-02-03 DIAGNOSIS — E78.5 HYPERLIPIDEMIA, UNSPECIFIED HYPERLIPIDEMIA TYPE: ICD-10-CM

## 2021-02-03 DIAGNOSIS — N18.32 STAGE 3B CHRONIC KIDNEY DISEASE (HCC): ICD-10-CM

## 2021-02-03 DIAGNOSIS — G47.33 OSA ON CPAP: ICD-10-CM

## 2021-02-03 DIAGNOSIS — N13.8 BENIGN PROSTATIC HYPERPLASIA WITH URINARY OBSTRUCTION: ICD-10-CM

## 2021-02-03 DIAGNOSIS — M15.9 PRIMARY OSTEOARTHRITIS INVOLVING MULTIPLE JOINTS: Primary | ICD-10-CM

## 2021-02-03 DIAGNOSIS — K21.9 GASTROESOPHAGEAL REFLUX DISEASE WITHOUT ESOPHAGITIS: ICD-10-CM

## 2021-02-03 DIAGNOSIS — Z79.4 TYPE 2 DIABETES MELLITUS WITH OTHER CIRCULATORY COMPLICATION, WITH LONG-TERM CURRENT USE OF INSULIN (HCC): ICD-10-CM

## 2021-02-03 DIAGNOSIS — Z89.612 STATUS POST ABOVE-KNEE AMPUTATION OF LEFT LOWER EXTREMITY (HCC): ICD-10-CM

## 2021-02-03 DIAGNOSIS — N40.1 BENIGN PROSTATIC HYPERPLASIA WITH URINARY OBSTRUCTION: ICD-10-CM

## 2021-02-03 PROCEDURE — 99309 SBSQ NF CARE MODERATE MDM 30: CPT | Performed by: NURSE PRACTITIONER

## 2021-02-03 PROCEDURE — 99490 CHRNC CARE MGMT STAFF 1ST 20: CPT | Performed by: NURSE PRACTITIONER

## 2021-02-03 ASSESSMENT — ENCOUNTER SYMPTOMS
CONSTIPATION: 0
VOMITING: 0
SORE THROAT: 0
DIARRHEA: 0
EYE REDNESS: 0
RHINORRHEA: 0
SHORTNESS OF BREATH: 0
WHEEZING: 0
COUGH: 0
NAUSEA: 0

## 2021-02-03 NOTE — PROGRESS NOTES
Heather Salvador is a 80 y.o. male who presents today for his medical conditions/complaints as noted below. Chief Complaint   Patient presents with    1 Month Follow-Up     Follow up on chronic health conditions           HPI:     Perla Keyes is in today for follow-up on his chronic health conditions including diabetes mellitus, hypertension, chronic kidney disease, insomnia, depression, neuropathy, CHF, osteoarthritis, upper lipidemia, CAD, BPH, phantom pain. He states he is still having concerns with phantom pain but does not wish for increase in his medication. Blood sugars are improved and most recent A1C was 5.4 on 1/13. He remains on Lantus, Januvia and SSI with Novolog at 5p.m. No recent falls. Past Medical History:   Diagnosis Date    BPH (benign prostatic hyperplasia)     Chronic kidney disease     CKD (chronic kidney disease) stage 3, GFR 30-59 ml/min 4/16/2015    Edema     Hiatal hernia     Hyperlipidemia     Hypertension     Osteoarthritis     S/P PTCA: 5/12/2015: Stenting of proximal left circumflex artery with Xience 2.75X15 mm, post-dilated to 3.70 mm. 5/12/2015 5/12/2015: Stenting of proximal left circumflex artery with Xience 2.75X15 mm, post-dilated to 3.70 mm.  Dr. Lonny Briones Type II or unspecified type diabetes mellitus without mention of complication, not stated as uncontrolled       Past Surgical History:   Procedure Laterality Date    [facility]15    [facility]    CATARACT REMOVAL      BILAT    COLONOSCOPY      CORONARY ANGIOPLASTY  2015    EKG 12-LEAD  5/12/2015         FOOT SURGERY      multiple    HIP SURGERY Right 6/29/2020    RIGHT HIP INTERTAN performed by Franchesca Love MD at [address] [place] [address]  05/12/2017    PACEMAKER INSERTION      PACEMAKER PLACEMENT      TONSILLECTOMY AND ADENOIDECTOMY         Family History   Problem Relation Age of Onset    Cancer Mother     Diabetes Father     Cancer Brother     Diabetes Paternal Grandmother     Diabetes Paternal Grandfather        Social History     Tobacco Use    Smoking status: Never Smoker    Smokeless tobacco: Never Used   Substance Use Topics    Alcohol use: Yes     Comment: Occasionally      No Known Allergies    Health Maintenance   Topic Date Due    DTaP/Tdap/Td vaccine (1 - Tdap) 11/19/1953    Shingles Vaccine (1 of 2) 11/19/1984    Lipid screen  06/10/2017    Flu vaccine (1) 09/01/2020    COVID-19 Vaccine (2 of 2 - Pfizer series) 01/29/2021    Potassium monitoring  09/28/2021    Creatinine monitoring  09/28/2021    Pneumococcal 65+ years Vaccine  Completed    Hepatitis A vaccine  Aged Out    Hib vaccine  Aged Out    Meningococcal (ACWY) vaccine  Aged Out       Subjective:      Review of Systems   Constitutional: Negative for chills, fatigue and fever. HENT: Positive for hearing loss. Negative for congestion, rhinorrhea and sore throat. Eyes: Positive for visual disturbance. Negative for redness. Respiratory: Negative for cough, shortness of breath and wheezing. Cardiovascular: Negative for chest pain and leg swelling. Gastrointestinal: Negative for constipation, diarrhea, nausea and vomiting. Endocrine: Negative for polydipsia and polyuria. Genitourinary: Negative for dysuria, frequency and hematuria. Musculoskeletal: Positive for arthralgias and gait problem. Negative for myalgias. Skin: Negative for rash and wound. Allergic/Immunologic: Positive for immunocompromised state. Negative for environmental allergies. Neurological: Positive for numbness. Negative for dizziness, light-headedness and headaches. Hematological: Does not bruise/bleed easily. Psychiatric/Behavioral: Negative for dysphoric mood and sleep disturbance. The patient is not nervous/anxious. Objective:     Physical Exam  Vitals signs and nursing note reviewed. Constitutional:       General: He is not in acute distress. Appearance: He is well-developed. HENT:      Head: Normocephalic and atraumatic. Right Ear: External ear normal.      Left Ear: External ear normal.      Nose: Nose normal.   Eyes:      General: No scleral icterus. Right eye: No discharge. Left eye: No discharge. Conjunctiva/sclera: Conjunctivae normal.   Neck:      Musculoskeletal: Neck supple. Thyroid: No thyromegaly. Vascular: No JVD. Cardiovascular:      Rate and Rhythm: Normal rate and regular rhythm. Heart sounds: Normal heart sounds. Pulmonary:      Effort: Pulmonary effort is normal. No respiratory distress. Breath sounds: Normal breath sounds. No stridor. No wheezing or rales. Abdominal:      General: Bowel sounds are normal. There is no distension. Palpations: Abdomen is soft. Tenderness: There is no abdominal tenderness. Musculoskeletal: Normal range of motion. General: No deformity. Right shoulder: He exhibits no tenderness, no bony tenderness and no pain. Left shoulder: He exhibits no tenderness, no bony tenderness and no pain. Cervical back: He exhibits no tenderness, no bony tenderness and no pain. Thoracic back: He exhibits no tenderness, no bony tenderness, no pain and no spasm. Lumbar back: He exhibits no tenderness, no bony tenderness and no pain. Feet:      Left foot:      Amputation: Left leg is amputated above knee. Lymphadenopathy:      Cervical: No cervical adenopathy. Upper Body:      Right upper body: No supraclavicular adenopathy. Left upper body: No supraclavicular adenopathy. Skin:     General: Skin is warm and dry. Findings: No erythema or rash. Neurological:      Mental Status: He is alert and oriented to person, place, and time. Motor: No atrophy, abnormal muscle tone or seizure activity.    Psychiatric:         Speech: Speech normal.         Behavior: Behavior normal.         Cognition and Memory: Memory normal.       /62   Pulse 61   Temp 98.3 °F (36.8 °C)   Resp 16   Wt 243 lb 6.4 oz (110.4 kg)   SpO2 99%   BMI 33.95 kg/m²     Assessment/Plan      1. Primary osteoarthritis involving multiple joints -chronic and continue Norco, Flexeril, and Tylenol. Continue to monitor for pain. 2. Acute on chronic combined systolic (congestive) and diastolic (congestive) heart failure (HCC) -no edema today. Continues to follow with cardiology. Continue current dose of Bumex and Aldactone. 3. Status post above-knee amputation of left lower extremity (HCC) -chronic and states no change in neuropathic pain. Does not desire change in gabapentin. Will continue. 4. Type 2 diabetes mellitus with other circulatory complication, with long-term current use of insulin ([facility]) -blood sugars stable. AIC 5.4. Cristina Nuñez Continue Lantus, meal time and SSI novolog and Januvia. 5. Stage 3b chronic kidney disease -continues to follow with nephrology. 6. Diabetic peripheral neuropathy ([facility] [address] - denies any real improvement will continue and monitor. 7. Benign prostatic hyperplasia with urinary obstruction -continues to follow with urology. Continue current dose of Flomax and Proscar. No antibiotics unless cleared by urology. 8. Hyperlipidemia, unspecified hyperlipidemia type -continue to follow labs. Continue current dose of statin, aspirin, and Plavix. 9. Essential (primary) hypertension -blood pressures overall stable with current regimen. Continue to monitor. Labs in place to monitor Bumex and Aldactone. 10. Gastroesophageal reflux disease without esophagitis -Improved with recent changes. Continue Omeprazole. 11. AKIRA on CPAP -continue CPAP. 12. Reactive depression -chronic and continue current dose of BuSpar in Zoloft. Mood overall stable. Resident has DM, CHF, HTN, HLD, OA and it is expected to last 12 or more months.  These chronic conditions place resident at a significant risk of death, acute exacerbation, or functional decline. The patient's comprehensive plan was revised and monitored today. I spent 20 minutes reviewing plan. Patient seen and examined. History partially obtained by chart review and nursing notes. I have reviewed patient's past medical, surgical, social, and family history and have made updates where appropriate.   See facility EMR for updated medication list.       Electronically signed by DENY Hurtado CNP on 2/3/2021 at 11:51 AM

## 2021-02-23 ENCOUNTER — TELEPHONE (OUTPATIENT)
Dept: CARDIOLOGY CLINIC | Age: 86
End: 2021-02-23

## 2021-02-23 ENCOUNTER — PROCEDURE VISIT (OUTPATIENT)
Dept: CARDIOLOGY CLINIC | Age: 86
End: 2021-02-23

## 2021-02-23 DIAGNOSIS — Z45.010 PACEMAKER BATTERY DEPLETION: ICD-10-CM

## 2021-02-23 DIAGNOSIS — Z95.0 PACEMAKER: Primary | ICD-10-CM

## 2021-02-23 LAB — SARS-COV-2: NEGATIVE

## 2021-02-23 NOTE — TELEPHONE ENCOUNTER
Procedure: Gen Change-  MDT pace  Date: 02.26.21  Arrival Time: 9am     Covid:  testing ordered, to be done: patient was tested at [facility]-  Results should be faxed by  02.25.2021    1 wk incision/ device check -   03.09.2021 at 1pm    Instructions verbalized to the nurse [employer]   Instructions faxed to Kal[facility]- [phone]

## 2021-02-25 ENCOUNTER — PREP FOR PROCEDURE (OUTPATIENT)
Dept: CARDIOLOGY | Age: 86
End: 2021-02-25

## 2021-02-25 RX ORDER — CHLORHEXIDINE GLUCONATE 4 G/100ML
SOLUTION TOPICAL ONCE
Status: CANCELLED | OUTPATIENT
Start: 2021-02-25 | End: 2021-02-25

## 2021-02-25 RX ORDER — SODIUM CHLORIDE 0.9 % (FLUSH) 0.9 %
10 SYRINGE (ML) INJECTION EVERY 12 HOURS SCHEDULED
Status: CANCELLED | OUTPATIENT
Start: 2021-02-25

## 2021-02-25 RX ORDER — SODIUM CHLORIDE 9 MG/ML
INJECTION, SOLUTION INTRAVENOUS CONTINUOUS
Status: CANCELLED | OUTPATIENT
Start: 2021-02-25

## 2021-02-25 RX ORDER — SODIUM CHLORIDE 0.9 % (FLUSH) 0.9 %
10 SYRINGE (ML) INJECTION PRN
Status: CANCELLED | OUTPATIENT
Start: 2021-02-25

## 2021-03-12 ENCOUNTER — OUTSIDE SERVICES (OUTPATIENT)
Dept: FAMILY MEDICINE CLINIC | Age: 86
End: 2021-03-12
Payer: MEDICARE

## 2021-03-12 VITALS
WEIGHT: 248.9 LBS | SYSTOLIC BLOOD PRESSURE: 133 MMHG | HEART RATE: 60 BPM | TEMPERATURE: 96.5 F | BODY MASS INDEX: 34.71 KG/M2 | OXYGEN SATURATION: 97 % | RESPIRATION RATE: 16 BRPM | DIASTOLIC BLOOD PRESSURE: 63 MMHG

## 2021-03-12 DIAGNOSIS — I50.43 ACUTE ON CHRONIC COMBINED SYSTOLIC (CONGESTIVE) AND DIASTOLIC (CONGESTIVE) HEART FAILURE (HCC): ICD-10-CM

## 2021-03-12 DIAGNOSIS — K21.9 GASTROESOPHAGEAL REFLUX DISEASE WITHOUT ESOPHAGITIS: ICD-10-CM

## 2021-03-12 DIAGNOSIS — M15.9 PRIMARY OSTEOARTHRITIS INVOLVING MULTIPLE JOINTS: Primary | ICD-10-CM

## 2021-03-12 DIAGNOSIS — F32.9 REACTIVE DEPRESSION: ICD-10-CM

## 2021-03-12 DIAGNOSIS — N40.1 BENIGN PROSTATIC HYPERPLASIA WITH URINARY OBSTRUCTION: ICD-10-CM

## 2021-03-12 DIAGNOSIS — E11.59 TYPE 2 DIABETES MELLITUS WITH OTHER CIRCULATORY COMPLICATION, WITH LONG-TERM CURRENT USE OF INSULIN (HCC): ICD-10-CM

## 2021-03-12 DIAGNOSIS — Z89.612 STATUS POST ABOVE-KNEE AMPUTATION OF LEFT LOWER EXTREMITY (HCC): ICD-10-CM

## 2021-03-12 DIAGNOSIS — E11.42 DIABETIC PERIPHERAL NEUROPATHY (HCC): ICD-10-CM

## 2021-03-12 DIAGNOSIS — G47.09 OTHER INSOMNIA: ICD-10-CM

## 2021-03-12 DIAGNOSIS — Z99.89 OSA ON CPAP: ICD-10-CM

## 2021-03-12 DIAGNOSIS — E78.5 HYPERLIPIDEMIA, UNSPECIFIED HYPERLIPIDEMIA TYPE: ICD-10-CM

## 2021-03-12 DIAGNOSIS — N18.32 STAGE 3B CHRONIC KIDNEY DISEASE (HCC): ICD-10-CM

## 2021-03-12 DIAGNOSIS — G47.33 OSA ON CPAP: ICD-10-CM

## 2021-03-12 DIAGNOSIS — Z79.4 TYPE 2 DIABETES MELLITUS WITH OTHER CIRCULATORY COMPLICATION, WITH LONG-TERM CURRENT USE OF INSULIN (HCC): ICD-10-CM

## 2021-03-12 DIAGNOSIS — N13.8 BENIGN PROSTATIC HYPERPLASIA WITH URINARY OBSTRUCTION: ICD-10-CM

## 2021-03-12 DIAGNOSIS — I10 ESSENTIAL (PRIMARY) HYPERTENSION: ICD-10-CM

## 2021-03-12 PROCEDURE — 99490 CHRNC CARE MGMT STAFF 1ST 20: CPT | Performed by: FAMILY MEDICINE

## 2021-03-12 PROCEDURE — 99309 SBSQ NF CARE MODERATE MDM 30: CPT | Performed by: FAMILY MEDICINE

## 2021-03-12 ASSESSMENT — ENCOUNTER SYMPTOMS
COUGH: 0
SORE THROAT: 0
CONSTIPATION: 0
EYE REDNESS: 0
NAUSEA: 0
DIARRHEA: 0
VOMITING: 0
WHEEZING: 0
SHORTNESS OF BREATH: 0
RHINORRHEA: 0

## 2021-03-12 NOTE — PROGRESS NOTES
Shyam Bingham is a 80 y.o. male who presents today for his medical conditions/complaints as noted below. Chief Complaint   Patient presents with    1 Month Follow-Up     Follow up on chronic health condiitons           HPI:     Dana Gibson is seen today for follow on chronic health conditions. He has hronic health conditions including diabetes mellitus, hypertension, chronic kidney disease, insomnia, depression, neuropathy, CHF, osteoarthritis, upper lipidemia, CAD, BPH, phantom pain. He denies any complaints today and states he is doing overall well. He remains on controlled diet. He is on a 2000 mL fluid restriction and follows with nephrology. He did have his pacemaker battery changed on 2/27/2021. And follows up with the [facility]acer clinic next week. His BuSpar was decreased on 2/24 and has tolerated this well. He does have an abraded area on his left buttocks. He is down 7 pounds in the last month, but his weight is overall stable. A1C stable and much improved but his fasting blood sugars are between 62-74      Past Medical History:   Diagnosis Date    BPH (benign prostatic hyperplasia)     Chronic kidney disease     CKD (chronic kidney disease) stage 3, GFR 30-59 ml/min 4/16/2015    Edema     Hiatal hernia     Hyperlipidemia     Hypertension     Osteoarthritis     S/P PTCA: 5/12/2015: Stenting of proximal left circumflex artery with Xience 2.75X15 mm, post-dilated to 3.70 mm. 5/12/2015 5/12/2015: Stenting of proximal left circumflex artery with Xience 2.75X15 mm, post-dilated to 3.70 mm.  Dr. Tirado Fail Type II or unspecified type diabetes mellitus without mention of complication, not stated as uncontrolled       Past Surgical History:   Procedure Laterality Date    [facility]15    [facility]    CATARACT REMOVAL      BILAT    COLONOSCOPY      CORONARY ANGIOPLASTY  2015    EKG 12-LEAD  5/12/2015         FOOT SURGERY      multiple    HIP SURGERY Right 6/29/2020    RIGHT HIP [facility] performed by Jean Prabhakar MD at [address] [place] [address]  05/12/2017    PACEMAKER INSERTION      PACEMAKER PLACEMENT      TONSILLECTOMY AND ADENOIDECTOMY         Family History   Problem Relation Age of Onset    Cancer Mother     Diabetes Father     Cancer Brother     Diabetes Paternal Grandmother     Diabetes Paternal Grandfather        Social History     Tobacco Use    Smoking status: Never Smoker    Smokeless tobacco: Never Used   Substance Use Topics    Alcohol use: Yes     Comment: Occasionally      No Known Allergies    Health Maintenance   Topic Date Due    DTaP/Tdap/Td vaccine (1 - Tdap) Never done    Shingles Vaccine (1 of 2) Never done    Lipid screen  06/10/2017    Flu vaccine (1) 09/01/2020    Potassium monitoring  02/26/2022    Creatinine monitoring  02/26/2022    Pneumococcal 65+ years Vaccine  Completed    COVID-19 Vaccine  Completed    Hepatitis A vaccine  Aged Out    Hib vaccine  Aged Out    Meningococcal (ACWY) vaccine  Aged Out       Subjective:      Review of Systems   Constitutional: Negative for chills, fatigue, fever and unexpected weight change. HENT: Positive for hearing loss. Negative for congestion, rhinorrhea and sore throat. Eyes: Positive for visual disturbance. Negative for redness. Respiratory: Negative for cough, shortness of breath and wheezing. Cardiovascular: Negative for chest pain and leg swelling. Gastrointestinal: Negative for constipation, diarrhea, nausea and vomiting. Endocrine: Negative for polydipsia and polyuria. Genitourinary: Negative for dysuria, frequency and hematuria. Musculoskeletal: Positive for arthralgias and gait problem. Negative for myalgias. Skin: Negative for rash and wound. Allergic/Immunologic: Negative for environmental allergies and immunocompromised state. Neurological: Positive for weakness. Negative for dizziness, light-headedness and headaches.    Hematological: Does not bruise/bleed easily. Psychiatric/Behavioral: Negative for dysphoric mood and sleep disturbance. The patient is not nervous/anxious. Objective:     Physical Exam  Vitals signs and nursing note reviewed. Constitutional:       General: He is not in acute distress. Appearance: He is well-developed. Interventions: Nasal cannula in place. HENT:      Head: Normocephalic and atraumatic. Right Ear: External ear normal.      Left Ear: External ear normal.      Nose: Nose normal.   Eyes:      General: No scleral icterus. Right eye: No discharge. Left eye: No discharge. Conjunctiva/sclera: Conjunctivae normal.   Neck:      Musculoskeletal: Neck supple. Thyroid: No thyromegaly. Vascular: No JVD. Cardiovascular:      Rate and Rhythm: Normal rate and regular rhythm. Heart sounds: Normal heart sounds. Pulmonary:      Effort: Pulmonary effort is normal. No respiratory distress. Breath sounds: Normal breath sounds. No stridor. No wheezing or rales. Abdominal:      General: Bowel sounds are normal. There is no distension. Palpations: Abdomen is soft. Tenderness: There is no abdominal tenderness. Musculoskeletal: Normal range of motion. General: No deformity. Right shoulder: He exhibits no tenderness, no bony tenderness and no pain. Left shoulder: He exhibits no tenderness, no bony tenderness and no pain. Cervical back: He exhibits no tenderness, no bony tenderness and no pain. Thoracic back: He exhibits no tenderness, no bony tenderness, no pain and no spasm. Lumbar back: He exhibits no tenderness, no bony tenderness and no pain. Feet:      Left foot:      Amputation: Left leg is amputated above knee. Lymphadenopathy:      Cervical: No cervical adenopathy. Upper Body:      Right upper body: No supraclavicular adenopathy. Left upper body: No supraclavicular adenopathy.    Skin:     General: Skin is warm and dry. Findings: No erythema or rash. Neurological:      Mental Status: He is alert and oriented to person, place, and time. Motor: No atrophy, abnormal muscle tone or seizure activity. Psychiatric:         Speech: Speech normal.         Behavior: Behavior normal.       /63   Pulse 60   Temp 96.5 °F (35.8 °C)   Resp 16   Wt 248 lb 14.4 oz (112.9 kg)   SpO2 97%   BMI 34.71 kg/m²     Assessment/Plan      1. Primary osteoarthritis involving multiple joints -can continue Norco, Flexeril, and Tylenol. Denies any pain today. 2. Acute on chronic combined systolic (congestive) and diastolic (congestive) heart failure (HCC) -no edema today. Continue lymphedema pumps. Continue Bumex and Aldactone. 3. Status post above-knee amputation of left lower extremity (HCC) -no neuropathic pain today. Does not desire change in gabapentin. Continue. 4. Type 2 diabetes mellitus with other circulatory complication, with long-term current use of insulin (HCC) -chronic and blood sugars much lower in the morning. We will decrease at bedtime Lantus and continue a.m. Lantus. We will follow up with blood sugars next week. Continue sliding scale insulin and Januvia. 5. Stage 3b chronic kidney disease -chronic and continues to follow with nephrology. 6. Diabetic peripheral neuropathy (HCC) -chronic and continue gabapentin. 7. Benign prostatic hyperplasia with urinary obstruction -has chronic catheter. Continues to follow with urology. Continue Flomax and Proscar. No antibiotics unless cleared by urology. 8. Hyperlipidemia, unspecified hyperlipidemia type -chronic and continue statin, aspirin, Plavix. 9. Essential (primary) hypertension -blood pressures overall stable on current regimen of Bumex and Aldactone. 10. Gastroesophageal reflux disease without esophagitis -chronic and continue omeprazole. 11. AKIRA on CPAP -continue CPAP.    12. Reactive depression -chronic and continue BuSpar and Zoloft. No changes. Did well with most recent decrease in BuSpar. 13. Other insomnia -sleeping well with trazodone. Continue to monitor. Resident has DM, CHF, HTN, HLD, OA and it is expected to last 12 or more months. These chronic conditions place resident at a significant risk of death, acute exacerbation, or functional decline. The patient's comprehensive plan was monitored today. I spent 20 minutes reviewing plan. Patient seen and examined. History partially obtained by chart review and nursing notes. I have reviewed patient's past medical, surgical, social, and family history and have made updates where appropriate.   See facility EMR for updated medication list.       Electronically signed by Percy Reyes MD on 3/12/2021 at 10:35 AM

## 2021-03-23 ENCOUNTER — NURSE ONLY (OUTPATIENT)
Dept: CARDIOLOGY CLINIC | Age: 86
End: 2021-03-23
Payer: MEDICARE

## 2021-03-23 ENCOUNTER — OUTSIDE SERVICES (OUTPATIENT)
Dept: FAMILY MEDICINE CLINIC | Age: 86
End: 2021-03-23
Payer: MEDICARE

## 2021-03-23 VITALS
BODY MASS INDEX: 36.58 KG/M2 | SYSTOLIC BLOOD PRESSURE: 154 MMHG | WEIGHT: 262.3 LBS | RESPIRATION RATE: 18 BRPM | HEART RATE: 65 BPM | TEMPERATURE: 97.6 F | OXYGEN SATURATION: 98 % | DIASTOLIC BLOOD PRESSURE: 66 MMHG

## 2021-03-23 DIAGNOSIS — Z95.0 PACEMAKER: Primary | ICD-10-CM

## 2021-03-23 DIAGNOSIS — L24.9 IRRITANT CONTACT DERMATITIS, UNSPECIFIED TRIGGER: Primary | ICD-10-CM

## 2021-03-23 PROCEDURE — 99308 SBSQ NF CARE LOW MDM 20: CPT | Performed by: NURSE PRACTITIONER

## 2021-03-23 PROCEDURE — 93280 PM DEVICE PROGR EVAL DUAL: CPT | Performed by: INTERNAL MEDICINE

## 2021-03-23 ASSESSMENT — ENCOUNTER SYMPTOMS
NAUSEA: 0
VOMITING: 0
SHORTNESS OF BREATH: 0
CONSTIPATION: 0
SORE THROAT: 0
DIARRHEA: 0
RHINORRHEA: 0
COUGH: 0
WHEEZING: 0

## 2021-03-23 NOTE — PROGRESS NOTES
medtronic dual pacer post change out   No follow up in office     . Flo Blood Battery longevity:  12.2 years   Presenting rhythm  AS     Atrial impedance 361  RV impedance 437    P wave sensing 2.9  R wave sensing 11.3, rare R waves     96.8 % atrial paced  51.2 % RV paced     Atrial threshold 0.75 V  at 0.4ms  RV threshold 0.5 V at 0.4ms  Mode switches   0     steri strips removed, 12/26/21 implant. Inc line slightly pulling apart in the middle~I put one steri strip on it, aware can remove next weekend, minor puffiness, shadow of bruising, no drainage, tender to touch.     If, at any point, there is any increased redness, swelling, increased discomfort, fever, or the incision opens up, the patient is aware to go to the emergency room

## 2021-03-23 NOTE — PROGRESS NOTES
Bella Lugo is a 80 y.o. male who presents today for his medical conditions/complaints as noted below. Chief Complaint   Patient presents with    Skin Problem           HPI:     Tracy dunne for a skin irritation on the right side of back. Currently using triamcinolone on this area. This is not improving this area. He does state that the area is irritated and does cause some itching especially when he becomes warm. Area is approximately 7 cm x 11 cm. He does not recall how long he has had this area. He does have a past medical history of diabetes and nursing staff does not recall how long this has been there. I am going to change the regimen today and see if this causes improvement. Current Outpatient Medications   Medication Sig Dispense Refill    SITagliptin (JANUVIA) 50 MG tablet Take 50 mg by mouth daily      busPIRone (BUSPAR) 10 MG tablet Take 10 mg by mouth 3 times daily      melatonin 3 MG TABS tablet Take 5 mg by mouth daily      omeprazole (PRILOSEC) 20 MG delayed release capsule Take 20 mg by mouth daily      spironolactone (ALDACTONE) 25 MG tablet Take 25 mg by mouth daily      traZODone (DESYREL) 100 MG tablet Take 100 mg by mouth nightly      triamcinolone (KENALOG) 0.1 % cream Apply topically 2 times daily Apply topically 2 times daily.       sertraline (ZOLOFT) 100 MG tablet Take 100 mg by mouth daily      ondansetron (ZOFRAN) 4 MG tablet Take 4 mg by mouth every 8 hours as needed for Nausea or Vomiting      insulin glargine (LANTUS) 100 UNIT/ML injection vial Inject 35 Units into the skin 2 times daily (Patient taking differently: Inject 42 Units into the skin 2 times daily ) 1 vial 0    isosorbide mononitrate (IMDUR) 30 MG extended release tablet Take 1 tablet by mouth daily HOLD for SBP </=110 30 tablet 0    insulin lispro (HUMALOG) 100 UNIT/ML injection vial Inject 0-6 Units into the skin 3 times daily (with meals) **Corrective Low Dose Algorithm**  Glucose: Dose:               No Insulin  140-199 1 Unit  200-249 2 Units  250-299 3 Units  300-349 4 Units  350-399 5 Units  Over 399 6 Units 1 vial 0    insulin lispro (HUMALOG) 100 UNIT/ML injection vial Inject 10 Units into the skin 3 times daily (with meals) (Patient taking differently: Inject 14 Units into the skin 3 times daily (with meals) ) 1 vial 0    metoprolol succinate (TOPROL XL) 50 MG extended release tablet Take 1 tablet by mouth daily HOLD for SBP </=110 or HR </=55 30 tablet 0    zinc oxide (PINXAV) 30 % OINT Apply 113.4 g topically 2 times daily Groin area 57 g 0    calcium elemental (OSCAL) 500 MG TABS tablet Take 1 tablet by mouth 2 times daily (Patient not taking: Reported on 10/2/2020) 30 tablet 0    magnesium oxide (MAG-OX) 400 (241.3 Mg) MG TABS tablet Take 1 tablet by mouth 2 times daily 30 tablet 0    vitamin D (ERGOCALCIFEROL) 1.25 MG (34464 UT) CAPS capsule Take 1 capsule by mouth once a week 5 capsule 0    famotidine (PEPCID) 20 MG tablet Take 1 tablet by mouth daily 30 tablet 0    gabapentin (NEURONTIN) 300 MG capsule Take 1 capsule by mouth 2 times daily for 30 days. 60 capsule 0    bumetanide (BUMEX) 1 MG tablet Take 1 tablet by mouth 2 times daily 30 tablet 3    docusate sodium (COLACE) 100 MG capsule Take 100 mg by mouth daily as needed for Constipation      benzonatate (TESSALON) 200 MG capsule Take 200 mg by mouth every 8 hours as needed for Cough      BD AUTOSHIELD DUO 30G X 5 MM MISC       guaiFENesin (ROBITUSSIN) 100 MG/5ML liquid Take 10 mLs by mouth every 6 hours as needed       ASPERCREME W/LIDOCAINE EX Apply topically every 4 hours as needed (Apply to back PRN for Pain)       cyclobenzaprine (FLEXERIL) 10 MG tablet 10 mg daily Taking once daily for leg spasms and the Every 8 hours PRN TID for Pain. Hold if drowsy, confused, or sleepy.       Incontinence Supplies (BEDSIDE DRAINAGE BAG) MISC Dispense one, 2000 cc overnight urinary bag 1 each 0    finasteride (PROSCAR) 5 MG tablet Take 1 tablet by mouth daily 30 tablet 3    tamsulosin (FLOMAX) 0.4 MG capsule Take 1 capsule by mouth nightly 30 capsule 3    atorvastatin (LIPITOR) 20 MG tablet Take 20 mg by mouth nightly      clopidogrel (PLAVIX) 75 MG tablet Take 1 tablet by mouth daily 90 tablet 3    nitroGLYCERIN (NITROSTAT) 0.4 MG SL tablet Place 1 tablet under the tongue every 5 minutes as needed for Chest pain 25 tablet 0    aspirin 81 MG chewable tablet Take 1 tablet by mouth daily 30 tablet 3    acetaminophen (TYLENOL) 325 MG tablet Take 650 mg by mouth every 6 hours       Multiple Vitamins-Minerals (THERAPEUTIC MULTIVITAMIN-MINERALS) tablet Take 1 tablet by mouth daily       No current facility-administered medications for this visit. No Known Allergies    Subjective:      Review of Systems   Constitutional: Negative for chills and fever. HENT: Negative for congestion, postnasal drip, rhinorrhea and sore throat. Respiratory: Negative for cough, shortness of breath and wheezing. Cardiovascular: Negative for chest pain and leg swelling. Gastrointestinal: Negative for constipation, diarrhea, nausea and vomiting. Genitourinary: Negative for dysuria, frequency and urgency. Musculoskeletal: Negative for arthralgias and myalgias. Skin: Positive for rash (right side of back). Negative for pallor. Objective:     BP (!) 154/66   Pulse 65   Temp 97.6 °F (36.4 °C)   Resp 18   Wt 262 lb 4.8 oz (119 kg)   SpO2 98%   BMI 36.58 kg/m²     Physical Exam  Vitals signs and nursing note reviewed. Constitutional:       General: He is not in acute distress. Appearance: He is well-developed. HENT:      Head: Normocephalic and atraumatic. Eyes:      General: No scleral icterus. Right eye: No discharge. Left eye: No discharge. Cardiovascular:      Rate and Rhythm: Normal rate and regular rhythm. Pulmonary:      Effort: Pulmonary effort is normal. No respiratory distress.       Breath

## 2021-04-05 LAB
BASOPHILS ABSOLUTE: ABNORMAL
BASOPHILS RELATIVE PERCENT: ABNORMAL
BUN BLDV-MCNC: 53 MG/DL
CALCIUM SERPL-MCNC: 8.3 MG/DL
CHLORIDE BLD-SCNC: 99 MMOL/L
CO2: 30 MMOL/L
CREAT SERPL-MCNC: 2.2 MG/DL
CREATININE, URINE: 41.4
EOSINOPHILS ABSOLUTE: ABNORMAL
EOSINOPHILS RELATIVE PERCENT: ABNORMAL
GFR CALCULATED: 30
GLUCOSE BLD-MCNC: 123 MG/DL
HCT VFR BLD CALC: 35.7 % (ref 41–53)
HEMOGLOBIN: 11.2 G/DL (ref 13.5–17.5)
IRON SATURATION: 23
IRON: 43
LYMPHOCYTES ABSOLUTE: ABNORMAL
LYMPHOCYTES RELATIVE PERCENT: ABNORMAL
MAGNESIUM: 1.9 MG/DL
MCH RBC QN AUTO: ABNORMAL PG
MCHC RBC AUTO-ENTMCNC: ABNORMAL G/DL
MCV RBC AUTO: ABNORMAL FL
MICROALBUMIN/CREAT 24H UR: 4.2 MG/G{CREAT}
MICROALBUMIN/CREAT UR-RTO: 101
MONOCYTES ABSOLUTE: ABNORMAL
MONOCYTES RELATIVE PERCENT: ABNORMAL
NEUTROPHILS ABSOLUTE: ABNORMAL
NEUTROPHILS RELATIVE PERCENT: ABNORMAL
PHOSPHORUS: 3.7 MG/DL
PLATELET # BLD: 255 K/ΜL
PMV BLD AUTO: ABNORMAL FL
POTASSIUM SERPL-SCNC: 4.9 MMOL/L
PTH INTACT: 177
RBC # BLD: 3.68 10^6/ΜL
SODIUM BLD-SCNC: 136 MMOL/L
TOTAL IRON BINDING CAPACITY: 187
WBC # BLD: 10.6 10^3/ML

## 2021-04-13 ENCOUNTER — OUTSIDE SERVICES (OUTPATIENT)
Dept: FAMILY MEDICINE CLINIC | Age: 86
End: 2021-04-13
Payer: MEDICARE

## 2021-04-13 VITALS
DIASTOLIC BLOOD PRESSURE: 69 MMHG | BODY MASS INDEX: 34.95 KG/M2 | HEART RATE: 67 BPM | OXYGEN SATURATION: 98 % | SYSTOLIC BLOOD PRESSURE: 160 MMHG | TEMPERATURE: 98.5 F | WEIGHT: 250.6 LBS | RESPIRATION RATE: 18 BRPM

## 2021-04-13 DIAGNOSIS — L24.9 IRRITANT CONTACT DERMATITIS, UNSPECIFIED TRIGGER: ICD-10-CM

## 2021-04-13 DIAGNOSIS — E11.42 DIABETIC PERIPHERAL NEUROPATHY (HCC): ICD-10-CM

## 2021-04-13 DIAGNOSIS — Z89.612 STATUS POST ABOVE-KNEE AMPUTATION OF LEFT LOWER EXTREMITY (HCC): ICD-10-CM

## 2021-04-13 DIAGNOSIS — G47.09 OTHER INSOMNIA: ICD-10-CM

## 2021-04-13 DIAGNOSIS — I50.43 ACUTE ON CHRONIC COMBINED SYSTOLIC (CONGESTIVE) AND DIASTOLIC (CONGESTIVE) HEART FAILURE (HCC): ICD-10-CM

## 2021-04-13 DIAGNOSIS — N13.8 BENIGN PROSTATIC HYPERPLASIA WITH URINARY OBSTRUCTION: ICD-10-CM

## 2021-04-13 DIAGNOSIS — N18.32 STAGE 3B CHRONIC KIDNEY DISEASE (HCC): ICD-10-CM

## 2021-04-13 DIAGNOSIS — I10 ESSENTIAL (PRIMARY) HYPERTENSION: ICD-10-CM

## 2021-04-13 DIAGNOSIS — E78.5 HYPERLIPIDEMIA, UNSPECIFIED HYPERLIPIDEMIA TYPE: ICD-10-CM

## 2021-04-13 DIAGNOSIS — Z99.89 OSA ON CPAP: ICD-10-CM

## 2021-04-13 DIAGNOSIS — G47.33 OSA ON CPAP: ICD-10-CM

## 2021-04-13 DIAGNOSIS — F32.9 REACTIVE DEPRESSION: ICD-10-CM

## 2021-04-13 DIAGNOSIS — M15.9 PRIMARY OSTEOARTHRITIS INVOLVING MULTIPLE JOINTS: Primary | ICD-10-CM

## 2021-04-13 DIAGNOSIS — E11.59 TYPE 2 DIABETES MELLITUS WITH OTHER CIRCULATORY COMPLICATION, WITH LONG-TERM CURRENT USE OF INSULIN (HCC): ICD-10-CM

## 2021-04-13 DIAGNOSIS — K21.9 GASTROESOPHAGEAL REFLUX DISEASE WITHOUT ESOPHAGITIS: ICD-10-CM

## 2021-04-13 DIAGNOSIS — N40.1 BENIGN PROSTATIC HYPERPLASIA WITH URINARY OBSTRUCTION: ICD-10-CM

## 2021-04-13 DIAGNOSIS — Z79.4 TYPE 2 DIABETES MELLITUS WITH OTHER CIRCULATORY COMPLICATION, WITH LONG-TERM CURRENT USE OF INSULIN (HCC): ICD-10-CM

## 2021-04-13 PROCEDURE — 99309 SBSQ NF CARE MODERATE MDM 30: CPT | Performed by: NURSE PRACTITIONER

## 2021-04-13 PROCEDURE — 99490 CHRNC CARE MGMT STAFF 1ST 20: CPT | Performed by: NURSE PRACTITIONER

## 2021-04-13 ASSESSMENT — ENCOUNTER SYMPTOMS
EYE REDNESS: 0
RHINORRHEA: 0
WHEEZING: 0
NAUSEA: 0
CONSTIPATION: 0
COUGH: 0
SORE THROAT: 0
VOMITING: 0
SHORTNESS OF BREATH: 0
BACK PAIN: 1
DIARRHEA: 0

## 2021-04-13 NOTE — PROGRESS NOTES
Gerhard Larson is a 80 y.o. male who presents today for his medical conditions/complaints as noted below. Chief Complaint   Patient presents with    1 Month Follow-Up     Follow up on chronic health conditions           HPI:     Ilene Dawson is seen today for follow on chronic health conditions. He has chronic health conditions including diabetes mellitus, hypertension, chronic kidney disease, insomnia, depression, neuropathy, CHF, osteoarthritis, upper lipidemia, CAD, BPH, phantom pain. He denies any complaints today and states he is doing overall well. He has been seen by dermatology for his recent rash on his back which has not been easily resolved. He states it is doing better now. He remains on controlled diet. He is on a 2000 mL fluid restriction and follows with nephrology. His blood sugars are elevated and will increase his basal insulin. Incidentally he does complain of left ear feeling full of wax again. This is been a chronic complaint he does have soft wax noted in bilateral ears and will add Debrox again. Past Medical History:   Diagnosis Date    BPH (benign prostatic hyperplasia)     Chronic kidney disease     CKD (chronic kidney disease) stage 3, GFR 30-59 ml/min 4/16/2015    Edema     Hiatal hernia     Hyperlipidemia     Hypertension     Osteoarthritis     S/P PTCA: 5/12/2015: Stenting of proximal left circumflex artery with Xience 2.75X15 mm, post-dilated to 3.70 mm. 5/12/2015 5/12/2015: Stenting of proximal left circumflex artery with Xience 2.75X15 mm, post-dilated to 3.70 mm.  Dr. Woody De Luna Type II or unspecified type diabetes mellitus without mention of complication, not stated as uncontrolled       Past Surgical History:   Procedure Laterality Date    [facility]    CATARACT REMOVAL      BILAT    COLONOSCOPY      CORONARY ANGIOPLASTY  2015    EKG 12-LEAD  5/12/2015         FOOT SURGERY      multiple    HIP SURGERY Right 6/29/2020    RIGHT HIP INTERTAN performed by Cruzito Motley MD at [address] [place] [address] Way  05/12/2017    PACEMAKER INSERTION      PACEMAKER PLACEMENT      TONSILLECTOMY AND ADENOIDECTOMY         Family History   Problem Relation Age of Onset    Cancer Mother     Diabetes Father     Cancer Brother     Diabetes Paternal Grandmother     Diabetes Paternal Grandfather        Social History     Tobacco Use    Smoking status: Never Smoker    Smokeless tobacco: Never Used   Substance Use Topics    Alcohol use: Yes     Comment: Occasionally      No Known Allergies    Health Maintenance   Topic Date Due    DTaP/Tdap/Td vaccine (1 - Tdap) Never done    Shingles Vaccine (1 of 2) Never done    Lipid screen  06/10/2017    Flu vaccine (Season Ended) 09/01/2021    Potassium monitoring  04/05/2022    Creatinine monitoring  04/05/2022    Pneumococcal 65+ years Vaccine  Completed    COVID-19 Vaccine  Completed    Hepatitis A vaccine  Aged Out    Hib vaccine  Aged Out    Meningococcal (ACWY) vaccine  Aged Out       Subjective:      Review of Systems   Constitutional: Negative for chills, fatigue and fever. HENT: Positive for hearing loss. Negative for congestion, rhinorrhea and sore throat. Eyes: Positive for visual disturbance. Negative for redness. Respiratory: Negative for cough, shortness of breath and wheezing. Cardiovascular: Negative for chest pain and leg swelling. Gastrointestinal: Negative for constipation, diarrhea, nausea and vomiting. Endocrine: Negative for polydipsia and polyuria. Genitourinary: Negative for dysuria, frequency and hematuria. Musculoskeletal: Positive for arthralgias and back pain. Negative for gait problem and myalgias. Skin: Positive for rash. Negative for wound. Allergic/Immunologic: Negative for environmental allergies and immunocompromised state. Neurological: Negative for dizziness, light-headedness and headaches.    Hematological: Bruises/bleeds easily. Psychiatric/Behavioral: Negative for dysphoric mood and sleep disturbance. The patient is nervous/anxious. Objective:     Physical Exam  Vitals signs and nursing note reviewed. Constitutional:       General: He is not in acute distress. Appearance: He is well-developed. HENT:      Head: Normocephalic and atraumatic. Right Ear: External ear normal. Decreased hearing noted. Left Ear: External ear normal. Decreased hearing noted. Ears:      Comments: Soft wax noted in bilateral ears     Nose: Nose normal.   Eyes:      General: No scleral icterus. Right eye: No discharge. Left eye: No discharge. Conjunctiva/sclera: Conjunctivae normal.   Neck:      Musculoskeletal: Neck supple. Thyroid: No thyromegaly. Vascular: No JVD. Cardiovascular:      Rate and Rhythm: Normal rate and regular rhythm. Heart sounds: Normal heart sounds. Pulmonary:      Effort: Pulmonary effort is normal. No respiratory distress. Breath sounds: Normal breath sounds. No stridor. No wheezing or rales. Abdominal:      General: Bowel sounds are normal. There is no distension. Palpations: Abdomen is soft. Tenderness: There is no abdominal tenderness. Musculoskeletal: Normal range of motion. General: No deformity. Right shoulder: He exhibits no tenderness, no bony tenderness and no pain. Left shoulder: He exhibits no tenderness, no bony tenderness and no pain. Cervical back: He exhibits no tenderness, no bony tenderness and no pain. Thoracic back: He exhibits no tenderness, no bony tenderness, no pain and no spasm. Lumbar back: He exhibits no tenderness, no bony tenderness and no pain. Feet:      Left foot:      Amputation: Left leg is amputated above knee. Lymphadenopathy:      Cervical: No cervical adenopathy. Upper Body:      Right upper body: No supraclavicular adenopathy.       Left upper body: No supraclavicular adenopathy. Skin:     General: Skin is warm and dry. Findings: No erythema or rash. Neurological:      Mental Status: He is alert and oriented to person, place, and time. Motor: No atrophy, abnormal muscle tone or seizure activity. Psychiatric:         Mood and Affect: Affect is blunt. Speech: Speech normal.         Behavior: Behavior normal.       BP (!) 160/69   Pulse 67   Temp 98.5 °F (36.9 °C)   Resp 18   Wt 250 lb 9.6 oz (113.7 kg)   SpO2 98%   BMI 34.95 kg/m²     Assessment/Plan      1. Primary osteoarthritis involving multiple joints - chronic and continue Norco, Tylenol and Flexeril. Has neuropathic pain which causes increased pain. 2. Acute on chronic combined systolic (congestive) and diastolic (congestive) heart failure (HCC) - No edema today. Denies shortness of breath. Continue Bumex and Aldactone. Follows with cardiology. 3. Status post above-knee amputation of left lower extremity (HCC) - Continue Gabapentin and monitor skin. 4. Type 2 diabetes mellitus with other circulatory complication, with long-term current use of insulin ([facility]) - Blood sugars elevated, will increase Lantus. Recently decreased, but have rebounded. Continue Januvia and SSI. 5. Stage 3b chronic kidney disease - Continue following labs and follow up with nephrology. 6. Diabetic peripheral neuropathy ([facility] [address] -as above. 7. Benign prostatic hyperplasia with urinary obstruction - has chronic catheter. Continue Proscar and Flomax. No recent infections. 8. Hyperlipidemia, unspecified hyperlipidemia type- Chronic and continue ASA, statin, and heart healthy diet. 9. Essential (primary) hypertension - Blood pressures controlled. Continue Bumex and Aldactone. Monitor labs. 10. Gastroesophageal reflux disease without esophagitis Denies GI s/s. Continue Omeprazole. 11. AKIRA on CPAP - has been refusing CPAP. Continue to educate on risks and benefits.     12. Reactive depression - stable on Buspar and Zoloft. Continue to monitor moods. 13. Other insomnia - Continue current dose of Trazodone. Monitor sleep. 14. Irritant contact dermatitis, unspecified trigger - Continue current treatment. Follow up with derm. Benadryl for itching. Resident has DM, CKD, HTN, CHF, AKIRA, BPH and it is expected to last 12 or more months. These chronic conditions place resident at a significant risk of death, acute exacerbation, or functional decline. The patient's comprehensive plan was monitored today. I spent 20 minutes reviewing plan. Patient seen and examined. History partially obtained by chart review and nursing notes. I have reviewed patient's past medical, surgical, social, and family history and have made updates where appropriate.   See facility EMR for updated medication list.       Electronically signed by DENY Aguayo CNP on 4/13/2021 at 11:37 AM

## 2021-04-16 ENCOUNTER — OFFICE VISIT (OUTPATIENT)
Dept: NEPHROLOGY | Age: 86
End: 2021-04-16
Payer: MEDICARE

## 2021-04-16 VITALS — DIASTOLIC BLOOD PRESSURE: 50 MMHG | SYSTOLIC BLOOD PRESSURE: 114 MMHG | OXYGEN SATURATION: 98 % | HEART RATE: 54 BPM

## 2021-04-16 DIAGNOSIS — N18.32 STAGE 3B CHRONIC KIDNEY DISEASE (HCC): Primary | ICD-10-CM

## 2021-04-16 DIAGNOSIS — N18.32 ANEMIA IN STAGE 3B CHRONIC KIDNEY DISEASE (HCC): ICD-10-CM

## 2021-04-16 DIAGNOSIS — D63.1 ANEMIA IN STAGE 3B CHRONIC KIDNEY DISEASE (HCC): ICD-10-CM

## 2021-04-16 DIAGNOSIS — E87.1 HYPONATREMIA: ICD-10-CM

## 2021-04-16 DIAGNOSIS — N25.81 SECONDARY HYPERPARATHYROIDISM OF RENAL ORIGIN (HCC): ICD-10-CM

## 2021-04-16 PROCEDURE — 4040F PNEUMOC VAC/ADMIN/RCVD: CPT | Performed by: INTERNAL MEDICINE

## 2021-04-16 PROCEDURE — 1123F ACP DISCUSS/DSCN MKR DOCD: CPT | Performed by: INTERNAL MEDICINE

## 2021-04-16 PROCEDURE — 99213 OFFICE O/P EST LOW 20 MIN: CPT | Performed by: INTERNAL MEDICINE

## 2021-04-16 PROCEDURE — 1036F TOBACCO NON-USER: CPT | Performed by: INTERNAL MEDICINE

## 2021-04-16 PROCEDURE — G8417 CALC BMI ABV UP PARAM F/U: HCPCS | Performed by: INTERNAL MEDICINE

## 2021-04-16 PROCEDURE — G8427 DOCREV CUR MEDS BY ELIG CLIN: HCPCS | Performed by: INTERNAL MEDICINE

## 2021-04-16 RX ORDER — CALCITRIOL 0.25 UG/1
0.25 CAPSULE, LIQUID FILLED ORAL
Qty: 30 CAPSULE | Refills: 3 | Status: SHIPPED | OUTPATIENT
Start: 2021-04-16

## 2021-04-16 RX ORDER — LORATADINE 10 MG/1
10 TABLET ORAL DAILY
COMMUNITY

## 2021-04-16 RX ORDER — HYDROCODONE BITARTRATE AND ACETAMINOPHEN 5; 325 MG/1; MG/1
1 TABLET ORAL EVERY 6 HOURS PRN
COMMUNITY
End: 2022-04-14 | Stop reason: SDUPTHER

## 2021-04-16 RX ORDER — DIPHENHYDRAMINE HCL 25 MG
25 CAPSULE ORAL EVERY 6 HOURS PRN
COMMUNITY
End: 2022-01-18 | Stop reason: ALTCHOICE

## 2021-04-16 RX ORDER — DESOXIMETASONE 2.5 MG/G
CREAM TOPICAL 2 TIMES DAILY
Status: ON HOLD | COMMUNITY
End: 2022-10-11

## 2021-04-16 NOTE — PROGRESS NOTES
[facility]    [facility], [address]   [address] [facility], [facility]  [phone]  Progress Note  4/16/2021 10:00 AM      Pt Name:    Amrik Conde  YOB: 1934  Primary Care Physician:  Rhonda Pan MD     Chief Complaint:   Chief Complaint   Patient presents with    Follow-up     CKD III        History of Present Illness: This is a follow up visit for CKD III, fluid overload. Patient denies any recent hospitalizations. Reports he is overall doing ok. Has a rash on his back and is following with dermatology, reports it is improving. Edema is controlled on bumex 1 mg BID, spironolactone 25 mg daily. K has been controlled. Chronic SOB, unchanged. BP controlled. Hx of DM with retinopathy and neuropathy, CAD/PCI ( Desi Reed), CHF ( EF 45%), +pacemaker, BPH, urinary retention s/p SP cath, HTN, hyperlipidemia, OA.  + left BKA . Pertinent items are noted in HPI. Past History:  Past Medical History:   Diagnosis Date    BPH (benign prostatic hyperplasia)     Chronic kidney disease     CKD (chronic kidney disease) stage 3, GFR 30-59 ml/min 4/16/2015    Edema     Hiatal hernia     Hyperlipidemia     Hypertension     Osteoarthritis     S/P PTCA: 5/12/2015: Stenting of proximal left circumflex artery with Xience 2.75X15 mm, post-dilated to 3.70 mm. 5/12/2015 5/12/2015: Stenting of proximal left circumflex artery with Xience 2.75X15 mm, post-dilated to 3.70 mm.  Dr. Linh Black Type II or unspecified type diabetes mellitus without mention of complication, not stated as uncontrolled      Past Surgical History:   Procedure Laterality Date    CARDIAC [facility]HETERIZATION  5/8/15    [facility]    CATARACT REMOVAL      BILAT    COLONOSCOPY      CORONARY ANGIOPLASTY  2015    EKG 12-LEAD  5/12/2015         FOOT SURGERY      multiple    HIP SURGERY Right 6/29/2020    RIGHT HIP INTERTAN performed by Ilene Galindo MD at [address] [place] [address] AMPUTATION BELOW KNEE  05/12/2017    PACEMAKER INSERTION      PACEMAKER PLACEMENT      TONSILLECTOMY AND ADENOIDECTOMY          VITALS:  BP (!) 114/50 (Site: Left Upper Arm)   Pulse 54   SpO2 98%   Wt Readings from Last 3 Encounters:   04/13/21 250 lb 9.6 oz (113.7 kg)   03/23/21 262 lb 4.8 oz (119 kg)   03/12/21 248 lb 14.4 oz (112.9 kg)     There is no height or weight on file to calculate BMI. General Appearance: alert and cooperative with exam, appears comfortable,   HEENT: EOMI, moist oral mucus membranes  Lungs: clear  Heart: S1, S2 heard, no rub  GI: soft, non-tender, no guarding, +SP cath  Extremities: no significant LE edema, + left BKA  Skin: warm, dry  Neurologic: no tremor, no asterixis, no focal neurologic deficits     Medications:  Current Outpatient Medications   Medication Sig Dispense Refill    diphenhydrAMINE (BENADRYL) 25 MG capsule Take 25 mg by mouth every 6 hours as needed for Itching      diphenhydrAMINE (BENADRYL) 2 % cream Apply topically 3 times daily as needed for Itching Apply topically 3 times daily as needed.  loratadine (CLARITIN) 10 MG tablet Take 10 mg by mouth daily      carbamide peroxide (DEBROX) 6.5 % otic solution 5 drops 2 times daily      desoximetasone (TOPICORT) 0.25 % cream Apply topically 2 times daily Apply topically 2 times daily.  glucagon 1 MG injection Inject 1 kit into the muscle once      dextromethorphan-guaiFENesin (MUCINEX DM)  MG per extended release tablet Take 1 tablet by mouth every 12 hours as needed      HYDROcodone-acetaminophen (NORCO) 5-325 MG per tablet Take 1 tablet by mouth every 6 hours as needed for Pain.       calcitRIOL (ROCALTROL) 0.25 MCG capsule Take 1 capsule by mouth three times a week 30 capsule 3    SITagliptin (JANUVIA) 50 MG tablet Take 50 mg by mouth daily      busPIRone (BUSPAR) 10 MG tablet Take 5 mg by mouth daily 1.5 daily      melatonin 3 MG TABS tablet Take 5 mg by mouth daily      omeprazole (PRILOSEC) 20 MG delayed release capsule Take 20 mg by mouth daily      spironolactone (ALDACTONE) 25 MG tablet Take 25 mg by mouth daily      traZODone (DESYREL) 100 MG tablet Take 100 mg by mouth nightly      triamcinolone (KENALOG) 0.1 % cream Apply topically 2 times daily Apply topically 2 times daily.  sertraline (ZOLOFT) 100 MG tablet Take 100 mg by mouth daily      ondansetron (ZOFRAN) 4 MG tablet Take 4 mg by mouth every 8 hours as needed for Nausea or Vomiting      insulin glargine (LANTUS) 100 UNIT/ML injection vial Inject 35 Units into the skin 2 times daily (Patient taking differently: Inject 40 Units into the skin 2 times daily 40  Units am 22 units bedtime) 1 vial 0    isosorbide mononitrate (IMDUR) 30 MG extended release tablet Take 1 tablet by mouth daily HOLD for SBP </=110 30 tablet 0    insulin lispro (HUMALOG) 100 UNIT/ML injection vial Inject 0-6 Units into the skin 3 times daily (with meals) **Corrective Low Dose Algorithm**  Glucose: Dose:               No Insulin  140-199 1 Unit  200-249 2 Units  250-299 3 Units  300-349 4 Units  350-399 5 Units  Over 399 6 Units 1 vial 0    insulin lispro (HUMALOG) 100 UNIT/ML injection vial Inject 10 Units into the skin 3 times daily (with meals) (Patient taking differently: Inject 24 Units into the skin 3 times daily (with meals) ) 1 vial 0    metoprolol succinate (TOPROL XL) 50 MG extended release tablet Take 1 tablet by mouth daily HOLD for SBP </=110 or HR </=55 30 tablet 0    zinc oxide (PINXAV) 30 % OINT Apply 113.4 g topically 2 times daily Groin area 57 g 0    magnesium oxide (MAG-OX) 400 (241.3 Mg) MG TABS tablet Take 1 tablet by mouth 2 times daily 30 tablet 0    vitamin D (ERGOCALCIFEROL) 1.25 MG (74897 UT) CAPS capsule Take 1 capsule by mouth once a week 5 capsule 0    gabapentin (NEURONTIN) 300 MG capsule Take 1 capsule by mouth 2 times daily for 30 days.  (Patient taking differently: Take 100 mg by mouth daily. ) 60 capsule 0    bumetanide (BUMEX) 1 MG tablet Take 1 tablet by mouth 2 times daily 30 tablet 3    docusate sodium (COLACE) 100 MG capsule Take 100 mg by mouth daily as needed for Constipation      benzonatate (TESSALON) 200 MG capsule Take 200 mg by mouth every 8 hours as needed for Cough      BD AUTOSHIELD DUO 30G X 5 MM MISC       guaiFENesin (ROBITUSSIN) 100 MG/5ML liquid Take 10 mLs by mouth every 6 hours as needed       ASPERCREME W/LIDOCAINE EX Apply topically every 4 hours as needed (Apply to back PRN for Pain)       cyclobenzaprine (FLEXERIL) 10 MG tablet 10 mg daily Taking once daily for leg spasms and the Every 8 hours PRN TID for Pain. Hold if drowsy, confused, or sleepy.  Incontinence Supplies (BEDSIDE DRAINAGE BAG) MISC Dispense one, 2000 cc overnight urinary bag 1 each 0    finasteride (PROSCAR) 5 MG tablet Take 1 tablet by mouth daily 30 tablet 3    tamsulosin (FLOMAX) 0.4 MG capsule Take 1 capsule by mouth nightly 30 capsule 3    atorvastatin (LIPITOR) 20 MG tablet Take 20 mg by mouth nightly      clopidogrel (PLAVIX) 75 MG tablet Take 1 tablet by mouth daily 90 tablet 3    nitroGLYCERIN (NITROSTAT) 0.4 MG SL tablet Place 1 tablet under the tongue every 5 minutes as needed for Chest pain 25 tablet 0    aspirin 81 MG chewable tablet Take 1 tablet by mouth daily 30 tablet 3    acetaminophen (TYLENOL) 325 MG tablet Take 650 mg by mouth every 6 hours       Multiple Vitamins-Minerals (THERAPEUTIC MULTIVITAMIN-MINERALS) tablet Take 1 tablet by mouth daily      calcium elemental (OSCAL) 500 MG TABS tablet Take 1 tablet by mouth 2 times daily (Patient not taking: Reported on 10/2/2020) 30 tablet 0    famotidine (PEPCID) 20 MG tablet Take 1 tablet by mouth daily 30 tablet 0     No current facility-administered medications for this visit.          Laboratory & Diagnostics:  CBC:   Lab Results   Component Value Date    WBC 10.6 04/05/2021    HGB 11.2 (A) 04/05/2021    HCT 35.7 (A) 04/05/2021 MCV 98.4 (H) 02/26/2021     04/05/2021     BMP:    Lab Results   Component Value Date     04/05/2021     02/26/2021     09/28/2020    K 4.9 04/05/2021    K 4.4 02/26/2021    K 4.7 09/28/2020    CL 99 04/05/2021     02/26/2021    CL 97 09/28/2020    CO2 30 04/05/2021    CO2 28 02/26/2021    CO2 28.0 09/28/2020    BUN 53 04/05/2021    BUN 51 (H) 02/26/2021    BUN 35 09/28/2020    CREATININE 2.2 04/05/2021    CREATININE 2.1 (H) 02/26/2021    CREATININE 2.1 09/28/2020    GLUCOSE 123 04/05/2021    GLUCOSE 52 (L) 02/26/2021    GLUCOSE 186 09/28/2020      Hepatic:   Lab Results   Component Value Date    AST 17 06/27/2020    AST 21 11/05/2019    AST 17 05/22/2017    ALT 13 06/27/2020    ALT 21 11/05/2019    ALT 9 (L) 05/22/2017    BILITOT 0.2 (L) 06/27/2020    BILITOT 0.3 11/05/2019    BILITOT 0.3 05/22/2017    ALKPHOS 76 06/27/2020    ALKPHOS 83 11/05/2019    ALKPHOS 84 05/22/2017     BNP:   Lab Results   Component Value Date    BNP 2,278 11/07/2019     Lipids:   Lab Results   Component Value Date    CHOL 126 06/10/2016    HDL 48 06/10/2016     INR:   Lab Results   Component Value Date    INR 1.01 02/26/2021    INR 1.08 07/05/2020    INR 1.27 (H) 05/22/2017     URINE:   Lab Results   Component Value Date    NAUR < 20 07/01/2020     Lab Results   Component Value Date    NITRU Negative 09/01/2020    COLORU Light Yellow 09/01/2020    PHUR 7.0 09/01/2020    LABCAST 8-15 HYALINE 07/01/2020    LABCAST NONE SEEN 07/01/2020    WBCUA 15-25 07/01/2020    RBCUA 3-5 07/01/2020    YEAST NONE SEEN 07/01/2020    BACTERIA NONE SEEN 07/01/2020    CLARITYU Clear 09/01/2020    SPECGRAV 1.015 07/01/2020    LEUKOCYTESUR  09/01/2020      Comment:      small    UROBILINOGEN Normal 09/01/2020    BILIRUBINUR Negative 09/01/2020    BLOODU  09/01/2020      Comment:      small    GLUCOSEU  09/01/2020      Comment:      100    KETUA Negative 09/01/2020      Microalbumen/Creatinine ratio:  No components found for: RUCREAT        Impression/Plan:     1. 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 Goals of care include slowing rate of progression by controlling blood pressure, blood glucoses and albuminuria and by avoiding nephrotoxins such as NSAIDs and IV contrast.   2.chronic systolic CHF: appears euvolemic  3. Hx hypervolemic hypoNa: resolved. On 2 L fluid restriction  4. Hx hyperkalemia: resolved. Ok to continue aldactone  5. Anemia:  Stable, no need for EVA at this time  6. HTN - controlled  7. DM with microalbuminuria - will hold off ACE/ARB at this time due to hx of hyperkalemia  8. Secondary hyperparathyroidism: start Calcitriol 0.25 mcg MWF  9. Urinary retention s/p SP catheter      F/u in 6 months. Bloodwork and medications were reviewed and plan of care discussed with the patient.         Jennifer Garcia DO  [facility]

## 2021-05-07 ENCOUNTER — OUTSIDE SERVICES (OUTPATIENT)
Dept: FAMILY MEDICINE CLINIC | Age: 86
End: 2021-05-07
Payer: MEDICARE

## 2021-05-07 VITALS
DIASTOLIC BLOOD PRESSURE: 58 MMHG | HEART RATE: 64 BPM | RESPIRATION RATE: 16 BRPM | WEIGHT: 250.3 LBS | OXYGEN SATURATION: 98 % | TEMPERATURE: 97.8 F | BODY MASS INDEX: 34.91 KG/M2 | SYSTOLIC BLOOD PRESSURE: 110 MMHG

## 2021-05-07 DIAGNOSIS — N13.8 BENIGN PROSTATIC HYPERPLASIA WITH URINARY OBSTRUCTION: ICD-10-CM

## 2021-05-07 DIAGNOSIS — E11.42 DIABETIC PERIPHERAL NEUROPATHY (HCC): ICD-10-CM

## 2021-05-07 DIAGNOSIS — N40.1 BENIGN PROSTATIC HYPERPLASIA WITH URINARY OBSTRUCTION: ICD-10-CM

## 2021-05-07 DIAGNOSIS — Z79.4 TYPE 2 DIABETES MELLITUS WITH OTHER CIRCULATORY COMPLICATION, WITH LONG-TERM CURRENT USE OF INSULIN (HCC): ICD-10-CM

## 2021-05-07 DIAGNOSIS — Z89.612 STATUS POST ABOVE-KNEE AMPUTATION OF LEFT LOWER EXTREMITY (HCC): ICD-10-CM

## 2021-05-07 DIAGNOSIS — M15.9 PRIMARY OSTEOARTHRITIS INVOLVING MULTIPLE JOINTS: Primary | ICD-10-CM

## 2021-05-07 DIAGNOSIS — L24.9 IRRITANT CONTACT DERMATITIS, UNSPECIFIED TRIGGER: ICD-10-CM

## 2021-05-07 DIAGNOSIS — G47.33 OSA ON CPAP: ICD-10-CM

## 2021-05-07 DIAGNOSIS — I10 ESSENTIAL (PRIMARY) HYPERTENSION: ICD-10-CM

## 2021-05-07 DIAGNOSIS — E11.59 TYPE 2 DIABETES MELLITUS WITH OTHER CIRCULATORY COMPLICATION, WITH LONG-TERM CURRENT USE OF INSULIN (HCC): ICD-10-CM

## 2021-05-07 DIAGNOSIS — G47.09 OTHER INSOMNIA: ICD-10-CM

## 2021-05-07 DIAGNOSIS — I50.43 ACUTE ON CHRONIC COMBINED SYSTOLIC (CONGESTIVE) AND DIASTOLIC (CONGESTIVE) HEART FAILURE (HCC): ICD-10-CM

## 2021-05-07 DIAGNOSIS — N18.32 STAGE 3B CHRONIC KIDNEY DISEASE (HCC): ICD-10-CM

## 2021-05-07 DIAGNOSIS — F32.9 REACTIVE DEPRESSION: ICD-10-CM

## 2021-05-07 DIAGNOSIS — Z99.89 OSA ON CPAP: ICD-10-CM

## 2021-05-07 DIAGNOSIS — K21.9 GASTROESOPHAGEAL REFLUX DISEASE WITHOUT ESOPHAGITIS: ICD-10-CM

## 2021-05-07 DIAGNOSIS — E78.5 HYPERLIPIDEMIA, UNSPECIFIED HYPERLIPIDEMIA TYPE: ICD-10-CM

## 2021-05-07 PROCEDURE — 99309 SBSQ NF CARE MODERATE MDM 30: CPT | Performed by: NURSE PRACTITIONER

## 2021-05-07 PROCEDURE — 99490 CHRNC CARE MGMT STAFF 1ST 20: CPT | Performed by: NURSE PRACTITIONER

## 2021-05-07 ASSESSMENT — ENCOUNTER SYMPTOMS
WHEEZING: 0
BACK PAIN: 1
VOMITING: 0
EYE REDNESS: 0
SHORTNESS OF BREATH: 0
RHINORRHEA: 0
SORE THROAT: 0
NAUSEA: 0
COUGH: 0
DIARRHEA: 0
CONSTIPATION: 0

## 2021-05-07 NOTE — PROGRESS NOTES
Diabetes Paternal Grandmother     Diabetes Paternal Grandfather        Social History     Tobacco Use    Smoking status: Never Smoker    Smokeless tobacco: Never Used   Substance Use Topics    Alcohol use: Yes     Comment: Occasionally      No Known Allergies    Health Maintenance   Topic Date Due    DTaP/Tdap/Td vaccine (1 - Tdap) Never done    Shingles Vaccine (1 of 2) Never done    Lipid screen  06/10/2017    Flu vaccine (Season Ended) 09/01/2021    Potassium monitoring  04/05/2022    Creatinine monitoring  04/05/2022    Pneumococcal 65+ years Vaccine  Completed    COVID-19 Vaccine  Completed    Hepatitis A vaccine  Aged Out    Hib vaccine  Aged Out    Meningococcal (ACWY) vaccine  Aged Out       Subjective:      Review of Systems   Constitutional: Negative for chills, fatigue and fever. HENT: Positive for hearing loss. Negative for congestion, rhinorrhea and sore throat. Eyes: Positive for visual disturbance. Negative for redness. Respiratory: Negative for cough, shortness of breath and wheezing. Cardiovascular: Negative for chest pain and leg swelling. Gastrointestinal: Negative for constipation, diarrhea, nausea and vomiting. Endocrine: Negative for polydipsia and polyuria. Genitourinary: Negative for dysuria, frequency and hematuria. Musculoskeletal: Positive for arthralgias and back pain. Negative for gait problem and myalgias. Skin: Positive for rash. Negative for wound. Allergic/Immunologic: Negative for environmental allergies and immunocompromised state. Neurological: Negative for dizziness, light-headedness and headaches. Hematological: Bruises/bleeds easily. Psychiatric/Behavioral: Negative for dysphoric mood and sleep disturbance. Objective:     Physical Exam  Vitals signs and nursing note reviewed. Constitutional:       General: He is not in acute distress. Appearance: He is well-developed. HENT:      Head: Normocephalic and atraumatic. Right Ear: External ear normal. Decreased hearing noted. Left Ear: External ear normal. Decreased hearing noted. Nose: Nose normal.   Eyes:      General: No scleral icterus. Right eye: No discharge. Left eye: No discharge. Conjunctiva/sclera: Conjunctivae normal.   Neck:      Musculoskeletal: Neck supple. Thyroid: No thyromegaly. Vascular: No JVD. Cardiovascular:      Rate and Rhythm: Normal rate and regular rhythm. Heart sounds: Normal heart sounds. Pulmonary:      Effort: Pulmonary effort is normal. No respiratory distress. Breath sounds: Normal breath sounds. No stridor. No wheezing or rales. Abdominal:      General: Bowel sounds are normal. There is no distension. Palpations: Abdomen is soft. Tenderness: There is no abdominal tenderness. Musculoskeletal: Normal range of motion. General: No deformity. Right shoulder: He exhibits no tenderness, no bony tenderness and no pain. Left shoulder: He exhibits no tenderness, no bony tenderness and no pain. Cervical back: He exhibits no tenderness, no bony tenderness and no pain. Thoracic back: He exhibits no tenderness, no bony tenderness, no pain and no spasm. Lumbar back: He exhibits no tenderness, no bony tenderness and no pain. Feet:      Left foot:      Amputation: Left leg is amputated above knee. Lymphadenopathy:      Cervical: No cervical adenopathy. Upper Body:      Right upper body: No supraclavicular adenopathy. Left upper body: No supraclavicular adenopathy. Skin:     General: Skin is warm and dry. Findings: Rash present. No erythema. Neurological:      Mental Status: He is alert and oriented to person, place, and time. Motor: No atrophy, abnormal muscle tone or seizure activity. Psychiatric:         Mood and Affect: Affect is blunt.          Speech: Speech normal.         Behavior: Behavior normal.       BP (!) 110/58   Pulse 64 Temp 97.8 °F (36.6 °C)   Resp 16   Wt 250 lb 4.8 oz (113.5 kg)   SpO2 98%   BMI 34.91 kg/m²     Assessment/Plan      1. Primary osteoarthritis involving multiple joints - chronic and continue Norco, Tylenol and Flexeril. Has neuropathic pain which causes increased pain. 2. Acute on chronic combined systolic (congestive) and diastolic (congestive) heart failure (HCC) - No edema today. Denies shortness of breath. Continue Bumex and Aldactone. Follows with cardiology. 3. Status post above-knee amputation of left lower extremity (HCC) - Continue Gabapentin and monitor skin. 4. Type 2 diabetes mellitus with other circulatory complication, with long-term current use of insulin ([facility]) - Blood sugars elevated. Likely a result of prednisone. Continue Lantus, Januvia and SSI. 5. Stage 3b chronic kidney disease - Continue following labs and follow up with nephrology. 6. Diabetic peripheral neuropathy ([facility] [address] -as above. 7. Benign prostatic hyperplasia with urinary obstruction - has chronic catheter. Continue Proscar and Flomax. No recent infections. 8. Hyperlipidemia, unspecified hyperlipidemia type- Chronic and continue ASA, statin, and heart healthy diet. 9. Essential (primary) hypertension - Blood pressures controlled. Continue Bumex and Aldactone. Monitor labs. 10. Gastroesophageal reflux disease without esophagitis Denies GI s/s. Continue Omeprazole. 11. AKIRA on CPAP - has been refusing CPAP. Continue to educate on risks and benefits. 12. Reactive depression - stable on Buspar and Zoloft. Continue to monitor moods. 13. Other insomnia - Continue current dose of Trazodone. Monitor sleep. 14. Irritant contact dermatitis, unspecified trigger - Tinea. Treating with terbenifine. Benadryl for itching. Resident has DM, CKD, HTN, CHF, AKIRA, BPH and it is expected to last 12 or more months.  These chronic conditions place resident at a significant risk of death, acute exacerbation, or functional decline. The patient's comprehensive plan was monitored today. I spent 20 minutes reviewing plan. Patient seen and examined. History partially obtained by chart review and nursing notes. I have reviewed patient's past medical, surgical, social, and family history and have made updates where appropriate.   See facility EMR for updated medication list.       Electronically signed by DENY Lozano CNP on 5/7/2021 at 12:05 PM

## 2021-06-23 ENCOUNTER — PROCEDURE VISIT (OUTPATIENT)
Dept: CARDIOLOGY CLINIC | Age: 86
End: 2021-06-23
Payer: MEDICARE

## 2021-06-23 DIAGNOSIS — Z95.0 PACEMAKER: Primary | ICD-10-CM

## 2021-06-23 PROCEDURE — 93294 REM INTERROG EVL PM/LDLS PM: CPT | Performed by: NUCLEAR MEDICINE

## 2021-06-23 PROCEDURE — 93296 REM INTERROG EVL PM/IDS: CPT | Performed by: NUCLEAR MEDICINE

## 2021-06-29 ENCOUNTER — OUTSIDE SERVICES (OUTPATIENT)
Dept: FAMILY MEDICINE CLINIC | Age: 86
End: 2021-06-29
Payer: MEDICARE

## 2021-06-29 VITALS
RESPIRATION RATE: 18 BRPM | WEIGHT: 240.4 LBS | SYSTOLIC BLOOD PRESSURE: 168 MMHG | TEMPERATURE: 97.5 F | DIASTOLIC BLOOD PRESSURE: 75 MMHG | OXYGEN SATURATION: 95 % | BODY MASS INDEX: 33.53 KG/M2 | HEART RATE: 75 BPM

## 2021-06-29 DIAGNOSIS — B37.2 CANDIDAL DERMATITIS: Primary | ICD-10-CM

## 2021-06-29 DIAGNOSIS — I50.43 ACUTE ON CHRONIC COMBINED SYSTOLIC (CONGESTIVE) AND DIASTOLIC (CONGESTIVE) HEART FAILURE (HCC): ICD-10-CM

## 2021-06-29 DIAGNOSIS — N18.32 STAGE 3B CHRONIC KIDNEY DISEASE (HCC): ICD-10-CM

## 2021-06-29 DIAGNOSIS — F32.9 REACTIVE DEPRESSION: ICD-10-CM

## 2021-06-29 DIAGNOSIS — B35.4 TINEA CORPORIS: ICD-10-CM

## 2021-06-29 DIAGNOSIS — Z79.4 TYPE 2 DIABETES MELLITUS WITH OTHER CIRCULATORY COMPLICATION, WITH LONG-TERM CURRENT USE OF INSULIN (HCC): ICD-10-CM

## 2021-06-29 DIAGNOSIS — K21.9 GASTROESOPHAGEAL REFLUX DISEASE WITHOUT ESOPHAGITIS: ICD-10-CM

## 2021-06-29 DIAGNOSIS — E11.42 DIABETIC PERIPHERAL NEUROPATHY (HCC): ICD-10-CM

## 2021-06-29 DIAGNOSIS — E78.5 HYPERLIPIDEMIA, UNSPECIFIED HYPERLIPIDEMIA TYPE: ICD-10-CM

## 2021-06-29 DIAGNOSIS — N40.1 BENIGN PROSTATIC HYPERPLASIA WITH URINARY OBSTRUCTION: ICD-10-CM

## 2021-06-29 DIAGNOSIS — Z99.89 OSA ON CPAP: ICD-10-CM

## 2021-06-29 DIAGNOSIS — Z89.612 STATUS POST ABOVE-KNEE AMPUTATION OF LEFT LOWER EXTREMITY (HCC): ICD-10-CM

## 2021-06-29 DIAGNOSIS — E11.59 TYPE 2 DIABETES MELLITUS WITH OTHER CIRCULATORY COMPLICATION, WITH LONG-TERM CURRENT USE OF INSULIN (HCC): ICD-10-CM

## 2021-06-29 DIAGNOSIS — I10 ESSENTIAL (PRIMARY) HYPERTENSION: ICD-10-CM

## 2021-06-29 DIAGNOSIS — G47.09 OTHER INSOMNIA: ICD-10-CM

## 2021-06-29 DIAGNOSIS — M15.9 PRIMARY OSTEOARTHRITIS INVOLVING MULTIPLE JOINTS: ICD-10-CM

## 2021-06-29 DIAGNOSIS — N13.8 BENIGN PROSTATIC HYPERPLASIA WITH URINARY OBSTRUCTION: ICD-10-CM

## 2021-06-29 DIAGNOSIS — G47.33 OSA ON CPAP: ICD-10-CM

## 2021-06-29 PROCEDURE — 99309 SBSQ NF CARE MODERATE MDM 30: CPT | Performed by: NURSE PRACTITIONER

## 2021-06-29 ASSESSMENT — ENCOUNTER SYMPTOMS
SHORTNESS OF BREATH: 0
DIARRHEA: 0
WHEEZING: 0
COUGH: 0
RHINORRHEA: 0
VOMITING: 0
CONSTIPATION: 0
EYE REDNESS: 0
SORE THROAT: 0
NAUSEA: 0

## 2021-06-29 NOTE — PROGRESS NOTES
Amrik Conde is a 80 y.o. male who presents today for his medical conditions/complaints as noted below. Chief Complaint   Patient presents with    Other     Medication review           HPI:     Seen and examined in his room today after readmission to the facility on 6/28/2021. He was transferred to [facility] for treatment of candidal dermatitis and tinea corporis. He did have debridement done of his back and he is currently on Diflucan, triamcinolone, Chlortrimazolebetamethasone. He does have follow-up with Dr. Petr Bryant for follow-up on his rash. He states he is no longer having any itching. Patient does have chronic health conditions of diabetes mellitus, ASHD, neuropathy, osteoarthritis, hyperlipidemia, depression, vitamin D deficiency, hypertension, insomnia. He denies any complaints today. He is happy to be back and states his wife is going home later this week who is also a patient here. States his appetite is good and denies any GI complaints with use of Diflucan. Blood pressure is mildly elevated today but overall stable. Blood sugars reviewed today and are fluctuating but overall stable. Continue to monitor. Past Medical History:   Diagnosis Date    BPH (benign prostatic hyperplasia)     Chronic kidney disease     CKD (chronic kidney disease) stage 3, GFR 30-59 ml/min ([facility]) 4/16/2015    Edema     Hiatal hernia     Hyperlipidemia     Hypertension     Osteoarthritis     S/P PTCA: 5/12/2015: Stenting of proximal left circumflex artery with Xience 2.75X15 mm, post-dilated to 3.70 mm. 5/12/2015 5/12/2015: Stenting of proximal left circumflex artery with Xience 2.75X15 mm, post-dilated to 3.70 mm.  Dr. Linh Black Type II or unspecified type diabetes mellitus without mention of complication, not stated as uncontrolled       Past Surgical History:   Procedure Laterality Date    C[facility]IAC [facility]HETERIZATION  5/8/15    [facility]    CATARACT REMOVAL      BILAT    COLONOSCOPY      CORONARY ANGIOPLASTY 2015    EKG 12-LEAD  5/12/2015         FOOT SURGERY      multiple    HIP SURGERY Right 6/29/2020    RIGHT HIP INTERTAN performed by Cruzito Motley MD at [address]  05/12/2017    PACEMAKER INSERTION      PACEMAKER PLACEMENT      TONSILLECTOMY AND ADENOIDECTOMY         Family History   Problem Relation Age of Onset    Cancer Mother     Diabetes Father     Cancer Brother     Diabetes Paternal Grandmother     Diabetes Paternal Grandfather        Social History     Tobacco Use    Smoking status: Never Smoker    Smokeless tobacco: Never Used   Substance Use Topics    Alcohol use: Yes     Comment: Occasionally      No Known Allergies    Health Maintenance   Topic Date Due    DTaP/Tdap/Td vaccine (1 - Tdap) Never done    Shingles Vaccine (1 of 2) Never done    Lipid screen  06/10/2017    Flu vaccine (Season Ended) 09/01/2021    Potassium monitoring  04/05/2022    Creatinine monitoring  04/05/2022    Pneumococcal 65+ years Vaccine  Completed    COVID-19 Vaccine  Completed    Hepatitis A vaccine  Aged Out    Hib vaccine  Aged Out    Meningococcal (ACWY) vaccine  Aged Out       Subjective:      Review of Systems   Constitutional: Negative for chills, fatigue and fever. HENT: Negative for congestion, rhinorrhea and sore throat. Eyes: Negative for redness and visual disturbance. Respiratory: Negative for cough, shortness of breath and wheezing. Cardiovascular: Negative for chest pain and leg swelling. Gastrointestinal: Negative for constipation, diarrhea, nausea and vomiting. Endocrine: Negative for polydipsia and polyuria. Genitourinary: Negative for dysuria, frequency and hematuria. Musculoskeletal: Positive for arthralgias and gait problem. Negative for myalgias. Skin: Negative for rash and wound. Allergic/Immunologic: Negative for environmental allergies and immunocompromised state.    Neurological: Negative for dizziness, light-headedness and headaches. Hematological: Does not bruise/bleed easily. Psychiatric/Behavioral: Negative for dysphoric mood and sleep disturbance. The patient is not nervous/anxious. Objective:     Physical Exam  Vitals and nursing note reviewed. Constitutional:       General: He is not in acute distress. Appearance: He is well-developed. HENT:      Head: Normocephalic and atraumatic. Right Ear: External ear normal.      Left Ear: External ear normal.      Nose: Nose normal.   Eyes:      General: Visual field deficit present. No scleral icterus. Right eye: No discharge. Left eye: No discharge. Conjunctiva/sclera: Conjunctivae normal.   Neck:      Thyroid: No thyromegaly. Vascular: No JVD. Cardiovascular:      Rate and Rhythm: Normal rate and regular rhythm. Heart sounds: Normal heart sounds. Pulmonary:      Effort: Pulmonary effort is normal. No respiratory distress. Breath sounds: Normal breath sounds. No stridor. No wheezing or rales. Abdominal:      General: Bowel sounds are normal. There is no distension. Palpations: Abdomen is soft. Tenderness: There is no abdominal tenderness. Musculoskeletal:         General: No deformity. Normal range of motion. Right shoulder: No tenderness or bony tenderness. Left shoulder: No tenderness or bony tenderness. Cervical back: Neck supple. No tenderness or bony tenderness. Thoracic back: No spasms, tenderness or bony tenderness. Lumbar back: No tenderness or bony tenderness. Left Lower Extremity: Left leg is amputated above knee. Lymphadenopathy:      Cervical: No cervical adenopathy. Upper Body:      Right upper body: No supraclavicular adenopathy. Left upper body: No supraclavicular adenopathy. Skin:     General: Skin is warm and dry. Findings: No erythema or rash.    Neurological:      Mental Status: He is alert and oriented to person, place, and time.      Motor: No atrophy, abnormal muscle tone or seizure activity. Psychiatric:         Speech: Speech normal.         Behavior: Behavior normal.         Cognition and Memory: Memory normal.       BP (!) 168/75   Pulse 75   Temp 97.5 °F (36.4 °C)   Resp 18   Wt 240 lb 6.4 oz (109 kg)   SpO2 95%   BMI 33.53 kg/m²     Assessment/Plan      1. Candidal dermatitis - Recent admission to hospital. Continue Diflucan. 2. Tinea corporis - Chronic and continue Clotrimazole and Trimcinolone. 3. Primary osteoarthritis involving multiple joints - Chronic and continue Norco, tylenol and Flexeril. 4. Acute on chronic combined systolic (congestive) and diastolic (congestive) heart failure (HCC) - Chronic and continue Bumex and Aldactone. 5. Status post above-knee amputation of left lower extremity (HCC) - Continue Gabapentin and monitor lower extremities. 6. Type 2 diabetes mellitus with other circulatory complication, with long-term current use of insulin (HCC) - Chronic and continue Lantus, Januvia and SSI   7. Stage 3b chronic kidney disease ([facility] [address]- Chronic and continue to follow with nephrology. 8. Diabetic peripheral neuropathy ([facility] [address] -As above. 9. Benign prostatic hyperplasia with urinary obstruction - chronic and continue Proscar and Flomax. 10. Hyperlipidemia, unspecified hyperlipidemia type - Chronic and Continue ASA, Statin, and heart healthy diet. 11. Essential (primary) hypertension - blood pressure elevated. Continue to monitor. Continue Bumex and Aldactone. 12. Gastroesophageal reflux disease without esophagitis - chronic and continue Omeprazole. 13. AKIRA on CPAP - refusing CPAP. Monitor breathing. 14. Reactive depression -Chronic and continue Buspar and Zoloft. 15. Other insomnia - Sleeping well. Continue Trazodone. Patient seen and examined. History partially obtained by chart review and nursing notes.  I have reviewed patient's past medical, surgical, social, and family history and have made updates where appropriate.   See facility EMR for updated medication list.       Electronically signed by DEYN Zapata CNP on 6/29/2021 at 3:48 PM

## 2021-07-06 ENCOUNTER — OUTSIDE SERVICES (OUTPATIENT)
Dept: FAMILY MEDICINE CLINIC | Age: 86
End: 2021-07-06
Payer: MEDICARE

## 2021-07-06 VITALS
RESPIRATION RATE: 18 BRPM | OXYGEN SATURATION: 100 % | DIASTOLIC BLOOD PRESSURE: 52 MMHG | TEMPERATURE: 98.1 F | SYSTOLIC BLOOD PRESSURE: 122 MMHG | BODY MASS INDEX: 33.5 KG/M2 | HEART RATE: 60 BPM | WEIGHT: 240.2 LBS

## 2021-07-06 DIAGNOSIS — K21.9 GASTROESOPHAGEAL REFLUX DISEASE WITHOUT ESOPHAGITIS: ICD-10-CM

## 2021-07-06 DIAGNOSIS — M15.9 PRIMARY OSTEOARTHRITIS INVOLVING MULTIPLE JOINTS: ICD-10-CM

## 2021-07-06 DIAGNOSIS — Z79.4 TYPE 2 DIABETES MELLITUS WITH OTHER CIRCULATORY COMPLICATION, WITH LONG-TERM CURRENT USE OF INSULIN (HCC): ICD-10-CM

## 2021-07-06 DIAGNOSIS — Z99.89 OSA ON CPAP: ICD-10-CM

## 2021-07-06 DIAGNOSIS — I10 ESSENTIAL (PRIMARY) HYPERTENSION: ICD-10-CM

## 2021-07-06 DIAGNOSIS — G47.33 OSA ON CPAP: ICD-10-CM

## 2021-07-06 DIAGNOSIS — Z89.612 STATUS POST ABOVE-KNEE AMPUTATION OF LEFT LOWER EXTREMITY (HCC): ICD-10-CM

## 2021-07-06 DIAGNOSIS — E11.42 DIABETIC PERIPHERAL NEUROPATHY (HCC): ICD-10-CM

## 2021-07-06 DIAGNOSIS — I50.43 ACUTE ON CHRONIC COMBINED SYSTOLIC (CONGESTIVE) AND DIASTOLIC (CONGESTIVE) HEART FAILURE (HCC): ICD-10-CM

## 2021-07-06 DIAGNOSIS — B37.2 CANDIDAL DERMATITIS: Primary | ICD-10-CM

## 2021-07-06 DIAGNOSIS — N40.1 BENIGN PROSTATIC HYPERPLASIA WITH URINARY OBSTRUCTION: ICD-10-CM

## 2021-07-06 DIAGNOSIS — N18.32 STAGE 3B CHRONIC KIDNEY DISEASE (HCC): ICD-10-CM

## 2021-07-06 DIAGNOSIS — G47.09 OTHER INSOMNIA: ICD-10-CM

## 2021-07-06 DIAGNOSIS — F32.9 REACTIVE DEPRESSION: ICD-10-CM

## 2021-07-06 DIAGNOSIS — B35.4 TINEA CORPORIS: ICD-10-CM

## 2021-07-06 DIAGNOSIS — N13.8 BENIGN PROSTATIC HYPERPLASIA WITH URINARY OBSTRUCTION: ICD-10-CM

## 2021-07-06 DIAGNOSIS — E78.5 HYPERLIPIDEMIA, UNSPECIFIED HYPERLIPIDEMIA TYPE: ICD-10-CM

## 2021-07-06 DIAGNOSIS — E11.59 TYPE 2 DIABETES MELLITUS WITH OTHER CIRCULATORY COMPLICATION, WITH LONG-TERM CURRENT USE OF INSULIN (HCC): ICD-10-CM

## 2021-07-06 PROCEDURE — 99306 1ST NF CARE HIGH MDM 50: CPT | Performed by: FAMILY MEDICINE

## 2021-07-06 PROCEDURE — 99490 CHRNC CARE MGMT STAFF 1ST 20: CPT | Performed by: FAMILY MEDICINE

## 2021-07-06 ASSESSMENT — ENCOUNTER SYMPTOMS
CONSTIPATION: 0
COUGH: 0
WHEEZING: 0
NAUSEA: 0
SHORTNESS OF BREATH: 0
SORE THROAT: 0
VOMITING: 0
DIARRHEA: 0
RHINORRHEA: 0
EYE REDNESS: 0

## 2021-07-06 NOTE — PROGRESS NOTES
Parker Blake is a 80 y.o. male who presents today for his medical conditions/complaints as noted below. Chief Complaint   Patient presents with    Other     Readmission visit           HPI:     Patient seen and examined in his room today for readmission to the facility on 6/29/2021. He was transferred to [facility] for treatment of candidal dermatitis and tinea corporis. He did have debridement done of his back and he is currently on Diflucan, triamcinolone, Chlortrimazole-betamethasone. He does have follow-up with Dr. Kristopher Gonzalez for follow-up on his rash. He states he is no longer having any itching. Patient does have chronic health conditions of diabetes mellitus, ASHD, neuropathy, osteoarthritis, hyperlipidemia, depression, vitamin D deficiency, hypertension, insomnia. He denies any complaints today. States his appetite is good and denies any GI complaints with use of Diflucan. Blood sugars reviewed today and are fluctuating but overall stable. Continue to monitor. Past Medical History:   Diagnosis Date    BPH (benign prostatic hyperplasia)     Chronic kidney disease     CKD (chronic kidney disease) stage 3, GFR 30-59 ml/min ([facility]) 4/16/2015    Edema     Hiatal hernia     Hyperlipidemia     Hypertension     Osteoarthritis     S/P PTCA: 5/12/2015: Stenting of proximal left circumflex artery with Xience 2.75X15 mm, post-dilated to 3.70 mm. 5/12/2015 5/12/2015: Stenting of proximal left circumflex artery with Xience 2.75X15 mm, post-dilated to 3.70 mm.  Dr. Nahed Hu Type II or unspecified type diabetes mellitus without mention of complication, not stated as uncontrolled       Past Surgical History:   Procedure Laterality Date    [facility]    CATARACT REMOVAL      BILAT    COLONOSCOPY      CORONARY ANGIOPLASTY  2015    EKG 12-LEAD  5/12/2015         FOOT SURGERY      multiple    HIP SURGERY Right 6/29/2020    RIGHT HIP INTERTAN performed by Anne Marie Hodges MD at [facility]    HYDROCELE EXCISION      LEG AMPUTATION BELOW KNEE  05/12/2017    PACEMAKER INSERTION      PACEMAKER PLACEMENT      TONSILLECTOMY AND ADENOIDECTOMY         Family History   Problem Relation Age of Onset    Cancer Mother     Diabetes Father     Cancer Brother     Diabetes Paternal Grandmother     Diabetes Paternal Grandfather        Social History     Tobacco Use    Smoking status: Never Smoker    Smokeless tobacco: Never Used   Substance Use Topics    Alcohol use: Yes     Comment: Occasionally      No Known Allergies    Health Maintenance   Topic Date Due    DTaP/Tdap/Td vaccine (1 - Tdap) Never done    Shingles Vaccine (1 of 2) Never done    Lipid screen  06/10/2017    Flu vaccine (1) 09/01/2021    Potassium monitoring  04/05/2022    Creatinine monitoring  04/05/2022    Pneumococcal 65+ years Vaccine  Completed    COVID-19 Vaccine  Completed    Hepatitis A vaccine  Aged Out    Hib vaccine  Aged Out    Meningococcal (ACWY) vaccine  Aged Out       Subjective:      Review of Systems   Constitutional: Negative for chills, fatigue and fever. HENT: Negative for congestion, rhinorrhea and sore throat. Eyes: Negative for redness and visual disturbance. Respiratory: Negative for cough, shortness of breath and wheezing. Cardiovascular: Negative for chest pain and leg swelling. Gastrointestinal: Negative for constipation, diarrhea, nausea and vomiting. Endocrine: Negative for polydipsia and polyuria. Genitourinary: Negative for dysuria, frequency and hematuria. Musculoskeletal: Positive for arthralgias and gait problem. Negative for myalgias. Skin: Negative for rash and wound. Allergic/Immunologic: Negative for environmental allergies and immunocompromised state. Neurological: Positive for numbness (phantom pain). Negative for dizziness, light-headedness and headaches. Hematological: Does not bruise/bleed easily.    Psychiatric/Behavioral: Negative for dysphoric mood and sleep disturbance. The patient is not nervous/anxious. Objective:     Physical Exam  Vitals and nursing note reviewed. Constitutional:       General: He is not in acute distress. Appearance: He is well-developed. HENT:      Head: Normocephalic and atraumatic. Right Ear: External ear normal.      Left Ear: External ear normal.      Nose: Nose normal.   Eyes:      General: No scleral icterus. Right eye: No discharge. Left eye: No discharge. Conjunctiva/sclera: Conjunctivae normal.   Neck:      Thyroid: No thyromegaly. Vascular: No JVD. Cardiovascular:      Rate and Rhythm: Normal rate and regular rhythm. Heart sounds: Normal heart sounds. Pulmonary:      Effort: Pulmonary effort is normal. No respiratory distress. Breath sounds: Normal breath sounds. No stridor. No wheezing or rales. Abdominal:      General: Bowel sounds are normal. There is no distension. Palpations: Abdomen is soft. Tenderness: There is no abdominal tenderness. Musculoskeletal:         General: No deformity. Normal range of motion. Right shoulder: No tenderness or bony tenderness. Left shoulder: No tenderness or bony tenderness. Cervical back: Neck supple. No tenderness or bony tenderness. Thoracic back: No spasms, tenderness or bony tenderness. Lumbar back: No tenderness or bony tenderness. Left Lower Extremity: Left leg is amputated above knee. Lymphadenopathy:      Cervical: No cervical adenopathy. Upper Body:      Right upper body: No supraclavicular adenopathy. Left upper body: No supraclavicular adenopathy. Skin:     General: Skin is warm and dry. Findings: No erythema or rash. Neurological:      Mental Status: He is alert and oriented to person, place, and time. Motor: No atrophy, abnormal muscle tone or seizure activity.    Psychiatric:         Speech: Speech normal.         Behavior: Behavior normal.       BP (!) 122/52   Pulse 60   Temp 98.1 °F (36.7 °C)   Resp 18   Wt 240 lb 3.2 oz (109 kg)   SpO2 100%   BMI 33.50 kg/m²     Assessment/Plan      1. Candidal dermatitis -Recent admission to hospital and followed by ID. Continue current treatment and diflucan. 2. Tinea corporis - Continue current treatment and follow up with ID.    3. Primary osteoarthritis involving multiple joints - Chronic and continue Norco, tylenol, and Flexeril. 4. Acute on chronic combined systolic (congestive) and diastolic (congestive) heart failure (HCC) - Chronic and continue Bumex and Aldactone. Labs in place to monitor. Continue to monitor weights. 5. 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 Continue Gabapentin. 6. Type 2 diabetes mellitus with other circulatory complication, with long-term current use of insulin (HCC) - blood sugars improved over the last week. Continue Lantus, januvia, and SSI. 7. Stage 3b chronic kidney disease ([facility] [address] - Chronic and continue to follow with nephrology. 8. Diabetic peripheral neuropathy (HCC) - Chronic and continue Gabapentin. 9. Benign prostatic hyperplasia with urinary obstruction - catheter in place. Continue proscar and flomax. 10. Hyperlipidemia, unspecified hyperlipidemia type - Chronic and continue ASA, Statin and heart healthy diet. Labs in place to monitor. 11. Essential (primary) hypertension -blood pressures stable. Continue Bumex and Aldactone. 12. Gastroesophageal reflux disease without esophagitis - Chronic and continue omeprazole. 13. AKIRA on CPAP - Continue Cpap as tolerated. Does refuse at times. 14. Reactive depression - mood stable. Continue Buspar and zoloft. No change in doses. 15. Other insomnia - sleeping well. Continue and trazodone. Resident has OA, DM, HLD, HTN, AKIRA and it is expected to last 12 or more months.  These chronic conditions place resident at a significant risk of death, acute exacerbation, or functional decline. The patient's comprehensive plan was monitored today. I spent 20 minutes reviewing plan. Patient seen and examined. History partially obtained by chart review and nursing notes. I have reviewed patient's past medical, surgical, social, and family history and have made updates where appropriate.   See facility EMR for updated medication list.       Electronically signed by Carmen Chand MD on 7/6/2021 at 10:57 AM

## 2021-08-12 ENCOUNTER — OUTSIDE SERVICES (OUTPATIENT)
Dept: FAMILY MEDICINE CLINIC | Age: 86
End: 2021-08-12
Payer: MEDICARE

## 2021-08-12 VITALS
TEMPERATURE: 97.6 F | HEART RATE: 77 BPM | SYSTOLIC BLOOD PRESSURE: 112 MMHG | WEIGHT: 239.6 LBS | OXYGEN SATURATION: 100 % | RESPIRATION RATE: 18 BRPM | BODY MASS INDEX: 33.42 KG/M2 | DIASTOLIC BLOOD PRESSURE: 53 MMHG

## 2021-08-12 DIAGNOSIS — E11.42 DIABETIC PERIPHERAL NEUROPATHY (HCC): ICD-10-CM

## 2021-08-12 DIAGNOSIS — Z99.89 OSA ON CPAP: ICD-10-CM

## 2021-08-12 DIAGNOSIS — I50.43 ACUTE ON CHRONIC COMBINED SYSTOLIC (CONGESTIVE) AND DIASTOLIC (CONGESTIVE) HEART FAILURE (HCC): ICD-10-CM

## 2021-08-12 DIAGNOSIS — Z79.4 TYPE 2 DIABETES MELLITUS WITH OTHER CIRCULATORY COMPLICATION, WITH LONG-TERM CURRENT USE OF INSULIN (HCC): ICD-10-CM

## 2021-08-12 DIAGNOSIS — N18.32 STAGE 3B CHRONIC KIDNEY DISEASE (HCC): ICD-10-CM

## 2021-08-12 DIAGNOSIS — E11.59 TYPE 2 DIABETES MELLITUS WITH OTHER CIRCULATORY COMPLICATION, WITH LONG-TERM CURRENT USE OF INSULIN (HCC): ICD-10-CM

## 2021-08-12 DIAGNOSIS — M15.9 PRIMARY OSTEOARTHRITIS INVOLVING MULTIPLE JOINTS: Primary | ICD-10-CM

## 2021-08-12 DIAGNOSIS — F32.9 REACTIVE DEPRESSION: ICD-10-CM

## 2021-08-12 DIAGNOSIS — E78.5 HYPERLIPIDEMIA, UNSPECIFIED HYPERLIPIDEMIA TYPE: ICD-10-CM

## 2021-08-12 DIAGNOSIS — Z89.612 STATUS POST ABOVE-KNEE AMPUTATION OF LEFT LOWER EXTREMITY (HCC): ICD-10-CM

## 2021-08-12 DIAGNOSIS — N40.1 BENIGN PROSTATIC HYPERPLASIA WITH URINARY OBSTRUCTION: ICD-10-CM

## 2021-08-12 DIAGNOSIS — G47.33 OSA ON CPAP: ICD-10-CM

## 2021-08-12 DIAGNOSIS — N13.8 BENIGN PROSTATIC HYPERPLASIA WITH URINARY OBSTRUCTION: ICD-10-CM

## 2021-08-12 DIAGNOSIS — I10 ESSENTIAL (PRIMARY) HYPERTENSION: ICD-10-CM

## 2021-08-12 DIAGNOSIS — G47.09 OTHER INSOMNIA: ICD-10-CM

## 2021-08-12 DIAGNOSIS — K21.9 GASTROESOPHAGEAL REFLUX DISEASE WITHOUT ESOPHAGITIS: ICD-10-CM

## 2021-08-12 PROCEDURE — 99309 SBSQ NF CARE MODERATE MDM 30: CPT | Performed by: NURSE PRACTITIONER

## 2021-08-12 PROCEDURE — 99490 CHRNC CARE MGMT STAFF 1ST 20: CPT | Performed by: NURSE PRACTITIONER

## 2021-08-12 ASSESSMENT — ENCOUNTER SYMPTOMS
VOMITING: 0
RHINORRHEA: 0
SHORTNESS OF BREATH: 0
SORE THROAT: 0
WHEEZING: 0
COUGH: 0
EYE REDNESS: 0
DIARRHEA: 0
NAUSEA: 0
CONSTIPATION: 0

## 2021-08-12 NOTE — PROGRESS NOTES
Beth Baird is a 80 y.o. male who presents today for his medical conditions/complaints as noted below. Chief Complaint   Patient presents with    1 Month Follow-Up     Follow up on chronic health conditions           HPI:     Seen and examined in his room today for follow-up on his chronic health conditions he is laying in bed and was taking a nap upon entering room but is easily aroused. He has chronic health conditions of diabetes, chronic kidney disease, BPH, hypertension, hyperlipidemia, depression, insomnia, GERD. Patient states he has a new area on his buttocks but dressing is currently in place. Skin on back is healing well and treatment is currently completed. Blood sugars reviewed today and are overall stable with some mild elevations in the afternoon. Blood pressures are stable on current regimen. No recent falls. We will continue to monitor. He denies any needs today. Past Medical History:   Diagnosis Date    BPH (benign prostatic hyperplasia)     Chronic kidney disease     CKD (chronic kidney disease) stage 3, GFR 30-59 ml/min ([facility] 4/16/2015    Edema     Hiatal hernia     Hyperlipidemia     Hypertension     Osteoarthritis     S/P PTCA: 5/12/2015: Stenting of proximal left circumflex artery with Xience 2.75X15 mm, post-dilated to 3.70 mm. 5/12/2015 5/12/2015: Stenting of proximal left circumflex artery with Xience 2.75X15 mm, post-dilated to 3.70 mm.  Dr. Krista Hall Type II or unspecified type diabetes mellitus without mention of complication, not stated as uncontrolled       Past Surgical History:   Procedure Laterality Date    [facility]15    [facility]    CATARACT REMOVAL      BILAT    COLONOSCOPY      CORONARY ANGIOPLASTY  2015    EKG 12-LEAD  5/12/2015         FOOT SURGERY      multiple    HIP SURGERY Right 6/29/2020    RIGHT HIP INTERTAN performed by Earline Lucero MD at [address] [place] [address]  05/12/2017    PACEMAKER INSERTION      PACEMAKER PLACEMENT      TONSILLECTOMY AND ADENOIDECTOMY         Family History   Problem Relation Age of Onset    Cancer Mother     Diabetes Father     Cancer Brother     Diabetes Paternal Grandmother     Diabetes Paternal Grandfather        Social History     Tobacco Use    Smoking status: Never Smoker    Smokeless tobacco: Never Used   Substance Use Topics    Alcohol use: Yes     Comment: Occasionally      No Known Allergies    Health Maintenance   Topic Date Due    DTaP/Tdap/Td vaccine (1 - Tdap) Never done    Shingles Vaccine (1 of 2) Never done    Lipid screen  06/10/2017    Flu vaccine (1) 09/01/2021    Potassium monitoring  04/05/2022    Creatinine monitoring  04/05/2022    Pneumococcal 65+ years Vaccine  Completed    COVID-19 Vaccine  Completed    Hepatitis A vaccine  Aged Out    Hib vaccine  Aged Out    Meningococcal (ACWY) vaccine  Aged Out       Subjective:      Review of Systems   Constitutional: Negative for chills, fatigue and fever. HENT: Negative for congestion, rhinorrhea and sore throat. Eyes: Negative for redness and visual disturbance. Respiratory: Negative for cough, shortness of breath and wheezing. Cardiovascular: Negative for chest pain and leg swelling. Gastrointestinal: Negative for constipation, diarrhea, nausea and vomiting. Endocrine: Negative for polydipsia and polyuria. Genitourinary: Negative for dysuria, frequency and hematuria. Musculoskeletal: Positive for arthralgias and gait problem. Negative for myalgias. Skin: Positive for wound. Negative for rash. Allergic/Immunologic: Negative for environmental allergies and immunocompromised state. Neurological: Negative for dizziness, light-headedness and headaches. Hematological: Bruises/bleeds easily. Psychiatric/Behavioral: Negative for dysphoric mood and sleep disturbance. The patient is not nervous/anxious.         Objective:     Physical Exam  Vitals and nursing note reviewed. Constitutional:       General: He is not in acute distress. Appearance: He is well-developed. HENT:      Head: Normocephalic and atraumatic. Right Ear: External ear normal.      Left Ear: External ear normal.      Nose: Nose normal.   Eyes:      General: No scleral icterus. Right eye: No discharge. Left eye: No discharge. Conjunctiva/sclera: Conjunctivae normal.   Neck:      Thyroid: No thyromegaly. Vascular: No JVD. Cardiovascular:      Rate and Rhythm: Normal rate and regular rhythm. Heart sounds: Normal heart sounds. Pulmonary:      Effort: Pulmonary effort is normal. No respiratory distress. Breath sounds: Normal breath sounds. No stridor. No wheezing or rales. Abdominal:      General: Bowel sounds are normal. There is no distension. Palpations: Abdomen is soft. Tenderness: There is no abdominal tenderness. Musculoskeletal:         General: No deformity. Normal range of motion. Right shoulder: No tenderness or bony tenderness. Left shoulder: No tenderness or bony tenderness. Cervical back: Neck supple. No tenderness or bony tenderness. Thoracic back: No spasms, tenderness or bony tenderness. Lumbar back: No tenderness or bony tenderness. Left Lower Extremity: Left leg is amputated above knee. Lymphadenopathy:      Cervical: No cervical adenopathy. Upper Body:      Right upper body: No supraclavicular adenopathy. Left upper body: No supraclavicular adenopathy. Skin:     General: Skin is warm and dry. Findings: Bruising and wound present. No erythema or rash. Neurological:      Mental Status: He is alert and oriented to person, place, and time. Motor: No atrophy, abnormal muscle tone or seizure activity.    Psychiatric:         Speech: Speech normal.         Behavior: Behavior normal.       BP (!) 112/53   Pulse 77   Temp 97.6 °F (36.4 °C)   Resp 18   Wt 239 lb 9.6 oz (108.7 kg)   SpO2 100%   BMI 33.42 kg/m²     Assessment/Plan      1. Primary osteoarthritis involving multiple joints -can continue current dose of Norco, Tylenol, Flexeril, and gabapentin. Denies any increase in pains. 2. Acute on chronic combined systolic (congestive) and diastolic (congestive) heart failure (HCC) -continue to monitor weights. Continue current dose of Bumex and Aldactone. Follow-up with cardiology. 3. 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 4. Type 2 diabetes mellitus with other circulatory complication, with long-term current use of insulin ([facility]) -blood sugars variable but overall controlled. Continue Lantus, Januvia, and sliding scale insulin. 5. Stage 3b chronic kidney disease ([facility] [address] -follows with nephrology. Continue to monitor labs. 6. Di[facility] peripheral [facility] -as above. 7. Benign prostatic hyperplasia with urinary obstruction -chronic and continue catheter. Continue Proscar and Flomax. 8. Hyperlipidemia, unspecified hyperlipidemia type -chronic and continue aspirin, statin, and heart healthy diet. Continue to monitor labs. 9. Essential (primary) hypertension -chronic and continue Bumex and Aldactone. Monitor for hypotension. 10. Gastroesophageal reflux disease without esophagitis -chronic and continue omeprazole. 11. AKIRA on CPAP -continue CPAP as tolerated. 12. Reactive depression -denies mood issues. Continue BuSpar and Zoloft. 13. Other insomnia -chronic and continue trazodone. Resident has CHF, DM, CKD, HLD, HTN and it is expected to last 12 or more months. These chronic conditions place resident at a significant risk of death, acute exacerbation, or functional decline. The patient's comprehensive plan was monitored today. I spent 20 minutes reviewing plan. Patient seen and examined. History partially obtained by chart review and nursing notes.  I have reviewed patient's past medical, surgical, social, and family history and have made updates where appropriate.   See facility EMR for updated medication list.       Electronically signed by DENY Chapman CNP on 8/12/2021 at 4:00 PM

## 2021-09-14 ENCOUNTER — TELEPHONE (OUTPATIENT)
Dept: NEPHROLOGY | Age: 86
End: 2021-09-14

## 2021-09-14 DIAGNOSIS — N18.30 STAGE 3 CHRONIC KIDNEY DISEASE, UNSPECIFIED WHETHER STAGE 3A OR 3B CKD (HCC): Primary | ICD-10-CM

## 2021-09-14 NOTE — TELEPHONE ENCOUNTER
----- Message from Romario Patten DO sent at 9/14/2021 12:39 PM EDT -----  Received note about worsened edema. Add metolazone 5 mg daily x  3 days. Call after that with update. Would like them to send me his daily weight trends as well. Repeat bmp next week.

## 2021-09-20 LAB
BUN BLDV-MCNC: 44 MG/DL
CALCIUM SERPL-MCNC: 8 MG/DL
CHLORIDE BLD-SCNC: 99 MMOL/L
CO2: 28 MMOL/L
CREAT SERPL-MCNC: 2.1 MG/DL
GFR CALCULATED: 32
GLUCOSE BLD-MCNC: 136 MG/DL
POTASSIUM SERPL-SCNC: 4.8 MMOL/L
SODIUM BLD-SCNC: 136 MMOL/L

## 2021-09-21 ENCOUNTER — OUTSIDE SERVICES (OUTPATIENT)
Dept: FAMILY MEDICINE CLINIC | Age: 86
End: 2021-09-21
Payer: MEDICARE

## 2021-09-21 VITALS
BODY MASS INDEX: 36.6 KG/M2 | WEIGHT: 262.4 LBS | TEMPERATURE: 97.5 F | DIASTOLIC BLOOD PRESSURE: 58 MMHG | RESPIRATION RATE: 20 BRPM | OXYGEN SATURATION: 99 % | HEART RATE: 65 BPM | SYSTOLIC BLOOD PRESSURE: 135 MMHG

## 2021-09-21 DIAGNOSIS — E11.42 DIABETIC PERIPHERAL NEUROPATHY (HCC): ICD-10-CM

## 2021-09-21 DIAGNOSIS — N18.32 STAGE 3B CHRONIC KIDNEY DISEASE (HCC): ICD-10-CM

## 2021-09-21 DIAGNOSIS — K21.9 GASTROESOPHAGEAL REFLUX DISEASE WITHOUT ESOPHAGITIS: ICD-10-CM

## 2021-09-21 DIAGNOSIS — I10 ESSENTIAL (PRIMARY) HYPERTENSION: ICD-10-CM

## 2021-09-21 DIAGNOSIS — E11.59 TYPE 2 DIABETES MELLITUS WITH OTHER CIRCULATORY COMPLICATION, WITH LONG-TERM CURRENT USE OF INSULIN (HCC): ICD-10-CM

## 2021-09-21 DIAGNOSIS — E78.5 HYPERLIPIDEMIA, UNSPECIFIED HYPERLIPIDEMIA TYPE: ICD-10-CM

## 2021-09-21 DIAGNOSIS — N13.8 BENIGN PROSTATIC HYPERPLASIA WITH URINARY OBSTRUCTION: ICD-10-CM

## 2021-09-21 DIAGNOSIS — F32.9 REACTIVE DEPRESSION: ICD-10-CM

## 2021-09-21 DIAGNOSIS — Z79.4 TYPE 2 DIABETES MELLITUS WITH OTHER CIRCULATORY COMPLICATION, WITH LONG-TERM CURRENT USE OF INSULIN (HCC): ICD-10-CM

## 2021-09-21 DIAGNOSIS — G47.09 OTHER INSOMNIA: ICD-10-CM

## 2021-09-21 DIAGNOSIS — G47.33 OSA ON CPAP: ICD-10-CM

## 2021-09-21 DIAGNOSIS — Z89.612 STATUS POST ABOVE-KNEE AMPUTATION OF LEFT LOWER EXTREMITY (HCC): ICD-10-CM

## 2021-09-21 DIAGNOSIS — I50.43 ACUTE ON CHRONIC COMBINED SYSTOLIC (CONGESTIVE) AND DIASTOLIC (CONGESTIVE) HEART FAILURE (HCC): ICD-10-CM

## 2021-09-21 DIAGNOSIS — N40.1 BENIGN PROSTATIC HYPERPLASIA WITH URINARY OBSTRUCTION: ICD-10-CM

## 2021-09-21 DIAGNOSIS — Z99.89 OSA ON CPAP: ICD-10-CM

## 2021-09-21 DIAGNOSIS — M15.9 PRIMARY OSTEOARTHRITIS INVOLVING MULTIPLE JOINTS: Primary | ICD-10-CM

## 2021-09-21 PROCEDURE — 99309 SBSQ NF CARE MODERATE MDM 30: CPT | Performed by: FAMILY MEDICINE

## 2021-09-21 PROCEDURE — 99490 CHRNC CARE MGMT STAFF 1ST 20: CPT | Performed by: FAMILY MEDICINE

## 2021-09-21 ASSESSMENT — ENCOUNTER SYMPTOMS
NAUSEA: 0
ROS SKIN COMMENTS: RASH ON BACK
RHINORRHEA: 0
VOMITING: 0
EYE REDNESS: 0
SHORTNESS OF BREATH: 0
COUGH: 0
WHEEZING: 0
SORE THROAT: 0
DIARRHEA: 0
CONSTIPATION: 0

## 2021-09-21 NOTE — PROGRESS NOTES
Shiloh Shaw is a 80 y.o. male who presents today for his medical conditions/complaints as noted below. Chief Complaint   Patient presents with    1 Month Follow-Up     Follow up on chronic health conditions           HPI:     Patient seen and examined in his room today for follow-up on his chronic health conditions he is laying in bed and was taking a nap upon entering room but is easily aroused. He has chronic health conditions of diabetes, chronic kidney disease, BPH, hypertension, hyperlipidemia, depression, insomnia, GERD. Patient states he is being treated for a rash on his back and states it does itch, but otherwise he has no complaints. Blood sugars reviewed today and are stable in the am but increased at supper. No recent falls. We will continue to monitor. He denies any needs today. Past Medical History:   Diagnosis Date    BPH (benign prostatic hyperplasia)     Chronic kidney disease     CKD (chronic kidney disease) stage 3, GFR 30-59 ml/min ([facility]) 4/16/2015    Edema     Hiatal hernia     Hyperlipidemia     Hypertension     Osteoarthritis     S/P PTCA: 5/12/2015: Stenting of proximal left circumflex artery with Xience 2.75X15 mm, post-dilated to 3.70 mm. 5/12/2015 5/12/2015: Stenting of proximal left circumflex artery with Xience 2.75X15 mm, post-dilated to 3.70 mm.  Dr. Jenniffer Meadows Type II or unspecified type diabetes mellitus without mention of complication, not stated as uncontrolled       Past Surgical History:   Procedure Laterality Date    [facility]    CATARACT REMOVAL      BILAT    COLONOSCOPY      CORONARY ANGIOPLASTY  2015    EKG 12-LEAD  5/12/2015         FOOT SURGERY      multiple    HIP SURGERY Right 6/29/2020    RIGHT HIP INTERTAN performed by Shagufta Tavera MD at [address] [place] [address]  05/12/2017    PACEMAKER INSERTION      PACEMAKER PLACEMENT      TONSILLECTOMY AND ADENOIDECTOMY Family History   Problem Relation Age of Onset    Cancer Mother     Diabetes Father     Cancer Brother     Diabetes Paternal Grandmother     Diabetes Paternal Grandfather        Social History     Tobacco Use    Smoking status: Never Smoker    Smokeless tobacco: Never Used   Substance Use Topics    Alcohol use: Yes     Comment: Occasionally      No Known Allergies    Health Maintenance   Topic Date Due    DTaP/Tdap/Td vaccine (1 - Tdap) Never done    Shingles Vaccine (1 of 2) Never done    Lipid screen  06/10/2017    Flu vaccine (1) 09/01/2021    Potassium monitoring  09/20/2022    Creatinine monitoring  09/20/2022    Pneumococcal 65+ years Vaccine  Completed    COVID-19 Vaccine  Completed    Hepatitis A vaccine  Aged Out    Hib vaccine  Aged Out    Meningococcal (ACWY) vaccine  Aged Out       Subjective:      Review of Systems   Constitutional: Negative for chills, fatigue and fever. HENT: Positive for hearing loss. Negative for congestion, rhinorrhea and sore throat. Eyes: Positive for visual disturbance. Negative for redness. Respiratory: Negative for cough, shortness of breath and wheezing. Cardiovascular: Negative for chest pain and leg swelling. Gastrointestinal: Negative for constipation, diarrhea, nausea and vomiting. Endocrine: Negative for polydipsia and polyuria. Genitourinary: Negative for dysuria, frequency and hematuria. Musculoskeletal: Negative for arthralgias, gait problem and myalgias. Skin: Negative for rash and wound. Rash on back     Allergic/Immunologic: Negative for environmental allergies and immunocompromised state. Neurological: Negative for dizziness, light-headedness and headaches. Hematological: Does not bruise/bleed easily. Psychiatric/Behavioral: Negative for dysphoric mood and sleep disturbance. The patient is not nervous/anxious. Objective:     Physical Exam  Vitals and nursing note reviewed.    Constitutional: General: He is not in acute distress. Appearance: He is well-developed. HENT:      Head: Normocephalic and atraumatic. Right Ear: External ear normal.      Left Ear: External ear normal.      Nose: Nose normal.   Eyes:      General: No scleral icterus. Right eye: No discharge. Left eye: No discharge. Conjunctiva/sclera: Conjunctivae normal.   Neck:      Thyroid: No thyromegaly. Vascular: No JVD. Cardiovascular:      Rate and Rhythm: Normal rate and regular rhythm. Heart sounds: Normal heart sounds. Pulmonary:      Effort: Pulmonary effort is normal. No respiratory distress. Breath sounds: Normal breath sounds. No stridor. No wheezing or rales. Abdominal:      General: Bowel sounds are normal. There is no distension. Palpations: Abdomen is soft. Tenderness: There is no abdominal tenderness. Musculoskeletal:         General: No deformity. Normal range of motion. Right shoulder: No tenderness or bony tenderness. Left shoulder: No tenderness or bony tenderness. Cervical back: Neck supple. No tenderness or bony tenderness. Thoracic back: No spasms, tenderness or bony tenderness. Lumbar back: No tenderness or bony tenderness. Left Lower Extremity: Left leg is amputated above knee. Lymphadenopathy:      Cervical: No cervical adenopathy. Upper Body:      Right upper body: No supraclavicular adenopathy. Left upper body: No supraclavicular adenopathy. Skin:     General: Skin is warm and dry. Findings: Rash present. No erythema. Neurological:      Mental Status: He is alert and oriented to person, place, and time. Motor: No atrophy, abnormal muscle tone or seizure activity. Psychiatric:         Speech: Speech normal.         Behavior: Behavior normal.       BP (!) 135/58   Pulse 65   Temp 97.5 °F (36.4 °C)   Resp 20   Wt 262 lb 6.4 oz (119 kg)   SpO2 99%   BMI 36.60 kg/m²     Assessment/Plan      1. Primary osteoarthritis involving multiple joints - chronic and continue current dose of Norco, Tylenol, Flexeril, and gabapentin. Denies any increase in pain today, but has chronic pain daily. 2. Acute on chronic combined systolic (congestive) and diastolic (congestive) heart failure (HCC) -continue to monitor weights. Continue current dose of Bumex and Aldactone. Follow-up with cardiology as directed. 3. 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 4. Type 2 diabetes mellitus with other circulatory complication, with long-term current use of insulin ([facility]) -blood sugars elevated in the afternoon. Continue Lantus, Novolog at supper, but will increase, Januvia, and sliding scale insulin. 5. Stage 3b chronic kidney disease ([facility] [address] -follows with nephrology. Continue to monitor labs. 6. Di[facility] -as above. 7. Benign prostatic hyperplasia with urinary obstruction -chronic and continue catheter. Continue Proscar and Flomax. 8. Hyperlipidemia, unspecified hyperlipidemia type -chronic and continue aspirin, statin, and heart healthy diet. Continue to monitor labs. 9. Essential (primary) hypertension -chronic and continue Bumex and Aldactone. Monitor for hypotension. 10. Gastroesophageal reflux disease without esophagitis -chronic and continue omeprazole. 11. AKIRA on CPAP -continue CPAP as tolerated. 12. Reactive depression -denies mood issues. Continue BuSpar and Zoloft. 13. Other insomnia -chronic and continue trazodone.      Resident has CHF, DM, CKD, HLD, HTN and it is expected to last 12 or more months. These chronic conditions place resident at a significant risk of death, acute exacerbation, or functional decline. The patient's comprehensive plan was monitored today. I spent 20 minutes reviewing plan. Patient seen and examined.   History partially obtained by chart review and nursing notes. I have reviewed patient's past medical, surgical, social, and family history and have made updates where appropriate.   See facility EMR for updated medication list.       Electronically signed by Eddie Sutton MD on 9/21/2021 at 10:38 AM

## 2021-09-28 ENCOUNTER — TELEPHONE (OUTPATIENT)
Dept: NEPHROLOGY | Age: 86
End: 2021-09-28

## 2021-09-28 ENCOUNTER — PROCEDURE VISIT (OUTPATIENT)
Dept: CARDIOLOGY CLINIC | Age: 86
End: 2021-09-28
Payer: MEDICARE

## 2021-09-28 DIAGNOSIS — Z95.0 PACEMAKER: Primary | ICD-10-CM

## 2021-09-28 PROCEDURE — 93296 REM INTERROG EVL PM/IDS: CPT | Performed by: NUCLEAR MEDICINE

## 2021-09-28 PROCEDURE — 93294 REM INTERROG EVL PM/LDLS PM: CPT | Performed by: NUCLEAR MEDICINE

## 2021-09-28 NOTE — PROGRESS NOTES
carelink medtronic dual pacer     . Amaury Rossri Battery longevity:  11.7 years on device   Presenting rhythm  AS     Atrial impedance 342  RV impedance 418    P wave sensing 2.9  R wave sensing 17.3    30.1 % atrial paced  98.6 % RV paced     Atrial threshold 0.625 V  at 0.4ms  RV threshold 0.625 V at 0.4ms  Mode switches   0  8/8/21  7 beats VT

## 2021-09-29 RX ORDER — TORSEMIDE 10 MG/1
40 TABLET ORAL DAILY
Status: ON HOLD | COMMUNITY
End: 2022-10-15 | Stop reason: HOSPADM

## 2021-10-11 LAB
PTH INTACT: 122
VITAMIN D 25-HYDROXY: 32
VITAMIN D2, 25 HYDROXY: NORMAL
VITAMIN D3,25 HYDROXY: NORMAL

## 2021-10-27 ENCOUNTER — OUTSIDE SERVICES (OUTPATIENT)
Dept: FAMILY MEDICINE CLINIC | Age: 86
End: 2021-10-27
Payer: MEDICARE

## 2021-10-27 VITALS
SYSTOLIC BLOOD PRESSURE: 142 MMHG | TEMPERATURE: 97.1 F | OXYGEN SATURATION: 96 % | RESPIRATION RATE: 20 BRPM | DIASTOLIC BLOOD PRESSURE: 60 MMHG | WEIGHT: 258.6 LBS | BODY MASS INDEX: 36.07 KG/M2 | HEART RATE: 62 BPM

## 2021-10-27 DIAGNOSIS — Z79.4 TYPE 2 DIABETES MELLITUS WITH OTHER CIRCULATORY COMPLICATION, WITH LONG-TERM CURRENT USE OF INSULIN (HCC): ICD-10-CM

## 2021-10-27 DIAGNOSIS — K21.9 GASTROESOPHAGEAL REFLUX DISEASE WITHOUT ESOPHAGITIS: ICD-10-CM

## 2021-10-27 DIAGNOSIS — M15.9 PRIMARY OSTEOARTHRITIS INVOLVING MULTIPLE JOINTS: Primary | ICD-10-CM

## 2021-10-27 DIAGNOSIS — I10 ESSENTIAL (PRIMARY) HYPERTENSION: ICD-10-CM

## 2021-10-27 DIAGNOSIS — E78.5 HYPERLIPIDEMIA, UNSPECIFIED HYPERLIPIDEMIA TYPE: ICD-10-CM

## 2021-10-27 DIAGNOSIS — Z89.612 STATUS POST ABOVE-KNEE AMPUTATION OF LEFT LOWER EXTREMITY (HCC): ICD-10-CM

## 2021-10-27 DIAGNOSIS — F32.9 REACTIVE DEPRESSION: ICD-10-CM

## 2021-10-27 DIAGNOSIS — N40.1 BENIGN PROSTATIC HYPERPLASIA WITH URINARY OBSTRUCTION: ICD-10-CM

## 2021-10-27 DIAGNOSIS — N13.8 BENIGN PROSTATIC HYPERPLASIA WITH URINARY OBSTRUCTION: ICD-10-CM

## 2021-10-27 DIAGNOSIS — G47.33 OSA ON CPAP: ICD-10-CM

## 2021-10-27 DIAGNOSIS — N18.32 STAGE 3B CHRONIC KIDNEY DISEASE (HCC): ICD-10-CM

## 2021-10-27 DIAGNOSIS — E11.42 DIABETIC PERIPHERAL NEUROPATHY (HCC): ICD-10-CM

## 2021-10-27 DIAGNOSIS — E11.59 TYPE 2 DIABETES MELLITUS WITH OTHER CIRCULATORY COMPLICATION, WITH LONG-TERM CURRENT USE OF INSULIN (HCC): ICD-10-CM

## 2021-10-27 DIAGNOSIS — Z99.89 OSA ON CPAP: ICD-10-CM

## 2021-10-27 DIAGNOSIS — I50.43 ACUTE ON CHRONIC COMBINED SYSTOLIC (CONGESTIVE) AND DIASTOLIC (CONGESTIVE) HEART FAILURE (HCC): ICD-10-CM

## 2021-10-27 DIAGNOSIS — G47.09 OTHER INSOMNIA: ICD-10-CM

## 2021-10-27 PROCEDURE — 99309 SBSQ NF CARE MODERATE MDM 30: CPT | Performed by: NURSE PRACTITIONER

## 2021-10-27 PROCEDURE — 99490 CHRNC CARE MGMT STAFF 1ST 20: CPT | Performed by: NURSE PRACTITIONER

## 2021-10-27 ASSESSMENT — ENCOUNTER SYMPTOMS
VOMITING: 0
DIARRHEA: 0
COUGH: 0
RHINORRHEA: 0
COLOR CHANGE: 1
ROS SKIN COMMENTS: RASH ON BACK
NAUSEA: 0
SHORTNESS OF BREATH: 0
WHEEZING: 0
SORE THROAT: 0
CONSTIPATION: 0
EYE REDNESS: 0

## 2021-10-27 NOTE — PROGRESS NOTES
Jorge L Stevenson is a 80 y.o. male who presents today for his medical conditions/complaints as noted below. Chief Complaint   Patient presents with    1 Month Follow-Up     Follow up on chronic health conditions           HPI:     Patient seen and examined in his room today for follow-up on his chronic health conditions he is laying in bed and was taking a nap upon entering room but is easily aroused. He has chronic health conditions of diabetes, chronic kidney disease, BPH, hypertension, hyperlipidemia, depression, insomnia, GERD. He has bruising on his left arm and this was noted after a recent incident with drew lift. He does have some mild swelling at the elbow. He is wearing a compression sleeve to the area. He also has an area on his right head that is suspicious and likely needs to be referred to dermatology. He will discuss with wife. Blood sugars are stable. Past Medical History:   Diagnosis Date    BPH (benign prostatic hyperplasia)     Chronic kidney disease     CKD (chronic kidney disease) stage 3, GFR 30-59 ml/min ([facility]) 4/16/2015    Edema     Hiatal hernia     Hyperlipidemia     Hypertension     Osteoarthritis     S/P PTCA: 5/12/2015: Stenting of proximal left circumflex artery with Xience 2.75X15 mm, post-dilated to 3.70 mm. 5/12/2015 5/12/2015: Stenting of proximal left circumflex artery with Xience 2.75X15 mm, post-dilated to 3.70 mm.  Dr. Jennifer Turner Type II or unspecified type diabetes mellitus without mention of complication, not stated as uncontrolled       Past Surgical History:   Procedure Laterality Date    [facility]15    [facility]    CATARACT REMOVAL      BILAT    COLONOSCOPY      CORONARY ANGIOPLASTY  2015    EKG 12-LEAD  5/12/2015         FOOT SURGERY      multiple    HIP SURGERY Right 6/29/2020    RIGHT HIP INTERTAN performed by Kayla Strauss MD at [address] [place] [address]  05/12/2017    PACEMAKER INSERTION  PACEMAKER PLACEMENT      TONSILLECTOMY AND ADENOIDECTOMY         Family History   Problem Relation Age of Onset    Cancer Mother     Diabetes Father     Cancer Brother     Diabetes Paternal Grandmother     Diabetes Paternal Grandfather        Social History     Tobacco Use    Smoking status: Never Smoker    Smokeless tobacco: Never Used   Substance Use Topics    Alcohol use: Yes     Comment: Occasionally      No Known Allergies    Health Maintenance   Topic Date Due    DTaP/Tdap/Td vaccine (1 - Tdap) Never done    Shingles Vaccine (1 of 2) Never done    Lipid screen  06/10/2017    COVID-19 Vaccine (3 - Pfizer booster) 07/29/2021    Flu vaccine (1) 09/01/2021    Potassium monitoring  09/20/2022    Creatinine monitoring  09/20/2022    Pneumococcal 65+ years Vaccine  Completed    Hepatitis A vaccine  Aged Out    Hib vaccine  Aged Out    Meningococcal (ACWY) vaccine  Aged Out       Subjective:      Review of Systems   Constitutional: Negative for chills, fatigue and fever. HENT: Positive for hearing loss. Negative for congestion, rhinorrhea and sore throat. Eyes: Positive for visual disturbance. Negative for redness. Respiratory: Negative for cough, shortness of breath and wheezing. Cardiovascular: Negative for chest pain and leg swelling. Gastrointestinal: Negative for constipation, diarrhea, nausea and vomiting. Endocrine: Negative for polydipsia and polyuria. Genitourinary: Negative for dysuria, frequency and hematuria. Musculoskeletal: Negative for arthralgias, gait problem and myalgias. Skin: Positive for color change. Negative for rash and wound. Rash on back     Allergic/Immunologic: Negative for environmental allergies and immunocompromised state. Neurological: Negative for dizziness, light-headedness and headaches. Hematological: Bruises/bleeds easily. Psychiatric/Behavioral: Negative for dysphoric mood and sleep disturbance.  The patient is not nervous/anxious. Objective:     Physical Exam  Vitals and nursing note reviewed. Constitutional:       General: He is not in acute distress. Appearance: He is well-developed. HENT:      Head: Normocephalic and atraumatic. Right Ear: External ear normal.      Left Ear: External ear normal.      Nose: Nose normal.   Eyes:      General: No scleral icterus. Right eye: No discharge. Left eye: No discharge. Conjunctiva/sclera: Conjunctivae normal.   Neck:      Thyroid: No thyromegaly. Vascular: No JVD. Cardiovascular:      Rate and Rhythm: Normal rate and regular rhythm. Heart sounds: Normal heart sounds. Pulmonary:      Effort: Pulmonary effort is normal. No respiratory distress. Breath sounds: Normal breath sounds. No stridor. No wheezing or rales. Abdominal:      General: Bowel sounds are normal. There is no distension. Palpations: Abdomen is soft. Tenderness: There is no abdominal tenderness. Musculoskeletal:         General: No deformity. Normal range of motion. Right shoulder: No tenderness or bony tenderness. Left shoulder: No tenderness or bony tenderness. Cervical back: Neck supple. No tenderness or bony tenderness. Thoracic back: No spasms, tenderness or bony tenderness. Lumbar back: No tenderness or bony tenderness. Left Lower Extremity: Left leg is amputated above knee. Lymphadenopathy:      Cervical: No cervical adenopathy. Upper Body:      Right upper body: No supraclavicular adenopathy. Left upper body: No supraclavicular adenopathy. Skin:     General: Skin is warm and dry. Findings: Bruising, ecchymosis and rash present. No erythema. Neurological:      Mental Status: He is alert and oriented to person, place, and time. Motor: No atrophy, abnormal muscle tone or seizure activity.    Psychiatric:         Speech: Speech normal.         Behavior: Behavior normal.       BP (!) 142/60 Pulse 62   Temp 97.1 °F (36.2 °C)   Resp 20   Wt 258 lb 9.6 oz (117.3 kg)   SpO2 96%   BMI 36.07 kg/m²     Assessment/Plan      1. Primary osteoarthritis involving multiple joints - chronic and continue current dose of Norco, Tylenol, Flexeril, and gabapentin. Denies any increase in pain today, but has chronic pain daily. 2. Acute on chronic combined systolic (congestive) and diastolic (congestive) heart failure (HCC) -continue to monitor weights. Continue current dose of Bumex and Aldactone. Follow-up with cardiology as directed. Weight down in the last month. 3. 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 4. Type 2 diabetes mellitus with other circulatory complication, with long-term current use of insulin ([facility]) -blood sugars variable but stable. Continue Lantus, Novolog at supper, but will increase, Januvia, and sliding scale insulin. 5. Stage 3b chronic kidney disease ([facility] [address] -follows with nephrology. Continue to monitor labs. 6. Di[facility] peripheral [facility] -as above. 7. Benign prostatic hyperplasia with urinary obstruction -chronic and continue catheter. Continue Proscar and Flomax. 8. Hyperlipidemia, unspecified hyperlipidemia type -chronic and continue aspirin, statin, and heart healthy diet. Continue to monitor labs. 9. Essential (primary) hypertension -chronic and continue Bumex and Aldactone. Monitor for hypotension. 10. Gastroesophageal reflux disease without esophagitis -chronic and continue omeprazole. 11. AKIRA on CPAP -continue CPAP as tolerated. 12. Reactive depression -denies mood issues. Continue BuSpar and Zoloft. 13. Other insomnia -chronic and continue trazodone.      Resident has CHF, DM, CKD, HLD, HTN and it is expected to last 12 or more months. These chronic conditions place resident at a significant risk of death, acute exacerbation, or functional decline.  The

## 2021-11-05 ENCOUNTER — OFFICE VISIT (OUTPATIENT)
Dept: NEPHROLOGY | Age: 86
End: 2021-11-05
Payer: MEDICARE

## 2021-11-05 VITALS — HEART RATE: 68 BPM | SYSTOLIC BLOOD PRESSURE: 118 MMHG | DIASTOLIC BLOOD PRESSURE: 62 MMHG | OXYGEN SATURATION: 98 %

## 2021-11-05 DIAGNOSIS — N18.32 STAGE 3B CHRONIC KIDNEY DISEASE (HCC): Primary | ICD-10-CM

## 2021-11-05 PROCEDURE — 1036F TOBACCO NON-USER: CPT | Performed by: INTERNAL MEDICINE

## 2021-11-05 PROCEDURE — 99213 OFFICE O/P EST LOW 20 MIN: CPT | Performed by: INTERNAL MEDICINE

## 2021-11-05 PROCEDURE — 1123F ACP DISCUSS/DSCN MKR DOCD: CPT | Performed by: INTERNAL MEDICINE

## 2021-11-05 PROCEDURE — G8417 CALC BMI ABV UP PARAM F/U: HCPCS | Performed by: INTERNAL MEDICINE

## 2021-11-05 PROCEDURE — G8427 DOCREV CUR MEDS BY ELIG CLIN: HCPCS | Performed by: INTERNAL MEDICINE

## 2021-11-05 PROCEDURE — 4040F PNEUMOC VAC/ADMIN/RCVD: CPT | Performed by: INTERNAL MEDICINE

## 2021-11-05 PROCEDURE — G8484 FLU IMMUNIZE NO ADMIN: HCPCS | Performed by: INTERNAL MEDICINE

## 2021-11-05 NOTE — PROGRESS NOTES
[facility]    [facility], [address]   [facility], [address]  [phone]  Progress Note  11/5/2021 10:00 AM      Pt Name:    Sherine Villarreal  YOB: 1934  Primary Care Physician:  Lanre Brown MD     Chief Complaint:   Chief Complaint   Patient presents with    Follow-up     CKD III        History of Present Illness: This is a follow up visit for CKD III, fluid overload. Patient seen, accompanied by his wife. Denies any recent hospitalizations. Resides at [facility]. I changed his diuretics to Torsemide 40 mg daily b/c he was having issues with worsening edema/weight gain. He states he feels like his edema is stable, maybe slightly worse than baseline. I do not have any weights to review. Breathing is stable  Creatinine was 2.3. Lytes stable. Hx of DM with retinopathy and neuropathy, CAD/PCI ( [place] [facility]), CHF ( EF 45%), +pacemaker, BPH, urinary retention s/p SP cath, HTN, hyperlipidemia, OA.  + left BKA . Pertinent items are noted in HPI. Past History:  Past Medical History:   Diagnosis Date    BPH (benign prostatic hyperplasia)     Chronic kidney disease     CKD (chronic kidney disease) stage 3, GFR 30-59 ml/min ([facility]) 4/16/2015    Edema     Hiatal hernia     Hyperlipidemia     Hypertension     Osteoarthritis     S/P PTCA: 5/12/2015: Stenting of proximal left circumflex artery with Xience 2.75X15 mm, post-dilated to 3.70 mm. 5/12/2015 5/12/2015: Stenting of proximal left circumflex artery with Xience 2.75X15 mm, post-dilated to 3.70 mm.  Dr. Bernis Fabry Type II or unspecified type diabetes mellitus without mention of complication, not stated as uncontrolled      Past Surgical History:   Procedure Laterality Date    [facility]8/15    [facility]    CATARACT REMOVAL      BILAT    COLONOSCOPY      CORONARY ANGIOPLASTY  2015    EKG 12-LEAD  5/12/2015         FOOT SURGERY      multiple    HIP SURGERY Right 6/29/2020    RIGHT HIP INTERTAN performed by Manny Nyhan, MD at [address] [place] [address] Way  05/12/2017    PACEMAKER INSERTION      PACEMAKER PLACEMENT      TONSILLECTOMY AND ADENOIDECTOMY          VITALS:  /62 (Site: Left Upper Arm, Position: Sitting, Cuff Size: Large Adult)   Pulse 68   SpO2 98%   Wt Readings from Last 3 Encounters:   10/27/21 258 lb 9.6 oz (117.3 kg)   09/21/21 262 lb 6.4 oz (119 kg)   08/12/21 239 lb 9.6 oz (108.7 kg)     There is no height or weight on file to calculate BMI. General Appearance: alert and cooperative with exam, appears comfortable,   HEENT: EOMI, moist oral mucus membranes  Lungs: clear  Heart: S1, S2 heard, no rub  GI: soft, non-tender, no guarding, +SP cath  Extremities: 2+ edema right leg, + left BKA  Skin: warm, dry  Neurologic: no tremor, no asterixis, no focal neurologic deficits     Medications:  Current Outpatient Medications   Medication Sig Dispense Refill    torsemide (DEMADEX) 10 MG tablet Take 40 mg by mouth daily      diphenhydrAMINE (BENADRYL) 25 MG capsule Take 25 mg by mouth every 6 hours as needed for Itching       diphenhydrAMINE (BENADRYL) 2 % cream Apply topically 3 times daily as needed for Itching Apply topically 3 times daily as needed.  loratadine (CLARITIN) 10 MG tablet Take 10 mg by mouth daily      carbamide peroxide (DEBROX) 6.5 % otic solution 5 drops 2 times daily      desoximetasone (TOPICORT) 0.25 % cream Apply topically 2 times daily Apply topically 2 times daily.  glucagon 1 MG injection Inject 1 kit into the muscle once      dextromethorphan-guaiFENesin (MUCINEX DM)  MG per extended release tablet Take 1 tablet by mouth every 12 hours as needed      HYDROcodone-acetaminophen (NORCO) 5-325 MG per tablet Take 1 tablet by mouth every 6 hours as needed for Pain.       calcitRIOL (ROCALTROL) 0.25 MCG capsule Take 1 capsule by mouth three times a week 30 capsule 3    SITagliptin (JANUVIA) 50 MG tablet Take 50 mg by mouth daily      busPIRone (BUSPAR) 10 MG tablet Take 15 mg by mouth daily 7.5 mg TID      melatonin 3 MG TABS tablet Take 5 mg by mouth daily      omeprazole (PRILOSEC) 20 MG delayed release capsule Take 20 mg by mouth daily      spironolactone (ALDACTONE) 25 MG tablet Take 25 mg by mouth daily      traZODone (DESYREL) 100 MG tablet Take 100 mg by mouth nightly      triamcinolone (KENALOG) 0.1 % cream Apply topically 2 times daily Apply topically 2 times daily.       sertraline (ZOLOFT) 100 MG tablet Take 100 mg by mouth daily      ondansetron (ZOFRAN) 4 MG tablet Take 4 mg by mouth every 8 hours as needed for Nausea or Vomiting      insulin glargine (LANTUS) 100 UNIT/ML injection vial Inject 35 Units into the skin 2 times daily (Patient taking differently: Inject 40 Units into the skin 2 times daily 40  Units am 22 units bedtime) 1 vial 0    isosorbide mononitrate (IMDUR) 30 MG extended release tablet Take 1 tablet by mouth daily HOLD for SBP </=110 30 tablet 0    insulin lispro (HUMALOG) 100 UNIT/ML injection vial Inject 0-6 Units into the skin 3 times daily (with meals) **Corrective Low Dose Algorithm**  Glucose: Dose:               No Insulin  140-199 1 Unit  200-249 2 Units  250-299 3 Units  300-349 4 Units  350-399 5 Units  Over 399 6 Units 1 vial 0    insulin lispro (HUMALOG) 100 UNIT/ML injection vial Inject 10 Units into the skin 3 times daily (with meals) (Patient taking differently: Inject 24 Units into the skin 3 times daily (with meals) ) 1 vial 0    metoprolol succinate (TOPROL XL) 50 MG extended release tablet Take 1 tablet by mouth daily HOLD for SBP </=110 or HR </=55 30 tablet 0    zinc oxide (PINXAV) 30 % OINT Apply 113.4 g topically 2 times daily Groin area 57 g 0    calcium elemental (OSCAL) 500 MG TABS tablet Take 1 tablet by mouth 2 times daily 30 tablet 0    magnesium oxide (MAG-OX) 400 (241.3 Mg) MG TABS tablet Take 1 tablet by mouth 2 times daily 30 tablet 0    vitamin D (ERGOCALCIFEROL) 1.25 MG (98089 UT) CAPS capsule Take 1 capsule by mouth once a week 5 capsule 0    famotidine (PEPCID) 20 MG tablet Take 1 tablet by mouth daily 30 tablet 0    gabapentin (NEURONTIN) 300 MG capsule Take 1 capsule by mouth 2 times daily for 30 days. (Patient taking differently: Take 100 mg by mouth daily. ) 60 capsule 0    docusate sodium (COLACE) 100 MG capsule Take 100 mg by mouth daily as needed for Constipation      benzonatate (TESSALON) 200 MG capsule Take 200 mg by mouth every 8 hours as needed for Cough      BD AUTOSHIELD DUO 30G X 5 MM MISC       guaiFENesin (ROBITUSSIN) 100 MG/5ML liquid Take 10 mLs by mouth every 6 hours as needed       ASPERCREME W/LIDOCAINE EX Apply topically every 4 hours as needed (Apply to back PRN for Pain)       cyclobenzaprine (FLEXERIL) 10 MG tablet 10 mg daily Taking once daily for leg spasms and the Every 8 hours PRN TID for Pain. Hold if drowsy, confused, or sleepy.  Incontinence Supplies (BEDSIDE DRAINAGE BAG) MISC Dispense one, 2000 cc overnight urinary bag 1 each 0    finasteride (PROSCAR) 5 MG tablet Take 1 tablet by mouth daily 30 tablet 3    tamsulosin (FLOMAX) 0.4 MG capsule Take 1 capsule by mouth nightly 30 capsule 3    atorvastatin (LIPITOR) 20 MG tablet Take 20 mg by mouth nightly      clopidogrel (PLAVIX) 75 MG tablet Take 1 tablet by mouth daily 90 tablet 3    nitroGLYCERIN (NITROSTAT) 0.4 MG SL tablet Place 1 tablet under the tongue every 5 minutes as needed for Chest pain 25 tablet 0    aspirin 81 MG chewable tablet Take 1 tablet by mouth daily 30 tablet 3    acetaminophen (TYLENOL) 325 MG tablet Take 650 mg by mouth every 6 hours       Multiple Vitamins-Minerals (THERAPEUTIC MULTIVITAMIN-MINERALS) tablet Take 1 tablet by mouth daily       No current facility-administered medications for this visit.         Laboratory & Diagnostics:  CBC:   Lab Results   Component Value Date    WBC 10.6 04/05/2021 HGB 11.2 (A) 04/05/2021    HCT 35.7 (A) 04/05/2021    MCV 98.4 (H) 02/26/2021     04/05/2021     BMP:    Lab Results   Component Value Date     09/20/2021     04/05/2021     02/26/2021    K 4.8 09/20/2021    K 4.9 04/05/2021    K 4.4 02/26/2021    CL 99 09/20/2021    CL 99 04/05/2021     02/26/2021    CO2 28.0 09/20/2021    CO2 30 04/05/2021    CO2 28 02/26/2021    BUN 44 09/20/2021    BUN 53 04/05/2021    BUN 51 (H) 02/26/2021    CREATININE 2.1 09/20/2021    CREATININE 2.2 04/05/2021    CREATININE 2.1 (H) 02/26/2021    GLUCOSE 136 09/20/2021    GLUCOSE 123 04/05/2021    GLUCOSE 52 (L) 02/26/2021      Hepatic:   Lab Results   Component Value Date    AST 17 06/27/2020    AST 21 11/05/2019    AST 17 05/22/2017    ALT 13 06/27/2020    ALT 21 11/05/2019    ALT 9 (L) 05/22/2017    BILITOT 0.2 (L) 06/27/2020    BILITOT 0.3 11/05/2019    BILITOT 0.3 05/22/2017    ALKPHOS 76 06/27/2020    ALKPHOS 83 11/05/2019    ALKPHOS 84 05/22/2017     BNP:   Lab Results   Component Value Date    BNP 2,278 11/07/2019     Lipids:   Lab Results   Component Value Date    CHOL 126 06/10/2016    HDL 48 06/10/2016     INR:   Lab Results   Component Value Date    INR 1.01 02/26/2021    INR 1.08 07/05/2020    INR 1.27 (H) 05/22/2017     URINE:   Lab Results   Component Value Date    NAUR < 20 07/01/2020     Lab Results   Component Value Date    NITRU Negative 09/01/2020    COLORU Light Yellow 09/01/2020    PHUR 7.0 09/01/2020    LABCAST 8-15 HYALINE 07/01/2020    LABCAST NONE SEEN 07/01/2020    WBCUA 15-25 07/01/2020    RBCUA 3-5 07/01/2020    YEAST NONE SEEN 07/01/2020    BACTERIA NONE SEEN 07/01/2020    CLARITYU Clear 09/01/2020    SPECGRAV 1.015 07/01/2020    LEUKOCYTESUR  09/01/2020      Comment:      small    UROBILINOGEN Normal 09/01/2020    BILIRUBINUR Negative 09/01/2020    BLOODU  09/01/2020      Comment:      small    GLUCOSEU  09/01/2020      Comment:      100    KETUA Negative 09/01/2020 Microalbumen/Creatinine ratio:  No components found for: RUCREAT        Impression/Plan:     1. 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   -renally dose meds. Reduce Januvia to 25 mg daily, GFR is 25 ml/min, will discuss with PCP. Goals of care include slowing rate of progression by controlling blood pressure, blood glucoses and albuminuria and by avoiding nephrotoxins such as NSAIDs and IV contrast.     2.Chronic systolic CHF: on hypervolemic side, increase Torsemide to BID for next 3 days. 3. Hx hypervolemic hypoNa: resolved. On 2 L fluid restriction  4. Hx hyperkalemia: resolved. Ok to continue aldactone  5. Anemia:  Stable, no need for EVA at this time  6. HTN - controlled  7. DM with microalbuminuria - will hold off ACE/ARB at this time due to hx of hyperkalemia  8. Secondary hyperparathyroidism: improved, continue Calcitriol  9. Urinary retention s/p SP catheter      F/u in 4 months. Bloodwork and medications were reviewed and plan of care discussed with the patient.         Isael Berumen DO  [facility]

## 2021-11-23 ENCOUNTER — OUTSIDE SERVICES (OUTPATIENT)
Dept: FAMILY MEDICINE CLINIC | Age: 86
End: 2021-11-23
Payer: MEDICARE

## 2021-11-23 VITALS
WEIGHT: 258 LBS | OXYGEN SATURATION: 98 % | RESPIRATION RATE: 16 BRPM | TEMPERATURE: 97.5 F | DIASTOLIC BLOOD PRESSURE: 64 MMHG | SYSTOLIC BLOOD PRESSURE: 137 MMHG | BODY MASS INDEX: 35.98 KG/M2 | HEART RATE: 60 BPM

## 2021-11-23 DIAGNOSIS — G47.33 OSA ON CPAP: ICD-10-CM

## 2021-11-23 DIAGNOSIS — I10 ESSENTIAL (PRIMARY) HYPERTENSION: ICD-10-CM

## 2021-11-23 DIAGNOSIS — N18.4 CHRONIC KIDNEY DISEASE, STAGE 4 (SEVERE) (HCC): ICD-10-CM

## 2021-11-23 DIAGNOSIS — Z99.89 OSA ON CPAP: ICD-10-CM

## 2021-11-23 DIAGNOSIS — N13.8 BENIGN PROSTATIC HYPERPLASIA WITH URINARY OBSTRUCTION: ICD-10-CM

## 2021-11-23 DIAGNOSIS — E78.5 HYPERLIPIDEMIA, UNSPECIFIED HYPERLIPIDEMIA TYPE: ICD-10-CM

## 2021-11-23 DIAGNOSIS — M15.9 PRIMARY OSTEOARTHRITIS INVOLVING MULTIPLE JOINTS: Primary | ICD-10-CM

## 2021-11-23 DIAGNOSIS — G47.09 OTHER INSOMNIA: ICD-10-CM

## 2021-11-23 DIAGNOSIS — I50.43 ACUTE ON CHRONIC COMBINED SYSTOLIC (CONGESTIVE) AND DIASTOLIC (CONGESTIVE) HEART FAILURE (HCC): ICD-10-CM

## 2021-11-23 DIAGNOSIS — E11.59 TYPE 2 DIABETES MELLITUS WITH OTHER CIRCULATORY COMPLICATION, WITH LONG-TERM CURRENT USE OF INSULIN (HCC): ICD-10-CM

## 2021-11-23 DIAGNOSIS — K21.9 GASTROESOPHAGEAL REFLUX DISEASE WITHOUT ESOPHAGITIS: ICD-10-CM

## 2021-11-23 DIAGNOSIS — N40.1 BENIGN PROSTATIC HYPERPLASIA WITH URINARY OBSTRUCTION: ICD-10-CM

## 2021-11-23 DIAGNOSIS — E11.42 DIABETIC PERIPHERAL NEUROPATHY (HCC): ICD-10-CM

## 2021-11-23 DIAGNOSIS — F32.9 REACTIVE DEPRESSION: ICD-10-CM

## 2021-11-23 DIAGNOSIS — Z89.612 STATUS POST ABOVE-KNEE AMPUTATION OF LEFT LOWER EXTREMITY (HCC): ICD-10-CM

## 2021-11-23 DIAGNOSIS — Z79.4 TYPE 2 DIABETES MELLITUS WITH OTHER CIRCULATORY COMPLICATION, WITH LONG-TERM CURRENT USE OF INSULIN (HCC): ICD-10-CM

## 2021-11-23 PROCEDURE — 99490 CHRNC CARE MGMT STAFF 1ST 20: CPT | Performed by: NURSE PRACTITIONER

## 2021-11-23 PROCEDURE — 99309 SBSQ NF CARE MODERATE MDM 30: CPT | Performed by: NURSE PRACTITIONER

## 2021-11-23 ASSESSMENT — ENCOUNTER SYMPTOMS
SORE THROAT: 0
WHEEZING: 0
EYE REDNESS: 0
COUGH: 0
NAUSEA: 0
DIARRHEA: 0
SHORTNESS OF BREATH: 0
CONSTIPATION: 0
RHINORRHEA: 0
VOMITING: 0

## 2021-11-23 NOTE — PROGRESS NOTES
Soo Sotomayor is a 80 y.o. male who presents today for his medical conditions/complaints as noted below. Chief Complaint   Patient presents with    1 Month Follow-Up     Follow up on chronic health conditions           HPI:     Patient seen and examined in his room today for follow-up on his chronic health conditions. He is up in his wheelchair. He was noted to have a bruise on his right lower leg yesterday. We are currently holding his lymphedema pumps. He denies pain. He remains on Plavix and ASA. He has chronic health conditions of diabetes, chronic kidney disease, BPH, hypertension, hyperlipidemia, depression, insomnia, GERD. Blood sugars are stable. Past Medical History:   Diagnosis Date    BPH (benign prostatic hyperplasia)     Chronic kidney disease     CKD (chronic kidney disease) stage 3, GFR 30-59 ml/min ([facility]) 4/16/2015    Edema     Hiatal hernia     Hyperlipidemia     Hypertension     Osteoarthritis     S/P PTCA: 5/12/2015: Stenting of proximal left circumflex artery with Xience 2.75X15 mm, post-dilated to 3.70 mm. 5/12/2015 5/12/2015: Stenting of proximal left circumflex artery with Xience 2.75X15 mm, post-dilated to 3.70 mm.  Dr. Allison Millard Type II or unspecified type diabetes mellitus without mention of complication, not stated as uncontrolled       Past Surgical History:   Procedure Laterality Date    [facility]    CATARACT REMOVAL      BILAT    COLONOSCOPY      CORONARY ANGIOPLASTY  2015    EKG 12-LEAD  5/12/2015         FOOT SURGERY      multiple    HIP SURGERY Right 6/29/2020    RIGHT HIP INTERTAN performed by Violet Hernandez MD at [address] [place] [address]  05/12/2017    PACEMAKER INSERTION      PACEMAKER PLACEMENT      TONSILLECTOMY AND ADENOIDECTOMY         Family History   Problem Relation Age of Onset    Cancer Mother     Diabetes Father     Cancer Brother     Diabetes Paternal Grandmother  Diabetes Paternal Grandfather        Social History     Tobacco Use    Smoking status: Never Smoker    Smokeless tobacco: Never Used   Substance Use Topics    Alcohol use: Yes     Comment: Occasionally      No Known Allergies    Health Maintenance   Topic Date Due    DTaP/Tdap/Td vaccine (1 - Tdap) Never done    Shingles Vaccine (1 of 2) Never done    Lipid screen  06/10/2017    Flu vaccine (1) 09/01/2021    Potassium monitoring  09/20/2022    Creatinine monitoring  09/20/2022    Pneumococcal 65+ years Vaccine  Completed    COVID-19 Vaccine  Completed    Hepatitis A vaccine  Aged Out    Hib vaccine  Aged Out    Meningococcal (ACWY) vaccine  Aged Out       Subjective:      Review of Systems   Constitutional: Negative for chills, fatigue and fever. HENT: Positive for hearing loss. Negative for congestion, rhinorrhea and sore throat. Eyes: Negative for redness and visual disturbance. Respiratory: Negative for cough, shortness of breath and wheezing. Cardiovascular: Negative for chest pain and leg swelling. Gastrointestinal: Negative for constipation, diarrhea, nausea and vomiting. Endocrine: Negative for polydipsia and polyuria. Genitourinary: Negative for dysuria, frequency and hematuria. Musculoskeletal: Positive for gait problem. Negative for arthralgias and myalgias. Skin: Negative for rash and wound. Allergic/Immunologic: Positive for immunocompromised state. Negative for environmental allergies. Neurological: Negative for dizziness, light-headedness and headaches. Hematological: Bruises/bleeds easily. Psychiatric/Behavioral: Negative for dysphoric mood and sleep disturbance. The patient is not nervous/anxious. Objective:     Physical Exam  Vitals and nursing note reviewed. Constitutional:       General: He is not in acute distress. Appearance: He is well-developed. HENT:      Head: Normocephalic and atraumatic.       Right Ear: External ear normal. Left Ear: External ear normal.      Nose: Nose normal.   Eyes:      General: No scleral icterus. Right eye: No discharge. Left eye: No discharge. Conjunctiva/sclera: Conjunctivae normal.   Neck:      Thyroid: No thyromegaly. Vascular: No JVD. Cardiovascular:      Rate and Rhythm: Normal rate and regular rhythm. Heart sounds: Normal heart sounds. Pulmonary:      Effort: Pulmonary effort is normal. No respiratory distress. Breath sounds: Normal breath sounds. No stridor. No wheezing or rales. Chest:   Breasts:      Right: No supraclavicular adenopathy. Left: No supraclavicular adenopathy. Abdominal:      General: Bowel sounds are normal. There is no distension. Palpations: Abdomen is soft. Tenderness: There is no abdominal tenderness. Musculoskeletal:         General: No deformity. Normal range of motion. Right shoulder: No tenderness or bony tenderness. Left shoulder: No tenderness or bony tenderness. Cervical back: Neck supple. No tenderness or bony tenderness. Thoracic back: No spasms, tenderness or bony tenderness. Lumbar back: No tenderness or bony tenderness. Left Lower Extremity: Left leg is amputated above knee. Lymphadenopathy:      Cervical: No cervical adenopathy. Upper Body:      Right upper body: No supraclavicular adenopathy. Left upper body: No supraclavicular adenopathy. Skin:     General: Skin is warm and dry. Findings: Bruising present. No erythema or rash. Neurological:      Mental Status: He is alert and oriented to person, place, and time. Motor: No atrophy, abnormal muscle tone or seizure activity.    Psychiatric:         Mood and Affect: Mood normal.         Speech: Speech normal.         Behavior: Behavior normal.         Cognition and Memory: Memory normal.       /64   Pulse 60   Temp 97.5 °F (36.4 °C)   Resp 16   Wt 258 lb (117 kg)   SpO2 98%   BMI 35.98 kg/m²     Assessment/Plan      1. Primary osteoarthritis involving multiple joints -he can continue current dose of Norco, Tylenol, Flexeril, and gabapentin. 2. Chronic kidney disease, stage 4 (severe) ([facility]) -follows with nephrology. Continue to monitor labs. 3. Acute on chronic combined systolic (congestive) and diastolic (congestive) heart failure (HCC) -chronic and continue Aldactone and torsemide. Follow-up with cardiology and nephrology. 4. Status post above-knee amputation of left lower extremity ([facility]) -chronic and continue gabapentin for phantom pain. 5. Type 2 diabetes mellitus with other circulatory complication, with long-term current use of insulin ([facility]) -blood sugar stable. Continue Lantus, NovoLog, and Januvia. 6. Diabetic peripheral neuropathy ([facility])-as above. 7. Benign prostatic hyperplasia with urinary obstruction -continue urinary catheter along with Proscar and Flomax. 8. Hyperlipidemia, unspecified hyperlipidemia type -chronic and continue aspirin, statin, and heart healthy diet. Continue to monitor labs. 9. Essential (primary) hypertension -chronic and continue Demadex and Aldactone no signs and symptoms of hypotension. 10. Gastroesophageal reflux disease without esophagitis -denies GI upset. Continue omeprazole. 11. AKIRA on CPAP -refusing CPAP. This has been discontinued. 12. Reactive depression -mood overall stable. Continue BuSpar and Zoloft. 13. Other insomnia -sleeping well. Continue trazodone. Resident has CHF, DM, CKD, HLD, HTN and it is expected to last 12 or more months. These chronic conditions place resident at a significant risk of death, acute exacerbation, or functional decline. The patient's comprehensive plan was monitored today. I spent 20 minutes reviewing plan. Patient seen and examined. History partially obtained by chart review and nursing notes.  I have reviewed patient's past medical, surgical, social, and family history and have made updates where appropriate.   See facility EMR for updated medication list.       Electronically signed by DENY Quinones CNP on 11/23/2021 at 4:06 PM

## 2021-12-28 ENCOUNTER — OUTSIDE SERVICES (OUTPATIENT)
Dept: FAMILY MEDICINE CLINIC | Age: 86
End: 2021-12-28
Payer: MEDICARE

## 2021-12-28 VITALS
TEMPERATURE: 98.3 F | SYSTOLIC BLOOD PRESSURE: 135 MMHG | BODY MASS INDEX: 34.85 KG/M2 | DIASTOLIC BLOOD PRESSURE: 59 MMHG | HEART RATE: 60 BPM | OXYGEN SATURATION: 92 % | RESPIRATION RATE: 16 BRPM | WEIGHT: 249.9 LBS

## 2021-12-28 DIAGNOSIS — E78.5 HYPERLIPIDEMIA, UNSPECIFIED HYPERLIPIDEMIA TYPE: ICD-10-CM

## 2021-12-28 DIAGNOSIS — M15.9 PRIMARY OSTEOARTHRITIS INVOLVING MULTIPLE JOINTS: ICD-10-CM

## 2021-12-28 DIAGNOSIS — I10 ESSENTIAL (PRIMARY) HYPERTENSION: ICD-10-CM

## 2021-12-28 DIAGNOSIS — I50.43 ACUTE ON CHRONIC COMBINED SYSTOLIC (CONGESTIVE) AND DIASTOLIC (CONGESTIVE) HEART FAILURE (HCC): ICD-10-CM

## 2021-12-28 DIAGNOSIS — E11.42 DIABETIC PERIPHERAL NEUROPATHY (HCC): ICD-10-CM

## 2021-12-28 DIAGNOSIS — K21.9 GASTROESOPHAGEAL REFLUX DISEASE WITHOUT ESOPHAGITIS: ICD-10-CM

## 2021-12-28 DIAGNOSIS — E11.59 TYPE 2 DIABETES MELLITUS WITH OTHER CIRCULATORY COMPLICATION, WITH LONG-TERM CURRENT USE OF INSULIN (HCC): Primary | ICD-10-CM

## 2021-12-28 DIAGNOSIS — N40.1 BENIGN PROSTATIC HYPERPLASIA WITH URINARY OBSTRUCTION: ICD-10-CM

## 2021-12-28 DIAGNOSIS — N13.8 BENIGN PROSTATIC HYPERPLASIA WITH URINARY OBSTRUCTION: ICD-10-CM

## 2021-12-28 DIAGNOSIS — F32.9 REACTIVE DEPRESSION: ICD-10-CM

## 2021-12-28 DIAGNOSIS — Z89.612 STATUS POST ABOVE-KNEE AMPUTATION OF LEFT LOWER EXTREMITY (HCC): ICD-10-CM

## 2021-12-28 DIAGNOSIS — N18.4 CHRONIC KIDNEY DISEASE, STAGE 4 (SEVERE) (HCC): ICD-10-CM

## 2021-12-28 DIAGNOSIS — Z79.4 TYPE 2 DIABETES MELLITUS WITH OTHER CIRCULATORY COMPLICATION, WITH LONG-TERM CURRENT USE OF INSULIN (HCC): Primary | ICD-10-CM

## 2021-12-28 PROCEDURE — 99490 CHRNC CARE MGMT STAFF 1ST 20: CPT | Performed by: FAMILY MEDICINE

## 2021-12-28 PROCEDURE — 99309 SBSQ NF CARE MODERATE MDM 30: CPT | Performed by: FAMILY MEDICINE

## 2021-12-28 ASSESSMENT — ENCOUNTER SYMPTOMS
VOMITING: 0
RHINORRHEA: 0
NAUSEA: 0
SHORTNESS OF BREATH: 0
EYE REDNESS: 0
SORE THROAT: 0
COUGH: 0
WHEEZING: 0
CONSTIPATION: 0
DIARRHEA: 0

## 2021-12-28 NOTE — PROGRESS NOTES
Jad Capone is a 80 y.o. male who presents today for his medical conditions/complaints as noted below. Chief Complaint   Patient presents with    Other     monthly follow up of chronic conditions           HPI:     HPI  Patient is seen and examined in his room today for monthly follow-up of chronic conditions. He states that he is feeling well and he has no complaints. He denies chest pain or shortness of breath. States that his bowels are working well. He is currently working on a crossword puzzle and states he enjoys doing these. He denies any issues with depression. He does have history of diabetes and his sugars were reviewed showing elevated sugars around lunch and dinnertime. He is getting 28 units of NovoLog at 5 PM and also has 40 units of Lantus daily as well as sliding scale. Sugars are still running high. Patient has a history of chronic kidney disease as well as hypertension, sleep apnea, CHF, BPH with urinary obstruction, hyperlipidemia, reactive depression. He has had no falls and weight is overall stable. No concerns per patient or staff. Past Medical History:   Diagnosis Date    BPH (benign prostatic hyperplasia)     Chronic kidney disease     CKD (chronic kidney disease) stage 3, GFR 30-59 ml/min ([facility] 4/16/2015    Edema     Hiatal hernia     Hyperlipidemia     Hypertension     Osteoarthritis     S/P PTCA: 5/12/2015: Stenting of proximal left circumflex artery with Xience 2.75X15 mm, post-dilated to 3.70 mm. 5/12/2015 5/12/2015: Stenting of proximal left circumflex artery with Xience 2.75X15 mm, post-dilated to 3.70 mm.  Dr. Nata Michel Type II or unspecified type diabetes mellitus without mention of complication, not stated as uncontrolled       Past Surgical History:   Procedure Laterality Date    C[facility]/15    [facility]    CATARACT REMOVAL      BILAT    COLONOSCOPY      CORONARY ANGIOPLASTY  2015    EKG 12-LEAD  5/12/2015         FOOT SURGERY multiple    HIP SURGERY Right 6/29/2020    RIGHT HIP INTERTAN performed by Richa Billy MD at [address]  05/12/2017    PACEMAKER INSERTION      PACEMAKER PLACEMENT      TONSILLECTOMY AND ADENOIDECTOMY         Family History   Problem Relation Age of Onset    Cancer Mother     Diabetes Father     Cancer Brother     Diabetes Paternal Grandmother     Diabetes Paternal Grandfather        Social History     Tobacco Use    Smoking status: Never Smoker    Smokeless tobacco: Never Used   Substance Use Topics    Alcohol use: Yes     Comment: Occasionally      No Known Allergies    Health Maintenance   Topic Date Due    DTaP/Tdap/Td vaccine (1 - Tdap) Never done    Shingles Vaccine (1 of 2) Never done    Lipid screen  06/10/2017    Pneumococcal 65+ yrs at Risk Vaccine (2 of 2 - PCV13) 01/06/2018    Flu vaccine (1) 09/01/2021    Potassium monitoring  09/20/2022    Creatinine monitoring  09/20/2022    COVID-19 Vaccine  Completed    Hepatitis A vaccine  Aged Out    Hib vaccine  Aged Out    Meningococcal (ACWY) vaccine  Aged Out       Subjective:      Review of Systems   Constitutional: Negative for activity change, appetite change, chills, fatigue, fever and unexpected weight change. HENT: Positive for hearing loss. Negative for congestion, rhinorrhea and sore throat. Eyes: Negative for redness and visual disturbance. Respiratory: Negative for cough, shortness of breath and wheezing. Cardiovascular: Negative for chest pain and leg swelling. Gastrointestinal: Negative for constipation, diarrhea, nausea and vomiting. Endocrine: Negative for polydipsia and polyuria. Genitourinary: Negative for dysuria, frequency and hematuria. Musculoskeletal: Positive for arthralgias and gait problem. Negative for myalgias. Skin: Negative for rash and wound. Allergic/Immunologic: Negative for environmental allergies.    Neurological: Negative for dizziness, light-headedness and headaches. Hematological: Bruises/bleeds easily. Psychiatric/Behavioral: Negative for dysphoric mood and sleep disturbance. The patient is not nervous/anxious. Objective:     Physical Exam  Vitals and nursing note reviewed. Constitutional:       General: He is not in acute distress. Appearance: He is well-developed. He is obese. HENT:      Head: Normocephalic and atraumatic. Right Ear: External ear normal. Decreased hearing noted. Left Ear: External ear normal. Decreased hearing noted. Nose: Nose normal.   Eyes:      General: No scleral icterus. Right eye: No discharge. Left eye: No discharge. Conjunctiva/sclera: Conjunctivae normal.   Neck:      Thyroid: No thyromegaly. Vascular: No JVD. Cardiovascular:      Rate and Rhythm: Normal rate and regular rhythm. Heart sounds: Normal heart sounds. Pulmonary:      Effort: Pulmonary effort is normal. No respiratory distress. Breath sounds: Normal breath sounds. No stridor. No wheezing or rales. Chest:   Breasts:      Right: No supraclavicular adenopathy. Left: No supraclavicular adenopathy. Abdominal:      General: Bowel sounds are normal. There is no distension. Palpations: Abdomen is soft. Tenderness: There is no abdominal tenderness. Musculoskeletal:         General: No deformity. Normal range of motion. Right shoulder: No tenderness or bony tenderness. Left shoulder: No tenderness or bony tenderness. Cervical back: Neck supple. No tenderness or bony tenderness. Thoracic back: No spasms, tenderness or bony tenderness. Lumbar back: No tenderness or bony tenderness. Left Lower Extremity: Left leg is amputated above knee. Lymphadenopathy:      Cervical: No cervical adenopathy. Upper Body:      Right upper body: No supraclavicular adenopathy. Left upper body: No supraclavicular adenopathy.    Skin: General: Skin is warm and dry. Findings: No erythema or rash. Neurological:      Mental Status: He is alert and oriented to person, place, and time. Motor: No atrophy, abnormal muscle tone or seizure activity. Psychiatric:         Mood and Affect: Mood normal.         Speech: Speech normal.         Behavior: Behavior normal.         Cognition and Memory: Memory normal.       BP (!) 135/59   Pulse 60   Temp 98.3 °F (36.8 °C)   Resp 16   Wt 249 lb 14.4 oz (113.4 kg)   SpO2 92%   BMI 34.85 kg/m²     Assessment/Plan      1. Type 2 diabetes mellitus with other circulatory complication, with long-term current use of insulin ([address]    2. Diabetic peripheral neuropathy ([address]    3. Primary osteoarthritis involving multiple joints    4. Chronic kidney disease, stage 4 (severe) (HCC)    5. chronic combined systolic (congestive) and diastolic (congestive) heart failure (HCC)    6. Status post above-knee amputation of left lower extremity ([address]    7. Benign prostatic hyperplasia with urinary obstruction    8. Hyperlipidemia, unspecified hyperlipidemia type    9. Essential (primary) hypertension    10. Gastroesophageal reflux disease without esophagitis    11. Reactive depression      1/2. Sugars running elevated. Will discontinue Januvia and add Trulicity 2.24 mg weekly with the intention of titrating up as needed. Continue with mealtime insulin at 5 PM as well as basal insulin daily and sliding scale. 3.  Arthritis is chronic and denies pain currently. Continue Tylenol as needed. 4.  Patient follows with nephrology for CKD. Avoid nephrotoxins. 5.  Weight overall stable. Denies current symptoms. Continue Aldactone and furosemide. Follows up with cardiology and nephrology. 6.  Chronic. Continue gabapentin for phantom pain. Assistance with transfers and ADLs. 7.  Continue Proscar and Flomax  8. Continue with statin, aspirin, heart healthy diet. Fasting labs annually.   9.  Blood pressure is well controlled. Cont demadex, aldactone. Monitor  10. Cont with omeprazole  11. Mood controlled. Cont buspar and zoloft    Resident has depression, CHF, HTN, DM and it is expected to last 12 or more months. These chronic conditions place resident at a significant risk of death, acute exacerbation, or functional decline. The patient's comprehensive plan was monitored today. I spent 20 minutes reviewing plan. Patient seen and examined. History partially obtained by chart review and nursing notes. I have reviewed patient's past medical, surgical, social, and family history and have made updates where appropriate.   See facility EMR for updated medication list.       Electronically signed by Mirian Lee MD on 12/28/2021 at 9:58 AM

## 2022-01-01 ENCOUNTER — CLINICAL DOCUMENTATION ONLY (OUTPATIENT)
Facility: CLINIC | Age: 87
End: 2022-01-01

## 2022-01-04 ENCOUNTER — PROCEDURE VISIT (OUTPATIENT)
Dept: CARDIOLOGY CLINIC | Age: 87
End: 2022-01-04
Payer: MEDICARE

## 2022-01-04 DIAGNOSIS — Z95.0 PACEMAKER: Primary | ICD-10-CM

## 2022-01-04 PROCEDURE — 93294 REM INTERROG EVL PM/LDLS PM: CPT | Performed by: NUCLEAR MEDICINE

## 2022-01-04 PROCEDURE — 93296 REM INTERROG EVL PM/IDS: CPT | Performed by: NUCLEAR MEDICINE

## 2022-01-04 NOTE — PROGRESS NOTES
[employer] medtronic dual pacer       . Brad Michael Battery longevity:  11.4 years on device   Presenting rhythm  AS     Atrial impedance 342  RV impedance 418    P wave sensing 3.1  R wave sensing 17.3    56.5 % atrial paced  93.9 % RV paced     Atrial threshold 0.75 V  at 0.4ms  RV threshold 0.625 V at 0.4ms  Mode switches   0

## 2022-01-18 ENCOUNTER — OFFICE VISIT (OUTPATIENT)
Dept: CARDIOLOGY CLINIC | Age: 87
End: 2022-01-18
Payer: MEDICARE

## 2022-01-18 VITALS
HEIGHT: 72 IN | BODY MASS INDEX: 33.86 KG/M2 | DIASTOLIC BLOOD PRESSURE: 74 MMHG | WEIGHT: 250 LBS | SYSTOLIC BLOOD PRESSURE: 114 MMHG | HEART RATE: 84 BPM

## 2022-01-18 DIAGNOSIS — I10 PRIMARY HYPERTENSION: ICD-10-CM

## 2022-01-18 DIAGNOSIS — Z95.0 PACEMAKER: ICD-10-CM

## 2022-01-18 DIAGNOSIS — I25.10 CORONARY ARTERY DISEASE INVOLVING NATIVE CORONARY ARTERY OF NATIVE HEART WITHOUT ANGINA PECTORIS: Primary | ICD-10-CM

## 2022-01-18 PROCEDURE — 1036F TOBACCO NON-USER: CPT | Performed by: NUCLEAR MEDICINE

## 2022-01-18 PROCEDURE — 1123F ACP DISCUSS/DSCN MKR DOCD: CPT | Performed by: NUCLEAR MEDICINE

## 2022-01-18 PROCEDURE — 4040F PNEUMOC VAC/ADMIN/RCVD: CPT | Performed by: NUCLEAR MEDICINE

## 2022-01-18 PROCEDURE — G8417 CALC BMI ABV UP PARAM F/U: HCPCS | Performed by: NUCLEAR MEDICINE

## 2022-01-18 PROCEDURE — G8484 FLU IMMUNIZE NO ADMIN: HCPCS | Performed by: NUCLEAR MEDICINE

## 2022-01-18 PROCEDURE — 99213 OFFICE O/P EST LOW 20 MIN: CPT | Performed by: NUCLEAR MEDICINE

## 2022-01-18 PROCEDURE — G8427 DOCREV CUR MEDS BY ELIG CLIN: HCPCS | Performed by: NUCLEAR MEDICINE

## 2022-01-18 RX ORDER — DULAGLUTIDE 0.75 MG/.5ML
4.5 INJECTION, SOLUTION SUBCUTANEOUS DAILY
Status: ON HOLD | COMMUNITY
End: 2022-10-11

## 2022-01-18 NOTE — PROGRESS NOTES
[address] [facility],4Th Floor [address] [facility]  Dept: [phone]  Dept Fax: [phone]  Loc: [phone]    Visit Date: 1/18/2022    Jose Flores is a 80 y.o. male who presents todayfor:  Chief Complaint   Patient presents with    Check-Up    Coronary Artery Disease    Hypertension    Hyperlipidemia     Known cardiorenal   No changes in breathing  No chest pain   No changes in breathing  Pacer is followed  No issues     HPI:  HPI  Past Medical History:   Diagnosis Date    BPH (benign prostatic hyperplasia)     Chronic kidney disease     CKD (chronic kidney disease) stage 3, GFR 30-59 ml/min ([facility] 4/16/2015    Edema     Hiatal hernia     Hyperlipidemia     Hypertension     Osteoarthritis     S/P PTCA: 5/12/2015: Stenting of proximal left circumflex artery with Xience 2.75X15 mm, post-dilated to 3.70 mm. 5/12/2015 5/12/2015: Stenting of proximal left circumflex artery with Xience 2.75X15 mm, post-dilated to 3.70 mm.  Dr. Josephine Álvarez Type II or unspecified type diabetes mellitus without mention of complication, not stated as uncontrolled       Past Surgical History:   Procedure Laterality Date    [facility]15    [facility]    CATARACT REMOVAL      BILAT    COLONOSCOPY      CORONARY ANGIOPLASTY  2015    EKG 12-LEAD  5/12/2015         FOOT SURGERY      multiple    HIP SURGERY Right 6/29/2020    RIGHT HIP INTERTAN performed by Patel Hwang MD at [address] [place] [address] Way  05/12/2017    PACEMAKER INSERTION      PACEMAKER PLACEMENT      TONSILLECTOMY AND ADENOIDECTOMY       Family History   Problem Relation Age of Onset    Cancer Mother     Diabetes Father     Cancer Brother     Diabetes Paternal Grandmother     Diabetes Paternal Grandfather      Social History     Tobacco Use    Smoking status: Never Smoker    Smokeless tobacco: Never Used   Substance Use Topics Units  350-399 5 Units  Over 399 6 Units 1 vial 0    insulin lispro (HUMALOG) 100 UNIT/ML injection vial Inject 10 Units into the skin 3 times daily (with meals) (Patient taking differently: Inject 24 Units into the skin 3 times daily (with meals) ) 1 vial 0    metoprolol succinate (TOPROL XL) 50 MG extended release tablet Take 1 tablet by mouth daily HOLD for SBP </=110 or HR </=55 30 tablet 0    magnesium oxide (MAG-OX) 400 (241.3 Mg) MG TABS tablet Take 1 tablet by mouth 2 times daily 30 tablet 0    vitamin D (ERGOCALCIFEROL) 1.25 MG (50864 UT) CAPS capsule Take 1 capsule by mouth once a week 5 capsule 0    docusate sodium (COLACE) 100 MG capsule Take 100 mg by mouth daily as needed for Constipation      benzonatate (TESSALON) 200 MG capsule Take 200 mg by mouth every 8 hours as needed for Cough      BD AUTOSHIELD DUO 30G X 5 MM MISC       guaiFENesin (ROBITUSSIN) 100 MG/5ML liquid Take 10 mLs by mouth every 6 hours as needed       ASPERCREME W/LIDOCAINE EX Apply topically every 4 hours as needed (Apply to back PRN for Pain)       cyclobenzaprine (FLEXERIL) 10 MG tablet 10 mg daily Taking once daily for leg spasms and the Every 8 hours PRN TID for Pain. Hold if drowsy, confused, or sleepy.       Incontinence Supplies (BEDSIDE DRAINAGE BAG) MISC Dispense one, 2000 cc overnight urinary bag 1 each 0    finasteride (PROSCAR) 5 MG tablet Take 1 tablet by mouth daily 30 tablet 3    tamsulosin (FLOMAX) 0.4 MG capsule Take 1 capsule by mouth nightly 30 capsule 3    atorvastatin (LIPITOR) 20 MG tablet Take 20 mg by mouth nightly      clopidogrel (PLAVIX) 75 MG tablet Take 1 tablet by mouth daily 90 tablet 3    nitroGLYCERIN (NITROSTAT) 0.4 MG SL tablet Place 1 tablet under the tongue every 5 minutes as needed for Chest pain 25 tablet 0    aspirin 81 MG chewable tablet Take 1 tablet by mouth daily 30 tablet 3    acetaminophen (TYLENOL) 325 MG tablet Take 650 mg by mouth every 6 hours       Multiple Vitamins-Minerals (THERAPEUTIC MULTIVITAMIN-MINERALS) tablet Take 1 tablet by mouth daily      carbamide peroxide (DEBROX) 6.5 % otic solution 5 drops 2 times daily      desoximetasone (TOPICORT) 0.25 % cream Apply topically 2 times daily Apply topically 2 times daily.  triamcinolone (KENALOG) 0.1 % cream Apply topically 2 times daily Apply topically 2 times daily.  zinc oxide (PINXAV) 30 % OINT Apply 113.4 g topically 2 times daily Groin area 57 g 0    calcium elemental (OSCAL) 500 MG TABS tablet Take 1 tablet by mouth 2 times daily 30 tablet 0    gabapentin (NEURONTIN) 300 MG capsule Take 1 capsule by mouth 2 times daily for 30 days. (Patient taking differently: Take 100 mg by mouth daily. ) 60 capsule 0     No current facility-administered medications for this visit. No Known Allergies  Health Maintenance   Topic Date Due    Depression Monitoring  Never done    DTaP/Tdap/Td vaccine (1 - Tdap) Never done    Shingles Vaccine (1 of 2) Never done    Lipid screen  06/10/2017    Pneumococcal 65+ yrs at Risk Vaccine (2 of 2 - PCV13) 01/06/2018    Flu vaccine (1) 09/01/2021    Potassium monitoring  09/20/2022    Creatinine monitoring  09/20/2022    COVID-19 Vaccine  Completed    Hepatitis A vaccine  Aged Out    Hib vaccine  Aged Out    Meningococcal (ACWY) vaccine  Aged Out       Subjective:  Review of Systems  General:   No fever, no chills, No fatigue or weight loss  Pulmonary:    No dyspnea, no wheezing  Cardiac:    Denies recent chest pain,   GI:     No nausea or vomiting, no abdominal pain  Neuro:    No dizziness or light headedness,   Musculoskeletal:  No recent active issues  Extremities:   No edema, no obvious claudication       Objective:  Physical Exam  /74   Pulse 84   Ht 6' (1.829 m)   Wt 250 lb (113.4 kg)   BMI 33.91 kg/m²   General:   Well developed, well nourished  Lungs:   Clear to auscultation  Heart:    Normal S1 S2, Slight murmur.  no rubs, no gallops  Abdomen: Soft, non tender, no organomegalies, positive bowel sounds  Extremities:   No edema, no cyanosis, good peripheral pulses  Neurological:   Awake, alert, oriented. No obvious focal deficits  Musculoskelatal:  No obvious deformities    Assessment:      Diagnosis Orders   1. Coronary artery disease involving native coronary artery of native heart without angina pectoris     2. Primary hypertension     3. Pacemaker     as above  Cardiac fair for now     Plan:  No follow-ups on file. As above  Continue risk factor modification and medical management  Thank you for allowing me to participate in the care of your patient. Please don't hesitate to contact me regarding any further issues related to the patient care    Orders Placed:  No orders of the defined types were placed in this encounter. Medications Prescribed:  No orders of the defined types were placed in this encounter. Discussed use, benefit, and side effects of prescribed medications. All patient questions answered. Pt voicedunderstanding. Instructed to continue current medications, diet and exercise. Continue risk factor modification and medical management. Patient agreed with treatment plan. Follow up as directed.     Electronically signedby Jeanne Warren MD on 1/18/2022 at 11:42 AM

## 2022-02-15 PROBLEM — N25.81 SECONDARY HYPERPARATHYROIDISM OF RENAL ORIGIN (HCC): Status: ACTIVE | Noted: 2022-02-15

## 2022-02-15 PROBLEM — I44.2 ATRIOVENTRICULAR BLOCK, COMPLETE (HCC): Status: ACTIVE | Noted: 2022-01-01

## 2022-02-15 PROBLEM — M15.9 PRIMARY OSTEOARTHRITIS INVOLVING MULTIPLE JOINTS: Status: ACTIVE | Noted: 2022-01-01

## 2022-03-04 ENCOUNTER — OFFICE VISIT (OUTPATIENT)
Dept: NEPHROLOGY | Age: 87
End: 2022-03-04
Payer: MEDICARE

## 2022-03-04 VITALS
BODY MASS INDEX: 32.69 KG/M2 | WEIGHT: 241 LBS | DIASTOLIC BLOOD PRESSURE: 74 MMHG | OXYGEN SATURATION: 98 % | SYSTOLIC BLOOD PRESSURE: 124 MMHG | HEART RATE: 64 BPM

## 2022-03-04 DIAGNOSIS — D63.1 ANEMIA IN STAGE 3 CHRONIC KIDNEY DISEASE, UNSPECIFIED WHETHER STAGE 3A OR 3B CKD (HCC): ICD-10-CM

## 2022-03-04 DIAGNOSIS — N18.30 ANEMIA IN STAGE 3 CHRONIC KIDNEY DISEASE, UNSPECIFIED WHETHER STAGE 3A OR 3B CKD (HCC): ICD-10-CM

## 2022-03-04 DIAGNOSIS — N18.32 STAGE 3B CHRONIC KIDNEY DISEASE (HCC): Primary | ICD-10-CM

## 2022-03-04 LAB
BASOPHILS ABSOLUTE: ABNORMAL
BASOPHILS ABSOLUTE: ABNORMAL
BASOPHILS RELATIVE PERCENT: ABNORMAL
BASOPHILS RELATIVE PERCENT: ABNORMAL
BUN BLDV-MCNC: 63 MG/DL
BUN BLDV-MCNC: 63 MG/DL
CALCIUM SERPL-MCNC: 8.8 MG/DL
CALCIUM SERPL-MCNC: 8.8 MG/DL
CHLORIDE BLD-SCNC: 100 MMOL/L
CHLORIDE BLD-SCNC: 100 MMOL/L
CO2: 29 MMOL/L
CO2: 29 MMOL/L
CREAT SERPL-MCNC: 2.3 MG/DL
CREAT SERPL-MCNC: 2.3 MG/DL
EOSINOPHILS ABSOLUTE: ABNORMAL
EOSINOPHILS ABSOLUTE: ABNORMAL
EOSINOPHILS RELATIVE PERCENT: ABNORMAL
EOSINOPHILS RELATIVE PERCENT: ABNORMAL
GFR CALCULATED: 25
GFR CALCULATED: 25
GLUCOSE BLD-MCNC: 201 MG/DL
GLUCOSE BLD-MCNC: 201 MG/DL
HCT VFR BLD CALC: 35.4 % (ref 41–53)
HCT VFR BLD CALC: 35.4 % (ref 41–53)
HEMOGLOBIN: 10.8 G/DL (ref 13.5–17.5)
HEMOGLOBIN: 10.8 G/DL (ref 13.5–17.5)
IRON SATURATION: 29
IRON: 62
LYMPHOCYTES ABSOLUTE: ABNORMAL
LYMPHOCYTES ABSOLUTE: ABNORMAL
LYMPHOCYTES RELATIVE PERCENT: ABNORMAL
LYMPHOCYTES RELATIVE PERCENT: ABNORMAL
MAGNESIUM: 2.3 MG/DL
MCH RBC QN AUTO: ABNORMAL PG
MCH RBC QN AUTO: ABNORMAL PG
MCHC RBC AUTO-ENTMCNC: ABNORMAL G/DL
MCHC RBC AUTO-ENTMCNC: ABNORMAL G/DL
MCV RBC AUTO: ABNORMAL FL
MCV RBC AUTO: ABNORMAL FL
MONOCYTES ABSOLUTE: ABNORMAL
MONOCYTES ABSOLUTE: ABNORMAL
MONOCYTES RELATIVE PERCENT: ABNORMAL
MONOCYTES RELATIVE PERCENT: ABNORMAL
NEUTROPHILS ABSOLUTE: ABNORMAL
NEUTROPHILS ABSOLUTE: ABNORMAL
NEUTROPHILS RELATIVE PERCENT: ABNORMAL
NEUTROPHILS RELATIVE PERCENT: ABNORMAL
PLATELET # BLD: 242 K/ΜL
PLATELET # BLD: 242 K/ΜL
PMV BLD AUTO: ABNORMAL FL
PMV BLD AUTO: ABNORMAL FL
POTASSIUM SERPL-SCNC: 5.4 MMOL/L
POTASSIUM SERPL-SCNC: 5.4 MMOL/L
RBC # BLD: 3.53 10^6/ΜL
RBC # BLD: 3.53 10^6/ΜL
SODIUM BLD-SCNC: 139 MMOL/L
SODIUM BLD-SCNC: 139 MMOL/L
TOTAL IRON BINDING CAPACITY: 215
WBC # BLD: 11 10^3/ML
WBC # BLD: 11 10^3/ML

## 2022-03-04 PROCEDURE — G8417 CALC BMI ABV UP PARAM F/U: HCPCS | Performed by: INTERNAL MEDICINE

## 2022-03-04 PROCEDURE — 1036F TOBACCO NON-USER: CPT | Performed by: INTERNAL MEDICINE

## 2022-03-04 PROCEDURE — 1123F ACP DISCUSS/DSCN MKR DOCD: CPT | Performed by: INTERNAL MEDICINE

## 2022-03-04 PROCEDURE — G8484 FLU IMMUNIZE NO ADMIN: HCPCS | Performed by: INTERNAL MEDICINE

## 2022-03-04 PROCEDURE — 4040F PNEUMOC VAC/ADMIN/RCVD: CPT | Performed by: INTERNAL MEDICINE

## 2022-03-04 PROCEDURE — 99213 OFFICE O/P EST LOW 20 MIN: CPT | Performed by: INTERNAL MEDICINE

## 2022-03-04 PROCEDURE — G8427 DOCREV CUR MEDS BY ELIG CLIN: HCPCS | Performed by: INTERNAL MEDICINE

## 2022-03-04 NOTE — PROGRESS NOTES
[facility]    [facility], [address]   [address] [facility], [facility] [place]  [phone]  Progress Note  3/4/2022 10:00 AM      Pt Name:    Alena Novak  YOB: 1934  Primary Care Physician:  Sudhakar Vidales MD     Chief Complaint:   Chief Complaint   Patient presents with    Follow-up     CKD III        History of Present Illness: This is a follow up visit for CKD III, fluid overload. Patient seen, accompanied by his wife. Denies any recent hospitalizations. Swelling is under control. Weight actually down 10 pounds from last month. On torsemide 40 mg daily. Denies any shortness of breath. Hx of DM with retinopathy and neuropathy, CAD/PCI ( [place] [place]), CHF ( EF 45%), +pacemaker, BPH, urinary retention s/p SP cath, HTN, hyperlipidemia, OA.  + left BKA . Pertinent items are noted in HPI. Past History:  Past Medical History:   Diagnosis Date    BPH (benign prostatic hyperplasia)     Chronic kidney disease     CKD (chronic kidney disease) stage 3, GFR 30-59 ml/min ([facility]) 4/16/2015    Edema     Hiatal hernia     Hyperlipidemia     Hypertension     Osteoarthritis     S/P PTCA: 5/12/2015: Stenting of proximal left circumflex artery with Xience 2.75X15 mm, post-dilated to 3.70 mm. 5/12/2015 5/12/2015: Stenting of proximal left circumflex artery with Xience 2.75X15 mm, post-dilated to 3.70 mm.  Dr. Jackelin Horvath Type II or unspecified type diabetes mellitus without mention of complication, not stated as uncontrolled      Past Surgical History:   Procedure Laterality Date    C[facility]  5/8/15    [facility]    CATARACT REMOVAL      BILAT    COLONOSCOPY      CORONARY ANGIOPLASTY  2015    EKG 12-LEAD  5/12/2015         FOOT SURGERY      multiple    HIP SURGERY Right 6/29/2020    RIGHT HIP INTERTAN performed by Ran Singer MD at [address] [place] [address]  05/12/2017    PACEMAKER INSERTION      PACEMAKER PLACEMENT  TONSILLECTOMY AND ADENOIDECTOMY          VITALS:  /74 (Site: Right Upper Arm, Position: Sitting, Cuff Size: Large Adult)   Pulse 64   Wt 241 lb (109.3 kg)   SpO2 98%   BMI 32.69 kg/m²   Wt Readings from Last 3 Encounters:   03/04/22 241 lb (109.3 kg)   02/15/22 251 lb 4.8 oz (114 kg)   01/18/22 250 lb (113.4 kg)     Body mass index is 32.69 kg/m². General Appearance: alert and cooperative with exam, appears comfortable,   HEENT: EOMI, moist oral mucus membranes  Lungs: clear  Heart: S1, S2 heard, no rub  GI: soft, non-tender, no guarding, +SP cath  Extremities: 1+ edema right leg + left BKA  Skin: warm, dry  Neurologic: no tremor, no asterixis, no focal neurologic deficits     Medications:  Current Outpatient Medications   Medication Sig Dispense Refill    Carboxymethylcellulose Sodium (ARTIFICIAL TEARS OP) Apply to eye      Dulaglutide (TRULICITY) 4.79 MX/5.3FX SOPN Inject 1.5 mg into the skin once a week       torsemide (DEMADEX) 10 MG tablet Take 40 mg by mouth daily      loratadine (CLARITIN) 10 MG tablet Take 10 mg by mouth daily      carbamide peroxide (DEBROX) 6.5 % otic solution 5 drops 2 times daily      desoximetasone (TOPICORT) 0.25 % cream Apply topically 2 times daily Apply topically 2 times daily.  glucagon 1 MG injection Inject 1 kit into the muscle once      dextromethorphan-guaiFENesin (MUCINEX DM)  MG per extended release tablet Take 1 tablet by mouth every 12 hours as needed      HYDROcodone-acetaminophen (NORCO) 5-325 MG per tablet Take 1 tablet by mouth every 6 hours as needed for Pain.       calcitRIOL (ROCALTROL) 0.25 MCG capsule Take 1 capsule by mouth three times a week 30 capsule 3    busPIRone (BUSPAR) 10 MG tablet Take 7.5 mg by mouth 3 times daily       melatonin 3 MG TABS tablet Take 5 mg by mouth daily      omeprazole (PRILOSEC) 20 MG delayed release capsule Take 20 mg by mouth daily      spironolactone (ALDACTONE) 25 MG tablet Take 25 mg by (COLACE) 100 MG capsule Take 100 mg by mouth daily as needed for Constipation      benzonatate (TESSALON) 200 MG capsule Take 200 mg by mouth every 8 hours as needed for Cough      BD AUTOSHIELD DUO 30G X 5 MM MISC       guaiFENesin (ROBITUSSIN) 100 MG/5ML liquid Take 10 mLs by mouth every 6 hours as needed       ASPERCREME W/LIDOCAINE EX Apply topically every 4 hours as needed (Apply to back PRN for Pain)       cyclobenzaprine (FLEXERIL) 10 MG tablet 10 mg daily Taking once daily for leg spasms and the Every 8 hours PRN TID for Pain. Hold if drowsy, confused, or sleepy.  Incontinence Supplies (BEDSIDE DRAINAGE BAG) MISC Dispense one, 2000 cc overnight urinary bag 1 each 0    finasteride (PROSCAR) 5 MG tablet Take 1 tablet by mouth daily 30 tablet 3    tamsulosin (FLOMAX) 0.4 MG capsule Take 1 capsule by mouth nightly 30 capsule 3    atorvastatin (LIPITOR) 20 MG tablet Take 20 mg by mouth nightly      clopidogrel (PLAVIX) 75 MG tablet Take 1 tablet by mouth daily 90 tablet 3    nitroGLYCERIN (NITROSTAT) 0.4 MG SL tablet Place 1 tablet under the tongue every 5 minutes as needed for Chest pain 25 tablet 0    aspirin 81 MG chewable tablet Take 1 tablet by mouth daily 30 tablet 3    acetaminophen (TYLENOL) 325 MG tablet Take 650 mg by mouth every 6 hours       Multiple Vitamins-Minerals (THERAPEUTIC MULTIVITAMIN-MINERALS) tablet Take 1 tablet by mouth daily       No current facility-administered medications for this visit.         Laboratory & Diagnostics:  CBC:   Lab Results   Component Value Date    WBC 10.6 04/05/2021    HGB 11.2 (A) 04/05/2021    HCT 35.7 (A) 04/05/2021    MCV 98.4 (H) 02/26/2021     04/05/2021     BMP:    Lab Results   Component Value Date     09/20/2021     04/05/2021     02/26/2021    K 4.8 09/20/2021    K 4.9 04/05/2021    K 4.4 02/26/2021    CL 99 09/20/2021    CL 99 04/05/2021     02/26/2021    CO2 28.0 09/20/2021    CO2 30 04/05/2021    CO2 28 02/26/2021    BUN 44 09/20/2021    BUN 53 04/05/2021    BUN 51 (H) 02/26/2021    CREATININE 2.1 09/20/2021    CREATININE 2.2 04/05/2021    CREATININE 2.1 (H) 02/26/2021    GLUCOSE 136 09/20/2021    GLUCOSE 123 04/05/2021    GLUCOSE 52 (L) 02/26/2021      Hepatic:   Lab Results   Component Value Date    AST 17 06/27/2020    AST 21 11/05/2019    AST 17 05/22/2017    ALT 13 06/27/2020    ALT 21 11/05/2019    ALT 9 (L) 05/22/2017    BILITOT 0.2 (L) 06/27/2020    BILITOT 0.3 11/05/2019    BILITOT 0.3 05/22/2017    ALKPHOS 76 06/27/2020    ALKPHOS 83 11/05/2019    ALKPHOS 84 05/22/2017     BNP:   Lab Results   Component Value Date    BNP 2,278 11/07/2019     Lipids:   Lab Results   Component Value Date    CHOL 126 06/10/2016    HDL 48 06/10/2016     INR:   Lab Results   Component Value Date    INR 1.01 02/26/2021    INR 1.08 07/05/2020    INR 1.27 (H) 05/22/2017     URINE:   Lab Results   Component Value Date    NAUR < 20 07/01/2020     Lab Results   Component Value Date    NITRU Negative 09/01/2020    COLORU Light Yellow 09/01/2020    PHUR 7.0 09/01/2020    LABCAST 8-15 HYALINE 07/01/2020    LABCAST NONE SEEN 07/01/2020    WBCUA 15-25 07/01/2020    RBCUA 3-5 07/01/2020    YEAST NONE SEEN 07/01/2020    BACTERIA NONE SEEN 07/01/2020    CLARITYU Clear 09/01/2020    SPECGRAV 1.015 07/01/2020    LEUKOCYTESUR  09/01/2020      Comment:      small    UROBILINOGEN Normal 09/01/2020    BILIRUBINUR Negative 09/01/2020    BLOODU  09/01/2020      Comment:      small    GLUCOSEU  09/01/2020      Comment:      100    KETUA Negative 09/01/2020      Microalbumen/Creatinine ratio:  No components found for: RUCREAT        Impression/Plan:     1.  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 2.Chronic systolic CHF: euvolemic     3. Hx hypervolemic hypoNa:   On 2 L fluid restriction  4. Hx hyperkalemia  5. Anemia:  braulio if Hgb < 10  6. HTN - controlled  7. DM with microalbuminuria - will hold off ACE/ARB at this time due to hx of hyperkalemia  8. Secondary hyperparathyroidism: improved, continue Calcitriol  9. Urinary retention s/p SP catheter      F/u in 6 months. Bloodwork and medications were reviewed and plan of care discussed with the patient.         Isabel Polk DO  [facility]

## 2022-03-08 ENCOUNTER — TELEPHONE (OUTPATIENT)
Dept: NEPHROLOGY | Age: 87
End: 2022-03-08

## 2022-03-08 DIAGNOSIS — N18.32 STAGE 3B CHRONIC KIDNEY DISEASE (HCC): Primary | ICD-10-CM

## 2022-03-08 NOTE — TELEPHONE ENCOUNTER
----- Message from Macrina Fisher DO sent at 3/7/2022  8:20 PM EST -----  Labs look ok but potassium is on the higher side. Hold spironolactone x 3 days.    Low potassium diet  Bmp next week  ----- Message -----  From: Lonnie Sahni CMA  Sent: 3/7/2022   9:19 AM EST  To: Macrina Fisher DO

## 2022-03-24 LAB
BASOPHILS ABSOLUTE: ABNORMAL
BASOPHILS RELATIVE PERCENT: ABNORMAL
BUN BLDV-MCNC: 63 MG/DL
CALCIUM SERPL-MCNC: 8.6 MG/DL
CHLORIDE BLD-SCNC: 100 MMOL/L
CO2: 30 MMOL/L
CREAT SERPL-MCNC: 2.1 MG/DL
EOSINOPHILS ABSOLUTE: ABNORMAL
EOSINOPHILS RELATIVE PERCENT: ABNORMAL
GFR CALCULATED: 27
GLUCOSE BLD-MCNC: 137 MG/DL
HCT VFR BLD CALC: 34.6 % (ref 41–53)
HEMOGLOBIN: 10.8 G/DL (ref 13.5–17.5)
IRON SATURATION: 24
IRON: 47
LYMPHOCYTES ABSOLUTE: ABNORMAL
LYMPHOCYTES RELATIVE PERCENT: ABNORMAL
MAGNESIUM: 2.3 MG/DL
MCH RBC QN AUTO: ABNORMAL PG
MCHC RBC AUTO-ENTMCNC: ABNORMAL G/DL
MCV RBC AUTO: ABNORMAL FL
MONOCYTES ABSOLUTE: ABNORMAL
MONOCYTES RELATIVE PERCENT: ABNORMAL
NEUTROPHILS ABSOLUTE: ABNORMAL
NEUTROPHILS RELATIVE PERCENT: ABNORMAL
PLATELET # BLD: 226 K/ΜL
PMV BLD AUTO: ABNORMAL FL
POTASSIUM SERPL-SCNC: 4.8 MMOL/L
RBC # BLD: 3.48 10^6/ΜL
SODIUM BLD-SCNC: 139 MMOL/L
TOTAL IRON BINDING CAPACITY: 199
WBC # BLD: 11.4 10^3/ML

## 2022-03-25 ENCOUNTER — TELEPHONE (OUTPATIENT)
Dept: NEPHROLOGY | Age: 87
End: 2022-03-25

## 2022-03-25 NOTE — TELEPHONE ENCOUNTER
----- Message from Heather Blanco DO sent at 3/25/2022 11:11 AM EDT -----  Labs are stable and potassium is improved  ----- Message -----  From: Lesia Handy CMA  Sent: 3/25/2022   8:38 AM EDT  To: Heather Blanco DO

## 2022-03-29 ENCOUNTER — NURSE ONLY (OUTPATIENT)
Dept: CARDIOLOGY CLINIC | Age: 87
End: 2022-03-29
Payer: MEDICARE

## 2022-03-29 DIAGNOSIS — Z95.0 PACEMAKER: Primary | ICD-10-CM

## 2022-03-29 PROCEDURE — 93288 INTERROG EVL PM/LDLS PM IP: CPT | Performed by: NUCLEAR MEDICINE

## 2022-03-29 NOTE — PROGRESS NOTES
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

## 2022-07-11 ENCOUNTER — PROCEDURE VISIT (OUTPATIENT)
Dept: CARDIOLOGY CLINIC | Age: 87
End: 2022-07-11
Payer: MEDICARE

## 2022-07-11 DIAGNOSIS — Z95.0 PACEMAKER: Primary | ICD-10-CM

## 2022-07-11 PROCEDURE — 93294 REM INTERROG EVL PM/LDLS PM: CPT | Performed by: NUCLEAR MEDICINE

## 2022-07-11 PROCEDURE — 93296 REM INTERROG EVL PM/IDS: CPT | Performed by: NUCLEAR MEDICINE

## 2022-07-11 NOTE — PROGRESS NOTES
[facility] medtronic dual pacer     . Bonne [place] Battery longevity:  11 years on device   Presenting rhythm  As     Atrial impedance 342  RV impedance 418    P wave sensing 3.3  R wave sensing 13.5    9.8 % atrial paced  94.6 % RV paced     Atrial threshold 0.5 V  at 0.4ms  RV threshold 0.75 V at 0.4ms  Mode switches   <0.1

## 2022-07-12 ENCOUNTER — TELEPHONE (OUTPATIENT)
Dept: NEPHROLOGY | Age: 87
End: 2022-07-12

## 2022-07-12 DIAGNOSIS — N18.30 ANEMIA IN STAGE 3 CHRONIC KIDNEY DISEASE, UNSPECIFIED WHETHER STAGE 3A OR 3B CKD (HCC): Primary | ICD-10-CM

## 2022-07-12 DIAGNOSIS — D63.1 ANEMIA IN STAGE 3 CHRONIC KIDNEY DISEASE, UNSPECIFIED WHETHER STAGE 3A OR 3B CKD (HCC): Primary | ICD-10-CM

## 2022-07-12 LAB
BASOPHILS ABSOLUTE: ABNORMAL
BASOPHILS RELATIVE PERCENT: ABNORMAL
BILIRUBIN, URINE: NEGATIVE
BLOOD, URINE: POSITIVE
BUN BLDV-MCNC: 54 MG/DL
CALCIUM SERPL-MCNC: 8.9 MG/DL
CHLORIDE BLD-SCNC: 98 MMOL/L
CLARITY: ABNORMAL
CO2: 29 MMOL/L
COLOR: YELLOW
CREAT SERPL-MCNC: 2.6 MG/DL
EOSINOPHILS ABSOLUTE: ABNORMAL
EOSINOPHILS RELATIVE PERCENT: ABNORMAL
GFR CALCULATED: 21
GLUCOSE BLD-MCNC: 155 MG/DL
GLUCOSE URINE: NEGATIVE
HCT VFR BLD CALC: 34.5 % (ref 41–53)
HEMOGLOBIN: 10.6 G/DL (ref 13.5–17.5)
KETONES, URINE: NEGATIVE
LEUKOCYTE ESTERASE, URINE: ABNORMAL
LYMPHOCYTES ABSOLUTE: ABNORMAL
LYMPHOCYTES RELATIVE PERCENT: ABNORMAL
MCH RBC QN AUTO: ABNORMAL PG
MCHC RBC AUTO-ENTMCNC: ABNORMAL G/DL
MCV RBC AUTO: ABNORMAL FL
MONOCYTES ABSOLUTE: ABNORMAL
MONOCYTES RELATIVE PERCENT: ABNORMAL
NEUTROPHILS ABSOLUTE: ABNORMAL
NEUTROPHILS RELATIVE PERCENT: ABNORMAL
NITRITE, URINE: NEGATIVE
PH UA: 8 (ref 4.5–8)
PLATELET # BLD: 326 K/ΜL
PMV BLD AUTO: ABNORMAL FL
POTASSIUM SERPL-SCNC: 5.2 MMOL/L
PROTEIN UA: POSITIVE
RBC # BLD: 3.45 10^6/ΜL
SODIUM BLD-SCNC: 136 MMOL/L
SPECIFIC GRAVITY, URINE: 1.01
UROBILINOGEN, URINE: NORMAL
WBC # BLD: 13.4 10^3/ML

## 2022-08-02 LAB
BASOPHILS ABSOLUTE: ABNORMAL
BASOPHILS RELATIVE PERCENT: ABNORMAL
BUN BLDV-MCNC: 69 MG/DL
CALCIUM SERPL-MCNC: 8.3 MG/DL
CHLORIDE BLD-SCNC: 103 MMOL/L
CO2: 31 MMOL/L
CREAT SERPL-MCNC: 2.8 MG/DL
EOSINOPHILS ABSOLUTE: ABNORMAL
EOSINOPHILS RELATIVE PERCENT: ABNORMAL
GFR CALCULATED: 19
GLUCOSE BLD-MCNC: 74 MG/DL
HCT VFR BLD CALC: 33.1 % (ref 41–53)
HEMOGLOBIN: 10.2 G/DL (ref 13.5–17.5)
LYMPHOCYTES ABSOLUTE: ABNORMAL
LYMPHOCYTES RELATIVE PERCENT: ABNORMAL
MCH RBC QN AUTO: ABNORMAL PG
MCHC RBC AUTO-ENTMCNC: ABNORMAL G/DL
MCV RBC AUTO: ABNORMAL FL
MONOCYTES ABSOLUTE: ABNORMAL
MONOCYTES RELATIVE PERCENT: ABNORMAL
NEUTROPHILS ABSOLUTE: ABNORMAL
NEUTROPHILS RELATIVE PERCENT: ABNORMAL
PLATELET # BLD: 274 K/ΜL
PMV BLD AUTO: ABNORMAL FL
POTASSIUM SERPL-SCNC: 5 MMOL/L
RBC # BLD: 3.35 10^6/ΜL
SODIUM BLD-SCNC: 141 MMOL/L
WBC # BLD: 11.7 10^3/ML

## 2022-08-11 LAB
BUN BLDV-MCNC: 45 MG/DL
CALCIUM SERPL-MCNC: 8.5 MG/DL
CHLORIDE BLD-SCNC: 102 MMOL/L
CO2: 31 MMOL/L
CREAT SERPL-MCNC: 2.3 MG/DL
GFR CALCULATED: 24
GLUCOSE BLD-MCNC: 50 MG/DL
POTASSIUM SERPL-SCNC: 5.4 MMOL/L
SODIUM BLD-SCNC: 140 MMOL/L

## 2022-08-12 ENCOUNTER — TELEPHONE (OUTPATIENT)
Dept: NEPHROLOGY | Age: 87
End: 2022-08-12

## 2022-08-12 LAB
BUN BLDV-MCNC: 47 MG/DL
CALCIUM SERPL-MCNC: 7.8 MG/DL
CHLORIDE BLD-SCNC: 102 MMOL/L
CO2: 25 MMOL/L
CREAT SERPL-MCNC: 2.4 MG/DL
GFR CALCULATED: 23
GLUCOSE BLD-MCNC: 76 MG/DL
POTASSIUM SERPL-SCNC: 5 MMOL/L
SODIUM BLD-SCNC: 138 MMOL/L

## 2022-08-12 NOTE — TELEPHONE ENCOUNTER
----- Message from Carmella Herzog DO sent at 8/12/2022 12:45 PM EDT -----  Stop spironolactone, potassium has been running hig  ----- Message -----  From: Ana Amezcua CMA  Sent: 8/12/2022  10:11 AM EDT  To: Carmella Herzog DO

## 2022-09-12 LAB
BASOPHILS ABSOLUTE: ABNORMAL
BASOPHILS RELATIVE PERCENT: ABNORMAL
BUN BLDV-MCNC: 44 MG/DL
CALCIUM SERPL-MCNC: 8.1 MG/DL
CHLORIDE BLD-SCNC: 100 MMOL/L
CO2: 31 MMOL/L
CREAT SERPL-MCNC: 2.3 MG/DL
CREATININE, URINE: 1
EOSINOPHILS ABSOLUTE: ABNORMAL
EOSINOPHILS RELATIVE PERCENT: ABNORMAL
GFR CALCULATED: 24
GLUCOSE BLD-MCNC: 87 MG/DL
HCT VFR BLD CALC: 33 % (ref 41–53)
HEMOGLOBIN: 10 G/DL (ref 13.5–17.5)
IRON SATURATION: 21
IRON: 38
LYMPHOCYTES ABSOLUTE: ABNORMAL
LYMPHOCYTES RELATIVE PERCENT: ABNORMAL
MCH RBC QN AUTO: ABNORMAL PG
MCHC RBC AUTO-ENTMCNC: ABNORMAL G/DL
MCV RBC AUTO: ABNORMAL FL
MICROALBUMIN/CREAT 24H UR: 3.5 MG/G{CREAT}
MICROALBUMIN/CREAT UR-RTO: 35
MONOCYTES ABSOLUTE: ABNORMAL
MONOCYTES RELATIVE PERCENT: ABNORMAL
NEUTROPHILS ABSOLUTE: ABNORMAL
NEUTROPHILS RELATIVE PERCENT: ABNORMAL
PDW BLD-RTO: ABNORMAL %
PLATELET # BLD: 281 K/ΜL
PMV BLD AUTO: ABNORMAL FL
POTASSIUM SERPL-SCNC: 4.7 MMOL/L
PTH INTACT: 123
RBC # BLD: 3.36 10^6/ΜL
SODIUM BLD-SCNC: 138 MMOL/L
TOTAL IRON BINDING CAPACITY: 179
WBC # BLD: 9.7 10^3/ML

## 2022-09-12 RX ORDER — MENTHOL 40 MG/ML
1 GEL TOPICAL 2 TIMES DAILY
Status: ON HOLD | COMMUNITY
End: 2022-10-15 | Stop reason: HOSPADM

## 2022-09-16 ENCOUNTER — OFFICE VISIT (OUTPATIENT)
Dept: NEPHROLOGY | Age: 87
End: 2022-09-16
Payer: MEDICARE

## 2022-09-16 VITALS
BODY MASS INDEX: 34.31 KG/M2 | HEART RATE: 64 BPM | DIASTOLIC BLOOD PRESSURE: 76 MMHG | WEIGHT: 253 LBS | OXYGEN SATURATION: 98 % | SYSTOLIC BLOOD PRESSURE: 128 MMHG

## 2022-09-16 DIAGNOSIS — D63.1 ANEMIA IN STAGE 4 CHRONIC KIDNEY DISEASE (HCC): ICD-10-CM

## 2022-09-16 DIAGNOSIS — N18.4 CKD (CHRONIC KIDNEY DISEASE) STAGE 4, GFR 15-29 ML/MIN (HCC): ICD-10-CM

## 2022-09-16 DIAGNOSIS — N18.4 ANEMIA IN STAGE 4 CHRONIC KIDNEY DISEASE (HCC): ICD-10-CM

## 2022-09-16 DIAGNOSIS — D63.1 ANEMIA IN STAGE 3 CHRONIC KIDNEY DISEASE, UNSPECIFIED WHETHER STAGE 3A OR 3B CKD (HCC): Primary | ICD-10-CM

## 2022-09-16 DIAGNOSIS — N25.81 SECONDARY HYPERPARATHYROIDISM OF RENAL ORIGIN (HCC): ICD-10-CM

## 2022-09-16 DIAGNOSIS — N18.30 ANEMIA IN STAGE 3 CHRONIC KIDNEY DISEASE, UNSPECIFIED WHETHER STAGE 3A OR 3B CKD (HCC): Primary | ICD-10-CM

## 2022-09-16 PROCEDURE — 99214 OFFICE O/P EST MOD 30 MIN: CPT | Performed by: INTERNAL MEDICINE

## 2022-09-16 PROCEDURE — 1123F ACP DISCUSS/DSCN MKR DOCD: CPT | Performed by: INTERNAL MEDICINE

## 2022-09-16 PROCEDURE — G8417 CALC BMI ABV UP PARAM F/U: HCPCS | Performed by: INTERNAL MEDICINE

## 2022-09-16 PROCEDURE — G8427 DOCREV CUR MEDS BY ELIG CLIN: HCPCS | Performed by: INTERNAL MEDICINE

## 2022-09-16 PROCEDURE — 1036F TOBACCO NON-USER: CPT | Performed by: INTERNAL MEDICINE

## 2022-09-16 NOTE — PROGRESS NOTES
[facility]    [facility], [address]   [place], [address]  [phone]  Progress Note  9/16/2022 10:00 AM      Pt Name:    Carla Manzano  YOB: 1934  Primary Care Physician:  Regi Mehta MD     Chief Complaint:   Chief Complaint   Patient presents with    Follow-up     CKD III        History of Present Illness: This is a follow up visit for CKD IIIb/IV, fluid overload. Hx of DM with retinopathy and neuropathy, CAD/PCI ( [place]), CHF ( EF 45%), +pacemaker, BPH, urinary retention s/p SP cath, HTN, hyperlipidemia, OA.  + left BKA . Patient was having issues with hyperkalemia recently and creat went up to 2.8 last month. Aldactone stopped. Also on lower dose of torsemide 20 mg from 40 mg.   Weight is up about 12 pounds from last visit but he thinks a lot is dietary related. Has some mild swelling in right thigh. Denies SOB. BP ok. Pertinent items are noted in HPI. Past History:  Past Medical History:   Diagnosis Date    BPH (benign prostatic hyperplasia)     Chronic kidney disease     CKD (chronic kidney disease) stage 3, GFR 30-59 ml/min ([facility] [address] 4/16/2015    Edema     Hiatal hernia     Hyperlipidemia     Hypertension     Osteoarthritis     S/P PTCA: 5/12/2015: Stenting of proximal left circumflex artery with Xience 2.75X15 mm, post-dilated to 3.70 mm. 5/12/2015 5/12/2015: Stenting of proximal left circumflex artery with Xience 2.75X15 mm, post-dilated to 3.70 mm.  Dr. Lit Lopez    Type II or unspecified type diabetes mellitus without mention of complication, not stated as uncontrolled      Past Surgical History:   Procedure Laterality Date    CARDIAC CATHETERIZATION  5/8/15    [facility]    CATARACT REMOVAL      BILAT    COLONOSCOPY      CORONARY ANGIOPLASTY  2015    EKG 12-LEAD  5/12/2015         FOOT SURGERY      multiple    HIP SURGERY Right 6/29/2020    RIGHT HIP INTERTAN performed by Gaby Andrade MD at [address] [place] [address] AMPUTATION BELOW KNEE  05/12/2017    PACEMAKER INSERTION      PACEMAKER PLACEMENT      TONSILLECTOMY AND ADENOIDECTOMY          VITALS:  /76 (Site: Left Lower Arm, Position: Sitting, Cuff Size: Large Adult)   Pulse 64   Wt 253 lb (114.8 kg)   SpO2 98%   BMI 34.31 kg/m²   Wt Readings from Last 3 Encounters:   09/16/22 253 lb (114.8 kg)   09/08/22 255 lb 3.2 oz (115.8 kg)   08/02/22 256 lb 14.4 oz (116.5 kg)     Body mass index is 34.31 kg/m². General Appearance: alert and cooperative with exam, appears comfortable,   HEENT: EOMI, moist oral mucus membranes  Lungs: diminished  Heart: S1, S2 heard, no rub  GI: soft, non-tender, no guarding, +SP cath  Extremities: 1+ edema right leg + left BKA  Skin: warm, dry  Neurologic: no tremor, no asterixis     Medications:  Current Outpatient Medications   Medication Sig Dispense Refill    Menthol, Topical Analgesic, (BIOFREEZE) 4 % GEL Apply topically      HYDROcodone-acetaminophen (NORCO) 5-325 MG per tablet Take 1 tablet by mouth every 6 hours as needed for Pain for up to 30 days. 60 tablet 0    Carboxymethylcellulose Sodium (ARTIFICIAL TEARS OP) Apply to eye      Dulaglutide (TRULICITY) 3.83 AG/6.2GU SOPN Inject 4.5 mg into the skin daily      torsemide (DEMADEX) 10 MG tablet Take 20 mg by mouth daily      loratadine (CLARITIN) 10 MG tablet Take 10 mg by mouth daily      carbamide peroxide (DEBROX) 6.5 % otic solution 5 drops 2 times daily      desoximetasone (TOPICORT) 0.25 % cream Apply topically 2 times daily Apply topically 2 times daily.       glucagon 1 MG injection Inject 1 kit into the muscle once      dextromethorphan-guaiFENesin (MUCINEX DM)  MG per extended release tablet Take 1 tablet by mouth every 12 hours as needed      calcitRIOL (ROCALTROL) 0.25 MCG capsule Take 1 capsule by mouth three times a week 30 capsule 3    busPIRone (BUSPAR) 10 MG tablet Take 7.5 mg by mouth 3 times daily       melatonin 3 MG TABS tablet Take 5 mg by mouth daily omeprazole (PRILOSEC) 20 MG delayed release capsule Take 20 mg by mouth daily      traZODone (DESYREL) 100 MG tablet Take 100 mg by mouth nightly      triamcinolone (KENALOG) 0.1 % cream Apply topically 2 times daily Apply topically 2 times daily. sertraline (ZOLOFT) 100 MG tablet Take 100 mg by mouth daily      ondansetron (ZOFRAN) 4 MG tablet Take 4 mg by mouth every 8 hours as needed for Nausea or Vomiting      insulin glargine (LANTUS) 100 UNIT/ML injection vial Inject 35 Units into the skin 2 times daily (Patient taking differently: Inject 40 Units into the skin 2 times daily 40  Units am 22 units bedtime) 1 vial 0    isosorbide mononitrate (IMDUR) 30 MG extended release tablet Take 1 tablet by mouth daily HOLD for SBP </=110 30 tablet 0    insulin lispro (HUMALOG) 100 UNIT/ML injection vial Inject 0-6 Units into the skin 3 times daily (with meals) **Corrective Low Dose Algorithm**  Glucose: Dose:               No Insulin  140-199 1 Unit  200-249 2 Units  250-299 3 Units  300-349 4 Units  350-399 5 Units  Over 399 6 Units 1 vial 0    insulin lispro (HUMALOG) 100 UNIT/ML injection vial Inject 10 Units into the skin 3 times daily (with meals) (Patient taking differently: Inject 24 Units into the skin 3 times daily (with meals)) 1 vial 0    metoprolol succinate (TOPROL XL) 50 MG extended release tablet Take 1 tablet by mouth daily HOLD for SBP </=110 or HR </=55 30 tablet 0    zinc oxide (PINXAV) 30 % OINT Apply 113.4 g topically 2 times daily Groin area 57 g 0    magnesium oxide (MAG-OX) 400 (241.3 Mg) MG TABS tablet Take 1 tablet by mouth 2 times daily 30 tablet 0    vitamin D (ERGOCALCIFEROL) 1.25 MG (26411 UT) CAPS capsule Take 1 capsule by mouth once a week 5 capsule 0    gabapentin (NEURONTIN) 300 MG capsule Take 1 capsule by mouth 2 times daily for 30 days.  (Patient taking differently: Take 100 mg by mouth in the morning and at bedtime.) 60 capsule 0    docusate sodium (COLACE) 100 MG capsule Take 100 mg by mouth daily as needed for Constipation      benzonatate (TESSALON) 200 MG capsule Take 200 mg by mouth every 8 hours as needed for Cough      BD AUTOSHIELD DUO 30G X 5 MM MISC       guaiFENesin (ROBITUSSIN) 100 MG/5ML liquid Take 10 mLs by mouth every 6 hours as needed       ASPERCREME W/LIDOCAINE EX Apply topically every 4 hours as needed (Apply to back PRN for Pain)       Incontinence Supplies (BEDSIDE DRAINAGE BAG) MISC Dispense one, 2000 cc overnight urinary bag 1 each 0    finasteride (PROSCAR) 5 MG tablet Take 1 tablet by mouth daily 30 tablet 3    atorvastatin (LIPITOR) 20 MG tablet Take 20 mg by mouth nightly      clopidogrel (PLAVIX) 75 MG tablet Take 1 tablet by mouth daily 90 tablet 3    nitroGLYCERIN (NITROSTAT) 0.4 MG SL tablet Place 1 tablet under the tongue every 5 minutes as needed for Chest pain 25 tablet 0    aspirin 81 MG chewable tablet Take 1 tablet by mouth daily 30 tablet 3    acetaminophen (TYLENOL) 325 MG tablet Take 650 mg by mouth in the morning and 650 mg at noon and 650 mg in the evening and 650 mg before bedtime. Multiple Vitamins-Minerals (THERAPEUTIC MULTIVITAMIN-MINERALS) tablet Take 1 tablet by mouth daily      spironolactone (ALDACTONE) 25 MG tablet Take 25 mg by mouth daily      calcium elemental (OSCAL) 500 MG TABS tablet Take 1 tablet by mouth 2 times daily 30 tablet 0    cyclobenzaprine (FLEXERIL) 10 MG tablet 10 mg daily Taking once daily for leg spasms and the Every 8 hours PRN TID for Pain. Hold if drowsy, confused, or sleepy. tamsulosin (FLOMAX) 0.4 MG capsule Take 1 capsule by mouth nightly 30 capsule 3     No current facility-administered medications for this visit.         Laboratory & Diagnostics:  CBC:   Lab Results   Component Value Date    WBC 9.7 09/12/2022    HGB 10.0 (A) 09/12/2022    HCT 33.0 (A) 09/12/2022    MCV 98.4 (H) 02/26/2021     09/12/2022     BMP:    Lab Results   Component Value Date     09/12/2022     08/12/2022     08/11/2022    K 4.7 09/12/2022    K 5.0 08/12/2022    K 5.4 08/11/2022     09/12/2022     08/12/2022     08/11/2022    CO2 31 09/12/2022    CO2 25 08/12/2022    CO2 31 08/11/2022    BUN 44 09/12/2022    BUN 47 08/12/2022    BUN 45 08/11/2022    CREATININE 2.3 09/12/2022    CREATININE 2.4 08/12/2022    CREATININE 2.3 08/11/2022    GLUCOSE 87 09/12/2022    GLUCOSE 76 08/12/2022    GLUCOSE 50 08/11/2022      Hepatic:   Lab Results   Component Value Date    AST 17 06/27/2020    AST 21 11/05/2019    AST 17 05/22/2017    ALT 13 06/27/2020    ALT 21 11/05/2019    ALT 9 (L) 05/22/2017    BILITOT 0.2 (L) 06/27/2020    BILITOT 0.3 11/05/2019    BILITOT 0.3 05/22/2017    ALKPHOS 76 06/27/2020    ALKPHOS 83 11/05/2019    ALKPHOS 84 05/22/2017     BNP:   Lab Results   Component Value Date    BNP 2,278 11/07/2019     Lipids:   Lab Results   Component Value Date    CHOL 126 06/10/2016    HDL 48 06/10/2016     INR:   Lab Results   Component Value Date    INR 1.01 02/26/2021    INR 1.08 07/05/2020    INR 1.27 (H) 05/22/2017     URINE:   Lab Results   Component Value Date/Time    NAUR < 20 07/01/2020 10:55 PM     Lab Results   Component Value Date/Time    NITRU Negative 07/12/2022 12:00 AM    COLORU Yellow 07/12/2022 12:00 AM    PHUR 8.0 07/12/2022 12:00 AM    LABCAST 8-15 HYALINE 07/01/2020 10:55 PM    LABCAST NONE SEEN 07/01/2020 10:55 PM    WBCUA 15-25 07/01/2020 10:55 PM    RBCUA 3-5 07/01/2020 10:55 PM    YEAST NONE SEEN 07/01/2020 10:55 PM    BACTERIA NONE SEEN 07/01/2020 10:55 PM    CLARITYU Slightly Cloudy 07/12/2022 12:00 AM    SPECGRAV 1.015 07/01/2020 10:55 PM    LEUKOCYTESUR Moderate 07/12/2022 12:00 AM    UROBILINOGEN Normal 07/12/2022 12:00 AM    BILIRUBINUR Negative 07/12/2022 12:00 AM    BLOODU Positive 07/12/2022 12:00 AM    GLUCOSEU Negative 07/12/2022 12:00 AM    KETUA Negative 07/12/2022 12:00 AM      Microalbumen/Creatinine ratio:  No components found for: RUCREAT Impression/Plan:     1. 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 Aldactone stopped. Torsemide reduce to 20 mg daily from 40 mg daily. Continue monitoring volume status, weight gain notify me and we may need to go back to 40 mg of torsemide       Goals of care include slowing rate of progression by controlling blood pressure, blood glucoses and albuminuria and by avoiding nephrotoxins such as NSAIDs and IV contrast.     2.Chronic systolic CHF:     3. Hx hypervolemic hypoNa, resolved: On 2 L fluid restriction  4. Hx hyperkalemia, better off aldactone  5. Anemia:  no need for EVA. Repeat in 3 months. 6. HTN - controlled  7. DM with microalbuminuria - improved. Holding off ACE/ARB d/t hyperkalemia  8. Secondary hyperparathyroidism: improved, continue Calcitriol  9. Urinary retention s/p SP catheter      F/u in 7 months. Labs again in 3 months. Bloodwork and medications were reviewed and plan of care discussed with the patient.         Kenny Quintanilla DO  [facility]

## 2022-09-27 ENCOUNTER — TELEPHONE (OUTPATIENT)
Dept: NEPHROLOGY | Age: 87
End: 2022-09-27

## 2022-09-27 NOTE — TELEPHONE ENCOUNTER
Increase Torsemide back to 40 mg daily. Take an additional 40 mg dose today. Repeat bmp in 2 weeks.    ----- Message -----   Ihsan Byrd at [address]  was notified

## 2022-10-07 ENCOUNTER — TELEPHONE (OUTPATIENT)
Dept: NEPHROLOGY | Age: 87
End: 2022-10-07

## 2022-10-07 NOTE — TELEPHONE ENCOUNTER
Spoke to pt's nurse Taisha Kevin at [facility], she understood that it is okay for pt to take Cipro daily instead of BID.

## 2022-10-10 ENCOUNTER — HOSPITAL ENCOUNTER (INPATIENT)
Age: 87
LOS: 5 days | Discharge: NURSING, REHAB OR LONG-TERM CARE | DRG: 698 | End: 2022-10-15
Attending: INTERNAL MEDICINE | Admitting: HOSPITALIST
Payer: MEDICARE

## 2022-10-10 DIAGNOSIS — E83.42 HYPOMAGNESEMIA: Primary | ICD-10-CM

## 2022-10-10 DIAGNOSIS — E87.6 HYPOKALEMIA: ICD-10-CM

## 2022-10-10 LAB
ALBUMIN SERPL-MCNC: 2.8 G/DL (ref 3.5–5.1)
ALP BLD-CCNC: 75 U/L (ref 38–126)
ALT SERPL-CCNC: 12 U/L (ref 11–66)
ANION GAP SERPL CALCULATED.3IONS-SCNC: 8 MEQ/L (ref 8–16)
AST SERPL-CCNC: 17 U/L (ref 5–40)
BACTERIA: ABNORMAL
BILIRUB SERPL-MCNC: 0.2 MG/DL (ref 0.3–1.2)
BILIRUBIN URINE: NEGATIVE
BLOOD, URINE: ABNORMAL
BUN BLDV-MCNC: 43 MG/DL (ref 7–22)
CALCIUM SERPL-MCNC: 8 MG/DL (ref 8.5–10.5)
CASTS: ABNORMAL /LPF
CASTS: ABNORMAL /LPF
CHARACTER, URINE: ABNORMAL
CHLORIDE BLD-SCNC: 96 MEQ/L (ref 98–111)
CO2: 32 MEQ/L (ref 23–33)
COLOR: YELLOW
CREAT SERPL-MCNC: 2.5 MG/DL (ref 0.4–1.2)
CRYSTALS: ABNORMAL
EPITHELIAL CELLS, UA: ABNORMAL /HPF
ERYTHROCYTE [DISTWIDTH] IN BLOOD BY AUTOMATED COUNT: 16.1 % (ref 11.5–14.5)
ERYTHROCYTE [DISTWIDTH] IN BLOOD BY AUTOMATED COUNT: 58.6 FL (ref 35–45)
GFR SERPL CREATININE-BSD FRML MDRD: 25 ML/MIN/1.73M2
GLUCOSE BLD-MCNC: 49 MG/DL (ref 70–108)
GLUCOSE BLD-MCNC: 73 MG/DL (ref 70–108)
GLUCOSE BLD-MCNC: 82 MG/DL (ref 70–108)
GLUCOSE BLD-MCNC: 87 MG/DL (ref 70–108)
GLUCOSE, URINE: NEGATIVE MG/DL
HCT VFR BLD CALC: 33.3 % (ref 42–52)
HEMOGLOBIN: 10.1 GM/DL (ref 14–18)
KETONES, URINE: NEGATIVE
LACTIC ACID: 0.5 MMOL/L (ref 0.5–2)
LEUKOCYTE ESTERASE, URINE: ABNORMAL
MCH RBC QN AUTO: 30.4 PG (ref 26–33)
MCHC RBC AUTO-ENTMCNC: 30.3 GM/DL (ref 32.2–35.5)
MCV RBC AUTO: 100.3 FL (ref 80–94)
MISCELLANEOUS LAB TEST RESULT: ABNORMAL
NITRITE, URINE: POSITIVE
PH UA: 7.5 (ref 5–9)
PLATELET # BLD: 261 THOU/MM3 (ref 130–400)
PMV BLD AUTO: 9.4 FL (ref 9.4–12.4)
POTASSIUM REFLEX MAGNESIUM: 4.4 MEQ/L (ref 3.5–5.2)
PROCALCITONIN: 0.09 NG/ML (ref 0.01–0.09)
PROTEIN UA: 30 MG/DL
RBC # BLD: 3.32 MILL/MM3 (ref 4.7–6.1)
RBC URINE: ABNORMAL /HPF
RENAL EPITHELIAL, UA: ABNORMAL
SODIUM BLD-SCNC: 136 MEQ/L (ref 135–145)
SPECIFIC GRAVITY UA: 1.01 (ref 1–1.03)
TOTAL PROTEIN: 6 G/DL (ref 6.1–8)
UROBILINOGEN, URINE: 0.2 EU/DL (ref 0–1)
WBC # BLD: 9.5 THOU/MM3 (ref 4.8–10.8)
WBC UA: > 100 /HPF
YEAST: PRESENT

## 2022-10-10 PROCEDURE — 82948 REAGENT STRIP/BLOOD GLUCOSE: CPT

## 2022-10-10 PROCEDURE — 84145 PROCALCITONIN (PCT): CPT

## 2022-10-10 PROCEDURE — 1200000000 HC SEMI PRIVATE

## 2022-10-10 PROCEDURE — 6370000000 HC RX 637 (ALT 250 FOR IP): Performed by: PHYSICIAN ASSISTANT

## 2022-10-10 PROCEDURE — 81001 URINALYSIS AUTO W/SCOPE: CPT

## 2022-10-10 PROCEDURE — 80053 COMPREHEN METABOLIC PANEL: CPT

## 2022-10-10 PROCEDURE — 83036 HEMOGLOBIN GLYCOSYLATED A1C: CPT

## 2022-10-10 PROCEDURE — 99223 1ST HOSP IP/OBS HIGH 75: CPT | Performed by: PHYSICIAN ASSISTANT

## 2022-10-10 PROCEDURE — 85027 COMPLETE CBC AUTOMATED: CPT

## 2022-10-10 PROCEDURE — 87086 URINE CULTURE/COLONY COUNT: CPT

## 2022-10-10 PROCEDURE — 2580000003 HC RX 258: Performed by: PHYSICIAN ASSISTANT

## 2022-10-10 PROCEDURE — 83605 ASSAY OF LACTIC ACID: CPT

## 2022-10-10 RX ORDER — ONDANSETRON 2 MG/ML
4 INJECTION INTRAMUSCULAR; INTRAVENOUS EVERY 6 HOURS PRN
Status: DISCONTINUED | OUTPATIENT
Start: 2022-10-10 | End: 2022-10-15 | Stop reason: HOSPADM

## 2022-10-10 RX ORDER — DOCUSATE SODIUM 100 MG/1
100 CAPSULE, LIQUID FILLED ORAL DAILY PRN
Status: DISCONTINUED | OUTPATIENT
Start: 2022-10-10 | End: 2022-10-15 | Stop reason: HOSPADM

## 2022-10-10 RX ORDER — INSULIN GLARGINE 100 [IU]/ML
22 INJECTION, SOLUTION SUBCUTANEOUS NIGHTLY
Status: DISCONTINUED | OUTPATIENT
Start: 2022-10-10 | End: 2022-10-10

## 2022-10-10 RX ORDER — ONDANSETRON 4 MG/1
4 TABLET, ORALLY DISINTEGRATING ORAL EVERY 8 HOURS PRN
Status: DISCONTINUED | OUTPATIENT
Start: 2022-10-10 | End: 2022-10-15 | Stop reason: HOSPADM

## 2022-10-10 RX ORDER — GABAPENTIN 100 MG/1
100 CAPSULE ORAL 2 TIMES DAILY
Status: DISCONTINUED | OUTPATIENT
Start: 2022-10-10 | End: 2022-10-15 | Stop reason: HOSPADM

## 2022-10-10 RX ORDER — TAMSULOSIN HYDROCHLORIDE 0.4 MG/1
0.4 CAPSULE ORAL NIGHTLY
Status: DISCONTINUED | OUTPATIENT
Start: 2022-10-11 | End: 2022-10-15 | Stop reason: HOSPADM

## 2022-10-10 RX ORDER — INSULIN GLARGINE 100 [IU]/ML
20 INJECTION, SOLUTION SUBCUTANEOUS EVERY MORNING
Status: DISCONTINUED | OUTPATIENT
Start: 2022-10-11 | End: 2022-10-15 | Stop reason: HOSPADM

## 2022-10-10 RX ORDER — LANOLIN ALCOHOL/MO/W.PET/CERES
4.5 CREAM (GRAM) TOPICAL NIGHTLY PRN
Status: DISCONTINUED | OUTPATIENT
Start: 2022-10-10 | End: 2022-10-15 | Stop reason: HOSPADM

## 2022-10-10 RX ORDER — FINASTERIDE 5 MG/1
5 TABLET, FILM COATED ORAL DAILY
Status: DISCONTINUED | OUTPATIENT
Start: 2022-10-11 | End: 2022-10-15 | Stop reason: HOSPADM

## 2022-10-10 RX ORDER — SODIUM CHLORIDE 0.9 % (FLUSH) 0.9 %
10 SYRINGE (ML) INJECTION EVERY 12 HOURS SCHEDULED
Status: DISCONTINUED | OUTPATIENT
Start: 2022-10-10 | End: 2022-10-15 | Stop reason: HOSPADM

## 2022-10-10 RX ORDER — ENOXAPARIN SODIUM 100 MG/ML
30 INJECTION SUBCUTANEOUS DAILY
Status: DISCONTINUED | OUTPATIENT
Start: 2022-10-10 | End: 2022-10-12

## 2022-10-10 RX ORDER — TORSEMIDE 20 MG/1
20 TABLET ORAL DAILY
Status: DISCONTINUED | OUTPATIENT
Start: 2022-10-10 | End: 2022-10-13

## 2022-10-10 RX ORDER — DEXTROSE MONOHYDRATE 100 MG/ML
INJECTION, SOLUTION INTRAVENOUS CONTINUOUS PRN
Status: DISCONTINUED | OUTPATIENT
Start: 2022-10-10 | End: 2022-10-15 | Stop reason: HOSPADM

## 2022-10-10 RX ORDER — ISOSORBIDE MONONITRATE 30 MG/1
30 TABLET, EXTENDED RELEASE ORAL DAILY
Status: DISCONTINUED | OUTPATIENT
Start: 2022-10-11 | End: 2022-10-15 | Stop reason: HOSPADM

## 2022-10-10 RX ORDER — INSULIN LISPRO 100 [IU]/ML
24 INJECTION, SOLUTION INTRAVENOUS; SUBCUTANEOUS
Status: DISCONTINUED | OUTPATIENT
Start: 2022-10-10 | End: 2022-10-10

## 2022-10-10 RX ORDER — PANTOPRAZOLE SODIUM 40 MG/1
40 TABLET, DELAYED RELEASE ORAL
Status: DISCONTINUED | OUTPATIENT
Start: 2022-10-11 | End: 2022-10-15 | Stop reason: HOSPADM

## 2022-10-10 RX ORDER — SODIUM CHLORIDE 9 MG/ML
INJECTION, SOLUTION INTRAVENOUS PRN
Status: DISCONTINUED | OUTPATIENT
Start: 2022-10-10 | End: 2022-10-15 | Stop reason: HOSPADM

## 2022-10-10 RX ORDER — METOPROLOL SUCCINATE 50 MG/1
50 TABLET, EXTENDED RELEASE ORAL DAILY
Status: DISCONTINUED | OUTPATIENT
Start: 2022-10-10 | End: 2022-10-15 | Stop reason: HOSPADM

## 2022-10-10 RX ORDER — POLYETHYLENE GLYCOL 3350 17 G/17G
17 POWDER, FOR SOLUTION ORAL DAILY PRN
Status: DISCONTINUED | OUTPATIENT
Start: 2022-10-10 | End: 2022-10-15 | Stop reason: HOSPADM

## 2022-10-10 RX ORDER — ASPIRIN 81 MG/1
81 TABLET, CHEWABLE ORAL DAILY
Status: DISCONTINUED | OUTPATIENT
Start: 2022-10-11 | End: 2022-10-15 | Stop reason: HOSPADM

## 2022-10-10 RX ORDER — CLOPIDOGREL BISULFATE 75 MG/1
75 TABLET ORAL DAILY
Status: DISCONTINUED | OUTPATIENT
Start: 2022-10-10 | End: 2022-10-15 | Stop reason: HOSPADM

## 2022-10-10 RX ORDER — CALCIUM CARBONATE 500(1250)
500 TABLET ORAL 2 TIMES DAILY
Status: DISCONTINUED | OUTPATIENT
Start: 2022-10-11 | End: 2022-10-15 | Stop reason: HOSPADM

## 2022-10-10 RX ORDER — TRAZODONE HYDROCHLORIDE 100 MG/1
100 TABLET ORAL NIGHTLY PRN
Status: DISCONTINUED | OUTPATIENT
Start: 2022-10-10 | End: 2022-10-15 | Stop reason: HOSPADM

## 2022-10-10 RX ORDER — SPIRONOLACTONE 25 MG/1
25 TABLET ORAL DAILY
Status: DISCONTINUED | OUTPATIENT
Start: 2022-10-10 | End: 2022-10-10

## 2022-10-10 RX ORDER — ATORVASTATIN CALCIUM 20 MG/1
20 TABLET, FILM COATED ORAL NIGHTLY
Status: DISCONTINUED | OUTPATIENT
Start: 2022-10-10 | End: 2022-10-15 | Stop reason: HOSPADM

## 2022-10-10 RX ORDER — ONDANSETRON 4 MG/1
4 TABLET, FILM COATED ORAL EVERY 8 HOURS PRN
Status: DISCONTINUED | OUTPATIENT
Start: 2022-10-10 | End: 2022-10-10 | Stop reason: SDUPTHER

## 2022-10-10 RX ORDER — BUSPIRONE HYDROCHLORIDE 7.5 MG/1
7.5 TABLET ORAL 3 TIMES DAILY
Status: DISCONTINUED | OUTPATIENT
Start: 2022-10-10 | End: 2022-10-15 | Stop reason: HOSPADM

## 2022-10-10 RX ORDER — INSULIN LISPRO 100 [IU]/ML
0-4 INJECTION, SOLUTION INTRAVENOUS; SUBCUTANEOUS
Status: DISCONTINUED | OUTPATIENT
Start: 2022-10-11 | End: 2022-10-15 | Stop reason: HOSPADM

## 2022-10-10 RX ORDER — BENZONATATE 100 MG/1
200 CAPSULE ORAL EVERY 8 HOURS PRN
Status: DISCONTINUED | OUTPATIENT
Start: 2022-10-10 | End: 2022-10-15 | Stop reason: HOSPADM

## 2022-10-10 RX ORDER — LANOLIN ALCOHOL/MO/W.PET/CERES
400 CREAM (GRAM) TOPICAL 2 TIMES DAILY
Status: DISCONTINUED | OUTPATIENT
Start: 2022-10-10 | End: 2022-10-15 | Stop reason: HOSPADM

## 2022-10-10 RX ORDER — ACETAMINOPHEN 325 MG/1
650 TABLET ORAL EVERY 6 HOURS PRN
Status: DISCONTINUED | OUTPATIENT
Start: 2022-10-10 | End: 2022-10-15 | Stop reason: HOSPADM

## 2022-10-10 RX ORDER — CYCLOBENZAPRINE HCL 10 MG
10 TABLET ORAL 3 TIMES DAILY PRN
Status: DISCONTINUED | OUTPATIENT
Start: 2022-10-11 | End: 2022-10-15 | Stop reason: HOSPADM

## 2022-10-10 RX ORDER — CALCITRIOL 0.25 UG/1
0.25 CAPSULE, LIQUID FILLED ORAL
Status: DISCONTINUED | OUTPATIENT
Start: 2022-10-12 | End: 2022-10-15 | Stop reason: HOSPADM

## 2022-10-10 RX ORDER — SERTRALINE HYDROCHLORIDE 100 MG/1
100 TABLET, FILM COATED ORAL DAILY
Status: DISCONTINUED | OUTPATIENT
Start: 2022-10-11 | End: 2022-10-15 | Stop reason: HOSPADM

## 2022-10-10 RX ORDER — INSULIN GLARGINE 100 [IU]/ML
40 INJECTION, SOLUTION SUBCUTANEOUS EVERY MORNING
Status: DISCONTINUED | OUTPATIENT
Start: 2022-10-11 | End: 2022-10-10

## 2022-10-10 RX ORDER — INSULIN LISPRO 100 [IU]/ML
0-4 INJECTION, SOLUTION INTRAVENOUS; SUBCUTANEOUS NIGHTLY
Status: DISCONTINUED | OUTPATIENT
Start: 2022-10-10 | End: 2022-10-10

## 2022-10-10 RX ORDER — SODIUM CHLORIDE 0.9 % (FLUSH) 0.9 %
10 SYRINGE (ML) INJECTION PRN
Status: DISCONTINUED | OUTPATIENT
Start: 2022-10-10 | End: 2022-10-15 | Stop reason: HOSPADM

## 2022-10-10 RX ADMIN — MAGNESIUM OXIDE 400 MG (241.3 MG MAGNESIUM) TABLET 400 MG: TABLET at 23:13

## 2022-10-10 RX ADMIN — METOPROLOL SUCCINATE 50 MG: 50 TABLET, EXTENDED RELEASE ORAL at 23:14

## 2022-10-10 RX ADMIN — DEXTROSE MONOHYDRATE 125 ML: 100 INJECTION, SOLUTION INTRAVENOUS at 23:31

## 2022-10-10 RX ADMIN — ATORVASTATIN CALCIUM 20 MG: 40 TABLET, FILM COATED ORAL at 23:13

## 2022-10-10 RX ADMIN — BUSPIRONE HYDROCHLORIDE 7.5 MG: 7.5 TABLET ORAL at 23:13

## 2022-10-10 RX ADMIN — CLOPIDOGREL BISULFATE 75 MG: 75 TABLET ORAL at 23:13

## 2022-10-10 RX ADMIN — SODIUM CHLORIDE, PRESERVATIVE FREE 10 ML: 5 INJECTION INTRAVENOUS at 23:40

## 2022-10-10 NOTE — PROGRESS NOTES
Pt admitted to  [facility] via via cart/stretcher from direct admit: [facility] . Complaints: AMS. IV normal saline infusing into the hand right, condition patent at a rate of 25 mls/ hour with about 500 mls in the bag still. IV site free of s/s of infection or infiltration. Vital signs obtained. Assessment and data collection initiated. Two nurse skin assessment performed by Radha Hyde RN and oMhit Yoder. Oriented to room. Policies and procedures for  explained. All questions answered with no further questions at this time. Fall prevention and safety brochure discussed with patient. Bed alarm on. Call light in reach. Oriented to room. Jennifer Lawton RN, RN 10/10/2022 6:57 PM     Explained patients right to have family, representative or physician notified of their admission. Patient has Declined for physician to be notified. Patient has Declined for family/representative to be notified.

## 2022-10-10 NOTE — PROGRESS NOTES
Transfer from [address] [place] [address],[address] [birth year] UTI sepsis came in 4 days ago lethargy and weakness has UTI was on Cipro today still weak only rtesponding to IV ATB has had 2gms Rocephin 127/52 98.8 62 18 100% 3L BL WBC 11.6 0.7 lactate CR 2.3 BL CKD CHF DM HTN pacer  Dr Iglesia Hylton, waiting on discharges no bed at present

## 2022-10-11 ENCOUNTER — APPOINTMENT (OUTPATIENT)
Dept: GENERAL RADIOLOGY | Age: 87
DRG: 698 | End: 2022-10-11
Attending: INTERNAL MEDICINE
Payer: MEDICARE

## 2022-10-11 PROBLEM — N39.0 UTI (URINARY TRACT INFECTION): Status: ACTIVE | Noted: 2022-01-01

## 2022-10-11 LAB
ALLEN TEST: ABNORMAL
ALLEN TEST: POSITIVE
ANION GAP SERPL CALCULATED.3IONS-SCNC: 8 MEQ/L (ref 8–16)
AVERAGE GLUCOSE: 114 MG/DL (ref 70–126)
BASE EXCESS (CALCULATED): 4.4 MMOL/L (ref -2.5–2.5)
BASE EXCESS (CALCULATED): 5 MMOL/L (ref -2.5–2.5)
BASE EXCESS (CALCULATED): 5.4 MMOL/L (ref -2.5–2.5)
BASE EXCESS (CALCULATED): 5.7 MMOL/L (ref -2.5–2.5)
BASE EXCESS (CALCULATED): 5.7 MMOL/L (ref -2.5–2.5)
BUN BLDV-MCNC: 46 MG/DL (ref 7–22)
CALCIUM IONIZED: 1.12 MMOL/L (ref 1.12–1.32)
CALCIUM SERPL-MCNC: 7.9 MG/DL (ref 8.5–10.5)
CHLORIDE BLD-SCNC: 96 MEQ/L (ref 98–111)
CO2: 30 MEQ/L (ref 23–33)
COLLECTED BY:: ABNORMAL
CREAT SERPL-MCNC: 2.5 MG/DL (ref 0.4–1.2)
DEVICE: ABNORMAL
ERYTHROCYTE [DISTWIDTH] IN BLOOD BY AUTOMATED COUNT: 15.9 % (ref 11.5–14.5)
ERYTHROCYTE [DISTWIDTH] IN BLOOD BY AUTOMATED COUNT: 58.7 FL (ref 35–45)
GFR SERPL CREATININE-BSD FRML MDRD: 25 ML/MIN/1.73M2
GLUCOSE BLD-MCNC: 100 MG/DL (ref 70–108)
GLUCOSE BLD-MCNC: 111 MG/DL (ref 70–108)
GLUCOSE BLD-MCNC: 115 MG/DL (ref 70–108)
GLUCOSE BLD-MCNC: 123 MG/DL (ref 70–108)
GLUCOSE BLD-MCNC: 125 MG/DL (ref 70–108)
GLUCOSE BLD-MCNC: 58 MG/DL (ref 70–108)
GLUCOSE BLD-MCNC: 61 MG/DL (ref 70–108)
GLUCOSE BLD-MCNC: 62 MG/DL (ref 70–108)
GLUCOSE BLD-MCNC: 66 MG/DL (ref 70–108)
GLUCOSE BLD-MCNC: 74 MG/DL (ref 70–108)
GLUCOSE BLD-MCNC: 75 MG/DL (ref 70–108)
GLUCOSE BLD-MCNC: 76 MG/DL (ref 70–108)
GLUCOSE BLD-MCNC: 78 MG/DL (ref 70–108)
GLUCOSE BLD-MCNC: 78 MG/DL (ref 70–108)
GLUCOSE BLD-MCNC: 80 MG/DL (ref 70–108)
GLUCOSE BLD-MCNC: 82 MG/DL (ref 70–108)
GLUCOSE BLD-MCNC: 91 MG/DL (ref 70–108)
GLUCOSE BLD-MCNC: 93 MG/DL (ref 70–108)
GLUCOSE BLD-MCNC: 95 MG/DL (ref 70–108)
GLUCOSE BLD-MCNC: 97 MG/DL (ref 70–108)
GLUCOSE BLD-MCNC: 97 MG/DL (ref 70–108)
HBA1C MFR BLD: 5.8 % (ref 4.4–6.4)
HCO3: 32 MMOL/L (ref 23–28)
HCO3: 32 MMOL/L (ref 23–28)
HCO3: 33 MMOL/L (ref 23–28)
HCO3: 34 MMOL/L (ref 23–28)
HCO3: 35 MMOL/L (ref 23–28)
HCT VFR BLD CALC: 31.4 % (ref 42–52)
HEMOGLOBIN: 9.6 GM/DL (ref 14–18)
IFIO2: 2
IFIO2: 30
MCH RBC QN AUTO: 30.6 PG (ref 26–33)
MCHC RBC AUTO-ENTMCNC: 30.6 GM/DL (ref 32.2–35.5)
MCV RBC AUTO: 100 FL (ref 80–94)
MODE: ABNORMAL
O2 SATURATION: 88 %
O2 SATURATION: 95 %
O2 SATURATION: 96 %
O2 SATURATION: 97 %
O2 SATURATION: 98 %
PCO2: 55 MMHG (ref 35–45)
PCO2: 60 MMHG (ref 35–45)
PCO2: 69 MMHG (ref 35–45)
PCO2: 69 MMHG (ref 35–45)
PCO2: 74 MMHG (ref 35–45)
PH BLOOD GAS: 7.28 (ref 7.35–7.45)
PH BLOOD GAS: 7.29 (ref 7.35–7.45)
PH BLOOD GAS: 7.3 (ref 7.35–7.45)
PH BLOOD GAS: 7.33 (ref 7.35–7.45)
PH BLOOD GAS: 7.37 (ref 7.35–7.45)
PIP: 18 CMH2O
PLATELET # BLD: 245 THOU/MM3 (ref 130–400)
PMV BLD AUTO: 9.5 FL (ref 9.4–12.4)
PO2: 101 MMHG (ref 71–104)
PO2: 110 MMHG (ref 71–104)
PO2: 64 MMHG (ref 71–104)
PO2: 86 MMHG (ref 71–104)
PO2: 93 MMHG (ref 71–104)
POTASSIUM SERPL-SCNC: 4.2 MEQ/L (ref 3.5–5.2)
PRO-BNP: ABNORMAL PG/ML (ref 0–1800)
RBC # BLD: 3.14 MILL/MM3 (ref 4.7–6.1)
SET PEEP: 6 MMHG
SET PRESS SUPP: 12 CMH2O
SET PRESS SUPP: 12 CMH2O
SET PRESS SUPP: 18 CMH2O
SET PRESS SUPP: 6 CMH2O
SET RESPIRATORY RATE: 12 BPM
SODIUM BLD-SCNC: 134 MEQ/L (ref 135–145)
SOURCE, BLOOD GAS: ABNORMAL
WBC # BLD: 10.9 THOU/MM3 (ref 4.8–10.8)

## 2022-10-11 PROCEDURE — 2580000003 HC RX 258: Performed by: PHYSICIAN ASSISTANT

## 2022-10-11 PROCEDURE — 85027 COMPLETE CBC AUTOMATED: CPT

## 2022-10-11 PROCEDURE — 2500000003 HC RX 250 WO HCPCS

## 2022-10-11 PROCEDURE — 82948 REAGENT STRIP/BLOOD GLUCOSE: CPT

## 2022-10-11 PROCEDURE — 94660 CPAP INITIATION&MGMT: CPT

## 2022-10-11 PROCEDURE — 94761 N-INVAS EAR/PLS OXIMETRY MLT: CPT

## 2022-10-11 PROCEDURE — 82330 ASSAY OF CALCIUM: CPT

## 2022-10-11 PROCEDURE — 99233 SBSQ HOSP IP/OBS HIGH 50: CPT | Performed by: PHYSICIAN ASSISTANT

## 2022-10-11 PROCEDURE — 36600 WITHDRAWAL OF ARTERIAL BLOOD: CPT

## 2022-10-11 PROCEDURE — 80048 BASIC METABOLIC PNL TOTAL CA: CPT

## 2022-10-11 PROCEDURE — 36415 COLL VENOUS BLD VENIPUNCTURE: CPT

## 2022-10-11 PROCEDURE — 82803 BLOOD GASES ANY COMBINATION: CPT

## 2022-10-11 PROCEDURE — 2060000000 HC ICU INTERMEDIATE R&B

## 2022-10-11 PROCEDURE — 2500000003 HC RX 250 WO HCPCS: Performed by: PHYSICIAN ASSISTANT

## 2022-10-11 PROCEDURE — 2580000003 HC RX 258: Performed by: STUDENT IN AN ORGANIZED HEALTH CARE EDUCATION/TRAINING PROGRAM

## 2022-10-11 PROCEDURE — 2700000000 HC OXYGEN THERAPY PER DAY

## 2022-10-11 PROCEDURE — 6360000002 HC RX W HCPCS: Performed by: STUDENT IN AN ORGANIZED HEALTH CARE EDUCATION/TRAINING PROGRAM

## 2022-10-11 PROCEDURE — 83880 ASSAY OF NATRIURETIC PEPTIDE: CPT

## 2022-10-11 PROCEDURE — 6370000000 HC RX 637 (ALT 250 FOR IP): Performed by: PHYSICIAN ASSISTANT

## 2022-10-11 PROCEDURE — 6360000002 HC RX W HCPCS: Performed by: PHYSICIAN ASSISTANT

## 2022-10-11 PROCEDURE — 71045 X-RAY EXAM CHEST 1 VIEW: CPT

## 2022-10-11 RX ORDER — DULAGLUTIDE 4.5 MG/.5ML
4.5 INJECTION, SOLUTION SUBCUTANEOUS WEEKLY
COMMUNITY

## 2022-10-11 RX ORDER — LABETALOL 20 MG/4 ML (5 MG/ML) INTRAVENOUS SYRINGE
5 ONCE
Status: COMPLETED | OUTPATIENT
Start: 2022-10-11 | End: 2022-10-11

## 2022-10-11 RX ORDER — DEXTROSE MONOHYDRATE 100 MG/ML
INJECTION, SOLUTION INTRAVENOUS CONTINUOUS
Status: DISCONTINUED | OUTPATIENT
Start: 2022-10-11 | End: 2022-10-12

## 2022-10-11 RX ORDER — CIPROFLOXACIN 500 MG/1
500 TABLET, FILM COATED ORAL 2 TIMES DAILY
Status: ON HOLD | COMMUNITY
End: 2022-10-15 | Stop reason: HOSPADM

## 2022-10-11 RX ORDER — DEXTROSE MONOHYDRATE 25 G/50ML
25 INJECTION, SOLUTION INTRAVENOUS ONCE
Status: DISCONTINUED | OUTPATIENT
Start: 2022-10-11 | End: 2022-10-15 | Stop reason: HOSPADM

## 2022-10-11 RX ORDER — BUMETANIDE 0.25 MG/ML
0.5 INJECTION, SOLUTION INTRAMUSCULAR; INTRAVENOUS 2 TIMES DAILY
Status: DISCONTINUED | OUTPATIENT
Start: 2022-10-11 | End: 2022-10-11

## 2022-10-11 RX ORDER — HYDROCODONE BITARTRATE AND ACETAMINOPHEN 5; 325 MG/1; MG/1
1 TABLET ORAL EVERY 6 HOURS PRN
COMMUNITY

## 2022-10-11 RX ORDER — HYDRALAZINE HYDROCHLORIDE 20 MG/ML
10 INJECTION INTRAMUSCULAR; INTRAVENOUS EVERY 6 HOURS PRN
Status: DISCONTINUED | OUTPATIENT
Start: 2022-10-11 | End: 2022-10-13

## 2022-10-11 RX ORDER — GABAPENTIN 100 MG/1
100 CAPSULE ORAL 2 TIMES DAILY
COMMUNITY

## 2022-10-11 RX ORDER — CEFTRIAXONE 1 G/1
1000 INJECTION, POWDER, FOR SOLUTION INTRAMUSCULAR; INTRAVENOUS ONCE
Status: ON HOLD | COMMUNITY
End: 2022-10-15 | Stop reason: HOSPADM

## 2022-10-11 RX ORDER — LABETALOL 20 MG/4 ML (5 MG/ML) INTRAVENOUS SYRINGE
10 EVERY 6 HOURS PRN
Status: DISCONTINUED | OUTPATIENT
Start: 2022-10-11 | End: 2022-10-13

## 2022-10-11 RX ORDER — ASPIRIN 81 MG/1
81 TABLET, CHEWABLE ORAL DAILY
COMMUNITY

## 2022-10-11 RX ORDER — DEXTROSE, SODIUM CHLORIDE, SODIUM LACTATE, POTASSIUM CHLORIDE, AND CALCIUM CHLORIDE 5; .6; .31; .03; .02 G/100ML; G/100ML; G/100ML; G/100ML; G/100ML
INJECTION, SOLUTION INTRAVENOUS CONTINUOUS
Status: DISCONTINUED | OUTPATIENT
Start: 2022-10-11 | End: 2022-10-11

## 2022-10-11 RX ORDER — BUMETANIDE 0.25 MG/ML
1 INJECTION, SOLUTION INTRAMUSCULAR; INTRAVENOUS 2 TIMES DAILY
Status: COMPLETED | OUTPATIENT
Start: 2022-10-11 | End: 2022-10-12

## 2022-10-11 RX ADMIN — CEFTRIAXONE SODIUM 1000 MG: 1 INJECTION, POWDER, FOR SOLUTION INTRAMUSCULAR; INTRAVENOUS at 08:09

## 2022-10-11 RX ADMIN — SODIUM CHLORIDE, SODIUM LACTATE, POTASSIUM CHLORIDE, CALCIUM CHLORIDE AND DEXTROSE MONOHYDRATE: 5; 600; 310; 30; 20 INJECTION, SOLUTION INTRAVENOUS at 03:21

## 2022-10-11 RX ADMIN — Medication 5 MG: at 23:40

## 2022-10-11 RX ADMIN — DEXTROSE MONOHYDRATE 125 ML: 100 INJECTION, SOLUTION INTRAVENOUS at 14:58

## 2022-10-11 RX ADMIN — Medication 5 MG: at 21:40

## 2022-10-11 RX ADMIN — ENOXAPARIN SODIUM 30 MG: 100 INJECTION SUBCUTANEOUS at 10:04

## 2022-10-11 RX ADMIN — BUMETANIDE 1 MG: 0.25 INJECTION INTRAMUSCULAR; INTRAVENOUS at 20:04

## 2022-10-11 RX ADMIN — DEXTROSE MONOHYDRATE 125 ML: 100 INJECTION, SOLUTION INTRAVENOUS at 21:11

## 2022-10-11 RX ADMIN — Medication 113.4 G: at 20:05

## 2022-10-11 RX ADMIN — DEXTROSE MONOHYDRATE 125 ML: 100 INJECTION, SOLUTION INTRAVENOUS at 03:07

## 2022-10-11 RX ADMIN — SODIUM CHLORIDE, PRESERVATIVE FREE 10 ML: 5 INJECTION INTRAVENOUS at 20:04

## 2022-10-11 RX ADMIN — DEXTROSE MONOHYDRATE: 100 INJECTION, SOLUTION INTRAVENOUS at 16:38

## 2022-10-11 RX ADMIN — BUMETANIDE 0.5 MG: 0.25 INJECTION, SOLUTION INTRAMUSCULAR; INTRAVENOUS at 10:03

## 2022-10-11 NOTE — H&P
Hospitalist History & Physical    Patient:  Nikki Berrios    Unit/Bed:5-04/004-A  YOB: 1934  MRN: [id number]   Acct: [de-identified]   PCP: Nati Chavira MD  Code Status: Full Code    Date of Service: Pt seen/examined on 10/10/22 and admitted to Inpatient with expected LOS greater than two midnights due to medical therapy. Chief Complaint: hallucinations    Assessment/Plan:    Acute metabolic encephalopathy: AOx3, with visual and auditory hallucinations. Secondary to UTI, treatment as below. UTI, catheter associated: POA. Pt with chronic suprapubic catheter for BPH/urinary retention. Urine culture from 10/6 (+) Providencia stuartii and Proteus mirabilis > 100,000CFU/mL. Both sensitive to ceftriaxone, started 10/10. Exchange catheter[facility] Check BMP, CBC, LA. Possible acute hypoxic resp failure: Pt presented on 3L NC, SpO2 93%. States O2 was placed yesterday at [place] but is not on chronically. Check CXR, BNP. Mild crackles on exam, chronic LE edema. Per chart review, torsemide was recently decreased and spironolactone stopped. May consider nephrology consult for further assistance with diuresis. CKD stage III: follows with Dr. Jose Irvin. Hx cardiorenal syndrome. Cr stable with baseline. Chronic HFrEF: Echo 06/2020 EF 45-50%, G1DD, mild pulmonary HTN. Mild crackles on exam. Resume home meds. See #3. IDDMII: with diabetic nephropathy. Rresume home insulin regimen. Accu-checks, SSI, hypoglycemia protocol. Secondary hyperparathyroidism: resume home meds. Hx hypervolemic hypoNA: on 2L fluid restriction. H/o right AKA: noted. Of note, LLE edematous, decreased sensation. Pt states this is chronic, swelling at baseline. CAD: resume home meds. Follows with Dr. Rachel Song. Has pacemaker.        History of Present Illness:  Nikki Berrios is a 80 y.o. male with PMHx of CAD, PPM, BPH with urinary retention who presented to [facility] with chief complaint of hallucinations. Patient states that he was seen in ED on Thursday for hallucinations, noted to have a UTI and started on Cipro. He states that he was notified today that culture showed resistance to Cipro and he was told to go to ED again. Patient reports continued visual and auditory hallucinations. Otherwise he is AOx3. Patient states that he thinks he was started on oxygen yesterday at the nursing home. He states he was told his oxygen was low, he did denies feeling short of breath. Denies cough, fever/chills, chest pain, abdominal pain, N/V/D. Patient states his suprapubic catheter is typically exchanged every 30 days, thinks the last was approximately 2 weeks ago. Patient reports chronic severe lower extremity edema and neuropathy, states this is consistent with his baseline. Denies pain. Patient transferred from [address] [place] [address] to [facility] for further management. Review of Systems: Pertinent positives as noted in the HPI. All other systems reviewed and negative. Past Medical History:        Diagnosis Date    BPH (benign prostatic hyperplasia)     Chronic kidney disease     CKD (chronic kidney disease) stage 3, GFR 30-59 ml/min ([facility] [address] 4/16/2015    Edema     Hiatal hernia     Hyperlipidemia     Hypertension     Osteoarthritis     S/P PTCA: 5/12/2015: Stenting of proximal left circumflex artery with Xience 2.75X15 mm, post-dilated to 3.70 mm. 5/12/2015 5/12/2015: Stenting of proximal left circumflex artery with Xience 2.75X15 mm, post-dilated to 3.70 mm.  Dr. Lauren Olivo    Type II or unspecified type diabetes mellitus without mention of complication, not stated as uncontrolled        Past Surgical History:        Procedure Laterality Date    CARDIAC CATHETERIZATION  5/8/15    [facility]    CATARACT REMOVAL      BILAT    COLONOSCOPY      CORONARY ANGIOPLASTY  2015    EKG 12-LEAD  5/12/2015         FOOT SURGERY      multiple    HIP SURGERY Right 6/29/2020    RIGHT HIP INTERTAN performed by Sharmila Monteiro MD at into the skin 2 times daily 40  Units am 22 units bedtime) 1 vial 0    isosorbide mononitrate (IMDUR) 30 MG extended release tablet Take 1 tablet by mouth daily HOLD for SBP </=110 30 tablet 0    insulin lispro (HUMALOG) 100 UNIT/ML injection vial Inject 0-6 Units into the skin 3 times daily (with meals) **Corrective Low Dose Algorithm**  Glucose: Dose:               No Insulin  140-199 1 Unit  200-249 2 Units  250-299 3 Units  300-349 4 Units  350-399 5 Units  Over 399 6 Units 1 vial 0    insulin lispro (HUMALOG) 100 UNIT/ML injection vial Inject 10 Units into the skin 3 times daily (with meals) (Patient taking differently: Inject 24 Units into the skin 3 times daily (with meals)) 1 vial 0    metoprolol succinate (TOPROL XL) 50 MG extended release tablet Take 1 tablet by mouth daily HOLD for SBP </=110 or HR </=55 30 tablet 0    zinc oxide (PINXAV) 30 % OINT Apply 113.4 g topically 2 times daily Groin area 57 g 0    calcium elemental (OSCAL) 500 MG TABS tablet Take 1 tablet by mouth 2 times daily 30 tablet 0    magnesium oxide (MAG-OX) 400 (241.3 Mg) MG TABS tablet Take 1 tablet by mouth 2 times daily 30 tablet 0    vitamin D (ERGOCALCIFEROL) 1.25 MG (26404 UT) CAPS capsule Take 1 capsule by mouth once a week 5 capsule 0    gabapentin (NEURONTIN) 300 MG capsule Take 1 capsule by mouth 2 times daily for 30 days.  (Patient taking differently: Take 100 mg by mouth in the morning and at bedtime.) 60 capsule 0    docusate sodium (COLACE) 100 MG capsule Take 100 mg by mouth daily as needed for Constipation      benzonatate (TESSALON) 200 MG capsule Take 200 mg by mouth every 8 hours as needed for Cough      BD AUTOSHIELD DUO 30G X 5 MM MISC       guaiFENesin (ROBITUSSIN) 100 MG/5ML liquid Take 10 mLs by mouth every 6 hours as needed       ASPERCREME W/LIDOCAINE EX Apply topically every 4 hours as needed (Apply to back PRN for Pain)       cyclobenzaprine (FLEXERIL) 10 MG tablet 10 mg daily Taking once daily for leg spasms and the Every 8 hours PRN TID for Pain. Hold if drowsy, confused, or sleepy. Incontinence Supplies (BEDSIDE DRAINAGE BAG) MISC Dispense one, 2000 cc overnight urinary bag 1 each 0    finasteride (PROSCAR) 5 MG tablet Take 1 tablet by mouth daily 30 tablet 3    tamsulosin (FLOMAX) 0.4 MG capsule Take 1 capsule by mouth nightly 30 capsule 3    atorvastatin (LIPITOR) 20 MG tablet Take 20 mg by mouth nightly      clopidogrel (PLAVIX) 75 MG tablet Take 1 tablet by mouth daily 90 tablet 3    nitroGLYCERIN (NITROSTAT) 0.4 MG SL tablet Place 1 tablet under the tongue every 5 minutes as needed for Chest pain 25 tablet 0    aspirin 81 MG chewable tablet Take 1 tablet by mouth daily 30 tablet 3    acetaminophen (TYLENOL) 325 MG tablet Take 650 mg by mouth in the morning and 650 mg at noon and 650 mg in the evening and 650 mg before bedtime. Multiple Vitamins-Minerals (THERAPEUTIC MULTIVITAMIN-MINERALS) tablet Take 1 tablet by mouth daily         Allergies:    Patient has no known allergies. Social History:    reports that he has never smoked. He has never used smokeless tobacco. He reports current alcohol use. He reports that he does not use drugs. Family History:       Problem Relation Age of Onset    Cancer Mother     Diabetes Father     Cancer Brother     Diabetes Paternal Grandmother     Diabetes Paternal Grandfather        Diet:  ADULT DIET; Regular; 5 carb choices (75 gm/meal); Low Fat/Low Chol/High Fiber/2 gm Na      Physical Exam:  /74   Pulse 70   Temp 98.7 °F (37.1 °C) (Oral)   Resp 20   SpO2 93%   General:   Pleasant, chronically ill-appearing male. NAD  HEENT:  normocephalic and atraumatic. No scleral icterus. PERR. Neck: supple. No JVD. No thyromegaly. Lungs: Basilar crackles. No retractions  Cardiac: RRR without murmur. Abdomen: soft. Nontender. Bowel sounds positive. Extremities:  No clubbing, cyanosis. Left aka. Right 4+ pitting edema, decreased sensation. Vasculature: capillary refill < 3 seconds. Palpable LE pulses bilaterally. Skin:  warm and dry. No rashes or lesions. Psych:  Alert and oriented x3. Affect appropriate  Lymph:  No supraclavicular adenopathy. Neurologic:  No focal deficit. No seizures. Data: (All radiographs, tracings, PFTs, and imaging are personally viewed and interpreted unless otherwise noted)  Labs:   No results for input(s): WBC, HGB, HCT, PLT in the last 72 hours. No results for input(s): NA, K, CL, CO2, BUN, CREATININE, CALCIUM, PHOS in the last 72 hours. Invalid input(s): MAGNES  No results for input(s): AST, ALT, BILIDIR, BILITOT, ALKPHOS in the last 72 hours. No results for input(s): INR in the last 72 hours. No results for input(s): Celine Fuse in the last 72 hours. Urinalysis:   Lab Results   Component Value Date/Time    NITRU Negative 07/12/2022 12:00 AM    WBCUA 15-25 07/01/2020 10:55 PM    BACTERIA NONE SEEN 07/01/2020 10:55 PM    RBCUA 3-5 07/01/2020 10:55 PM    BLOODU Positive 07/12/2022 12:00 AM    SPECGRAV 1.015 07/01/2020 10:55 PM    GLUCOSEU Negative 07/12/2022 12:00 AM       EKG: No prior ECG    Radiology:  No orders to display     No results found. Thank you Leela Howe MD for the opportunity to be involved in this patient's care.     Electronically signed by Wang Gonzales PA-C on 10/10/2022 at 8:01 PM

## 2022-10-11 NOTE — FLOWSHEET NOTE
10/11/22 1455   Safe Environment   Safety Measures Other (comment)  (VN safety round complete)   Pt remains on bi pap at his time , currently unable to complete admission required documents no distress noted

## 2022-10-11 NOTE — CARE COORDINATION
10/11/22, 9:03 AM EDT  Discharge Planning Evaluation  Social work consult received, patient from [facility]. Patient/Family preference is to return to [facility]. The patient's current payor source at the facility is Medicaid. Medicare skilled days available: yes  Insurance precert:  no  Left a message with Danae at the facility. Patient bed hold: yes  Anticipated transport plan: ambulance  Do they require COVID 19 test to return to [facility]  Is there a required time frame which which COVID test needs done:within 72hrs  Patient's Healthcare Decision Maker: Named in [address] attempted to visit with pt this morning, pt on Pap and resting, SW did not wake, no family at bedside. SW did call wife Fernando Tamayo, confirms plans to return to [facility], wife reports pt normally orientated, but when gets UTI gets confused. Wife reports pt has been at facility for 5 years, they have 2 daughters. Wife reports pt uses an electric wheelchair at facility, but needs to lift to get in the wheelchair.

## 2022-10-11 NOTE — PROGRESS NOTES
Bedside RN awoke pt to do midnight vitals. Patient appeared confused, BP, HR, Pulse ox levels WNL. Bedside RN checked blood glucose, it was 49. D10 bolus (125mL) administered and team notified.      Will recheck BG after 15 minutes

## 2022-10-11 NOTE — PROGRESS NOTES
After call to [facility] can confirm after speaking with [employer] (employee): Add Insulin glargine 30 units QAM, and 4 units at bedtime.   Add Trulicity 0.8PI once every Thursday  Remove Insulin lispro - no longer using  Removed Cyclobenzaprine  Adjusted Gabapentin to 100mg TID

## 2022-10-11 NOTE — PROGRESS NOTES
Pharmacist Review and Automatic Dose Adjustment of Prophylactic Enoxaparin    *Review reason for admission/hospital problem list*    The reviewing pharmacist has made an adjustment to the ordered enoxaparin dose or converted to UFH per the approved [facility] protocol and table as identified below. Mayelin Schultz is a 80 y.o. male. Recent Labs     10/10/22  2000 10/11/22  0424   CREATININE 2.5* 2.5*       Estimated Creatinine Clearance: 27 mL/min (A) (based on SCr of 2.5 mg/dL (H)). Recent Labs     10/10/22  2000 10/11/22  0424   HGB 10.1* 9.6*   HCT 33.3* 31.4*    245     No results for input(s): INR in the last 72 hours. Height:   Ht Readings from Last 1 Encounters:   01/18/22 6' (1.829 m)     Weight:  Wt Readings from Last 1 Encounters:   10/11/22 253 lb (114.8 kg)               Plan: Based upon the patient's weight and renal function, the ordered enoxaparin dose of 40 mg SQ daily has been changed/converted to Lovenox 30 mg SQ daily.       Thank you,  Danii Ardon, [facility]  10/11/2022, 9:25 AM

## 2022-10-11 NOTE — PLAN OF CARE
Problem: Discharge Planning  Goal: Discharge to home or other facility with appropriate resources  Outcome: Progressing   SW consult received. See SW note 10/11/22.

## 2022-10-11 NOTE — PROGRESS NOTES
Pharmacy Medication History Note      List of current medications patient is taking is complete. Source of information: [facility] Medication List    Changes made to medication list:  Medications removed:  Removed Aspecreme w/lidocaine  Removed Calcium 500mg tablet twice daily  Removed Spironolactone 25mg daily  Removed Carbamide Peroxide 6.5% Otic Solution  Removed Tamsulosin 0.4mg capsule daily  Removed Desoximetasone 0.25% cream  Removed Dulaglutide 4.5mg daily  Removed Triamcinolone 0.1% cream  Removed Zinc Oxide 30% ointment    Medications added/doses adjusted: Added Ceftriaxone 1 gram injection once for UTI  Added Ciprofloxacin 500mg BID for 7 days (10/07/2022-10/15/2022)    Other notes (ex. Recent course of antibiotics, Coumadin dosing): Facility medication list makes no mention of insulin products and patients glucose levels are within range. Unsure if patient is using long or short acting insulin per facility. Denies use of other OTC or herbal medications.       Allergies reviewed      Electronically signed by Raymundo Berger on 10/11/2022 at 2:22 PM

## 2022-10-11 NOTE — FLOWSHEET NOTE
10/11/22 9205   Safe Environment   Safety Measures Other (comment)  (VN safety round complete)   Pt unable to resond to audio d/t being on bi pap , camera on for safety check , pt remained on bi pap , resting comfortably no s/s distress noted

## 2022-10-11 NOTE — FLOWSHEET NOTE
10/11/22 1109   Safe Environment   Safety Measures Other (comment)  (VN safety round complete)   Pt remained on bi pap , safety check per camera , no acute distress noted

## 2022-10-11 NOTE — CARE COORDINATION
10/11/22, 10:19 AM EDT      DISCHARGE PLANNING [address]  Admitted: 10/10/2022  Hospital Day: 1    Location: -04/004-A Reason for admit: Sepsis ([facility] [A41.9]    Past Medical History:   Diagnosis Date    BPH (benign prostatic hyperplasia)     Chronic kidney disease     CKD (chronic kidney disease) stage 3, GFR 30-59 ml/min ([facility] [address] 4/16/2015    Edema     Hiatal hernia     Hyperlipidemia     Hypertension     Osteoarthritis     S/P PTCA: 5/12/2015: Stenting of proximal left circumflex artery with Xience 2.75X15 mm, post-dilated to 3.70 mm. 5/12/2015 5/12/2015: Stenting of proximal left circumflex artery with Xience 2.75X15 mm, post-dilated to 3.70 mm. Dr. Jackie Maynard    Type II or unspecified type diabetes mellitus without mention of complication, not stated as uncontrolled        Procedure: N/A  Barriers to Discharge: Patient was a direct admit from [address] [place] [address]. [facility]. Patient presents with UTI and auditory hallucinations. Patient admitted to  and transferred to  due to need for continuous bi-pap. IV fluids-D5% LR, IV Bumex twice daily, Rocephin, Lovenox, Insulin on hold, prn Tylenol and Zofran, carb choice diet, DNRCCA, Contiuous Bi-pap, telemetry, up with assistance. PCP: Amarilis Santos MD    Readmission Risk              Risk of Unplanned Readmission:  29         Patient's Healthcare Decision Maker: Named in [address] [place] [address]    Patient Goals/Plan/Treatment Preferences: Tony Dominguez is from [facility]  following for his return.      Transportation/Food Security/Housekeeping Addressed: no issues identified      Hand-off to [address] ALFONSO

## 2022-10-11 NOTE — DISCHARGE INSTR - COC
Continuity of Care Form    Patient Name: Hildegarde Kawasaki   :  1934  MRN:  [id number]    Admit date:  10/10/2022  Discharge date:  10/15/2022    Code Status Order: DNR-CCA   Advance Directives:     Admitting Physician:  Rambo Souza PA-C  PCP: Amarilis Santos MD    Discharging Nurse: [facility] Unit/Room#: 5K-04/004-A  Discharging Unit Phone Number: [phone]    Emergency Contact:   Extended Emergency Contact Information  Primary Emergency Contact: Lupe Garibay  Address: [address] [place] [address], [address] [place] [address] [address] [facility] Phone: [phone]  Mobile Phone: [phone]  Relation: Spouse  Secondary Emergency Contact: Lavenia [facility] [address] [facility] Phone: [phone]  Relation: Child    Past Surgical History:  Past Surgical History:   Procedure Laterality Date    CARDIAC CATHETERIZATION  5/8/15    [facility]    CATARACT REMOVAL      BILAT    COLONOSCOPY      CORONARY ANGIOPLASTY      EKG 12-LEAD  2015         FOOT SURGERY      multiple    HIP SURGERY Right 2020    RIGHT HIP INTERTAN performed by Anca Swift MD at [facility]  2017    PACEMAKER INSERTION      PACEMAKER PLACEMENT      TONSILLECTOMY AND ADENOIDECTOMY         Immunization History:   Immunization History   Administered Date(s) Administered    COVID-19, MODERNA PURPLE border, (age 18y+ booster, 6y-11y series), IM, 50 mcg/0.5 mL 2022    COVID-19, PFIZER PURPLE top, DILUTE for use, (age 15 y+), 30mcg/0.3mL 2021, 2021, 2021    Pneumococcal Conjugate Vaccine 2017       Active Problems:  Patient Active Problem List   Diagnosis Code    Medtronic dual pacer Z95.0    Hyperlipidemia E78.5    Diabetes mellitus ([address] E11.9    Chronic renal failure, stage 3 (moderate) (HCC) N18.30    Chronic osteoarthritis M19.90    Benign prostatic 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, N13.8    Other lymphedema I89.0    CAD (coronary artery disease) I25.10    Gastroesophageal reflux disease without esophagitis K21.9    EFREN (acute kidney injury) ([facility] N17.9    Diabetic peripheral neuropathy ([facility]) E11.42    Chronic back pain M54.9, G89.29    Hypoxia R09.02    Leukocytosis D72.829    Pain, dental K08.89    CKD (chronic kidney disease) stage 3, GFR 30-59 ml/min ([facility]) N18.30    CKD (chronic kidney disease), stage III ([facility]) N18.30    Dental abscess K04.7    Abnormal stress test R94.39    Diarrhea R19.7    S/P PTCA: 5/12/2015: Stenting of proximal left circumflex artery with Xience 2.75X15 mm, post-dilated to 3.70 mm. Z98.61    Hypotension I95.9    Renal insufficiency N28.9    Hematoma T14. 8XXA    Acute on chronic renal insufficiency N28.9, N18.9    CAD S/P percutaneous coronary angioplasty I25.10, Z98.61    HTN (hypertension) I10    Hyperlipidemia with target LDL less than 70 E78.5    Sepsis ([facility]) A41.9    Urinary retention R33.9    Scrotal edema N50.89    Cellulitis of scrotum N49.2    Altered mental status R41.82    Anasarca associated with disorder of kidney N04.9    Hyponatremia E87.1    ARF (acute renal failure) with tubular necrosis ([facility]) N17.0    Pleural effusion D19    Complicated UTI (urinary tract infection) N39.0    Decreased hearing H91.90    Acute kidney injury superimposed on CKD ([facility]) N17.9, N18.9    Hypokalemia E87.6    Acute on chronic combined systolic (congestive) and diastolic (congestive) heart failure ([facility]) I50.43    AMS (altered mental status) R41.82    Closed fracture of right hip ([facility]) P69.859D    Acute metabolic encephalopathy Z94.41    Hx of fall Z91.81    Acute blood loss as cause of postoperative anemia D62    Hypoalbuminemia E88.09    Hypocalcemia E83.51    Vitamin D deficiency E55.9    History of third degree heart block Z86.79    Status post above-knee amputation of left lower extremity ([facility] [address] W14.985    Intertrigo L30.4    Hypomagnesemia E83.42    At risk for malnutrition Z91.89 Cognitive deficits R41.89    Alkalosis E87.3    AKIRA on CPAP G47.33, Z99.89    Anemia in stage 3 chronic kidney disease (HCC) N18.30, D63.1    Reactive depression F32.9    Other insomnia G47.09    Pacemaker generator end of life Z45.010    Chronic kidney disease, stage 4 (severe) (HCC) N18.4    Atrioventricular block, complete (HCC) I44.2    Secondary hyperparathyroidism of renal origin ([facility] N25.81    Primary osteoarthritis involving multiple joints M15.9       Isolation/Infection:   Isolation            Contact          Patient Infection Status       Infection Onset Added Last Indicated Last Indicated By Review Planned Expiration Resolved Resolved By    VRE  05/26/17 05/26/17 Patricia Arreola RN        5/23/17    Resolved    COVID-19 (Rule Out) 07/08/20 07/08/20 07/08/20 COVID-19 (Ordered)   07/08/20 Rule-Out Test Resulted    COVID-19 (Rule Out) 06/29/20 06/29/20 06/29/20 COVID-19 (Ordered)   06/29/20 Rule-Out Test Resulted            Nurse Assessment:  Last Vital Signs: BP (!) 133/52   Pulse 60   Temp 97.7 °F (36.5 °C) (Axillary)   Resp 13   Wt 253 lb (114.8 kg)   SpO2 100%   BMI 34.31 kg/m²     Last documented pain score (0-10 scale):    Last Weight:   Wt Readings from Last 1 Encounters:   10/11/22 253 lb (114.8 kg)     Mental Status:  oriented and alert    IV Access:  - None    Nursing Mobility/ADLs:  Walking   Dependent  Transfer  Dependent  Bathing  Assisted  Dressing  Assisted  Toileting  Dependent  Feeding  Independent  Med Admin  Assisted  Med Delivery   Whole    Wound Care Documentation and Therapy:  Pressure Ulcer 05/23/17 Coccyx (Active)   Number of days: 1967       Pressure Ulcer 05/23/17 Buttocks Left;Right (Active)   Number of days: 1967       Wound 07/10/20 Coccyx Mid MASD- redness, excoriation buttocks (Active)   Number of days: 822       Incision 06/29/20 Hip Proximal;Right;Lateral (Active)   Number of days: 833       Incision 07/01/20 Thigh Left;Distal (Active)   Number of days: 247 Elimination:  Continence: Bowel: Yes  Bladder: Suprapubic Catheter  Urinary Catheter: In Prior to Admission  Colostomy/Ileostomy/Ileal Conduit: No       Date of Last BM: 10/15/2022    Intake/Output Summary (Last 24 hours) at 10/11/2022 1014  Last data filed at 10/11/2022 0024  Gross per 24 hour   Intake 200 ml   Output --   Net 200 ml     I/O last 3 completed shifts: In: 200 [P.O.:200]  Out: -     Safety Concerns: At Risk for Falls    Impairments/Disabilities:      Amputation - LLE    Nutrition Therapy:  Current Nutrition Therapy:   - Oral Diet:  General, Low Fat, and Low Sodium (2gm)    Routes of Feeding: Oral  Liquids: {Slp liquid thickness:30679}  Daily Fluid Restriction: yes - amount 2,000 ml  Last Modified Barium Swallow with Video (Video Swallowing Test): not done    Treatments at the Time of Hospital Discharge:   Respiratory Treatments: PAP at night/with naps  Oxygen Therapy:  is on oxygen at 1 L/min per nasal cannula. Rehab Therapies: Physical Therapy and Occupational Therapy  Weight Bearing Status/Restrictions: No weight bearing restrictions    RN SIGNATURE:  Electronically signed by Keenan Soto RN on 10/15/22 at 10:41 AM EDT    CASE MANAGEMENT/SOCIAL WORK SECTION    Inpatient Status Date: 10/10/2022    Readmission Risk Assessment Score:  Readmission Risk              Risk of Unplanned Readmission:  29           Discharging to Facility/ Agency   Name: [address]  AVDXE:[phone]  TYD:[phone]    Dialysis Facility (if applicable)   Name:  Address:  Dialysis Schedule:  Phone:  Fax:    / signature: Electronically signed by SHELLEY Yoon on 10/11/22 at 10:16 AM EDT    PHYSICIAN SECTION    Prognosis: Good    Condition at Discharge: Stable      Recommended Labs or Other Treatments After Discharge: You have been started on Bumex 2 mg daily, continue Aldactone 25 mg.  Get a BMP in a week.   Use BiPAP at night.  Use 1 L nasal cannula throughout the day. Follow-up with your primary care physician. Take antibiotic for 3 more days. During your stay in the hospital your blood sugar was running normal without getting any insulin. 1 time your blood sugar was low as well. Stop taking insulin products. Follow-up with your primary care physician. Check your blood sugar before giving any insulin. Physician Certification: I certify the above information and transfer of Fidel Turner  is necessary for the continuing treatment of the diagnosis listed and that he requires [facility] for greater 30 days.      Update Admission H&P: No change in H&P    PHYSICIAN SIGNATURE:  Electronically signed by Macarena Harrison MD on 10/15/22 at 10:56 AM EDT

## 2022-10-11 NOTE — SIGNIFICANT EVENT
I was contacted by patient's nurse at [phone] on 10/10/222 that there was a change in patient's Lantus order. At this time current Lantus order was changed. Was contacted again by nurse at [phone] to see if I wanted any fluids going on patient and I stated that as long as patient remained hemodynamically stable and was not NPO that no fluids needed to be ordered at this time. Unfortunately I was contacted again at [phone] stating that patient's blood glucose was 49. Patient received D10 bolus at 2331 and recheck 50 minutes later was 8950 minutes following that was 760. Patient did receive a D10 bolus again at 0307 10/11/2022. I also placed patient on D5 LR at 50cc/ hr at 0345. I asked for blood sugars to be checked 30 minutes until stabilization was demonstrated over the following 2 hours. Patient's last blood glucose was checked at 0555 and was 96. I contacted nurse at [phone] to discuss plan and she states patient's capillary refill was continue to be prolonged and he continued to have decreased responsiveness despite hypoglycemia resolved. At this time I immediately went to bedside to assess patient. Patient was arousable to verbal stimuli however did notice patient increased work of breathing with use of accessory muscles as well as continuously falling asleep during exam.  Patient was currently on 2 L nasal cannula which when he arrived he was not requiring any supplemental oxygen. Stat chest x-ray as well as ABG ordered. Discussed with nurse to keep D5 LR at 50 cc /hr running until day team could further assess patient. Patient's RN and I had a good agreement on proceeding steps to manage patient.     Electronically signed by Sanchez Jeffrey DO on 10/11/2022 at 6:23 AM

## 2022-10-11 NOTE — PROGRESS NOTES
Hospitalist Progress Note    Patient:  Angela Ibrahim      Unit/Bed:5K-04/004-A    YOB: 1934    MRN: [id number]       Acct: [de-identified]     PCP: Kam Thompson MD    Date of Admission: 10/10/2022    Assessment/Plan:    Acute metabolic encephalopathy:   AOx3, with visual and auditory hallucinations (10/10). Secondary to UTI, treatment as below. UTI, catheter associated- Present on arrival:   Pt with chronic suprapubic catheter for BPH/urinary retention. Urine culture from (10/6)   (+) Providencia stuartii and Proteus mirabilis > 100,000CFU/mL. Both sensitive to ceftriaxone, started (10/10)   Exchange catheter, repeat Ucx from new cath. UA (10/10)  Small Hematuria, 30 Protein, + Nitrite, Large Leuk Est, Cloudy     Acute respiratory acidosis with acute hypoxic resp failure- secondary to obstruction/ obesity with acute HFrEF exacerbation:   Pt presented on 3L NC, SpO2 93%. - on RA normally  CXR 10/10 on admission -Cardiomegaly w/ pulmonary vascular congestion and possible small pleural effusions  BNP pending-  Echo 2020 EF 45-50%, G1DD, mild pulmonary HTN. Updated echo ordered  Per chart review, torsemide was recently decreased (40 to 20mg) and aldactone (due to hyperkalemia and EFREN) stopped. 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 Kamila Mendez. Cr stable with baseline 2.5  BMP daily      IDDMII: with hypoglycemia   Hypoglycemia last night down to 49- required D10 300cc to stablize  Insulin on hold currently - D5LR running   Q2 hours Accuchecks and PRN   Accu-checks, SSI, hypoglycemia protocol. Secondary hyperparathyroidism:   Resume home meds. Ca (10/10) 8.0, (10/11) 7.9  Repeat BMP      Hx hypervolemic hypoNA- 134  BMP daily  On 2L fluid restriction. H/o right AKA:      CAD with history of PTCA   Continue plavix, ASA, statin, imdur, toprol  Follows with Dr. Manoj Castro. Resume home meds. Has pacemaker. DVT Prophylaxis  Pt on Lovenox 30mg SQ QD      Expected discharge date:  pending course    Disposition:    [] Home       [] TCU       [] Rehab       [] Psych       [x] SNF       [] [place]       [] Other-    Chief Complaint: Hallucinations    Hospital Course:   HPI Provided by Chart Review  \"Manpreet Adkins is a 80 y.o. male with PMHx of CAD, PPM, BPH with urinary retention who presented to [facility] with chief complaint of hallucinations. Patient states that he was seen in ED on Thursday for hallucinations, noted to have a UTI and started on Cipro. He states that he was notified today that culture showed resistance to Cipro and he was told to go to ED again. Patient reports continued visual and auditory hallucinations. Otherwise he is AOx3. Patient states that he thinks he was started on oxygen yesterday at the nursing home. He states he was told his oxygen was low, he did denies feeling short of breath. Denies cough, fever/chills, chest pain, abdominal pain, N/V/D. Patient states his suprapubic catheter is typically exchanged every 30 days, thinks the last was approximately 2 weeks ago. Patient reports chronic severe lower extremity edema and neuropathy, states this is consistent with his baseline. Denies pain. Patient transferred from [address] [place] [address] to [facility] for further management. \"    10/11/2022  Patient found to be more drowsy/obtunded this morning even after correction of hypoglycemia. ABG was ordered which demonstrated respiratory acidosis with compensatory metabolic alkalosis. BiPAP was ordered. Repeat ABG 1 hour later showing improvement in PaCO2 without any improvement and acidosis. BiPAP settings were increased. We will repeat 1 hour  Hypoglycemia has normalized. We will continue to monitor with blood sugars every 2 hours. Insulin on hold currently. Evidence of significant fluid overload on exam.  According to chart review, nephrology reduced torsemide dose in half and stopped Aldactone last month given EFREN and hyperkalemia. Bumex IV started today. Blood pressure and pulse are stable.   Patient is significantly drowsy but is alert and oriented x3    Medications:  Reviewed    Infusion Medications    dextrose 5% in lactated ringers 50 mL/hr at 10/11/22 0321    sodium chloride      dextrose       Scheduled Medications    dextrose  25 g IntraVENous Once    cefTRIAXone (ROCEPHIN) IV  1,000 mg IntraVENous Q24H    bumetanide  0.5 mg IntraVENous BID    aspirin  81 mg Oral Daily    atorvastatin  20 mg Oral Nightly    busPIRone  7.5 mg Oral TID    [START ON 10/12/2022] calcitRIOL  0.25 mcg Oral Once per day on Mon Wed Fri    calcium elemental  500 mg Oral BID    clopidogrel  75 mg Oral Daily    finasteride  5 mg Oral Daily    gabapentin  100 mg Oral BID    isosorbide mononitrate  30 mg Oral Daily    magnesium oxide  400 mg Oral BID    metoprolol succinate  50 mg Oral Daily    pantoprazole  40 mg Oral QAM AC    sertraline  100 mg Oral Daily    tamsulosin  0.4 mg Oral Nightly    torsemide  20 mg Oral Daily    zinc oxide  1 each Topical BID    sodium chloride flush  10 mL IntraVENous 2 times per day    enoxaparin  30 mg SubCUTAneous Daily    [Held by provider] insulin lispro  0-4 Units SubCUTAneous TID WC    [Held by provider] insulin glargine  20 Units SubCUTAneous QAM     PRN Meds: benzonatate, cyclobenzaprine, dextromethorphan-guaiFENesin, docusate sodium, melatonin, traZODone, sodium chloride flush, sodium chloride, ondansetron **OR** ondansetron, polyethylene glycol, acetaminophen **OR** acetaminophen, glucose, dextrose bolus **OR** dextrose bolus, glucagon (rDNA), dextrose      Intake/Output Summary (Last 24 hours) at 10/11/2022 1016  Last data filed at 10/11/2022 0024  Gross per 24 hour   Intake 200 ml   Output --   Net 200 ml Diet:  ADULT DIET; Regular; 5 carb choices (75 gm/meal); Low Fat/Low Chol/High Fiber/2 gm Na    Exam:  BP (!) 133/52   Pulse 60   Temp 97.7 °F (36.5 °C) (Axillary)   Resp 13   Wt 253 lb (114.8 kg)   SpO2 100%   BMI 34.31 kg/m²     General appearance: No apparent distress, appears stated age and cooperative. Obese, chronically ill appearing. Extremely drowsy but arousable  HEENT: Pupils equal, round, and reactive to light. Conjunctivae/corneas clear. Neck: Supple, with full range of motion. No jugular venous distention. Trachea midline. Respiratory:  bradypnea. Rales bilateral lower lobes   Cardiovascular: Regular rate and rhythm with normal S1/S2 without murmurs, rubs or gallops. Pacemaker present . + 3 edema RLL, 1+ thigh edema LLL ( AKA)  Abdomen: Soft, non-tender, non-distended with normal bowel sounds. Skin: Skin color pale without xerosis. No rashes or lesions. Neurologic:  Neurovascularly intact without any focal sensory/motor deficits. Cranial nerves: II-XII intact, grossly non-focal.  Psychiatric: oriented x 3- significantly drowsy but arousable. ,   Capillary Refill: Brisk,< 3 seconds       Labs:   Recent Labs     10/10/22  2000 10/11/22  0424   WBC 9.5 10.9*   HGB 10.1* 9.6*   HCT 33.3* 31.4*    245     Recent Labs     10/10/22  2000 10/11/22  0424    134*   K 4.4 4.2   CL 96* 96*   CO2 32 30   BUN 43* 46*   CREATININE 2.5* 2.5*   CALCIUM 8.0* 7.9*     Recent Labs     10/10/22  2000   AST 17   ALT 12   BILITOT 0.2*   ALKPHOS 75     No results for input(s): INR in the last 72 hours. No results for input(s): Celine Fuse in the last 72 hours.     Microbiology:      Urinalysis:      Lab Results   Component Value Date/Time    NITRU POSITIVE 10/10/2022 08:00 PM    WBCUA > 100 10/10/2022 08:00 PM    BACTERIA MANY 10/10/2022 08:00 PM    RBCUA 3-5 10/10/2022 08:00 PM    BLOODU SMALL 10/10/2022 08:00 PM    SPECGRAV 1.011 10/10/2022 08:00 PM    GLUCOSEU Negative 07/12/2022 12:00 AM       Radiology:  XR CHEST PORTABLE    Result Date: 10/11/2022  PROCEDURE: XR CHEST PORTABLE CLINICAL INFORMATION: 63-year-old male with new oxygen requirement with accessory muscle use. COMPARISON: Radiograph 7/10/2020. TECHNIQUE: AP upright view of the chest was obtained. FINDINGS: There is a dual-chamber cardiac device over the left side of the chest. There is cardiomegaly and pulmonary vascular congestion. There is blunting of the costophrenic angles which could represent small pleural effusions. There is no pneumothorax. Visualized portions of the upper abdomen are within normal limits. The osseous structures are intact. No acute fractures or suspicious osseous lesions. Cardiomegaly with pulmonary vascular congestion and possible small pleural effusions. **This report has been created using voice recognition software. It may contain minor errors which are inherent in voice recognition technology. ** Final report electronically signed by Dr Yi Stahl on 10/11/2022 7:40 AM      DVT prophylaxis: [x] Lovenox                                 [] SCDs                                 [] SQ Heparin                                 [] Encourage ambulation           [] Already on Anticoagulation     Code Status: DNR-CCA    PT/OT Eval Status: monitor     Tele:   [x] yes             [] no    Active Hospital Problems    Diagnosis Date Noted    Sepsis [facility]) [A41.9]        Electronically signed by Virginia Yuan PA-C on 10/11/2022 at 10:16 AM

## 2022-10-11 NOTE — PROGRESS NOTES
Bedside RN called and spoke with Cherise Wilson LPN at [facility] to discuss pt insulin orders. Per [place] [facility] nurse, pt recieves 30u lantus at 0800 and 4u of lantus at 2100. Facility holds lantus is pt BG is under 99. Per LPN, facility has d/c'd use of novalog. Pharmacy notified.

## 2022-10-12 ENCOUNTER — APPOINTMENT (OUTPATIENT)
Dept: GENERAL RADIOLOGY | Age: 87
DRG: 698 | End: 2022-10-12
Attending: INTERNAL MEDICINE
Payer: MEDICARE

## 2022-10-12 LAB
ANION GAP SERPL CALCULATED.3IONS-SCNC: 10 MEQ/L (ref 8–16)
BASE EXCESS (CALCULATED): 7.5 MMOL/L (ref -2.5–2.5)
BUN BLDV-MCNC: 42 MG/DL (ref 7–22)
CALCIUM SERPL-MCNC: 7.6 MG/DL (ref 8.5–10.5)
CHLORIDE BLD-SCNC: 94 MEQ/L (ref 98–111)
CO2: 29 MEQ/L (ref 23–33)
COLLECTED BY:: ABNORMAL
CREAT SERPL-MCNC: 2 MG/DL (ref 0.4–1.2)
DEVICE: ABNORMAL
ERYTHROCYTE [DISTWIDTH] IN BLOOD BY AUTOMATED COUNT: 15.8 % (ref 11.5–14.5)
ERYTHROCYTE [DISTWIDTH] IN BLOOD BY AUTOMATED COUNT: 55.8 FL (ref 35–45)
GFR SERPL CREATININE-BSD FRML MDRD: 32 ML/MIN/1.73M2
GLUCOSE BLD-MCNC: 108 MG/DL (ref 70–108)
GLUCOSE BLD-MCNC: 122 MG/DL (ref 70–108)
GLUCOSE BLD-MCNC: 124 MG/DL (ref 70–108)
GLUCOSE BLD-MCNC: 126 MG/DL (ref 70–108)
GLUCOSE BLD-MCNC: 132 MG/DL (ref 70–108)
GLUCOSE BLD-MCNC: 134 MG/DL (ref 70–108)
GLUCOSE BLD-MCNC: 144 MG/DL (ref 70–108)
GLUCOSE BLD-MCNC: 179 MG/DL (ref 70–108)
HCO3: 30 MMOL/L (ref 23–28)
HCT VFR BLD CALC: 32.7 % (ref 42–52)
HEMOGLOBIN: 10.2 GM/DL (ref 14–18)
IFIO2: 21
MCH RBC QN AUTO: 30.4 PG (ref 26–33)
MCHC RBC AUTO-ENTMCNC: 31.2 GM/DL (ref 32.2–35.5)
MCV RBC AUTO: 97.3 FL (ref 80–94)
MODE: ABNORMAL
O2 SATURATION: 94 %
ORGANISM: ABNORMAL
PCO2: 35 MMHG (ref 35–45)
PH BLOOD GAS: 7.55 (ref 7.35–7.45)
PLATELET # BLD: 238 THOU/MM3 (ref 130–400)
PMV BLD AUTO: 9.5 FL (ref 9.4–12.4)
PO2: 60 MMHG (ref 71–104)
POTASSIUM SERPL-SCNC: 3.5 MEQ/L (ref 3.5–5.2)
RBC # BLD: 3.36 MILL/MM3 (ref 4.7–6.1)
SET PEEP: 6 MMHG
SET PRESS SUPP: 18 CMH2O
SET RESPIRATORY RATE: 12 BPM
SODIUM BLD-SCNC: 133 MEQ/L (ref 135–145)
SOURCE, BLOOD GAS: ABNORMAL
URINE CULTURE, ROUTINE: ABNORMAL
WBC # BLD: 9.8 THOU/MM3 (ref 4.8–10.8)

## 2022-10-12 PROCEDURE — 2580000003 HC RX 258: Performed by: PHYSICIAN ASSISTANT

## 2022-10-12 PROCEDURE — 99233 SBSQ HOSP IP/OBS HIGH 50: CPT | Performed by: STUDENT IN AN ORGANIZED HEALTH CARE EDUCATION/TRAINING PROGRAM

## 2022-10-12 PROCEDURE — 2500000003 HC RX 250 WO HCPCS: Performed by: PHYSICIAN ASSISTANT

## 2022-10-12 PROCEDURE — 36415 COLL VENOUS BLD VENIPUNCTURE: CPT

## 2022-10-12 PROCEDURE — 2500000003 HC RX 250 WO HCPCS: Performed by: STUDENT IN AN ORGANIZED HEALTH CARE EDUCATION/TRAINING PROGRAM

## 2022-10-12 PROCEDURE — 82948 REAGENT STRIP/BLOOD GLUCOSE: CPT

## 2022-10-12 PROCEDURE — 36600 WITHDRAWAL OF ARTERIAL BLOOD: CPT

## 2022-10-12 PROCEDURE — 2700000000 HC OXYGEN THERAPY PER DAY

## 2022-10-12 PROCEDURE — 85027 COMPLETE CBC AUTOMATED: CPT

## 2022-10-12 PROCEDURE — 6360000002 HC RX W HCPCS: Performed by: STUDENT IN AN ORGANIZED HEALTH CARE EDUCATION/TRAINING PROGRAM

## 2022-10-12 PROCEDURE — 80048 BASIC METABOLIC PNL TOTAL CA: CPT

## 2022-10-12 PROCEDURE — 82803 BLOOD GASES ANY COMBINATION: CPT

## 2022-10-12 PROCEDURE — 2580000003 HC RX 258: Performed by: STUDENT IN AN ORGANIZED HEALTH CARE EDUCATION/TRAINING PROGRAM

## 2022-10-12 PROCEDURE — 6370000000 HC RX 637 (ALT 250 FOR IP): Performed by: PHYSICIAN ASSISTANT

## 2022-10-12 PROCEDURE — 94660 CPAP INITIATION&MGMT: CPT

## 2022-10-12 PROCEDURE — 6360000002 HC RX W HCPCS: Performed by: PHYSICIAN ASSISTANT

## 2022-10-12 PROCEDURE — 71045 X-RAY EXAM CHEST 1 VIEW: CPT

## 2022-10-12 PROCEDURE — 94761 N-INVAS EAR/PLS OXIMETRY MLT: CPT

## 2022-10-12 PROCEDURE — 2060000000 HC ICU INTERMEDIATE R&B

## 2022-10-12 RX ORDER — BUMETANIDE 0.25 MG/ML
2 INJECTION, SOLUTION INTRAMUSCULAR; INTRAVENOUS 2 TIMES DAILY
Status: DISCONTINUED | OUTPATIENT
Start: 2022-10-12 | End: 2022-10-13

## 2022-10-12 RX ORDER — ENOXAPARIN SODIUM 100 MG/ML
30 INJECTION SUBCUTANEOUS EVERY 12 HOURS
Status: DISCONTINUED | OUTPATIENT
Start: 2022-10-12 | End: 2022-10-15 | Stop reason: HOSPADM

## 2022-10-12 RX ADMIN — ACETAMINOPHEN 650 MG: 325 TABLET ORAL at 11:45

## 2022-10-12 RX ADMIN — ASPIRIN 81 MG 81 MG: 81 TABLET ORAL at 11:45

## 2022-10-12 RX ADMIN — GABAPENTIN 100 MG: 100 CAPSULE ORAL at 21:35

## 2022-10-12 RX ADMIN — FINASTERIDE 5 MG: 5 TABLET, FILM COATED ORAL at 11:45

## 2022-10-12 RX ADMIN — CALCITRIOL CAPSULES 0.25 MCG 0.25 MCG: 0.25 CAPSULE ORAL at 12:30

## 2022-10-12 RX ADMIN — BUSPIRONE HYDROCHLORIDE 7.5 MG: 7.5 TABLET ORAL at 11:45

## 2022-10-12 RX ADMIN — METOPROLOL SUCCINATE 50 MG: 50 TABLET, EXTENDED RELEASE ORAL at 11:45

## 2022-10-12 RX ADMIN — BUSPIRONE HYDROCHLORIDE 7.5 MG: 7.5 TABLET ORAL at 21:24

## 2022-10-12 RX ADMIN — ATORVASTATIN CALCIUM 20 MG: 40 TABLET, FILM COATED ORAL at 21:24

## 2022-10-12 RX ADMIN — ENOXAPARIN SODIUM 30 MG: 100 INJECTION SUBCUTANEOUS at 12:20

## 2022-10-12 RX ADMIN — Medication 113.4 G: at 23:42

## 2022-10-12 RX ADMIN — CALCIUM 500 MG: 500 TABLET ORAL at 12:30

## 2022-10-12 RX ADMIN — MAGNESIUM OXIDE 400 MG (241.3 MG MAGNESIUM) TABLET 400 MG: TABLET at 11:45

## 2022-10-12 RX ADMIN — SODIUM CHLORIDE, PRESERVATIVE FREE 10 ML: 5 INJECTION INTRAVENOUS at 21:33

## 2022-10-12 RX ADMIN — CEFTRIAXONE SODIUM 1000 MG: 1 INJECTION, POWDER, FOR SOLUTION INTRAMUSCULAR; INTRAVENOUS at 06:28

## 2022-10-12 RX ADMIN — PANTOPRAZOLE SODIUM 40 MG: 40 TABLET, DELAYED RELEASE ORAL at 06:26

## 2022-10-12 RX ADMIN — BUMETANIDE 2 MG: 0.25 INJECTION INTRAMUSCULAR; INTRAVENOUS at 21:24

## 2022-10-12 RX ADMIN — MAGNESIUM OXIDE 400 MG (241.3 MG MAGNESIUM) TABLET 400 MG: TABLET at 21:24

## 2022-10-12 RX ADMIN — ENOXAPARIN SODIUM 30 MG: 100 INJECTION SUBCUTANEOUS at 23:45

## 2022-10-12 RX ADMIN — SODIUM CHLORIDE, PRESERVATIVE FREE 10 ML: 5 INJECTION INTRAVENOUS at 11:45

## 2022-10-12 RX ADMIN — DEXTROSE MONOHYDRATE: 100 INJECTION, SOLUTION INTRAVENOUS at 08:31

## 2022-10-12 RX ADMIN — HYDRALAZINE HYDROCHLORIDE 10 MG: 20 INJECTION INTRAMUSCULAR; INTRAVENOUS at 03:26

## 2022-10-12 RX ADMIN — TAMSULOSIN HYDROCHLORIDE 0.4 MG: 0.4 CAPSULE ORAL at 22:28

## 2022-10-12 RX ADMIN — SERTRALINE 100 MG: 100 TABLET, FILM COATED ORAL at 11:45

## 2022-10-12 RX ADMIN — BUSPIRONE HYDROCHLORIDE 7.5 MG: 7.5 TABLET ORAL at 17:15

## 2022-10-12 RX ADMIN — ISOSORBIDE MONONITRATE 30 MG: 30 TABLET, EXTENDED RELEASE ORAL at 11:45

## 2022-10-12 RX ADMIN — BUMETANIDE 1 MG: 0.25 INJECTION INTRAMUSCULAR; INTRAVENOUS at 11:45

## 2022-10-12 RX ADMIN — CALCIUM 500 MG: 500 TABLET ORAL at 22:28

## 2022-10-12 RX ADMIN — GABAPENTIN 100 MG: 100 CAPSULE ORAL at 11:45

## 2022-10-12 RX ADMIN — CLOPIDOGREL BISULFATE 75 MG: 75 TABLET ORAL at 11:45

## 2022-10-12 ASSESSMENT — PAIN DESCRIPTION - ONSET: ONSET: ON-GOING

## 2022-10-12 ASSESSMENT — PAIN SCALES - GENERAL
PAINLEVEL_OUTOF10: 2
PAINLEVEL_OUTOF10: 0
PAINLEVEL_OUTOF10: 0

## 2022-10-12 ASSESSMENT — PAIN DESCRIPTION - ORIENTATION: ORIENTATION: RIGHT

## 2022-10-12 ASSESSMENT — PAIN DESCRIPTION - PAIN TYPE: TYPE: ACUTE PAIN

## 2022-10-12 ASSESSMENT — PAIN DESCRIPTION - DESCRIPTORS: DESCRIPTORS: ACHING;DISCOMFORT

## 2022-10-12 ASSESSMENT — PAIN DESCRIPTION - FREQUENCY: FREQUENCY: INTERMITTENT

## 2022-10-12 ASSESSMENT — PAIN - FUNCTIONAL ASSESSMENT: PAIN_FUNCTIONAL_ASSESSMENT: ACTIVITIES ARE NOT PREVENTED

## 2022-10-12 ASSESSMENT — PAIN DESCRIPTION - LOCATION: LOCATION: TOE (COMMENT WHICH ONE)

## 2022-10-12 NOTE — CARE COORDINATION
Collaborative Discharge Planning    Angela Ibrahim  :  1934  MRN:  [id number]    ADMIT DATE:  10/10/2022      Discharge Planning Discharge Planning  Type of Residence: [facility]  Living Arrangements: Other (Comment) (SNF)  Support Systems: Spouse/Significant Other, Children, Family Members, Friends/Neighbors  Current Services Prior To Admission: [address] Needed: [facility]  Type of Home Care Services: Skilled Therapy  Patient expects to be discharged to[de-identified] Skilled nursing facility  [facility] Notes /Social Work Whiteboard Notes  /Social Work Whiteboard: 10/11/22 Yris Vega if from [facility], plans to return, no precert    Discharge Plan SNF  Plans return [facility]; has [facility]  Discharge Milestones and Delays: Clinical status  From [facility]  From [facility] for BIPAP needs (now on RA)  UTI  History: PCI, Right [place], CHF    10/10 SPC changed    /s  IV AB, IV Diuresing  ABG PH 7.55, Bicarb 30, CO2 35    10/15 AB stop date     SIGNED:  Agnes Santos RN   10/12/2022, 1:49 PM

## 2022-10-12 NOTE — PLAN OF CARE
Problem: Discharge Planning  Goal: Discharge to home or other facility with appropriate resources  Outcome: Progressing  Flowsheets (Taken 10/12/2022 0800)  Discharge to home or other facility with appropriate resources:   Identify barriers to discharge with patient and caregiver   Arrange for needed discharge resources and transportation as appropriate   Identify discharge learning needs (meds, wound care, etc)   Refer to discharge planning if patient needs post-hospital services based on physician order or complex needs related to functional status, cognitive ability or social support system     Problem: Neurosensory - Adult  Goal: Achieves stable or improved neurological status  Outcome: Progressing  Flowsheets (Taken 10/12/2022 0800)  Achieves stable or improved neurological status: Assess for and report changes in neurological status  Goal: Achieves maximal functionality and self care  Outcome: Progressing  Flowsheets (Taken 10/12/2022 0800)  Achieves maximal functionality and self care:   Monitor swallowing and airway patency with patient fatigue and changes in neurological status   Encourage and assist patient to increase activity and self care with guidance from physical therapy/occupational therapy     Problem: Respiratory - Adult  Goal: Achieves optimal ventilation and oxygenation  10/12/2022 1340 by Jairon Edward RN  Outcome: Progressing  Flowsheets (Taken 10/12/2022 0800)  Achieves optimal ventilation and oxygenation:   Assess for changes in respiratory status   Assess for changes in mentation and behavior   Position to facilitate oxygenation and minimize respiratory effort   Oxygen supplementation based on oxygen saturation or arterial blood gases   Encourage broncho-pulmonary hygiene including cough, deep breathe, incentive spirometry   Assess the need for suctioning and aspirate as needed   Assess and instruct to report shortness of breath or any respiratory difficulty   Respiratory therapy support as indicated     Problem: Infection - Adult  Goal: Absence of infection during hospitalization  Outcome: Progressing  Flowsheets (Taken 10/12/2022 0800)  Absence of infection during hospitalization:   Assess and monitor for signs and symptoms of infection   Monitor lab/diagnostic results   Administer medications as ordered   Instruct and encourage patient and family to use good hand hygiene technique  Goal: Absence of infection at discharge  Outcome: Progressing  Flowsheets (Taken 10/12/2022 1340)  Absence of infection at discharge:   Assess and monitor for signs and symptoms of infection   Monitor lab/diagnostic results   Monitor all insertion sites i.e., indwelling lines, tubes and drains   Administer medications as ordered   Instruct and encourage patient and family to use good hand hygiene technique     Problem: Metabolic/Fluid and Electrolytes - Adult  Goal: Glucose maintained within prescribed range  Outcome: Progressing  Flowsheets  Taken 10/12/2022 1340  Glucose maintained within prescribed range:   Monitor blood glucose as ordered   Administer ordered medications to maintain glucose within target range   Instruct patient on self management of diabetes and initiate consult as needed   Assess for signs and symptoms of hyperglycemia and hypoglycemia   Assess barriers to adequate nutritional intake and initiate nutrition consult as needed  Taken 10/12/2022 0800  Glucose maintained within prescribed range:   Monitor blood glucose as ordered   Assess for signs and symptoms of hyperglycemia and hypoglycemia   Administer ordered medications to maintain glucose within target range   Assess barriers to adequate nutritional intake and initiate nutrition consult as needed  Goal: Electrolytes maintained within normal limits  Outcome: Progressing  Flowsheets (Taken 10/12/2022 1340)  Electrolytes maintained within normal limits:   Monitor labs and assess patient for signs and symptoms of electrolyte imbalances Administer electrolyte replacement as ordered   Monitor response to electrolyte replacements, including repeat lab results as appropriate  Goal: Hemodynamic stability and optimal renal function maintained  Outcome: Progressing  Flowsheets (Taken 10/12/2022 1340)  Hemodynamic stability and optimal renal function maintained:   Monitor labs and assess for signs and symptoms of volume excess or deficit   Monitor intake, output and patient weight   Monitor response to interventions for patient's volume status, including labs, urine output, blood pressure (other measures as available)   Encourage oral intake as appropriate     Problem: Chronic Conditions and Co-morbidities  Goal: Patient's chronic conditions and co-morbidity symptoms are monitored and maintained or improved  Outcome: Progressing  Flowsheets (Taken 10/12/2022 0800)  Care Plan - Patient's Chronic Conditions and Co-Morbidity Symptoms are Monitored and Maintained or Improved:   Monitor and assess patient's chronic conditions and comorbid symptoms for stability, deterioration, or improvement   Collaborate with multidisciplinary team to address chronic and comorbid conditions and prevent exacerbation or deterioration   Update acute care plan with appropriate goals if chronic or comorbid symptoms are exacerbated and prevent overall improvement and discharge     Problem: ABCDS Injury Assessment  Goal: Absence of physical injury  Outcome: Progressing  Flowsheets (Taken 10/12/2022 1340)  Absence of Physical Injury: Implement safety measures based on patient assessment     Problem: Skin/Tissue Integrity  Goal: Absence of new skin breakdown  Description: 1. Monitor for areas of redness and/or skin breakdown  2. Assess vascular access sites hourly  3. Every 4-6 hours minimum:  Change oxygen saturation probe site  4.   Every 4-6 hours:  If on nasal continuous positive airway pressure, respiratory therapy assess nares and determine need for appliance change or resting period.   Outcome: Progressing     Problem: Cardiovascular - Adult  Goal: Maintains optimal cardiac output and hemodynamic stability  Outcome: Progressing  Flowsheets (Taken 10/12/2022 1340)  Maintains optimal cardiac output and hemodynamic stability:   Monitor blood pressure and heart rate   Monitor urine output and notify Licensed Independent Practitioner for values outside of normal range   Assess for signs of decreased cardiac output     Problem: Skin/Tissue Integrity - Adult  Goal: Skin integrity remains intact  Outcome: Progressing  Flowsheets (Taken 10/12/2022 0800)  Skin Integrity Remains Intact:   Monitor for areas of redness and/or skin breakdown   Assess vascular access sites hourly   Every 4-6 hours minimum: Change oxygen saturation probe site   Every 4-6 hours: If on nasal continuous positive airway pressure, respiratory therapy assesses nares and determine need for appliance change or resting period  Goal: Incisions, wounds, or drain sites healing without S/S of infection  Outcome: Progressing  Flowsheets (Taken 10/12/2022 1340)  Incisions, Wounds, or Drain Sites Healing Without Sign and Symptoms of Infection:   ADMISSION and DAILY: Assess and document risk factors for pressure ulcer development   TWICE DAILY: Assess and document skin integrity   TWICE DAILY: Assess and document dressing/incision, wound bed, drain sites and surrounding tissue  Goal: Oral mucous membranes remain intact  Outcome: Progressing  Flowsheets (Taken 10/12/2022 1340)  Oral Mucous Membranes Remain Intact: Assess oral mucosa and hygiene practices     Problem: Musculoskeletal - Adult  Goal: Return mobility to safest level of function  Outcome: Progressing  Flowsheets (Taken 10/12/2022 1340)  Return Mobility to Safest Level of Function:   Assess patient stability and activity tolerance for standing, transferring and ambulating with or without assistive devices   Assist with transfers and ambulation using safe patient handling equipment as needed   Ensure adequate protection for wounds/incisions during mobilization   Obtain physical therapy/occupational therapy consults as needed   Instruct patient/family in ordered activity level   Apply continuous passive motion per provider or physical therapy orders to increase flexion toward goal     Problem: Genitourinary - Adult  Goal: Urinary catheter remains patent  Outcome: Progressing  Flowsheets (Taken 10/12/2022 1340)  Urinary catheter remains patent: Assess patency of urinary catheter     Problem: Hematologic - Adult  Goal: Maintains hematologic stability  Outcome: Progressing  Flowsheets (Taken 10/12/2022 1340)  Maintains hematologic stability:   Assess for signs and symptoms of bleeding or hemorrhage   Monitor labs for bleeding or clotting disorders     Care plan reviewed with patient and family. Patient and family verbalize understanding of the plan of care and contribute to goal setting.

## 2022-10-12 NOTE — PLAN OF CARE
Problem: Discharge Planning  Goal: Discharge to home or other facility with appropriate resources  10/11/2022 2136 by Pooja Collazo RN  Outcome: Progressing  Flowsheets (Taken 10/11/2022 2136)  Discharge to home or other facility with appropriate resources:   Identify barriers to discharge with patient and caregiver   Arrange for needed discharge resources and transportation as appropriate   Identify discharge learning needs (meds, wound care, etc)  10/11/2022 0857 by SHELLEY Hinkle  Outcome: Progressing     Problem: Neurosensory - Adult  Goal: Achieves stable or improved neurological status  Outcome: Progressing  Flowsheets (Taken 10/11/2022 2136)  Achieves stable or improved neurological status: Assess for and report changes in neurological status  Goal: Achieves maximal functionality and self care  Outcome: Progressing     Problem: Respiratory - Adult  Goal: Achieves optimal ventilation and oxygenation  Outcome: Progressing  Flowsheets (Taken 10/11/2022 2136)  Achieves optimal ventilation and oxygenation:   Assess for changes in respiratory status   Assess for changes in mentation and behavior   Oxygen supplementation based on oxygen saturation or arterial blood gases   Position to facilitate oxygenation and minimize respiratory effort     Problem: Infection - Adult  Goal: Absence of infection during hospitalization  Outcome: Progressing  Flowsheets (Taken 10/11/2022 2136)  Absence of infection during hospitalization:   Assess and monitor for signs and symptoms of infection   Monitor lab/diagnostic results   Monitor all insertion sites i.e., indwelling lines, tubes and drains   Administer medications as ordered   Instruct and encourage patient and family to use good hand hygiene technique     Problem: Metabolic/Fluid and Electrolytes - Adult  Goal: Glucose maintained within prescribed range  Outcome: Progressing  Flowsheets (Taken 10/11/2022 2136)  Glucose maintained within prescribed range: Monitor blood glucose as ordered     Problem: Chronic Conditions and Co-morbidities  Goal: Patient's chronic conditions and co-morbidity symptoms are monitored and maintained or improved  Outcome: Progressing  Flowsheets (Taken 10/11/2022 2136)  Care Plan - Patient's Chronic Conditions and Co-Morbidity Symptoms are Monitored and Maintained or Improved:   Monitor and assess patient's chronic conditions and comorbid symptoms for stability, deterioration, or improvement   Collaborate with multidisciplinary team to address chronic and comorbid conditions and prevent exacerbation or deterioration   Update acute care plan with appropriate goals if chronic or comorbid symptoms are exacerbated and prevent overall improvement and discharge     Problem: ABCDS Injury Assessment  Goal: Absence of physical injury  Outcome: Progressing  Flowsheets (Taken 10/11/2022 2136)  Absence of Physical Injury: Implement safety measures based on patient assessment     Problem: Skin/Tissue Integrity  Goal: Absence of new skin breakdown  Description: 1. Monitor for areas of redness and/or skin breakdown  2. Assess vascular access sites hourly  3. Every 4-6 hours minimum:  Change oxygen saturation probe site  4. Every 4-6 hours:  If on nasal continuous positive airway pressure, respiratory therapy assess nares and determine need for appliance change or resting period. Outcome: Progressing   Care plan reviewed with patient. Patient verbalize understanding of the plan of care and contribute to goal setting.

## 2022-10-12 NOTE — CARE COORDINATION
10/12/22, 7:06 AM EDT    DISCHARGE BARRIERS        Patient transferred to [facility]. Report left for unit SW, Ainsley, regarding discharge plan for this patient.

## 2022-10-12 NOTE — PLAN OF CARE
Problem: Respiratory - Adult  Goal: Achieves optimal ventilation and oxygenation  10/12/2022 0449 by Margarito Lorenzo RCP  Outcome: Progressing   Mutually agreed upon goals.

## 2022-10-12 NOTE — PROGRESS NOTES
Hospitalist Progress Note    Patient:  Mark Jules      Unit/Bed:4K-02/002-A    YOB: 1934    MRN: [id number]       Acct: [de-identified]     PCP: Vangie Cates MD    Date of Admission: 10/10/2022    Assessment/Plan:    Acute metabolic encephalopathy: resolved. AOx3, with visual and auditory hallucinations. Secondary to UTI, treatment as below. UTI, catheter associated- Present on arrival:   Pt with chronic suprapubic catheter for BPH/urinary retention. Urine culture from (10/6)   (+) Providencia stuartii and Proteus mirabilis > 100,000CFU/mL. Both sensitive to ceftriaxone, started (10/10) continue for 5 dats. Acute on chronic hypercapnic with acute hypoxic respiratory failure. - secondary to obstruction/ OHS with acute HFrEF exacerbation:   Pt presented on 3L NC, SpO2 93%. - on RA normally  CXR 10/10 on admission -Cardiomegaly w/ pulmonary vascular congestion and possible small pleural effusions  BNP 81763. Echo 2020 EF 45-50%, G1DD, mild pulmonary HTN. Updated echo ordered  ABG with improved gases. Will hold BIPAP foe now. Will diures the patient, 1 L negative. Per chart review, torsemide was recently decreased (40 to 20mg) and aldactone (due to hyperkalemia and EFREN) stopped. At discharge will start 2 mg Bumex PO with 25 mg aldactone. Start Bumex 2 mg IV for 2 doses. 2L fluid restriction   Monitor electrolytes. 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 Jaimee Cox. Cr stable  BMP daily      IDDMII: with hypoglycemia   Insulin on hold currently - start PO intake, no need for carb control diet. Q2 hours Accuchecks and PRN   Accu-checks, SSI, hypoglycemia protocol. Secondary hyperparathyroidism:   Resume home meds. Ca (10/10) 8.0, (10/11) 7.9  Repeat BMP      Hx hypervolemic hypoNA- 133  Worsening from hypotonic fluids. BMP daily  On 2L fluid restriction.       H/o right AKA:      CAD with history of PTCA Continue plavix, ASA, statin, imdur, toprol  Follows with Dr. Ramirez Neither. Resume home meds. Has pacemaker. DVT Prophylaxis  Pt on Lovenox 30mg SQ QD      Expected discharge date:  pending course    Disposition:    [] Home       [] TCU       [] Rehab       [] Psych       [x] SNF       [] [place]       [] Other-    Chief Complaint: Hallucinations    Hospital Course:   HPI Provided by Chart Review  \"Manpreet Thomas is a 80 y.o. male with PMHx of CAD, PPM, BPH with urinary retention who presented to [facility] with chief complaint of hallucinations. Patient states that he was seen in ED on Thursday for hallucinations, noted to have a UTI and started on Cipro. He states that he was notified today that culture showed resistance to Cipro and he was told to go to ED again. Patient reports continued visual and auditory hallucinations. Otherwise he is AOx3. Patient states that he thinks he was started on oxygen yesterday at the nursing home. He states he was told his oxygen was low, he did denies feeling short of breath. Denies cough, fever/chills, chest pain, abdominal pain, N/V/D. Patient states his suprapubic catheter is typically exchanged every 30 days, thinks the last was approximately 2 weeks ago. Patient reports chronic severe lower extremity edema and neuropathy, states this is consistent with his baseline. Denies pain. Patient transferred from [address] [place] [address] to [facility] for further management. \"    10/11/2022  Patient found to be more drowsy/obtunded this morning even after correction of hypoglycemia. ABG was ordered which demonstrated respiratory acidosis with compensatory metabolic alkalosis. BiPAP was ordered. Repeat ABG 1 hour later showing improvement in PaCO2 without any improvement and acidosis. BiPAP settings were increased. We will repeat 1 hour  Hypoglycemia has normalized. We will continue to monitor with blood sugars every 2 hours. Insulin on hold currently.     Evidence of significant fluid overload on exam.  According to chart review, nephrology reduced torsemide dose in half and stopped Aldactone last month given EFREN and hyperkalemia. Bumex IV started today. Blood pressure and pulse are stable.   Patient is significantly drowsy but is alert and oriented x3    10/12  Patient was seen and examined no acute issues overnight  Patient denied any SOB, chest pain  Denied any burning micturition  Labs reviewed Na 133, potassium is 3.5, Cr 2  Patient is hemodynamically stable    Medications:  Reviewed    Infusion Medications    sodium chloride      dextrose       Scheduled Medications    enoxaparin  30 mg SubCUTAneous Q12H    dextrose  25 g IntraVENous Once    cefTRIAXone (ROCEPHIN) IV  1,000 mg IntraVENous Q24H    aspirin  81 mg Oral Daily    atorvastatin  20 mg Oral Nightly    busPIRone  7.5 mg Oral TID    calcitRIOL  0.25 mcg Oral Once per day on Mon Wed Fri    calcium elemental  500 mg Oral BID    clopidogrel  75 mg Oral Daily    finasteride  5 mg Oral Daily    gabapentin  100 mg Oral BID    isosorbide mononitrate  30 mg Oral Daily    magnesium oxide  400 mg Oral BID    metoprolol succinate  50 mg Oral Daily    pantoprazole  40 mg Oral QAM AC    sertraline  100 mg Oral Daily    tamsulosin  0.4 mg Oral Nightly    [Held by provider] torsemide  20 mg Oral Daily    zinc oxide  1 each Topical BID    sodium chloride flush  10 mL IntraVENous 2 times per day    [Held by provider] insulin lispro  0-4 Units SubCUTAneous TID WC    [Held by provider] insulin glargine  20 Units SubCUTAneous QAM     PRN Meds: labetalol, hydrALAZINE, benzonatate, cyclobenzaprine, dextromethorphan-guaiFENesin, docusate sodium, melatonin, traZODone, sodium chloride flush, sodium chloride, ondansetron **OR** ondansetron, polyethylene glycol, acetaminophen **OR** acetaminophen, glucose, dextrose bolus **OR** dextrose bolus, glucagon (rDNA), dextrose      Intake/Output Summary (Last 24 hours) at 10/12/2022 1512  Last data filed at 10/12/2022 1350  Gross per 24 hour   Intake 1588.41 ml   Output 2000 ml   Net -411.59 ml         Diet:  ADULT DIET; Regular; Low Fat/Low Chol/High Fiber/2 gm Na; 2000 ml    Exam:  BP (!) 152/68   Pulse 66   Temp 97.8 °F (36.6 °C) (Axillary)   Resp 20   Ht 6' (1.829 m)   Wt 271 lb 2.7 oz (123 kg)   SpO2 (S) 93%   BMI 36.78 kg/m²     General appearance: No apparent distress, appears stated age and cooperative. Obese, chronically ill appearing. Extremely drowsy but arousable  HEENT: Pupils equal, round, and reactive to light. Conjunctivae/corneas clear. Neck: Supple, with full range of motion. No jugular venous distention. Trachea midline. Respiratory:  bradypnea. Rales bilateral lower lobes   Cardiovascular: Regular rate and rhythm with normal S1/S2 without murmurs, rubs or gallops. Pacemaker present . + 3 edema RLL, 1+ thigh edema LLL ( AKA)  Abdomen: Soft, non-tender, non-distended with normal bowel sounds. Skin: Skin color pale without xerosis. No rashes or lesions. Neurologic:  Neurovascularly intact without any focal sensory/motor deficits. Cranial nerves: II-XII intact, grossly non-focal.  Psychiatric: oriented x 3- significantly drowsy but arousable. ,   Capillary Refill: Brisk,< 3 seconds       Labs:   Recent Labs     10/10/22  2000 10/11/22  0424 10/12/22  0514   WBC 9.5 10.9* 9.8   HGB 10.1* 9.6* 10.2*   HCT 33.3* 31.4* 32.7*    245 238       Recent Labs     10/10/22  2000 10/11/22  0424 10/12/22  0514    134* 133*   K 4.4 4.2 3.5   CL 96* 96* 94*   CO2 32 30 29   BUN 43* 46* 42*   CREATININE 2.5* 2.5* 2.0*   CALCIUM 8.0* 7.9* 7.6*       Recent Labs     10/10/22  2000   AST 17   ALT 12   BILITOT 0.2*   ALKPHOS 75       No results for input(s): INR in the last 72 hours. No results for input(s): Linh Smack in the last 72 hours.     Microbiology:      Urinalysis:      Lab Results   Component Value Date/Time    NITRU POSITIVE 10/10/2022 08:00 PM    WBCUA > 100 10/10/2022 08:00 PM    BACTERIA MANY 10/10/2022 08:00 PM    RBCUA 3-5 10/10/2022 08:00 PM    BLOODU SMALL 10/10/2022 08:00 PM    SPECGRAV 1.011 10/10/2022 08:00 PM    GLUCOSEU Negative 07/12/2022 12:00 AM       Radiology:  XR CHEST PORTABLE    Result Date: 10/11/2022  PROCEDURE: XR CHEST PORTABLE CLINICAL INFORMATION: 28-year-old male with new oxygen requirement with accessory muscle use. COMPARISON: Radiograph 7/10/2020. TECHNIQUE: AP upright view of the chest was obtained. FINDINGS: There is a dual-chamber cardiac device over the left side of the chest. There is cardiomegaly and pulmonary vascular congestion. There is blunting of the costophrenic angles which could represent small pleural effusions. There is no pneumothorax. Visualized portions of the upper abdomen are within normal limits. The osseous structures are intact. No acute fractures or suspicious osseous lesions. Cardiomegaly with pulmonary vascular congestion and possible small pleural effusions. **This report has been created using voice recognition software. It may contain minor errors which are inherent in voice recognition technology. ** Final report electronically signed by Dr Magaly Wood on 10/11/2022 7:40 AM      DVT prophylaxis: [x] Lovenox                                 [] SCDs                                 [] SQ Heparin                                 [] Encourage ambulation           [] Already on Anticoagulation     Code Status: DNR-CCA    PT/OT Eval Status: monitor     Tele:   [x] yes             [] no    Active Hospital Problems    Diagnosis Date Noted    UTI (urinary tract infection) [N39.0] 10/11/2022     Priority: Medium    Sepsis ([address] [A41.9]        Electronically signed by Angela Duarte MD on 10/12/2022 at 3:12 PM

## 2022-10-12 NOTE — PROGRESS NOTES
Pharmacist Review and Automatic Dose Adjustment of Prophylactic Enoxaparin    *Review reason for admission/hospital problem list*    The reviewing pharmacist has made an adjustment to the ordered enoxaparin dose or converted to UFH per the approved [facility] protocol and table as identified below. Fidel Turner is a 80 y.o. male. Recent Labs     10/10/22  2000 10/11/22  0424 10/12/22  0514   CREATININE 2.5* 2.5* 2.0*       Estimated Creatinine Clearance: 35 mL/min (A) (based on SCr of 2 mg/dL (H)). Recent Labs     10/11/22  0424 10/12/22  0514   HGB 9.6* 10.2*   HCT 31.4* 32.7*    238     No results for input(s): INR in the last 72 hours. Height:   Ht Readings from Last 1 Encounters:   10/11/22 6' (1.829 m)     Weight:  Wt Readings from Last 1 Encounters:   10/12/22 271 lb 2.7 oz (123 kg)               Plan: Based upon the patient's weight and improving renal function, the ordered enoxaparin dose of 30 mg subQ daily has been changed/converted to enoxaparin 30 mg subQ BID.       Thank you,  Suyapa Cole, [facility]  10/12/2022, 12:00 PM

## 2022-10-13 LAB
ANION GAP SERPL CALCULATED.3IONS-SCNC: 11 MEQ/L (ref 8–16)
BUN BLDV-MCNC: 38 MG/DL (ref 7–22)
CALCIUM SERPL-MCNC: 8.4 MG/DL (ref 8.5–10.5)
CHLORIDE BLD-SCNC: 95 MEQ/L (ref 98–111)
CO2: 29 MEQ/L (ref 23–33)
CREAT SERPL-MCNC: 1.9 MG/DL (ref 0.4–1.2)
ERYTHROCYTE [DISTWIDTH] IN BLOOD BY AUTOMATED COUNT: 15.9 % (ref 11.5–14.5)
ERYTHROCYTE [DISTWIDTH] IN BLOOD BY AUTOMATED COUNT: 56.2 FL (ref 35–45)
GFR SERPL CREATININE-BSD FRML MDRD: 34 ML/MIN/1.73M2
GLUCOSE BLD-MCNC: 110 MG/DL (ref 70–108)
GLUCOSE BLD-MCNC: 128 MG/DL (ref 70–108)
GLUCOSE BLD-MCNC: 163 MG/DL (ref 70–108)
GLUCOSE BLD-MCNC: 177 MG/DL (ref 70–108)
GLUCOSE BLD-MCNC: 182 MG/DL (ref 70–108)
HCT VFR BLD CALC: 33.3 % (ref 42–52)
HEMOGLOBIN: 10.3 GM/DL (ref 14–18)
MCH RBC QN AUTO: 29.9 PG (ref 26–33)
MCHC RBC AUTO-ENTMCNC: 30.9 GM/DL (ref 32.2–35.5)
MCV RBC AUTO: 96.8 FL (ref 80–94)
PLATELET # BLD: 259 THOU/MM3 (ref 130–400)
PMV BLD AUTO: 9.6 FL (ref 9.4–12.4)
POTASSIUM SERPL-SCNC: 3.9 MEQ/L (ref 3.5–5.2)
RBC # BLD: 3.44 MILL/MM3 (ref 4.7–6.1)
SODIUM BLD-SCNC: 135 MEQ/L (ref 135–145)
WBC # BLD: 9.1 THOU/MM3 (ref 4.8–10.8)

## 2022-10-13 PROCEDURE — 82948 REAGENT STRIP/BLOOD GLUCOSE: CPT

## 2022-10-13 PROCEDURE — 85027 COMPLETE CBC AUTOMATED: CPT

## 2022-10-13 PROCEDURE — 2500000003 HC RX 250 WO HCPCS: Performed by: STUDENT IN AN ORGANIZED HEALTH CARE EDUCATION/TRAINING PROGRAM

## 2022-10-13 PROCEDURE — 6370000000 HC RX 637 (ALT 250 FOR IP): Performed by: STUDENT IN AN ORGANIZED HEALTH CARE EDUCATION/TRAINING PROGRAM

## 2022-10-13 PROCEDURE — 2580000003 HC RX 258: Performed by: PHYSICIAN ASSISTANT

## 2022-10-13 PROCEDURE — 6360000002 HC RX W HCPCS: Performed by: STUDENT IN AN ORGANIZED HEALTH CARE EDUCATION/TRAINING PROGRAM

## 2022-10-13 PROCEDURE — 6360000002 HC RX W HCPCS: Performed by: PHYSICIAN ASSISTANT

## 2022-10-13 PROCEDURE — 6370000000 HC RX 637 (ALT 250 FOR IP): Performed by: PHYSICIAN ASSISTANT

## 2022-10-13 PROCEDURE — 36415 COLL VENOUS BLD VENIPUNCTURE: CPT

## 2022-10-13 PROCEDURE — 99233 SBSQ HOSP IP/OBS HIGH 50: CPT | Performed by: STUDENT IN AN ORGANIZED HEALTH CARE EDUCATION/TRAINING PROGRAM

## 2022-10-13 PROCEDURE — 2580000003 HC RX 258: Performed by: STUDENT IN AN ORGANIZED HEALTH CARE EDUCATION/TRAINING PROGRAM

## 2022-10-13 PROCEDURE — 80048 BASIC METABOLIC PNL TOTAL CA: CPT

## 2022-10-13 PROCEDURE — 2060000000 HC ICU INTERMEDIATE R&B

## 2022-10-13 RX ORDER — BUMETANIDE 0.25 MG/ML
1 INJECTION, SOLUTION INTRAMUSCULAR; INTRAVENOUS 2 TIMES DAILY
Status: COMPLETED | OUTPATIENT
Start: 2022-10-13 | End: 2022-10-13

## 2022-10-13 RX ORDER — BUMETANIDE 1 MG/1
2 TABLET ORAL DAILY
Status: DISCONTINUED | OUTPATIENT
Start: 2022-10-13 | End: 2022-10-13

## 2022-10-13 RX ORDER — SPIRONOLACTONE 25 MG/1
25 TABLET ORAL DAILY
Status: DISCONTINUED | OUTPATIENT
Start: 2022-10-13 | End: 2022-10-15 | Stop reason: HOSPADM

## 2022-10-13 RX ADMIN — Medication 113.4 G: at 09:30

## 2022-10-13 RX ADMIN — CALCIUM 500 MG: 500 TABLET ORAL at 09:30

## 2022-10-13 RX ADMIN — GABAPENTIN 100 MG: 100 CAPSULE ORAL at 08:30

## 2022-10-13 RX ADMIN — METOPROLOL SUCCINATE 50 MG: 50 TABLET, EXTENDED RELEASE ORAL at 08:30

## 2022-10-13 RX ADMIN — SODIUM CHLORIDE, PRESERVATIVE FREE 10 ML: 5 INJECTION INTRAVENOUS at 08:30

## 2022-10-13 RX ADMIN — FINASTERIDE 5 MG: 5 TABLET, FILM COATED ORAL at 08:30

## 2022-10-13 RX ADMIN — ISOSORBIDE MONONITRATE 30 MG: 30 TABLET, EXTENDED RELEASE ORAL at 08:30

## 2022-10-13 RX ADMIN — SPIRONOLACTONE 25 MG: 25 TABLET ORAL at 08:40

## 2022-10-13 RX ADMIN — CLOPIDOGREL BISULFATE 75 MG: 75 TABLET ORAL at 08:30

## 2022-10-13 RX ADMIN — ENOXAPARIN SODIUM 30 MG: 100 INJECTION SUBCUTANEOUS at 12:20

## 2022-10-13 RX ADMIN — BUMETANIDE 1 MG: 0.25 INJECTION INTRAMUSCULAR; INTRAVENOUS at 12:20

## 2022-10-13 RX ADMIN — ACETAMINOPHEN 650 MG: 325 TABLET ORAL at 08:30

## 2022-10-13 RX ADMIN — BUMETANIDE 1 MG: 0.25 INJECTION INTRAMUSCULAR; INTRAVENOUS at 22:05

## 2022-10-13 RX ADMIN — MAGNESIUM OXIDE 400 MG (241.3 MG MAGNESIUM) TABLET 400 MG: TABLET at 08:30

## 2022-10-13 RX ADMIN — TAMSULOSIN HYDROCHLORIDE 0.4 MG: 0.4 CAPSULE ORAL at 22:05

## 2022-10-13 RX ADMIN — BUSPIRONE HYDROCHLORIDE 7.5 MG: 7.5 TABLET ORAL at 08:30

## 2022-10-13 RX ADMIN — CALCIUM 500 MG: 500 TABLET ORAL at 22:05

## 2022-10-13 RX ADMIN — SODIUM CHLORIDE, PRESERVATIVE FREE 10 ML: 5 INJECTION INTRAVENOUS at 20:24

## 2022-10-13 RX ADMIN — ATORVASTATIN CALCIUM 20 MG: 40 TABLET, FILM COATED ORAL at 20:24

## 2022-10-13 RX ADMIN — BUSPIRONE HYDROCHLORIDE 7.5 MG: 7.5 TABLET ORAL at 13:45

## 2022-10-13 RX ADMIN — CEFTRIAXONE SODIUM 1000 MG: 1 INJECTION, POWDER, FOR SOLUTION INTRAMUSCULAR; INTRAVENOUS at 06:03

## 2022-10-13 RX ADMIN — GABAPENTIN 100 MG: 100 CAPSULE ORAL at 20:24

## 2022-10-13 RX ADMIN — Medication 113.4 G: at 20:24

## 2022-10-13 RX ADMIN — MAGNESIUM OXIDE 400 MG (241.3 MG MAGNESIUM) TABLET 400 MG: TABLET at 20:23

## 2022-10-13 RX ADMIN — BUSPIRONE HYDROCHLORIDE 7.5 MG: 7.5 TABLET ORAL at 20:23

## 2022-10-13 RX ADMIN — PANTOPRAZOLE SODIUM 40 MG: 40 TABLET, DELAYED RELEASE ORAL at 06:03

## 2022-10-13 RX ADMIN — BUMETANIDE 1 MG: 0.25 INJECTION INTRAMUSCULAR; INTRAVENOUS at 08:30

## 2022-10-13 RX ADMIN — ASPIRIN 81 MG 81 MG: 81 TABLET ORAL at 08:30

## 2022-10-13 RX ADMIN — SERTRALINE 100 MG: 100 TABLET, FILM COATED ORAL at 08:30

## 2022-10-13 ASSESSMENT — PAIN SCALES - GENERAL: PAINLEVEL_OUTOF10: 0

## 2022-10-13 NOTE — PROGRESS NOTES
Hospitalist Progress Note    Patient:  Belia Zuluaga      Unit/Bed:4-02/002-A    YOB: 1934    MRN: [id number]       Acct: [de-identified]     PCP: Jesi Helton MD    Date of Admission: 10/10/2022    Assessment/Plan:    Acute metabolic encephalopathy: Secondary to UTI and CO2 narcosis. Resolved. AOx3, Secondary to UTI, treatment as below. 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 Urine culture from (10/6)   (+) Providencia stuartii and Proteus mirabilis > 100,000CFU/mL. Both sensitive to ceftriaxone, started (10/10) continue for 5 dats. Acute on chronic hypercapnic with acute hypoxic respiratory failure. - secondary to obstruction/ OHS with acute HFrEF exacerbation:   Pt presented on 3L NC, SpO2 93%. - on RA at baseline  CXR 10/10 on admission -Cardiomegaly w/ pulmonary vascular congestion and possible small pleural effusions  BNP 63396. Echo 2020 EF 45-50%, G1DD, mild pulmonary HTN. Updated echo ordered  10/12 ABG with improved gases. Use nasal cannula with intermittent BiPAP at night. Per chart review, torsemide was recently decreased (40 to 20mg) and aldactone (due to hyperkalemia and EFREN) stopped. At discharge will start 2 mg Bumex PO with 25 mg aldactone. S/p Bumex 1 mg twice daily IV. We will continue IV diuresis. Start Aldactone 25 mg.  2L fluid restriction   Monitor electrolytes. 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 Margareth Ring. Cr stable  BMP daily      IDDMII:   Insulin on hold currently - start PO intake  Q2 hours Accuchecks and PRN   Accu-checks, SSI, hypoglycemia protocol. Secondary hyperparathyroidism:   Resume home meds. Ca (10/10) 8.0, (10/11) 7.9  Repeat BMP      Hx hypervolemic hypoNA- 135  Improving. BMP daily  On 2L fluid restriction.       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 Likely from diabetes. CAD with history of PTCA   Continue plavix, ASA, statin, imdur, toprol  Follows with Dr. Latoya Lovell. Resume home meds. Has pacemaker. DVT Prophylaxis  Pt on Lovenox 30mg SQ QD      Expected discharge date:  pending course    Disposition:    [] Home       [] TCU       [] Rehab       [] Psych       [x] SNF       [] [place]       [] Other-    Chief Complaint: Hallucinations    Hospital Course:   HPI Provided by Chart Review  \"Manpreet Rodrigues is a 80 y.o. male with PMHx of CAD, PPM, BPH with urinary retention who presented to [facility] with chief complaint of hallucinations. Patient states that he was seen in ED on Thursday for hallucinations, noted to have a UTI and started on Cipro. He states that he was notified today that culture showed resistance to Cipro and he was told to go to ED again. Patient reports continued visual and auditory hallucinations. Otherwise he is AOx3. Patient states that he thinks he was started on oxygen yesterday at the nursing home. He states he was told his oxygen was low, he did denies feeling short of breath. Denies cough, fever/chills, chest pain, abdominal pain, N/V/D. Patient states his suprapubic catheter is typically exchanged every 30 days, thinks the last was approximately 2 weeks ago. Patient reports chronic severe lower extremity edema and neuropathy, states this is consistent with his baseline. Denies pain. Patient transferred from [address] [place] [address] to [facility] for further management. \"    10/11/2022  Patient found to be more drowsy/obtunded this morning even after correction of hypoglycemia. ABG was ordered which demonstrated respiratory acidosis with compensatory metabolic alkalosis. BiPAP was ordered. Repeat ABG 1 hour later showing improvement in PaCO2 without any improvement and acidosis. BiPAP settings were increased. We will repeat 1 hour  Hypoglycemia has normalized.   We will continue to monitor with blood sugars every 2 hours. Insulin on hold currently. Evidence of significant fluid overload on exam.  According to chart review, nephrology reduced torsemide dose in half and stopped Aldactone last month given EFREN and hyperkalemia. Bumex IV started today. Blood pressure and pulse are stable.   Patient is significantly drowsy but is alert and oriented x3    10/12  Patient was seen and examined no acute issues overnight  Patient denied any SOB, chest pain  Denied any burning micturition  Labs reviewed Na 133, potassium is 3.5, Cr 2  Patient is hemodynamically stable    Medications:  Reviewed    Infusion Medications    sodium chloride      dextrose       Scheduled Medications    spironolactone  25 mg Oral Daily    bumetanide  1 mg IntraVENous BID    enoxaparin  30 mg SubCUTAneous Q12H    dextrose  25 g IntraVENous Once    cefTRIAXone (ROCEPHIN) IV  1,000 mg IntraVENous Q24H    aspirin  81 mg Oral Daily    atorvastatin  20 mg Oral Nightly    busPIRone  7.5 mg Oral TID    calcitRIOL  0.25 mcg Oral Once per day on Mon Wed Fri    calcium elemental  500 mg Oral BID    clopidogrel  75 mg Oral Daily    finasteride  5 mg Oral Daily    gabapentin  100 mg Oral BID    isosorbide mononitrate  30 mg Oral Daily    magnesium oxide  400 mg Oral BID    metoprolol succinate  50 mg Oral Daily    pantoprazole  40 mg Oral QAM AC    sertraline  100 mg Oral Daily    tamsulosin  0.4 mg Oral Nightly    zinc oxide  1 each Topical BID    sodium chloride flush  10 mL IntraVENous 2 times per day    [Held by provider] insulin lispro  0-4 Units SubCUTAneous TID WC    [Held by provider] insulin glargine  20 Units SubCUTAneous QAM     PRN Meds: benzonatate, cyclobenzaprine, dextromethorphan-guaiFENesin, docusate sodium, melatonin, traZODone, sodium chloride flush, sodium chloride, ondansetron **OR** ondansetron, polyethylene glycol, acetaminophen **OR** acetaminophen, glucose, dextrose bolus **OR** dextrose bolus, glucagon (rDNA), dextrose      Intake/Output Summary (Last 24 hours) at 10/13/2022 1456  Last data filed at 10/13/2022 1317  Gross per 24 hour   Intake 880 ml   Output 1650 ml   Net -770 ml         Diet:  ADULT DIET; Regular; Low Fat/Low Chol/High Fiber/2 gm Na; 2000 ml    Exam:  BP (!) 149/66   Pulse 70   Temp 98.6 °F (37 °C) (Oral)   Resp 18   Ht 6' (1.829 m)   Wt 271 lb 2.7 oz (123 kg)   SpO2 95%   BMI 36.78 kg/m²     General appearance: No apparent distress, appears stated age and cooperative. Obese, chronically ill appearing. Extremely drowsy but arousable  HEENT: Pupils equal, round, and reactive to light. Conjunctivae/corneas clear. Neck: Supple, with full range of motion. No jugular venous distention. Trachea midline. Respiratory:  bradypnea. Rales bilateral lower lobes   Cardiovascular: Regular rate and rhythm with normal S1/S2 without murmurs, rubs or gallops. Pacemaker present . + 3 edema RLL, 1+ thigh edema LLL ( AKA)  Abdomen: Soft, non-tender, non-distended with normal bowel sounds. Skin: Skin color pale without xerosis. No rashes or lesions. Neurologic:  Neurovascularly intact without any focal sensory/motor deficits. Cranial nerves: II-XII intact, grossly non-focal.  Psychiatric: oriented x 3- significantly drowsy but arousable. ,   Capillary Refill: Brisk,< 3 seconds       Labs:   Recent Labs     10/11/22  0424 10/12/22  0514 10/13/22  0328   WBC 10.9* 9.8 9.1   HGB 9.6* 10.2* 10.3*   HCT 31.4* 32.7* 33.3*    238 259       Recent Labs     10/11/22  0424 10/12/22  0514 10/13/22  0328   * 133* 135   K 4.2 3.5 3.9   CL 96* 94* 95*   CO2 30 29 29   BUN 46* 42* 38*   CREATININE 2.5* 2.0* 1.9*   CALCIUM 7.9* 7.6* 8.4*       Recent Labs     10/10/22  2000   AST 17   ALT 12   BILITOT 0.2*   ALKPHOS 75       No results for input(s): INR in the last 72 hours. No results for input(s): Celine Fuse in the last 72 hours.     Microbiology:      Urinalysis:      Lab Results   Component Value Date/Time    NITRU POSITIVE 10/10/2022 08:00 PM    WBCUA > 100 10/10/2022 08:00 PM    BACTERIA MANY 10/10/2022 08:00 PM    RBCUA 3-5 10/10/2022 08:00 PM    BLOODU SMALL 10/10/2022 08:00 PM    SPECGRAV 1.011 10/10/2022 08:00 PM    GLUCOSEU Negative 07/12/2022 12:00 AM       Radiology:  XR CHEST PORTABLE    Result Date: 10/11/2022  PROCEDURE: XR CHEST PORTABLE CLINICAL INFORMATION: 51-year-old male with new oxygen requirement with accessory muscle use. COMPARISON: Radiograph 7/10/2020. TECHNIQUE: AP upright view of the chest was obtained. FINDINGS: There is a dual-chamber cardiac device over the left side of the chest. There is cardiomegaly and pulmonary vascular congestion. There is blunting of the costophrenic angles which could represent small pleural effusions. There is no pneumothorax. Visualized portions of the upper abdomen are within normal limits. The osseous structures are intact. No acute fractures or suspicious osseous lesions. Cardiomegaly with pulmonary vascular congestion and possible small pleural effusions. **This report has been created using voice recognition software. It may contain minor errors which are inherent in voice recognition technology. ** Final report electronically signed by Dr Coy Stephens on 10/11/2022 7:40 AM      DVT prophylaxis: [x] Lovenox                                 [] SCDs                                 [] SQ Heparin                                 [] Encourage ambulation           [] Already on Anticoagulation     Code Status: DNR-CCA    PT/OT Eval Status: monitor     Tele:   [x] yes             [] no    Active Hospital Problems    Diagnosis Date Noted    UTI (urinary tract infection) [N39.0] 10/11/2022     Priority: Medium    Sepsis [facility]) [A41.9]        Electronically signed by Mel Weiss MD on 10/13/2022 at 2:56 PM

## 2022-10-13 NOTE — PLAN OF CARE
Problem: Discharge Planning  Goal: Discharge to home or other facility with appropriate resources  Outcome: Progressing  Flowsheets (Taken 10/12/2022 2000)  Discharge to home or other facility with appropriate resources:   Identify barriers to discharge with patient and caregiver   Arrange for needed discharge resources and transportation as appropriate   Identify discharge learning needs (meds, wound care, etc)     Problem: Neurosensory - Adult  Goal: Achieves stable or improved neurological status  Outcome: Progressing  Flowsheets (Taken 10/12/2022 2000)  Achieves stable or improved neurological status:   Assess for and report changes in neurological status   Initiate measures to prevent increased intracranial pressure   Monitor temperature, glucose, and sodium.  Initiate appropriate interventions as ordered  Goal: Achieves maximal functionality and self care  Outcome: Progressing  Flowsheets (Taken 10/12/2022 2000)  Achieves maximal functionality and self care:   Monitor swallowing and airway patency with patient fatigue and changes in neurological status   Encourage and assist patient to increase activity and self care with guidance from physical therapy/occupational therapy     Problem: Respiratory - Adult  Goal: Achieves optimal ventilation and oxygenation  Outcome: Progressing  Flowsheets  Taken 10/13/2022 0550  Achieves optimal ventilation and oxygenation:   Assess for changes in respiratory status   Assess for changes in mentation and behavior   Position to facilitate oxygenation and minimize respiratory effort   Oxygen supplementation based on oxygen saturation or arterial blood gases   Assess and instruct to report shortness of breath or any respiratory difficulty   Respiratory therapy support as indicated  Taken 10/12/2022 2000  Achieves optimal ventilation and oxygenation:   Assess for changes in respiratory status   Assess for changes in mentation and behavior     Problem: Infection - Adult  Goal: 2000  Electrolytes maintained within normal limits:   Monitor labs and assess patient for signs and symptoms of electrolyte imbalances   Administer electrolyte replacement as ordered   Monitor response to electrolyte replacements, including repeat lab results as appropriate  Goal: Hemodynamic stability and optimal renal function maintained  Outcome: Progressing  Flowsheets  Taken 10/13/2022 0550  Hemodynamic stability and optimal renal function maintained:   Monitor labs and assess for signs and symptoms of volume excess or deficit   Monitor intake, output and patient weight   Monitor urine specific gravity, serum osmolarity and serum sodium as indicated or ordered   Monitor response to interventions for patient's volume status, including labs, urine output, blood pressure (other measures as available)  Taken 10/12/2022 2000  Hemodynamic stability and optimal renal function maintained:   Monitor labs and assess for signs and symptoms of volume excess or deficit   Monitor intake, output and patient weight   Monitor urine specific gravity, serum osmolarity and serum sodium as indicated or ordered   Monitor response to interventions for patient's volume status, including labs, urine output, blood pressure (other measures as available)     Problem: Chronic Conditions and Co-morbidities  Goal: Patient's chronic conditions and co-morbidity symptoms are monitored and maintained or improved  Outcome: Progressing  Flowsheets (Taken 10/12/2022 2000)  Care Plan - Patient's Chronic Conditions and Co-Morbidity Symptoms are Monitored and Maintained or Improved:   Monitor and assess patient's chronic conditions and comorbid symptoms for stability, deterioration, or improvement   Collaborate with multidisciplinary team to address chronic and comorbid conditions and prevent exacerbation or deterioration   Update acute care plan with appropriate goals if chronic or comorbid symptoms are exacerbated and prevent overall improvement and discharge     Problem: ABCDS Injury Assessment  Goal: Absence of physical injury  Outcome: Progressing  Flowsheets (Taken 10/12/2022 1340 by Elva Weinberg RN)  Absence of Physical Injury: Implement safety measures based on patient assessment     Problem: Skin/Tissue Integrity  Goal: Absence of new skin breakdown  Description: 1. Monitor for areas of redness and/or skin breakdown  2. Assess vascular access sites hourly  3. Every 4-6 hours minimum:  Change oxygen saturation probe site  4. Every 4-6 hours:  If on nasal continuous positive airway pressure, respiratory therapy assess nares and determine need for appliance change or resting period. Outcome: Progressing  Note: No new evidence of skin breakdown.      Problem: Cardiovascular - Adult  Goal: Maintains optimal cardiac output and hemodynamic stability  Outcome: Progressing  Flowsheets (Taken 10/12/2022 2000)  Maintains optimal cardiac output and hemodynamic stability:   Monitor blood pressure and heart rate   Monitor urine output and notify Licensed Independent Practitioner for values outside of normal range   Assess for signs of decreased cardiac output     Problem: Skin/Tissue Integrity - Adult  Goal: Skin integrity remains intact  Outcome: Progressing  Flowsheets  Taken 10/13/2022 0550  Skin Integrity Remains Intact:   Monitor for areas of redness and/or skin breakdown   Assess vascular access sites hourly  Taken 10/12/2022 2000  Skin Integrity Remains Intact:   Monitor for areas of redness and/or skin breakdown   Assess vascular access sites hourly  Goal: Incisions, wounds, or drain sites healing without S/S of infection  Outcome: Progressing  Flowsheets  Taken 10/13/2022 0550  Incisions, Wounds, or Drain Sites Healing Without Sign and Symptoms of Infection:   ADMISSION and DAILY: Assess and document risk factors for pressure ulcer development   TWICE DAILY: Assess and document skin integrity  Taken 10/12/2022 2000  Incisions, Wounds, or Drain Sites Healing Without Sign and Symptoms of Infection: ADMISSION and DAILY: Assess and document risk factors for pressure ulcer development  Goal: Oral mucous membranes remain intact  Outcome: Progressing  Flowsheets (Taken 10/12/2022 2000)  Oral Mucous Membranes Remain Intact: Assess oral mucosa and hygiene practices     Problem: Musculoskeletal - Adult  Goal: Return mobility to safest level of function  Outcome: Progressing  Flowsheets (Taken 10/12/2022 2000)  Return Mobility to Safest Level of Function:   Assess patient stability and activity tolerance for standing, transferring and ambulating with or without assistive devices   Assist with transfers and ambulation using safe patient handling equipment as needed   Ensure adequate protection for wounds/incisions during mobilization   Apply continuous passive motion per provider or physical therapy orders to increase flexion toward goal     Problem: Genitourinary - Adult  Goal: Urinary catheter remains patent  Outcome: Progressing  Flowsheets (Taken 10/12/2022 2000)  Urinary catheter remains patent:   Assess patency of urinary catheter   Irrigate catheter per Licensed Independent Practitioner order if indicated and notify Licensed Independent Practitioner if unable to irrigate     Problem: Hematologic - Adult  Goal: Maintains hematologic stability  Outcome: Progressing  Flowsheets (Taken 10/12/2022 2000)  Maintains hematologic stability:   Assess for signs and symptoms of bleeding or hemorrhage   Monitor labs for bleeding or clotting disorders     Problem: Pain  Goal: Verbalizes/displays adequate comfort level or baseline comfort level  Outcome: Progressing  Flowsheets (Taken 10/13/2022 5705)  Verbalizes/displays adequate comfort level or baseline comfort level:   Encourage patient to monitor pain and request assistance   Assess pain using appropriate pain scale   Administer analgesics based on type and severity of pain and evaluate response   Implement non-pharmacological measures as appropriate and evaluate response   Care plan reviewed with patient. Patient verbalizes understanding of the care plan and contributed to goal setting.

## 2022-10-13 NOTE — PLAN OF CARE
Problem: Discharge Planning  Goal: Discharge to home or other facility with appropriate resources  10/13/2022 1020 by Divina Boyle RN  Outcome: Progressing  Flowsheets (Taken 10/13/2022 0815)  Discharge to home or other facility with appropriate resources:   Identify barriers to discharge with patient and caregiver   Arrange for needed discharge resources and transportation as appropriate   Identify discharge learning needs (meds, wound care, etc)   Refer to discharge planning if patient needs post-hospital services based on physician order or complex needs related to functional status, cognitive ability or social support system     Problem: Neurosensory - Adult  Goal: Achieves stable or improved neurological status  10/13/2022 1020 by Divina Boyle RN  Outcome: Progressing  Flowsheets (Taken 10/13/2022 0815)  Achieves stable or improved neurological status: Assess for and report changes in neurological status     Problem: Neurosensory - Adult  Goal: Achieves maximal functionality and self care  10/13/2022 1020 by Divina Boyle RN  Outcome: Progressing  Flowsheets (Taken 10/13/2022 0815)  Achieves maximal functionality and self care:   Monitor swallowing and airway patency with patient fatigue and changes in neurological status   Encourage and assist patient to increase activity and self care with guidance from physical therapy/occupational therapy     Problem: Respiratory - Adult  Goal: Achieves optimal ventilation and oxygenation  10/13/2022 1020 by Divina Boyle RN  Outcome: Progressing  Flowsheets (Taken 10/13/2022 0815)  Achieves optimal ventilation and oxygenation:   Assess for changes in respiratory status   Assess for changes in mentation and behavior   Position to facilitate oxygenation and minimize respiratory effort   Oxygen supplementation based on oxygen saturation or arterial blood gases   Encourage broncho-pulmonary hygiene including cough, deep breathe, incentive spirometry   Assess the need for suctioning and aspirate as needed   Assess and instruct to report shortness of breath or any respiratory difficulty   Respiratory therapy support as indicated     Problem: Infection - Adult  Goal: Absence of infection during hospitalization  10/13/2022 1020 by Graciela Cain RN  Outcome: Progressing  Flowsheets (Taken 10/13/2022 0815)  Absence of infection during hospitalization:   Assess and monitor for signs and symptoms of infection   Monitor lab/diagnostic results   Administer medications as ordered   Instruct and encourage patient and family to use good hand hygiene technique     Problem: Infection - Adult  Goal: Absence of infection at discharge  10/13/2022 1020 by Graciela Cain RN  Outcome: Progressing  Flowsheets (Taken 10/13/2022 0815)  Absence of infection at discharge:   Assess and monitor for signs and symptoms of infection   Monitor lab/diagnostic results   Administer medications as ordered   Instruct and encourage patient and family to use good hand hygiene technique     Problem: Metabolic/Fluid and Electrolytes - Adult  Goal: Glucose maintained within prescribed range  10/13/2022 1020 by Graciela Cain RN  Outcome: Progressing  Flowsheets (Taken 10/13/2022 0815)  Glucose maintained within prescribed range:   Monitor blood glucose as ordered   Assess for signs and symptoms of hyperglycemia and hypoglycemia   Administer ordered medications to maintain glucose within target range   Assess barriers to adequate nutritional intake and initiate nutrition consult as needed     Problem: Metabolic/Fluid and Electrolytes - Adult  Goal: Electrolytes maintained within normal limits  10/13/2022 1020 by Graciela Cain RN  Outcome: Progressing  Flowsheets (Taken 10/13/2022 0815)  Electrolytes maintained within normal limits: Monitor labs and assess patient for signs and symptoms of electrolyte imbalances     Problem: Metabolic/Fluid and Electrolytes - Adult  Goal: Hemodynamic stability and optimal renal function maintained  10/13/2022 1020 by Graciela Cain RN  Outcome: Progressing  Flowsheets (Taken 10/13/2022 0815)  Hemodynamic stability and optimal renal function maintained:   Monitor labs and assess for signs and symptoms of volume excess or deficit   Monitor intake, output and patient weight   Encourage oral intake as appropriate     Problem: Chronic Conditions and Co-morbidities  Goal: Patient's chronic conditions and co-morbidity symptoms are monitored and maintained or improved  10/13/2022 1020 by Graciela Cain RN  Outcome: Progressing  Flowsheets (Taken 10/13/2022 0815)  Care Plan - Patient's Chronic Conditions and Co-Morbidity Symptoms are Monitored and Maintained or Improved:   Monitor and assess patient's chronic conditions and comorbid symptoms for stability, deterioration, or improvement   Collaborate with multidisciplinary team to address chronic and comorbid conditions and prevent exacerbation or deterioration   Update acute care plan with appropriate goals if chronic or comorbid symptoms are exacerbated and prevent overall improvement and discharge     Problem: ABCDS Injury Assessment  Goal: Absence of physical injury  10/13/2022 1020 by Graciela Cain RN  Outcome: Progressing  Flowsheets (Taken 10/12/2022 1340)  Absence of Physical Injury: Implement safety measures based on patient assessment     Problem: Skin/Tissue Integrity  Goal: Absence of new skin breakdown  Description: 1. Monitor for areas of redness and/or skin breakdown  2. Assess vascular access sites hourly  3. Every 4-6 hours minimum:  Change oxygen saturation probe site  4. Every 4-6 hours:  If on nasal continuous positive airway pressure, respiratory therapy assess nares and determine need for appliance change or resting period.   10/13/2022 1020 by Graciela Cain RN  Outcome: Progressing     Problem: Cardiovascular - Adult  Goal: Maintains optimal cardiac output and hemodynamic stability  10/13/2022 1020 by Katerina Diaz RN  Outcome: Progressing  Flowsheets (Taken 10/13/2022 0815)  Maintains optimal cardiac output and hemodynamic stability:   Monitor blood pressure and heart rate   Monitor urine output and notify Licensed Independent Practitioner for values outside of normal range     Problem: Skin/Tissue Integrity - Adult  Goal: Skin integrity remains intact  10/13/2022 1020 by Katerina Diaz RN  Outcome: Progressing  Flowsheets (Taken 10/13/2022 0815)  Skin Integrity Remains Intact:   Monitor for areas of redness and/or skin breakdown   Assess vascular access sites hourly     Problem: Skin/Tissue Integrity - Adult  Goal: Incisions, wounds, or drain sites healing without S/S of infection  10/13/2022 1020 by Katerina Diaz RN  Outcome: Progressing  Flowsheets (Taken 10/13/2022 0815)  Incisions, Wounds, or Drain Sites Healing Without Sign and Symptoms of Infection:   ADMISSION and DAILY: Assess and document risk factors for pressure ulcer development   TWICE DAILY: Assess and document skin integrity   TWICE DAILY: Assess and document dressing/incision, wound bed, drain sites and surrounding tissue     Problem: Skin/Tissue Integrity - Adult  Goal: Oral mucous membranes remain intact  10/13/2022 1020 by Katerina Diaz RN  Outcome: Progressing  Flowsheets (Taken 10/13/2022 0815)  Oral Mucous Membranes Remain Intact: Assess oral mucosa and hygiene practices     Problem: Musculoskeletal - Adult  Goal: Return mobility to safest level of function  10/13/2022 1020 by Katerina Diaz RN  Outcome: Progressing  Flowsheets (Taken 10/13/2022 0815)  Return Mobility to Safest Level of Function:   Assess patient stability and activity tolerance for standing, transferring and ambulating with or without assistive devices   Assist with transfers and ambulation using safe patient handling equipment as needed   Ensure adequate protection for wounds/incisions during mobilization   Obtain physical therapy/occupational therapy consults as needed   Apply continuous passive motion per provider or physical therapy orders to increase flexion toward goal   Instruct patient/family in ordered activity level     Problem: Genitourinary - Adult  Goal: Urinary catheter remains patent  10/13/2022 1020 by Dimitry Ho RN  Outcome: Progressing  Flowsheets (Taken 10/13/2022 0815)  Urinary catheter remains patent: Assess patency of urinary catheter     Problem: Hematologic - Adult  Goal: Maintains hematologic stability  10/13/2022 1020 by Dimitry Ho RN  Outcome: Progressing  Flowsheets (Taken 10/13/2022 0815)  Maintains hematologic stability:   Assess for signs and symptoms of bleeding or hemorrhage   Monitor labs for bleeding or clotting disorders     Problem: Pain  Goal: Verbalizes/displays adequate comfort level or baseline comfort level  10/13/2022 1020 by Dimitry Ho RN  Outcome: Progressing  Flowsheets (Taken 10/13/2022 0815)  Verbalizes/displays adequate comfort level or baseline comfort level:   Encourage patient to monitor pain and request assistance   Assess pain using appropriate pain scale   Administer analgesics based on type and severity of pain and evaluate response   Implement non-pharmacological measures as appropriate and evaluate response     Care plan reviewed with patient and family. Patient and family verbalize understanding of the plan of care and contribute to goal setting.

## 2022-10-14 PROBLEM — I50.21 ACUTE SYSTOLIC HEART FAILURE (HCC): Status: ACTIVE | Noted: 2022-10-14

## 2022-10-14 LAB
ANION GAP SERPL CALCULATED.3IONS-SCNC: 10 MEQ/L (ref 8–16)
BUN BLDV-MCNC: 37 MG/DL (ref 7–22)
CALCIUM SERPL-MCNC: 8 MG/DL (ref 8.5–10.5)
CHLORIDE BLD-SCNC: 96 MEQ/L (ref 98–111)
CO2: 29 MEQ/L (ref 23–33)
CREAT SERPL-MCNC: 1.9 MG/DL (ref 0.4–1.2)
ERYTHROCYTE [DISTWIDTH] IN BLOOD BY AUTOMATED COUNT: 15.8 % (ref 11.5–14.5)
ERYTHROCYTE [DISTWIDTH] IN BLOOD BY AUTOMATED COUNT: 54.7 FL (ref 35–45)
GFR SERPL CREATININE-BSD FRML MDRD: 34 ML/MIN/1.73M2
GLUCOSE BLD-MCNC: 126 MG/DL (ref 70–108)
GLUCOSE BLD-MCNC: 132 MG/DL (ref 70–108)
GLUCOSE BLD-MCNC: 181 MG/DL (ref 70–108)
GLUCOSE BLD-MCNC: 200 MG/DL (ref 70–108)
GLUCOSE BLD-MCNC: 233 MG/DL (ref 70–108)
HCT VFR BLD CALC: 31.9 % (ref 42–52)
HEMOGLOBIN: 10.2 GM/DL (ref 14–18)
MCH RBC QN AUTO: 30.4 PG (ref 26–33)
MCHC RBC AUTO-ENTMCNC: 32 GM/DL (ref 32.2–35.5)
MCV RBC AUTO: 95.2 FL (ref 80–94)
PLATELET # BLD: 245 THOU/MM3 (ref 130–400)
PMV BLD AUTO: 9.7 FL (ref 9.4–12.4)
POTASSIUM SERPL-SCNC: 4 MEQ/L (ref 3.5–5.2)
RBC # BLD: 3.35 MILL/MM3 (ref 4.7–6.1)
SODIUM BLD-SCNC: 135 MEQ/L (ref 135–145)
WBC # BLD: 8.7 THOU/MM3 (ref 4.8–10.8)

## 2022-10-14 PROCEDURE — 6370000000 HC RX 637 (ALT 250 FOR IP): Performed by: PHYSICIAN ASSISTANT

## 2022-10-14 PROCEDURE — 85027 COMPLETE CBC AUTOMATED: CPT

## 2022-10-14 PROCEDURE — 2580000003 HC RX 258: Performed by: PHYSICIAN ASSISTANT

## 2022-10-14 PROCEDURE — 6370000000 HC RX 637 (ALT 250 FOR IP): Performed by: STUDENT IN AN ORGANIZED HEALTH CARE EDUCATION/TRAINING PROGRAM

## 2022-10-14 PROCEDURE — 82948 REAGENT STRIP/BLOOD GLUCOSE: CPT

## 2022-10-14 PROCEDURE — 2060000000 HC ICU INTERMEDIATE R&B

## 2022-10-14 PROCEDURE — 2580000003 HC RX 258: Performed by: STUDENT IN AN ORGANIZED HEALTH CARE EDUCATION/TRAINING PROGRAM

## 2022-10-14 PROCEDURE — 99232 SBSQ HOSP IP/OBS MODERATE 35: CPT | Performed by: STUDENT IN AN ORGANIZED HEALTH CARE EDUCATION/TRAINING PROGRAM

## 2022-10-14 PROCEDURE — 36415 COLL VENOUS BLD VENIPUNCTURE: CPT

## 2022-10-14 PROCEDURE — 6360000002 HC RX W HCPCS: Performed by: PHYSICIAN ASSISTANT

## 2022-10-14 PROCEDURE — 80048 BASIC METABOLIC PNL TOTAL CA: CPT

## 2022-10-14 PROCEDURE — 6360000002 HC RX W HCPCS: Performed by: STUDENT IN AN ORGANIZED HEALTH CARE EDUCATION/TRAINING PROGRAM

## 2022-10-14 RX ORDER — BUMETANIDE 1 MG/1
2 TABLET ORAL DAILY
Status: DISCONTINUED | OUTPATIENT
Start: 2022-10-14 | End: 2022-10-15 | Stop reason: HOSPADM

## 2022-10-14 RX ADMIN — CALCITRIOL CAPSULES 0.25 MCG 0.25 MCG: 0.25 CAPSULE ORAL at 10:47

## 2022-10-14 RX ADMIN — GABAPENTIN 100 MG: 100 CAPSULE ORAL at 20:29

## 2022-10-14 RX ADMIN — ENOXAPARIN SODIUM 30 MG: 100 INJECTION SUBCUTANEOUS at 01:28

## 2022-10-14 RX ADMIN — TAMSULOSIN HYDROCHLORIDE 0.4 MG: 0.4 CAPSULE ORAL at 22:58

## 2022-10-14 RX ADMIN — PANTOPRAZOLE SODIUM 40 MG: 40 TABLET, DELAYED RELEASE ORAL at 06:54

## 2022-10-14 RX ADMIN — Medication 113.4 G: at 09:08

## 2022-10-14 RX ADMIN — CLOPIDOGREL BISULFATE 75 MG: 75 TABLET ORAL at 09:09

## 2022-10-14 RX ADMIN — METOPROLOL SUCCINATE 50 MG: 50 TABLET, EXTENDED RELEASE ORAL at 09:10

## 2022-10-14 RX ADMIN — CALCIUM 500 MG: 500 TABLET ORAL at 23:05

## 2022-10-14 RX ADMIN — ENOXAPARIN SODIUM 30 MG: 100 INJECTION SUBCUTANEOUS at 13:18

## 2022-10-14 RX ADMIN — MAGNESIUM OXIDE 400 MG (241.3 MG MAGNESIUM) TABLET 400 MG: TABLET at 20:29

## 2022-10-14 RX ADMIN — FINASTERIDE 5 MG: 5 TABLET, FILM COATED ORAL at 09:09

## 2022-10-14 RX ADMIN — ISOSORBIDE MONONITRATE 30 MG: 30 TABLET, EXTENDED RELEASE ORAL at 09:09

## 2022-10-14 RX ADMIN — SERTRALINE 100 MG: 100 TABLET, FILM COATED ORAL at 09:09

## 2022-10-14 RX ADMIN — BUSPIRONE HYDROCHLORIDE 7.5 MG: 7.5 TABLET ORAL at 20:29

## 2022-10-14 RX ADMIN — ENOXAPARIN SODIUM 30 MG: 100 INJECTION SUBCUTANEOUS at 23:04

## 2022-10-14 RX ADMIN — BUSPIRONE HYDROCHLORIDE 7.5 MG: 7.5 TABLET ORAL at 09:08

## 2022-10-14 RX ADMIN — SODIUM CHLORIDE, PRESERVATIVE FREE 10 ML: 5 INJECTION INTRAVENOUS at 20:30

## 2022-10-14 RX ADMIN — CEFTRIAXONE SODIUM 1000 MG: 1 INJECTION, POWDER, FOR SOLUTION INTRAMUSCULAR; INTRAVENOUS at 05:40

## 2022-10-14 RX ADMIN — SODIUM CHLORIDE, PRESERVATIVE FREE 10 ML: 5 INJECTION INTRAVENOUS at 09:10

## 2022-10-14 RX ADMIN — ATORVASTATIN CALCIUM 20 MG: 40 TABLET, FILM COATED ORAL at 20:29

## 2022-10-14 RX ADMIN — Medication 113.4 G: at 20:29

## 2022-10-14 RX ADMIN — BUSPIRONE HYDROCHLORIDE 7.5 MG: 7.5 TABLET ORAL at 13:19

## 2022-10-14 RX ADMIN — MAGNESIUM OXIDE 400 MG (241.3 MG MAGNESIUM) TABLET 400 MG: TABLET at 09:08

## 2022-10-14 RX ADMIN — SPIRONOLACTONE 25 MG: 25 TABLET ORAL at 09:09

## 2022-10-14 RX ADMIN — ASPIRIN 81 MG 81 MG: 81 TABLET ORAL at 09:08

## 2022-10-14 RX ADMIN — CALCIUM 500 MG: 500 TABLET ORAL at 10:47

## 2022-10-14 RX ADMIN — BUMETANIDE 2 MG: 1 TABLET ORAL at 15:51

## 2022-10-14 RX ADMIN — GABAPENTIN 100 MG: 100 CAPSULE ORAL at 09:09

## 2022-10-14 ASSESSMENT — PAIN SCALES - GENERAL: PAINLEVEL_OUTOF10: 0

## 2022-10-14 NOTE — PROGRESS NOTES
Hospitalist Progress Note    Patient:  Phoenix Rios      Unit/Bed:4K-02/002-A    YOB: 1934    MRN: [id number]       Acct: [de-identified]     PCP: Tyron Concepcion MD    Date of Admission: 10/10/2022    Assessment/Plan:    Acute metabolic encephalopathy: Secondary to UTI and CO2 narcosis. Resolved. AOx3, Secondary to UTI, treatment as below. 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 Urine culture from (10/6)   (+) Providencia stuartii and Proteus mirabilis > 100,000CFU/mL. 4th day. Unable to find cultures. Will give 3 more days of keflex. Acute on chronic hypercapnic with acute hypoxic respiratory failure. - secondary to obstruction/ OHS with acute HFrEF exacerbation: Improved. Pt presented on 3L NC, SpO2 93%. - on RA at baseline  CXR 10/10 on admission -Cardiomegaly w/ pulmonary vascular congestion and possible small pleural effusions  BNP 56861. Echo 2020 EF 45-50%, G1DD, mild pulmonary HTN. Updated echo ordered  10/12 ABG with improved gases. Use nasal cannula with intermittent BiPAP at night. Per chart review, torsemide was recently decreased (40 to 20mg) and aldactone (due to hyperkalemia and EFREN) stopped. S/p Bumex IV diuresis started on Aldactone 25 mg. Start bumex 2 mg PO.   2L fluid restriction   Monitor electrolytes. 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 Jorden Earl. Cr stable  BMP daily      IDDMII:   Insulin on hold currently - start PO intake  Q2 hours Accuchecks and PRN   Accu-checks, SSI, hypoglycemia protocol. Secondary hyperparathyroidism:   Resume home meds. Ca (10/10) 8.0, (10/11) 7.9  Repeat BMP      Hx hypervolemic hypoNA- 135  Improving. BMP daily  On 2L fluid restriction. 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 Likely from diabetes.      CAD with history of PTCA   Continue plavix, ASA, statin, imdur, toprol  Follows with Dr. Nemo Freitas. Resume home meds. Has pacemaker. DVT Prophylaxis  Pt on Lovenox 30mg SQ QD      Expected discharge date:  pending course    Disposition:    [] Home       [] TCU       [] Rehab       [] Psych       [x] SNF       [] [place]       [] Other-    Chief Complaint: Hallucinations    Hospital Course:   HPI Provided by Chart Review  \"Manpreet Schultz is a 80 y.o. male with PMHx of CAD, PPM, BPH with urinary retention who presented to [facility] with chief complaint of hallucinations. Patient states that he was seen in ED on Thursday for hallucinations, noted to have a UTI and started on Cipro. He states that he was notified today that culture showed resistance to Cipro and he was told to go to ED again. Patient reports continued visual and auditory hallucinations. Otherwise he is AOx3. Patient states that he thinks he was started on oxygen yesterday at the nursing home. He states he was told his oxygen was low, he did denies feeling short of breath. Denies cough, fever/chills, chest pain, abdominal pain, N/V/D. Patient states his suprapubic catheter is typically exchanged every 30 days, thinks the last was approximately 2 weeks ago. Patient reports chronic severe lower extremity edema and neuropathy, states this is consistent with his baseline. Denies pain. Patient transferred from [address] [place] [address] to [facility] for further management. \"    10/11/2022  Patient found to be more drowsy/obtunded this morning even after correction of hypoglycemia. ABG was ordered which demonstrated respiratory acidosis with compensatory metabolic alkalosis. BiPAP was ordered. Repeat ABG 1 hour later showing improvement in PaCO2 without any improvement and acidosis. BiPAP settings were increased. We will repeat 1 hour  Hypoglycemia has normalized. We will continue to monitor with blood sugars every 2 hours. Insulin on hold currently.     Evidence of significant fluid overload on exam.  According to chart review, nephrology reduced torsemide dose in half and stopped Aldactone last month given EFREN and hyperkalemia. Bumex IV started today. Blood pressure and pulse are stable.   Patient is significantly drowsy but is alert and oriented x3    10/12  Patient was seen and examined no acute issues overnight  Patient denied any SOB, chest pain  Denied any burning micturition  Labs reviewed Na 133, potassium is 3.5, Cr 2  Patient is hemodynamically stable    10/13  Patient seen and examined  No acute issues overnight   Denied any chest pain, SOB has improved  Denied any fever, chills, Nausea or vomiting  Hemodynamically and vitally stable     Medications:  Reviewed    Infusion Medications    sodium chloride      dextrose       Scheduled Medications    spironolactone  25 mg Oral Daily    enoxaparin  30 mg SubCUTAneous Q12H    dextrose  25 g IntraVENous Once    cefTRIAXone (ROCEPHIN) IV  1,000 mg IntraVENous Q24H    aspirin  81 mg Oral Daily    atorvastatin  20 mg Oral Nightly    busPIRone  7.5 mg Oral TID    calcitRIOL  0.25 mcg Oral Once per day on Mon Wed Fri    calcium elemental  500 mg Oral BID    clopidogrel  75 mg Oral Daily    finasteride  5 mg Oral Daily    gabapentin  100 mg Oral BID    isosorbide mononitrate  30 mg Oral Daily    magnesium oxide  400 mg Oral BID    metoprolol succinate  50 mg Oral Daily    pantoprazole  40 mg Oral QAM AC    sertraline  100 mg Oral Daily    tamsulosin  0.4 mg Oral Nightly    zinc oxide  1 each Topical BID    sodium chloride flush  10 mL IntraVENous 2 times per day    [Held by provider] insulin lispro  0-4 Units SubCUTAneous TID WC    [Held by provider] insulin glargine  20 Units SubCUTAneous QAM     PRN Meds: benzonatate, cyclobenzaprine, dextromethorphan-guaiFENesin, docusate sodium, melatonin, traZODone, sodium chloride flush, sodium chloride, ondansetron **OR** ondansetron, polyethylene glycol, acetaminophen **OR** acetaminophen, glucose, dextrose bolus **OR** dextrose bolus, glucagon (rDNA), dextrose      Intake/Output Summary (Last 24 hours) at 10/14/2022 1012  Last data filed at 10/14/2022 0316  Gross per 24 hour   Intake 740 ml   Output 1700 ml   Net -960 ml         Diet:  ADULT DIET; Regular; Low Fat/Low Chol/High Fiber/2 gm Na; 2000 ml  ADULT ORAL NUTRITION SUPPLEMENT; Lunch, Breakfast, Dinner; Diabetic Oral Supplement    Exam:  BP (!) 124/43   Pulse 74   Temp 98.4 °F (36.9 °C) (Oral)   Resp 20   Ht 6' (1.829 m)   Wt 271 lb 2.7 oz (123 kg)   SpO2 92%   BMI 36.78 kg/m²     General appearance: No apparent distress, appears stated age and cooperative. Obese, chronically ill appearing. Extremely drowsy but arousable  HEENT: Pupils equal, round, and reactive to light. Conjunctivae/corneas clear. Neck: Supple, with full range of motion. No jugular venous distention. Trachea midline. Respiratory:  bradypnea. Rales bilateral lower lobes   Cardiovascular: Regular rate and rhythm with normal S1/S2 without murmurs, rubs or gallops. Pacemaker present . + 3 edema RLL, 1+ thigh edema LLL ( AKA)  Abdomen: Soft, non-tender, non-distended with normal bowel sounds. Skin: Skin color pale without xerosis. No rashes or lesions. Neurologic:  Neurovascularly intact without any focal sensory/motor deficits. Cranial nerves: II-XII intact, grossly non-focal.  Psychiatric: oriented x 3- significantly drowsy but arousable. ,   Capillary Refill: Brisk,< 3 seconds       Labs:   Recent Labs     10/12/22  0514 10/13/22  0328 10/14/22  0323   WBC 9.8 9.1 8.7   HGB 10.2* 10.3* 10.2*   HCT 32.7* 33.3* 31.9*    259 245       Recent Labs     10/12/22  0514 10/13/22  0328 10/14/22  0323   * 135 135   K 3.5 3.9 4.0   CL 94* 95* 96*   CO2 29 29 29   BUN 42* 38* 37*   CREATININE 2.0* 1.9* 1.9*   CALCIUM 7.6* 8.4* 8.0*       No results for input(s): AST, ALT, BILIDIR, BILITOT, ALKPHOS in the last 72 hours.     No results for input(s): INR in the last 72 hours. No results for input(s): Raghav Carey in the last 72 hours. Microbiology:      Urinalysis:      Lab Results   Component Value Date/Time    NITRU POSITIVE 10/10/2022 08:00 PM    WBCUA > 100 10/10/2022 08:00 PM    BACTERIA MANY 10/10/2022 08:00 PM    RBCUA 3-5 10/10/2022 08:00 PM    BLOODU SMALL 10/10/2022 08:00 PM    SPECGRAV 1.011 10/10/2022 08:00 PM    GLUCOSEU Negative 07/12/2022 12:00 AM       Radiology:  XR CHEST PORTABLE    Result Date: 10/11/2022  PROCEDURE: XR CHEST PORTABLE CLINICAL INFORMATION: 51-year-old male with new oxygen requirement with accessory muscle use. COMPARISON: Radiograph 7/10/2020. TECHNIQUE: AP upright view of the chest was obtained. FINDINGS: There is a dual-chamber cardiac device over the left side of the chest. There is cardiomegaly and pulmonary vascular congestion. There is blunting of the costophrenic angles which could represent small pleural effusions. There is no pneumothorax. Visualized portions of the upper abdomen are within normal limits. The osseous structures are intact. No acute fractures or suspicious osseous lesions. Cardiomegaly with pulmonary vascular congestion and possible small pleural effusions. **This report has been created using voice recognition software. It may contain minor errors which are inherent in voice recognition technology. ** Final report electronically signed by Dr Brittney Mensah on 10/11/2022 7:40 AM      DVT prophylaxis: [x] Lovenox                                 [] SCDs                                 [] SQ Heparin                                 [] Encourage ambulation           [] Already on Anticoagulation     Code Status: DNR-CCA    PT/OT Eval Status: monitor     Tele:   [x] yes             [] no    Active Hospital Problems    Diagnosis Date Noted    UTI (urinary tract infection) [N39.0] 10/11/2022     Priority: Medium    Sepsis ([address] [A41.9]        Electronically signed by Orene Jeans, MD on 10/14/2022 at 10:12 AM

## 2022-10-14 NOTE — CARE COORDINATION
10/14/22, 10:50 AM EDT    Patient goals/plan/ treatment preferences discussed by  and . Patient goals/plan/ treatment preferences reviewed with patient/ family. Patient/ family verbalize understanding of discharge plan and are in agreement with goal/plan/treatment preferences. Understanding was demonstrated using the teach back method. AVS provided by RN at time of discharge, which includes all necessary medical information pertaining to the patients current course of illness, treatment, post-discharge goals of care, and treatment preferences. Services At/After Discharge: [facility] ([facility]) [address] [place] [address]. Patient to discharge back to [address] [place] [address] over weekend potentially. SW notified [address] [place] [address] of potential discharge over the weekend. RN made aware and discharge instructions and transportation forms placed on chart. IMM Letter  IMM Letter given to Patient/Family/Significant other/Guardian/POA/by[de-identified] Anna Yuan RN   IMM Letter date given[de-identified] 10/14/22  IMM Letter time given[de-identified] 7169         10/14/22, 12:45 PM EDT  DISCHARGE PLANNING EVALUATION    KUN received a call from Felipe Ng at [address] [place] [address] and she reported that if patient needs a bipap at discharge they need those orders so they can order it. KUN looked at chart and patient has not worn bipap for 2 days. KUN spoke with RN and attending told RN that bipap was needed at [facility]. KUN faxed bipap order to [address] [place] [address] and asked them to call KUN if they are not able to get a bipap by tomorrow.

## 2022-10-14 NOTE — PROGRESS NOTES
Comprehensive Nutrition Assessment    Type and Reason for Visit:  Initial, Positive Nutrition Screen    Nutrition Recommendations/Plan:   Recommend a Multivitamin daily. ONS: Started Glucerna TID. Continue current diet. Malnutrition Assessment:  Malnutrition Status: At risk for malnutrition (Comment) (10/14/22 0907)    Context:  Acute Illness     Findings of the 6 clinical characteristics of malnutrition:  Energy Intake:  75% or less of estimated energy requirements for 7 or more days  Weight Loss:  No significant weight loss     Body Fat Loss:  No significant body fat loss     Muscle Mass Loss:  No significant muscle mass loss    Fluid Accumulation:  Mild Extremities   Strength:  Not Performed    Nutrition Assessment: Pt. nutritionally compromised AEB wounds. At risk for further nutrition compromise r/t admit d/t metabolic encephalopathy, UTI, acute hypoxic respiratory failure, increased nutrient needs for wound healing, underlying medical condition (Hx: DM, HTN, HLD, CKD, Hyperparathyroidism, Right AKA, CAD). Nutrition Related Findings:    Pt. Report/Treatments/Miscellaneous: Pt seen during breakfast this morning. Pt reports decreased appetite and intake of meals over the past week. Pt declines Abel BID at this time, but is amenable to try Glucerna TID. GI Status: Pt denies N/V. Last BM x1 on 10/13/22. Pertinent Labs: 10/14/22: Na 135, BUN 37. Cr. 1.9, Glucose 132, POC Glucose 126. Pertinent Meds: Rocephin, Protonix. 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)       Current Nutrition Intake & Therapies:    Average Meal Intake: 51-75%, 26-50% (per pt report)  Average Supplements Intake:  (initiated today)  ADULT DIET;  Regular; Low Fat/Low Chol/High Fiber/2 gm Na; 2000 ml  ADULT ORAL NUTRITION SUPPLEMENT; Lunch, Breakfast, Dinner; Diabetic Oral Supplement    Anthropometric Measures:  Height: 6' (182.9 cm)  Ideal Body Weight (IBW): 178 lbs (81 kg)    Admission Body Weight: 271 lb 2.7 oz (123 kg) (10/12; +3-4 pitting edema in extremities)  Current Body Weight: 271 lb 2.7 oz (123 kg) (10/14; +3-4 pitting edema in extremities)  Adjusted BMI (kg/m2): 40.5 for left AKA  Adjusted IBW: 160 lbs for left AKA  Usual Body Weight:  (250 lbs per pt report. Per EMR: 255 lb 3.2 oz on 9/8/22; 247 lb 14.4 oz on 4/19/22; 258 lb 9.6 oz on 10/27/21)  BMI Categories: Obese Class 2 (BMI 35.0 -39.9)    Estimated Daily Nutrient Needs:  Energy Requirements Based On: Kcal/kg  Weight Used for Energy Requirements: Current  Energy (kcal/day): 1884-0542 kcal/day (15-18 kcal/kg)  Weight Used for Protein Requirements: Adjusted (Adjusted IBW 72.7 kg)  Protein (g/day): 101-145 g/day (1.4-2 g/kg)    Nutrition Diagnosis:   Increased nutrient needs related to increase demand for energy/nutrients as evidenced by wounds    Nutrition Interventions:   Food and/or Nutrient Delivery: Continue Current Diet, Start Oral Nutrition Supplement, Vitamin Supplement  Nutrition Education/Counseling: Education initiated (Encouraged lean protein sources with meals and ONS at best effort)  Coordination of Nutrition Care: Continue to monitor while inpatient    Goals:  Goals: PO intake 75% or greater, by next RD assessment    Nutrition Monitoring and Evaluation:   Behavioral-Environmental Outcomes: None Identified  Food/Nutrient Intake Outcomes: Food and Nutrient Intake, Supplement Intake, Vitamin/Mineral Intake  Physical Signs/Symptoms Outcomes: Biochemical Data, Weight, Skin, Nutrition Focused Physical Findings, GI Status, Fluid Status or Edema    Discharge Planning:     Too soon to determine     Davis Crowley, MS, RD, LD  Contact: [phone]

## 2022-10-14 NOTE — PLAN OF CARE
Problem: Discharge Planning  Goal: Discharge to home or other facility with appropriate resources  Outcome: Progressing  Flowsheets (Taken 10/13/2022 2000)  Discharge to home or other facility with appropriate resources:   Identify barriers to discharge with patient and caregiver   Arrange for needed discharge resources and transportation as appropriate   Identify discharge learning needs (meds, wound care, etc)   Refer to discharge planning if patient needs post-hospital services based on physician order or complex needs related to functional status, cognitive ability or social support system     Problem: Neurosensory - Adult  Goal: Achieves stable or improved neurological status  Outcome: Progressing  Flowsheets (Taken 10/13/2022 2000)  Achieves stable or improved neurological status:   Assess for and report changes in neurological status   Initiate measures to prevent increased intracranial pressure   Maintain blood pressure and fluid volume within ordered parameters to optimize cerebral perfusion and minimize risk of hemorrhage  Goal: Achieves maximal functionality and self care  Outcome: Progressing  Flowsheets (Taken 10/13/2022 2000)  Achieves maximal functionality and self care:   Monitor swallowing and airway patency with patient fatigue and changes in neurological status   Encourage and assist patient to increase activity and self care with guidance from physical therapy/occupational therapy     Problem: Respiratory - Adult  Goal: Achieves optimal ventilation and oxygenation  Outcome: Progressing  Flowsheets (Taken 10/13/2022 0815 by Kingsley Burgos RN)  Achieves optimal ventilation and oxygenation:   Assess for changes in respiratory status   Assess for changes in mentation and behavior   Position to facilitate oxygenation and minimize respiratory effort   Oxygen supplementation based on oxygen saturation or arterial blood gases   Encourage broncho-pulmonary hygiene including cough, deep breathe, incentive spirometry   Assess the need for suctioning and aspirate as needed   Assess and instruct to report shortness of breath or any respiratory difficulty   Respiratory therapy support as indicated     Problem: Infection - Adult  Goal: Absence of infection during hospitalization  Outcome: Progressing  Flowsheets (Taken 10/13/2022 2000)  Absence of infection during hospitalization:   Assess and monitor for signs and symptoms of infection   Monitor lab/diagnostic results   Monitor all insertion sites i.e., indwelling lines, tubes and drains   Administer medications as ordered  Goal: Absence of infection at discharge  Outcome: Progressing  Flowsheets (Taken 10/13/2022 2000)  Absence of infection at discharge:   Assess and monitor for signs and symptoms of infection   Monitor lab/diagnostic results   Monitor all insertion sites i.e., indwelling lines, tubes and drains   Administer medications as ordered     Problem: Metabolic/Fluid and Electrolytes - Adult  Goal: Glucose maintained within prescribed range  Outcome: Progressing  Flowsheets (Taken 10/13/2022 2000)  Glucose maintained within prescribed range:   Monitor blood glucose as ordered   Assess for signs and symptoms of hyperglycemia and hypoglycemia   Administer ordered medications to maintain glucose within target range   Assess barriers to adequate nutritional intake and initiate nutrition consult as needed  Goal: Electrolytes maintained within normal limits  Outcome: Progressing  Flowsheets (Taken 10/13/2022 2000)  Electrolytes maintained within normal limits:   Monitor labs and assess patient for signs and symptoms of electrolyte imbalances   Administer electrolyte replacement as ordered   Monitor response to electrolyte replacements, including repeat lab results as appropriate   Fluid restriction as ordered  Goal: Hemodynamic stability and optimal renal function maintained  Outcome: Progressing  Flowsheets (Taken 10/13/2022 2000)  Hemodynamic stability and optimal renal function maintained:   Monitor intake, output and patient weight   Monitor labs and assess for signs and symptoms of volume excess or deficit   Monitor urine specific gravity, serum osmolarity and serum sodium as indicated or ordered   Monitor response to interventions for patient's volume status, including labs, urine output, blood pressure (other measures as available)     Problem: Chronic Conditions and Co-morbidities  Goal: Patient's chronic conditions and co-morbidity symptoms are monitored and maintained or improved  Outcome: Progressing  Flowsheets (Taken 10/13/2022 2000)  Care Plan - Patient's Chronic Conditions and Co-Morbidity Symptoms are Monitored and Maintained or Improved:   Monitor and assess patient's chronic conditions and comorbid symptoms for stability, deterioration, or improvement   Collaborate with multidisciplinary team to address chronic and comorbid conditions and prevent exacerbation or deterioration   Update acute care plan with appropriate goals if chronic or comorbid symptoms are exacerbated and prevent overall improvement and discharge     Problem: ABCDS Injury Assessment  Goal: Absence of physical injury  Outcome: Progressing  Flowsheets (Taken 10/12/2022 1340 by Elsy Duque RN)  Absence of Physical Injury: Implement safety measures based on patient assessment     Problem: Skin/Tissue Integrity  Goal: Absence of new skin breakdown  Description: 1. Monitor for areas of redness and/or skin breakdown  2. Assess vascular access sites hourly  3. Every 4-6 hours minimum:  Change oxygen saturation probe site  4. Every 4-6 hours:  If on nasal continuous positive airway pressure, respiratory therapy assess nares and determine need for appliance change or resting period. Outcome: Progressing  Note: No new evidence of skin breakdown.      Problem: Cardiovascular - Adult  Goal: Maintains optimal cardiac output and hemodynamic stability  Outcome: ordered activity level     Problem: Genitourinary - Adult  Goal: Urinary catheter remains patent  Outcome: Progressing  Flowsheets (Taken 10/13/2022 0815 by Courtney Sheehan RN)  Urinary catheter remains patent: Assess patency of urinary catheter     Problem: Hematologic - Adult  Goal: Maintains hematologic stability  Outcome: Progressing  Flowsheets (Taken 10/13/2022 2000)  Maintains hematologic stability:   Assess for signs and symptoms of bleeding or hemorrhage   Administer blood products/factors as ordered     Problem: Pain  Goal: Verbalizes/displays adequate comfort level or baseline comfort level  Outcome: Progressing  Flowsheets (Taken 10/13/2022 0815 by Courtney Sheehan RN)  Verbalizes/displays adequate comfort level or baseline comfort level:   Encourage patient to monitor pain and request assistance   Assess pain using appropriate pain scale   Administer analgesics based on type and severity of pain and evaluate response   Implement non-pharmacological measures as appropriate and evaluate response  Care plan reviewed with patient. Patient verbalizes understanding of the care plan and contributed to goal setting.

## 2022-10-15 VITALS
TEMPERATURE: 98.1 F | WEIGHT: 272.1 LBS | BODY MASS INDEX: 36.85 KG/M2 | OXYGEN SATURATION: 94 % | HEIGHT: 72 IN | SYSTOLIC BLOOD PRESSURE: 112 MMHG | RESPIRATION RATE: 20 BRPM | DIASTOLIC BLOOD PRESSURE: 55 MMHG | HEART RATE: 69 BPM

## 2022-10-15 PROBLEM — J96.22 ACUTE ON CHRONIC RESPIRATORY FAILURE WITH HYPOXIA AND HYPERCAPNIA (HCC): Status: ACTIVE | Noted: 2022-01-01

## 2022-10-15 PROBLEM — J96.21 ACUTE ON CHRONIC RESPIRATORY FAILURE WITH HYPOXIA AND HYPERCAPNIA (HCC): Status: ACTIVE | Noted: 2022-01-01

## 2022-10-15 LAB
ANION GAP SERPL CALCULATED.3IONS-SCNC: 10 MEQ/L (ref 8–16)
BUN BLDV-MCNC: 35 MG/DL (ref 7–22)
CALCIUM SERPL-MCNC: 8.4 MG/DL (ref 8.5–10.5)
CHLORIDE BLD-SCNC: 93 MEQ/L (ref 98–111)
CO2: 30 MEQ/L (ref 23–33)
CREAT SERPL-MCNC: 1.9 MG/DL (ref 0.4–1.2)
GFR SERPL CREATININE-BSD FRML MDRD: 34 ML/MIN/1.73M2
GLUCOSE BLD-MCNC: 139 MG/DL (ref 70–108)
GLUCOSE BLD-MCNC: 154 MG/DL (ref 70–108)
GLUCOSE BLD-MCNC: 157 MG/DL (ref 70–108)
POTASSIUM REFLEX MAGNESIUM: 3.7 MEQ/L (ref 3.5–5.2)
SODIUM BLD-SCNC: 133 MEQ/L (ref 135–145)

## 2022-10-15 PROCEDURE — 36415 COLL VENOUS BLD VENIPUNCTURE: CPT

## 2022-10-15 PROCEDURE — 82948 REAGENT STRIP/BLOOD GLUCOSE: CPT

## 2022-10-15 PROCEDURE — 2580000003 HC RX 258: Performed by: PHYSICIAN ASSISTANT

## 2022-10-15 PROCEDURE — 80048 BASIC METABOLIC PNL TOTAL CA: CPT

## 2022-10-15 PROCEDURE — 6360000002 HC RX W HCPCS: Performed by: STUDENT IN AN ORGANIZED HEALTH CARE EDUCATION/TRAINING PROGRAM

## 2022-10-15 PROCEDURE — 6370000000 HC RX 637 (ALT 250 FOR IP): Performed by: PHYSICIAN ASSISTANT

## 2022-10-15 PROCEDURE — 6370000000 HC RX 637 (ALT 250 FOR IP): Performed by: STUDENT IN AN ORGANIZED HEALTH CARE EDUCATION/TRAINING PROGRAM

## 2022-10-15 PROCEDURE — 2580000003 HC RX 258: Performed by: STUDENT IN AN ORGANIZED HEALTH CARE EDUCATION/TRAINING PROGRAM

## 2022-10-15 PROCEDURE — 99239 HOSP IP/OBS DSCHRG MGMT >30: CPT | Performed by: STUDENT IN AN ORGANIZED HEALTH CARE EDUCATION/TRAINING PROGRAM

## 2022-10-15 RX ORDER — CEPHALEXIN 500 MG/1
500 CAPSULE ORAL 4 TIMES DAILY
Qty: 12 CAPSULE | Refills: 0 | Status: SHIPPED | OUTPATIENT
Start: 2022-10-15 | End: 2022-10-18

## 2022-10-15 RX ORDER — BUMETANIDE 2 MG/1
2 TABLET ORAL DAILY
Qty: 30 TABLET | Refills: 3 | Status: SHIPPED | OUTPATIENT
Start: 2022-10-16

## 2022-10-15 RX ORDER — PANTOPRAZOLE SODIUM 40 MG/1
40 TABLET, DELAYED RELEASE ORAL
Qty: 30 TABLET | Refills: 3 | Status: SHIPPED | OUTPATIENT
Start: 2022-10-16

## 2022-10-15 RX ORDER — SPIRONOLACTONE 25 MG/1
25 TABLET ORAL DAILY
Qty: 30 TABLET | Refills: 3 | Status: SHIPPED | OUTPATIENT
Start: 2022-10-15

## 2022-10-15 RX ORDER — TAMSULOSIN HYDROCHLORIDE 0.4 MG/1
0.4 CAPSULE ORAL NIGHTLY
Qty: 30 CAPSULE | Refills: 3 | Status: SHIPPED | OUTPATIENT
Start: 2022-10-15

## 2022-10-15 RX ORDER — CALCIUM CARBONATE 500(1250)
500 TABLET ORAL 2 TIMES DAILY
Qty: 30 TABLET | Refills: 0 | Status: SHIPPED | OUTPATIENT
Start: 2022-10-15

## 2022-10-15 RX ADMIN — MAGNESIUM OXIDE 400 MG (241.3 MG MAGNESIUM) TABLET 400 MG: TABLET at 08:38

## 2022-10-15 RX ADMIN — BUMETANIDE 2 MG: 1 TABLET ORAL at 08:38

## 2022-10-15 RX ADMIN — SERTRALINE 100 MG: 100 TABLET, FILM COATED ORAL at 08:38

## 2022-10-15 RX ADMIN — CALCIUM 500 MG: 500 TABLET ORAL at 10:38

## 2022-10-15 RX ADMIN — METOPROLOL SUCCINATE 50 MG: 50 TABLET, EXTENDED RELEASE ORAL at 08:38

## 2022-10-15 RX ADMIN — PANTOPRAZOLE SODIUM 40 MG: 40 TABLET, DELAYED RELEASE ORAL at 08:38

## 2022-10-15 RX ADMIN — CLOPIDOGREL BISULFATE 75 MG: 75 TABLET ORAL at 08:38

## 2022-10-15 RX ADMIN — ASPIRIN 81 MG 81 MG: 81 TABLET ORAL at 08:38

## 2022-10-15 RX ADMIN — CEFTRIAXONE SODIUM 1000 MG: 1 INJECTION, POWDER, FOR SOLUTION INTRAMUSCULAR; INTRAVENOUS at 07:03

## 2022-10-15 RX ADMIN — SODIUM CHLORIDE, PRESERVATIVE FREE 10 ML: 5 INJECTION INTRAVENOUS at 08:40

## 2022-10-15 RX ADMIN — BUSPIRONE HYDROCHLORIDE 7.5 MG: 7.5 TABLET ORAL at 08:38

## 2022-10-15 RX ADMIN — Medication 113.4 G: at 08:40

## 2022-10-15 RX ADMIN — GABAPENTIN 100 MG: 100 CAPSULE ORAL at 08:38

## 2022-10-15 RX ADMIN — FINASTERIDE 5 MG: 5 TABLET, FILM COATED ORAL at 08:38

## 2022-10-15 RX ADMIN — ISOSORBIDE MONONITRATE 30 MG: 30 TABLET, EXTENDED RELEASE ORAL at 08:38

## 2022-10-15 RX ADMIN — SPIRONOLACTONE 25 MG: 25 TABLET ORAL at 08:38

## 2022-10-15 ASSESSMENT — PAIN DESCRIPTION - LOCATION: LOCATION: BUTTOCKS

## 2022-10-15 ASSESSMENT — PAIN SCALES - GENERAL: PAINLEVEL_OUTOF10: 2

## 2022-10-15 ASSESSMENT — PAIN DESCRIPTION - DESCRIPTORS: DESCRIPTORS: BURNING

## 2022-10-15 NOTE — DISCHARGE INSTRUCTIONS
You have been started on Bumex 2 mg , continue aldacone  Get a BMP in a week. Use BiPAP at night. Use 1 L nasal cannula throughout the day. Follow-up with your primary care physician. Take antibiotic for 3 more days. During your stay in the hospital your blood sugar was running normal without getting any insulin. 1 time your blood sugar was low as well. Stop taking insulin products. Follow-up with your primary care physician. Check your blood sugar before giving any insulin.

## 2022-10-15 NOTE — DISCHARGE INSTR - DIET

## 2022-10-15 NOTE — PROGRESS NOTES
Received report from primary nurse Red Francis. Pt resting in bed; bedside tray table w/in reach. Informed pt that someone will check on them w/in the next hour and to use call light for emergencies.  SN Mao, [facility] [place].

## 2022-10-15 NOTE — PROGRESS NOTES
Hospitalist Progress Note    Patient:  Raffaele Simon      Unit/Bed:4K-02/002-A    YOB: 1934    MRN: [id number]       Acct: [de-identified]     PCP: Leela Howe MD    Date of Admission: 10/10/2022    Assessment/Plan:    Acute metabolic encephalopathy: Secondary to UTI and CO2 narcosis. Resolved. AOx3, Secondary to UTI, treatment as below. 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 Urine culture from (10/6)   (+) Providencia stuartii and Proteus mirabilis > 100,000CFU/mL. 5th day. Unable to find cultures. Will give 3 more days of keflex. Acute on chronic hypercapnic with acute hypoxic respiratory failure. - secondary to obstruction/ OHS with acute HFrEF exacerbation: Improved. Pt presented on 3L NC, SpO2 93%. - on RA at baseline  CXR 10/10 on admission -Cardiomegaly w/ pulmonary vascular congestion and possible small pleural effusions  BNP 79934. Echo 2020 EF 45-50%, G1DD, mild pulmonary HTN. Updated echo ordered  10/12 ABG with improved gases. Use nasal cannula with intermittent BiPAP at night. Per chart review, torsemide was recently decreased (40 to 20mg) and aldactone (due to hyperkalemia and EFREN) stopped. S/p Bumex IV diuresis started on Aldactone 25 mg. Continue bumex 2 mg PO.   2L fluid restriction   Monitor electrolytes. 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 Marilyn Poe. Cr stable       IDDMII:   Insulin on hold currently - start PO intake  Q2 hours Accuchecks and PRN   Accu-checks, SSI, hypoglycemia protocol. Secondary hyperparathyroidism:   Resume home meds. Ca (10/10) 8.0, (10/11) 7.9  Repeat BMP      Hx hypervolemic hypoNA- 133    BMP in a week  On 2L fluid restriction. 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 Likely from diabetes.      CAD with history of PTCA Continue plavix, ASA, statin, imdur, toprol  Follows with Dr. Ramirez Neither. Resume home meds. Has pacemaker. DVT Prophylaxis  Pt on Lovenox 30mg SQ QD      Expected discharge date:  pending course    Disposition:    [] Home       [] TCU       [] Rehab       [] Psych       [x] SNF       [] [place]       [] Other-    Chief Complaint: Hallucinations    Hospital Course:   HPI Provided by Chart Review  \"Manpreet Thomas is a 80 y.o. male with PMHx of CAD, PPM, BPH with urinary retention who presented to [facility] with chief complaint of hallucinations. Patient states that he was seen in ED on Thursday for hallucinations, noted to have a UTI and started on Cipro. He states that he was notified today that culture showed resistance to Cipro and he was told to go to ED again. Patient reports continued visual and auditory hallucinations. Otherwise he is AOx3. Patient states that he thinks he was started on oxygen yesterday at the nursing home. He states he was told his oxygen was low, he did denies feeling short of breath. Denies cough, fever/chills, chest pain, abdominal pain, N/V/D. Patient states his suprapubic catheter is typically exchanged every 30 days, thinks the last was approximately 2 weeks ago. Patient reports chronic severe lower extremity edema and neuropathy, states this is consistent with his baseline. Denies pain. Patient transferred from [address] [place] [address] to [facility] for further management. \"    10/11/2022  Patient found to be more drowsy/obtunded this morning even after correction of hypoglycemia. ABG was ordered which demonstrated respiratory acidosis with compensatory metabolic alkalosis. BiPAP was ordered. Repeat ABG 1 hour later showing improvement in PaCO2 without any improvement and acidosis. BiPAP settings were increased. We will repeat 1 hour  Hypoglycemia has normalized. We will continue to monitor with blood sugars every 2 hours. Insulin on hold currently.     Evidence of significant fluid overload on exam.  According to chart review, nephrology reduced torsemide dose in half and stopped Aldactone last month given EFREN and hyperkalemia. Bumex IV started today. Blood pressure and pulse are stable.   Patient is significantly drowsy but is alert and oriented x3    10/12  Patient was seen and examined no acute issues overnight  Patient denied any SOB, chest pain  Denied any burning micturition  Labs reviewed Na 133, potassium is 3.5, Cr 2  Patient is hemodynamically stable    10/13  Patient seen and examined  No acute issues overnight   Denied any chest pain, SOB has improved  Denied any fever, chills, Nausea or vomiting  Hemodynamically and vitally stable     10/15  Patient is back to baseline   AAOx3  Well diuresed  Negative fluid balance  Denied any chest pain or SOB      Medications:  Reviewed    Infusion Medications    sodium chloride      dextrose       Scheduled Medications    bumetanide  2 mg Oral Daily    spironolactone  25 mg Oral Daily    enoxaparin  30 mg SubCUTAneous Q12H    dextrose  25 g IntraVENous Once    cefTRIAXone (ROCEPHIN) IV  1,000 mg IntraVENous Q24H    aspirin  81 mg Oral Daily    atorvastatin  20 mg Oral Nightly    busPIRone  7.5 mg Oral TID    calcitRIOL  0.25 mcg Oral Once per day on Mon Wed Fri    calcium elemental  500 mg Oral BID    clopidogrel  75 mg Oral Daily    finasteride  5 mg Oral Daily    gabapentin  100 mg Oral BID    isosorbide mononitrate  30 mg Oral Daily    magnesium oxide  400 mg Oral BID    metoprolol succinate  50 mg Oral Daily    pantoprazole  40 mg Oral QAM AC    sertraline  100 mg Oral Daily    tamsulosin  0.4 mg Oral Nightly    zinc oxide  1 each Topical BID    sodium chloride flush  10 mL IntraVENous 2 times per day    [Held by provider] insulin lispro  0-4 Units SubCUTAneous TID WC    [Held by provider] insulin glargine  20 Units SubCUTAneous QAM     PRN Meds: benzonatate, cyclobenzaprine, dextromethorphan-guaiFENesin, docusate sodium, melatonin, traZODone, sodium chloride flush, sodium chloride, ondansetron **OR** ondansetron, polyethylene glycol, acetaminophen **OR** acetaminophen, glucose, dextrose bolus **OR** dextrose bolus, glucagon (rDNA), dextrose      Intake/Output Summary (Last 24 hours) at 10/15/2022 1049  Last data filed at 10/15/2022 0616  Gross per 24 hour   Intake 357.4 ml   Output 1850 ml   Net -1492.6 ml         Diet:  ADULT DIET; Regular; Low Fat/Low Chol/High Fiber/2 gm Na; 2000 ml  ADULT ORAL NUTRITION SUPPLEMENT; Lunch, Breakfast, Dinner; Diabetic Oral Supplement    Exam:  BP (!) 112/55   Pulse 69   Temp 98.1 °F (36.7 °C) (Oral)   Resp 20   Ht 6' (1.829 m)   Wt 272 lb 1.6 oz (123.4 kg)   SpO2 94%   BMI 36.90 kg/m²     General appearance: No apparent distress, appears stated age and cooperative. Obese, chronically ill appearing. Extremely drowsy but arousable  HEENT: Pupils equal, round, and reactive to light. Conjunctivae/corneas clear. Neck: Supple, with full range of motion. No jugular venous distention. Trachea midline. Respiratory:  bradypnea. Clear to auscultation  Cardiovascular: Regular rate and rhythm with normal S1/S2 without murmurs, rubs or gallops. Pacemaker present . + 3 edema RLL, 1+ thigh edema LLL ( AKA)  Abdomen: Soft, non-tender, non-distended with normal bowel sounds. Skin: Skin color pale without xerosis. No rashes or lesions. Neurologic:  Neurovascularly intact without any focal sensory/motor deficits.  Cranial nerves: II-XII intact, grossly non-focal.  Psychiatric: oriented x 3-   Capillary Refill: Brisk,< 3 seconds       Labs:   Recent Labs     10/13/22  0328 10/14/22  0323   WBC 9.1 8.7   HGB 10.3* 10.2*   HCT 33.3* 31.9*    245       Recent Labs     10/13/22  0328 10/14/22  0323 10/15/22  0822    135 133*   K 3.9 4.0 3.7   CL 95* 96* 93*   CO2 29 29 30   BUN 38* 37* 35*   CREATININE 1.9* 1.9* 1.9*   CALCIUM 8.4* 8.0* 8.4*       No results for input(s): AST, ALT, BILIDIR, BILITOT, ALKPHOS in the last 72 hours. No results for input(s): INR in the last 72 hours. No results for input(s): Ismael Jens in the last 72 hours. Microbiology:      Urinalysis:      Lab Results   Component Value Date/Time    NITRU POSITIVE 10/10/2022 08:00 PM    WBCUA > 100 10/10/2022 08:00 PM    BACTERIA MANY 10/10/2022 08:00 PM    RBCUA 3-5 10/10/2022 08:00 PM    BLOODU SMALL 10/10/2022 08:00 PM    SPECGRAV 1.011 10/10/2022 08:00 PM    GLUCOSEU Negative 07/12/2022 12:00 AM       Radiology:  XR CHEST PORTABLE    Result Date: 10/11/2022  PROCEDURE: XR CHEST PORTABLE CLINICAL INFORMATION: 26-year-old male with new oxygen requirement with accessory muscle use. COMPARISON: Radiograph 7/10/2020. TECHNIQUE: AP upright view of the chest was obtained. FINDINGS: There is a dual-chamber cardiac device over the left side of the chest. There is cardiomegaly and pulmonary vascular congestion. There is blunting of the costophrenic angles which could represent small pleural effusions. There is no pneumothorax. Visualized portions of the upper abdomen are within normal limits. The osseous structures are intact. No acute fractures or suspicious osseous lesions. Cardiomegaly with pulmonary vascular congestion and possible small pleural effusions. **This report has been created using voice recognition software. It may contain minor errors which are inherent in voice recognition technology. ** Final report electronically signed by Dr Nikita Tidwell on 10/11/2022 7:40 AM      DVT prophylaxis: [x] Lovenox                                 [] SCDs                                 [] SQ Heparin                                 [] Encourage ambulation           [] Already on Anticoagulation     Code Status: DNR-CCA    PT/OT Eval Status: monitor     Tele:   [x] yes             [] no    Active Hospital Problems    Diagnosis Date Noted    Acute systolic heart failure ([facility] [address] [I50.21] 10/14/2022     Priority: Medium    UTI (urinary tract infection) [N39.0] 10/11/2022     Priority: Medium    Sepsis [facility]) [A41.9]        Electronically signed by Dominik Madera MD on 10/15/2022 at 10:49 AM

## 2022-10-15 NOTE — PROGRESS NOTES
Report called to [address]. Genet's to primary nurse. No concerns voiced, MAR, AVS, and all discharge instructions faxed and sent with LACP and patient with transport.

## 2022-10-15 NOTE — DISCHARGE SUMMARY
Hospital Medicine Discharge Summary      Patient Identification:   Ramiro Garcia   : 1934  MRN: [id number]   Account: [de-identified]      Patient's PCP: Tavares Mccartney MD    Admit Date: 10/10/2022     Discharge Date: 10/15/2022      Admitting Physician: No admitting provider for patient encounter. Discharge Physician: Grant Patel MD       Hospital Course:   \"Manpreet Pedersen is a 80 y.o. male with PMHx of CAD, PPM, BPH with urinary retention who presented to [facility] with chief complaint of hallucinations. Patient states that he was seen in ED on Thursday for hallucinations, noted to have a UTI and started on Cipro. He states that he was notified today that culture showed resistance to Cipro and he was told to go to ED again. Patient reports continued visual and auditory hallucinations. Otherwise he is AOx3. Patient states that he thinks he was started on oxygen yesterday at the nursing home. He states he was told his oxygen was low, he did denies feeling short of breath. Denies cough, fever/chills, chest pain, abdominal pain, N/V/D. Patient states his suprapubic catheter is typically exchanged every 30 days, thinks the last was approximately 2 weeks ago. Patient reports chronic severe lower extremity edema and neuropathy, states this is consistent with his baseline. Denies pain. Patient transferred from [address] [place] [address] to [facility] for further management. \"  Patient required more oxygen and became SOB. Patient was found to be in CHF. Sepsis and UTI resolved with antibiotics. Patient was treated as follow   Acute metabolic encephalopathy: Secondary to UTI and CO2 narcosis. Resolved. 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 Urine culture from (10/6)   (+) Providencia stuartii and Proteus mirabilis > 100,000CFU/mL. Treated with antibiotics.         Acute on chronic hypercapnic with acute hypoxic respiratory failure. - secondary to obstruction/ OHS with acute HFrEF exacerbation: Improved. Pt presented on 3L NC, SpO2 93%. - on RA at baseline  CXR 10/10 on admission -Cardiomegaly w/ pulmonary vascular congestion and possible small pleural effusions  BNP 22918. Echo 2020 EF 45-50%, G1DD, mild pulmonary HTN. Updated echo ordered  10/12 ABG with improved gases. Use nasal cannula with intermittent BiPAP at night. Per chart review, torsemide was recently decreased (40 to 20mg) and aldactone (due to hyperkalemia and EFREN) stopped. S/p Bumex IV diuresis started on Aldactone 25 mg. Continue bumex 2 mg PO.   2L fluid restriction   Monitor electrolytes. The patient was seen and examined on day of discharge and this discharge summary is in conjunction with any daily progress note from day of discharge. The patient is discharged in stable condition. Labs: For convenience and continuity at follow-up the following most recent labs are provided:    CBC:    Lab Results   Component Value Date/Time    WBC 8.7 10/14/2022 03:23 AM    HGB 10.2 10/14/2022 03:23 AM    HCT 31.9 10/14/2022 03:23 AM     10/14/2022 03:23 AM       Renal:    Lab Results   Component Value Date/Time     10/15/2022 08:22 AM    K 3.7 10/15/2022 08:22 AM    CL 93 10/15/2022 08:22 AM    CO2 30 10/15/2022 08:22 AM    BUN 35 10/15/2022 08:22 AM    CREATININE 1.9 10/15/2022 08:22 AM    CALCIUM 8.4 10/15/2022 08:22 AM    PHOS 3.7 04/05/2021 12:00 AM         Significant Diagnostic Studies    Radiology:   XR CHEST PORTABLE   Final Result   Impression:   Stable study. This document has been electronically signed by: Natalie Tucker MD on    10/12/2022 05:31 AM      XR CHEST PORTABLE   Final Result   Cardiomegaly with pulmonary vascular congestion and possible small pleural effusions. **This report has been created using voice recognition software.  It may contain minor errors which are inherent in voice recognition technology. **      Final report electronically signed by Dr Magaly Wood on 10/11/2022 7:40 AM             Consults:     IP CONSULT TO SOCIAL WORK    Disposition: SNF  Condition at Discharge: Stable    Code Status:  Prior     Patient Instructions:    Discharge lab work: You have been started on Bumex 2 mg , continue aldacone  Get a BMP in a week. Use BiPAP at night. Use 1 L nasal cannula throughout the day. Follow-up with your primary care physician. Take antibiotic for 3 more days. During your stay in the hospital your blood sugar was running normal without getting any insulin. 1 time your blood sugar was low as well. Stop taking insulin products. Follow-up with your primary care physician. Check your blood sugar before giving any insulin.   Activity: activity as tolerated  Diet: No diet orders on file      Follow-up visits:   [facility] [place] [id number]        Oliva Sol MD  [address] [place]  [phone]    Schedule an appointment as soon as possible for a visit in 1 week(s)           Discharge Medications:        Medication List        START taking these medications      bumetanide 2 MG tablet  Commonly known as: BUMEX  Take 1 tablet by mouth daily  Start taking on: October 16, 2022     cephALEXin 500 MG capsule  Commonly known as: KEFLEX  Take 1 capsule by mouth 4 times daily for 3 days     pantoprazole 40 MG tablet  Commonly known as: PROTONIX  Take 1 tablet by mouth every morning (before breakfast)  Start taking on: October 16, 2022  Replaces: omeprazole 20 MG delayed release capsule            CONTINUE taking these medications      acetaminophen 325 MG tablet  Commonly known as: TYLENOL     aspirin 81 MG chewable tablet     atorvastatin 20 MG tablet  Commonly known as: LIPITOR     BD AutoShield Duo 30G X 5 MM Misc  Generic drug: Insulin Pen Needle     Bedside Drainage Bag [facility]  Dispense one, 2000 cc overnight urinary bag     benzonatate 200 MG capsule  Commonly known as: TESSALON     busPIRone 15 MG tablet  Commonly known as: BUSPAR     calcitRIOL 0.25 MCG capsule  Commonly known as: Rocaltrol  Take 1 capsule by mouth three times a week     calcium elemental 500 MG Tabs tablet  Commonly known as: OSCAL  Take 1 tablet by mouth 2 times daily     clopidogrel 75 MG tablet  Commonly known as: Plavix  Take 1 tablet by mouth daily     dextromethorphan-guaiFENesin  MG per extended release tablet  Commonly known as: MUCINEX DM     docusate sodium 100 MG capsule  Commonly known as: COLACE     finasteride 5 MG tablet  Commonly known as: PROSCAR  Take 1 tablet by mouth daily     gabapentin 100 MG capsule  Commonly known as: NEURONTIN     glucagon 1 MG injection     guaiFENesin 100 MG/5ML liquid  Commonly known as: ROBITUSSIN     HYDROcodone-acetaminophen 5-325 MG per tablet  Commonly known as: NORCO     loratadine 10 MG tablet  Commonly known as: CLARITIN     magnesium oxide 400 (241.3 Mg) MG Tabs tablet  Commonly known as: MAG-OX  Take 1 tablet by mouth 2 times daily     metoprolol succinate 50 MG extended release tablet  Commonly known as: TOPROL XL  Take 1 tablet by mouth daily HOLD for SBP </=110 or HR </=55     nitroGLYCERIN 0.4 MG SL tablet  Commonly known as: NITROSTAT  Place 1 tablet under the tongue every 5 minutes as needed for Chest pain     ondansetron 4 MG tablet  Commonly known as: ZOFRAN     sertraline 100 MG tablet  Commonly known as: ZOLOFT     spironolactone 25 MG tablet  Commonly known as: ALDACTONE  Take 1 tablet by mouth daily     tamsulosin 0.4 MG capsule  Commonly known as: FLOMAX  Take 1 capsule by mouth nightly     therapeutic multivitamin-minerals tablet     Trulicity 4.5 GQ/9.6GV Sopn  Generic drug: Dulaglutide     vitamin D 1.25 MG (07451 UT) Caps capsule  Commonly known as: ERGOCALCIFEROL  Take 1 capsule by mouth once a week     zinc oxide 30 % Oint  Commonly known as: PINXAV  Apply 113.4 g topically 2 times daily Groin area            STOP taking these medications      ARTIFICIAL TEARS OP     Biofreeze 4 % Gel  Generic drug: Menthol (Topical Analgesic)     cefTRIAXone 1 g injection  Commonly known as: ROCEPHIN     ciprofloxacin 500 MG tablet  Commonly known as: CIPRO     insulin glargine 100 UNIT/ML injection pen  Commonly known as: LANTUS;BASAGLAR     insulin glargine 100 UNIT/ML injection vial  Commonly known as: LANTUS     melatonin 3 MG Tabs tablet     omeprazole 20 MG delayed release capsule  Commonly known as: PRILOSEC  Replaced by: pantoprazole 40 MG tablet     torsemide 10 MG tablet  Commonly known as: DEMADEX     traZODone 100 MG tablet  Commonly known as: DESYREL            ASK your doctor about these medications      isosorbide mononitrate 30 MG extended release tablet  Commonly known as: IMDUR  Take 1 tablet by mouth daily HOLD for SBP </=110               Where to Get Your Medications        These medications were sent to [address] [place] [address] Dear Arthur Saleemjose juan Roseanna [phone]  [address] [place] [address]      Phone: [phone]   bumetanide 2 MG tablet  calcium elemental 500 MG Tabs tablet  cephALEXin 500 MG capsule  pantoprazole 40 MG tablet  spironolactone 25 MG tablet  tamsulosin 0.4 MG capsule  zinc oxide 30 % Oint                Time Spent on discharge is 45 minutes in the examination, evaluation, counseling and review of medications and discharge plan. Thank you Aria Holt MD for the opportunity to be involved in this patient's care.       Signed:    Electronically signed by Cortes Amanda MD on 10/15/22 at 6:19 PM EDT

## 2022-10-15 NOTE — FLOWSHEET NOTE
10/15/22 0947   Safe Environment   Safety Measures   (VN safety round complete)   Pt did not respond to audio , video enabled for safety check , pt awake and alert , setting up in bed , pt denied any needs or complaints , pt is [facility]

## 2022-10-15 NOTE — PROGRESS NOTES
Pt is alert and oriented; speech clear and appropriate. No concerns at this time. PERRLA 5mm down to 3mm. Bilateral upper extremities tan, warm, and dry; scattered bruising. Sensations present; no numbness/tingling. Hand grasp strong and equal bilaterally. Skin turgor and capillary refill less than 3 seconds. Dressing over IV on left dorsal hand clean, dry, and intact. Heart rhythms strong and regular. Chest rise symmetrical, respirations unlabored, lung sounds clear throughout. Bowel sounds active in all 4 quadrants. Abdomen soft, rounded, non-distended, and non-tender. 18fr suprapubic catheter patent and draining yellow urine. 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 Right lower extremity tan, warm, and dry. Sensations present; no numbness or tingling. Edema noted. Right peripheral pulse weak. Pressure stage 2 wound on coccyx, zinc oxide applied. Needs assistance to make adjustments in positioning. Bedside tray table w/in reach.  Ariadna Buck, SN, [facility].

## 2022-10-15 NOTE — PROGRESS NOTES
Notified primary nurse Sherman [employer]ado [employer].  SN She[place], [place] [facility] [place].

## 2022-10-19 ENCOUNTER — TELEPHONE (OUTPATIENT)
Dept: CARDIOLOGY CLINIC | Age: 87
End: 2022-10-19

## 2022-10-20 ENCOUNTER — PROCEDURE VISIT (OUTPATIENT)
Dept: CARDIOLOGY CLINIC | Age: 87
End: 2022-10-20
Payer: MEDICARE

## 2022-10-20 DIAGNOSIS — Z95.0 PACEMAKER: Primary | ICD-10-CM

## 2022-10-20 PROCEDURE — 93296 REM INTERROG EVL PM/IDS: CPT | Performed by: NUCLEAR MEDICINE

## 2022-10-20 PROCEDURE — 93294 REM INTERROG EVL PM/LDLS PM: CPT | Performed by: NUCLEAR MEDICINE

## 2022-10-20 NOTE — PROGRESS NOTES
[facility]   Patient of [place]    Battery 10.9 years    Presenting rhythm AS     A Impedance 361  RV Impedance 456    P wave sensing 3.5  R wave sensing >20    A Threshold 0.625 @ 0.40  A Amplitude 1.5 @ 0.40  RV Thresholds 0.625 @ 0.40  RV Amplitude 2.0 @ 0.40      A Paced 11.8%  V Paced 96.9%    Programmed Mode DDD       Afib [place] <0.1%    Episodes   none

## 2022-11-10 PROBLEM — N39.0 UTI (URINARY TRACT INFECTION): Status: RESOLVED | Noted: 2022-01-01 | Resolved: 2022-01-01

## 2022-11-18 ENCOUNTER — TELEPHONE (OUTPATIENT)
Dept: FAMILY MEDICINE CLINIC | Age: 87
End: 2022-11-18

## 2022-11-18 NOTE — TELEPHONE ENCOUNTER
----- Message from [employer] sent at 2022 10:01 AM EST -----  Subject: Message to Provider    QUESTIONS  Information for Provider? Dariel Vogt from [facility] calling to reach out to   [facility] about a Death certificate being signed. He asks that someone call him   back at [phone]   ---------------------------------------------------------------------------  --------------  [id number]  [phone]; OK to leave message on voicemail  ---------------------------------------------------------------------------  --------------  SCRIPT ANSWERS  Relationship to Patient? Third Party  Third Party Type? Other  Other Third Party Type? [facility]  Representative Name?  Dariel Vogt

## 2024-02-02 NOTE — TELEPHONE ENCOUNTER
PT IS SCHEDULED ON 02/26/21
RECEIVED A DOWNLOAD TODAY, [employer] DUAL PACEMAKER AND THE DEVICE IS AT [place]. THIS DEVICE DOES NOT SAY WHEN HE HIT [place].  THIS PT IS A RESIDENT AT [facility] FOR WIFE, LUPIS , WHO IS ON [employer] TO CALL THIS OFFICE
WIFE LUPIS CALLED THE OFFICE BACK AND SAID IT IS OK TO GO AHEAD AND SCHEDULE THIS PT FOR A GENERATOR CHANGE OUT BUT SHE WOULD LIKE FOR THE SCHEDULING STAFF TO CALL HER BEFORE THE ORDERS ARE PLACED . VERLAL ORDERS PER DR BEVERLY TO CHANGE OUT GENERATOR . ORDERS PLACED AND TAKEN TO SCHEDULING.  I DID CALL [facility] TO LET THEM KNOW ANAND WILL BE CALLING THEM TO GET THIS SET UP
no

## (undated) DEVICE — Y-TYPE TUR/BLADDER IRRIGATION SET, REGULATING CLAMP

## (undated) DEVICE — GUIDE PIN 3.2MM X 343MM: Brand: TRIGEN

## (undated) DEVICE — BLANKET WRM W29.9XL79.1IN UP BODY FORC AIR MISTRAL-AIR

## (undated) DEVICE — DUAL LUMEN URETERAL CATHETER

## (undated) DEVICE — TUBING, SUCTION, 1/4" X 20', STRAIGHT: Brand: MEDLINE INDUSTRIES, INC.

## (undated) DEVICE — 4.0MM SHORT PILOT DRILL WITH AO CONNECTOR: Brand: TRIGEN

## (undated) DEVICE — 4-PORT MANIFOLD: Brand: NEPTUNE 2

## (undated) DEVICE — BANDAGE COMPR W6INXL5YD SELF ADH COHESIVE CO FLX

## (undated) DEVICE — APPLICATOR MEDICATED 26 CC SOLUTION HI LT ORNG CHLORAPREP

## (undated) DEVICE — PAD,ABDOMINAL,5"X9",ST,LF,25/BX: Brand: MEDLINE INDUSTRIES, INC.

## (undated) DEVICE — BLANKET WRM W40.2XL55.9IN IORT LO BODY + MISTRAL AIR

## (undated) DEVICE — KIT PRB L440MM DIA3.9MM BLU SWISS LITHOCLAST TRIL

## (undated) DEVICE — INTENDED FOR TISSUE SEPARATION, AND OTHER PROCEDURES THAT REQUIRE A SHARP SURGICAL BLADE TO PUNCTURE OR CUT.: Brand: BARD-PARKER ® CARBON RIB-BACK BLADES

## (undated) DEVICE — LINER SUCT CANSTR 1500CC SEMI RIG W/ POR HYDROPHOBIC SHUT

## (undated) DEVICE — GLOVE ORANGE PI 7   MSG9070

## (undated) DEVICE — GOWN,SIRUS,NON REINFRCD,LARGE,SET IN SL: Brand: MEDLINE

## (undated) DEVICE — ADAPTER URO SCP UROLOK LL

## (undated) DEVICE — SYRINGE MED 50ML LUERLOCK TIP

## (undated) DEVICE — YANKAUER,BULB TIP,W/O VENT,RIGID,STERILE: Brand: MEDLINE

## (undated) DEVICE — DRESSING TRNSPAR W8XL12IN FLM SURESITE 123

## (undated) DEVICE — SPONGE LAP W18XL18IN WHT COT 4 PLY FLD STRUNG RADPQ DISP ST

## (undated) DEVICE — Z INACTIVE USE 2660664 SOLUTION IRRIG 3000ML 0.9% SOD CHL USP UROMATIC PLAS CONT

## (undated) DEVICE — GAUZE,SPONGE,8"X4",12PLY,XRAY,STRL,LF: Brand: MEDLINE

## (undated) DEVICE — GOWN,AURORA,NON-REINFORCED,2XL: Brand: MEDLINE

## (undated) DEVICE — GLOVE ORANGE PI 8   MSG9080

## (undated) DEVICE — NITINOL STONE RETRIEVAL BASKET: Brand: ZERO TIP

## (undated) DEVICE — 3.0MM X 1000MM BALL TIP GUIDE ROD: Brand: TRIGEN

## (undated) DEVICE — SPONGE GZ W4XL4IN COT 12 PLY TYP VII WVN C FLD DSGN

## (undated) DEVICE — DRAINBAG,ANTI-REFLUX TOWER,L/F,2000ML,LL: Brand: MEDLINE

## (undated) DEVICE — SYRINGE IRRIG 60ML SFT PLIABLE BLB EZ TO GRP 1 HND USE W/

## (undated) DEVICE — BANDAGE COBAN 4 IN COMPR W4INXL5YD FOAM COHESIVE QUIK STK SELF ADH SFT

## (undated) DEVICE — TOTAL TRAY, DB, 100% SILI FOLEY, 16FR 10: Brand: MEDLINE

## (undated) DEVICE — SYSTEM SKIN CLSR 22CM DERMBND PRINEO

## (undated) DEVICE — DRAPE C ARM W36XL30IN RECTANG BND BG AND TAPE